# Patient Record
Sex: MALE | Race: ASIAN | NOT HISPANIC OR LATINO
[De-identification: names, ages, dates, MRNs, and addresses within clinical notes are randomized per-mention and may not be internally consistent; named-entity substitution may affect disease eponyms.]

---

## 2018-10-01 ENCOUNTER — APPOINTMENT (OUTPATIENT)
Dept: UROLOGY | Facility: CLINIC | Age: 63
End: 2018-10-01
Payer: MEDICARE

## 2018-10-01 VITALS
DIASTOLIC BLOOD PRESSURE: 70 MMHG | BODY MASS INDEX: 27.62 KG/M2 | RESPIRATION RATE: 16 BRPM | HEIGHT: 67 IN | HEART RATE: 76 BPM | TEMPERATURE: 98.5 F | OXYGEN SATURATION: 98 % | WEIGHT: 176 LBS | SYSTOLIC BLOOD PRESSURE: 150 MMHG

## 2018-10-01 DIAGNOSIS — I10 ESSENTIAL (PRIMARY) HYPERTENSION: ICD-10-CM

## 2018-10-01 DIAGNOSIS — Z86.39 PERSONAL HISTORY OF OTHER ENDOCRINE, NUTRITIONAL AND METABOLIC DISEASE: ICD-10-CM

## 2018-10-01 LAB
BILIRUB UR QL STRIP: NEGATIVE
CLARITY UR: CLEAR
COLLECTION METHOD: NORMAL
GLUCOSE UR-MCNC: NORMAL
HCG UR QL: 0.2 EU/DL
HGB UR QL STRIP.AUTO: NEGATIVE
KETONES UR-MCNC: NEGATIVE
LEUKOCYTE ESTERASE UR QL STRIP: NEGATIVE
NITRITE UR QL STRIP: NEGATIVE
PH UR STRIP: 5.5
PROT UR STRIP-MCNC: NEGATIVE
SP GR UR STRIP: <=1.005

## 2018-10-01 PROCEDURE — 51798 US URINE CAPACITY MEASURE: CPT

## 2018-10-01 PROCEDURE — 81003 URINALYSIS AUTO W/O SCOPE: CPT | Mod: QW

## 2018-10-01 PROCEDURE — 99204 OFFICE O/P NEW MOD 45 MIN: CPT | Mod: 25

## 2018-10-01 RX ORDER — FINASTERIDE 5 MG/1
5 TABLET, FILM COATED ORAL DAILY
Qty: 30 | Refills: 6 | Status: ACTIVE | COMMUNITY
Start: 2018-10-01 | End: 1900-01-01

## 2018-10-01 RX ORDER — MECLIZINE HYDROCHLORIDE 12.5 MG/1
12.5 TABLET ORAL
Refills: 0 | Status: ACTIVE | COMMUNITY

## 2018-10-01 RX ORDER — SITAGLIPTIN 100 MG/1
100 TABLET, FILM COATED ORAL
Refills: 0 | Status: ACTIVE | COMMUNITY

## 2018-10-01 RX ORDER — DONEPEZIL HYDROCHLORIDE 5 MG/1
5 TABLET ORAL
Refills: 0 | Status: ACTIVE | COMMUNITY

## 2018-10-01 RX ORDER — OXYBUTYNIN CHLORIDE 15 MG/1
15 TABLET, EXTENDED RELEASE ORAL
Refills: 0 | Status: ACTIVE | COMMUNITY

## 2018-10-01 RX ORDER — TOLTERODINE TARTRATE 4 MG/1
4 CAPSULE, EXTENDED RELEASE ORAL
Refills: 0 | Status: ACTIVE | COMMUNITY

## 2018-10-01 RX ORDER — ASCORBIC ACID 500 MG
500 TABLET ORAL
Refills: 0 | Status: ACTIVE | COMMUNITY

## 2018-10-01 RX ORDER — TAMSULOSIN HYDROCHLORIDE 0.4 MG/1
0.4 CAPSULE ORAL
Refills: 0 | Status: ACTIVE | COMMUNITY

## 2018-10-01 RX ORDER — ATORVASTATIN CALCIUM 40 MG/1
40 TABLET, FILM COATED ORAL
Refills: 0 | Status: ACTIVE | COMMUNITY

## 2018-10-01 RX ORDER — HYDROCHLOROTHIAZIDE 25 MG/1
25 TABLET ORAL
Refills: 0 | Status: ACTIVE | COMMUNITY

## 2018-10-01 RX ORDER — LOSARTAN POTASSIUM 50 MG/1
50 TABLET, FILM COATED ORAL
Refills: 0 | Status: ACTIVE | COMMUNITY

## 2018-10-01 RX ORDER — MELOXICAM 15 MG/1
15 TABLET ORAL
Refills: 0 | Status: ACTIVE | COMMUNITY

## 2018-10-01 RX ORDER — ASPIRIN 81 MG
81 TABLET, DELAYED RELEASE (ENTERIC COATED) ORAL
Refills: 0 | Status: ACTIVE | COMMUNITY

## 2018-10-01 RX ORDER — ACETAMINOPHEN 500 MG/1
500 TABLET ORAL
Refills: 0 | Status: ACTIVE | COMMUNITY

## 2018-10-01 RX ORDER — GLIPIZIDE AND METFORMIN HYDROCHLORIDE 2.5; 5 MG/1; MG/1
2.5-5 TABLET, FILM COATED ORAL
Refills: 0 | Status: ACTIVE | COMMUNITY

## 2018-10-01 RX ORDER — AMLODIPINE BESYLATE 10 MG/1
10 TABLET ORAL
Refills: 0 | Status: ACTIVE | COMMUNITY

## 2018-10-15 ENCOUNTER — APPOINTMENT (OUTPATIENT)
Dept: UROLOGY | Facility: CLINIC | Age: 63
End: 2018-10-15
Payer: MEDICARE

## 2018-10-15 VITALS
BODY MASS INDEX: 28.19 KG/M2 | SYSTOLIC BLOOD PRESSURE: 153 MMHG | RESPIRATION RATE: 18 BRPM | TEMPERATURE: 97.9 F | WEIGHT: 180 LBS | DIASTOLIC BLOOD PRESSURE: 69 MMHG | HEART RATE: 69 BPM | OXYGEN SATURATION: 97 %

## 2018-10-15 PROCEDURE — 51798 US URINE CAPACITY MEASURE: CPT

## 2018-10-15 PROCEDURE — 99213 OFFICE O/P EST LOW 20 MIN: CPT | Mod: 25

## 2018-10-15 RX ORDER — TAMSULOSIN HYDROCHLORIDE 0.4 MG/1
0.4 CAPSULE ORAL
Qty: 30 | Refills: 5 | Status: ACTIVE | COMMUNITY
Start: 2018-10-15 | End: 1900-01-01

## 2018-10-15 RX ORDER — TOLTERODINE TARTRATE 4 MG/1
4 CAPSULE, EXTENDED RELEASE ORAL
Qty: 30 | Refills: 5 | Status: ACTIVE | COMMUNITY
Start: 2018-10-15 | End: 1900-01-01

## 2018-10-15 RX ORDER — SOLIFENACIN SUCCINATE 10 MG/1
10 TABLET, FILM COATED ORAL
Qty: 30 | Refills: 5 | Status: ACTIVE | COMMUNITY
Start: 2018-10-15 | End: 1900-01-01

## 2018-12-04 ENCOUNTER — APPOINTMENT (OUTPATIENT)
Dept: UROLOGY | Facility: CLINIC | Age: 63
End: 2018-12-04
Payer: MEDICARE

## 2018-12-04 VITALS
BODY MASS INDEX: 28.19 KG/M2 | SYSTOLIC BLOOD PRESSURE: 158 MMHG | HEART RATE: 66 BPM | DIASTOLIC BLOOD PRESSURE: 72 MMHG | RESPIRATION RATE: 18 BRPM | TEMPERATURE: 97.6 F | WEIGHT: 180 LBS | OXYGEN SATURATION: 98 %

## 2018-12-04 DIAGNOSIS — N40.1 BENIGN PROSTATIC HYPERPLASIA WITH LOWER URINARY TRACT SYMPMS: ICD-10-CM

## 2018-12-04 DIAGNOSIS — N13.8 BENIGN PROSTATIC HYPERPLASIA WITH LOWER URINARY TRACT SYMPMS: ICD-10-CM

## 2018-12-04 DIAGNOSIS — N39.41 URGE INCONTINENCE: ICD-10-CM

## 2018-12-04 DIAGNOSIS — Z00.00 ENCOUNTER FOR GENERAL ADULT MEDICAL EXAMINATION W/OUT ABNORMAL FINDINGS: ICD-10-CM

## 2018-12-04 DIAGNOSIS — E11.9 TYPE 2 DIABETES MELLITUS W/OUT COMPLICATIONS: ICD-10-CM

## 2018-12-04 PROCEDURE — 99213 OFFICE O/P EST LOW 20 MIN: CPT

## 2018-12-05 PROBLEM — N40.1 BPH WITH OBSTRUCTION/LOWER URINARY TRACT SYMPTOMS: Status: ACTIVE | Noted: 2018-10-01

## 2018-12-05 PROBLEM — N39.41 URGE INCONTINENCE OF URINE: Status: ACTIVE | Noted: 2018-10-01

## 2018-12-05 PROBLEM — Z00.00 ENCOUNTER FOR PREVENTIVE HEALTH EXAMINATION: Status: ACTIVE | Noted: 2018-09-24

## 2021-01-01 ENCOUNTER — INPATIENT (INPATIENT)
Facility: HOSPITAL | Age: 66
LOS: 19 days | DRG: 100 | End: 2021-11-16
Attending: INTERNAL MEDICINE | Admitting: INTERNAL MEDICINE
Payer: COMMERCIAL

## 2021-01-01 VITALS — RESPIRATION RATE: 16 BRPM | HEART RATE: 76 BPM | HEIGHT: 70 IN | OXYGEN SATURATION: 100 %

## 2021-01-01 VITALS — OXYGEN SATURATION: 99 % | HEART RATE: 73 BPM

## 2021-01-01 DIAGNOSIS — Z71.89 OTHER SPECIFIED COUNSELING: ICD-10-CM

## 2021-01-01 DIAGNOSIS — Z51.5 ENCOUNTER FOR PALLIATIVE CARE: ICD-10-CM

## 2021-01-01 DIAGNOSIS — G93.40 ENCEPHALOPATHY, UNSPECIFIED: ICD-10-CM

## 2021-01-01 DIAGNOSIS — K13.79 OTHER LESIONS OF ORAL MUCOSA: ICD-10-CM

## 2021-01-01 DIAGNOSIS — R53.2 FUNCTIONAL QUADRIPLEGIA: ICD-10-CM

## 2021-01-01 DIAGNOSIS — E11.65 TYPE 2 DIABETES MELLITUS WITH HYPERGLYCEMIA: ICD-10-CM

## 2021-01-01 DIAGNOSIS — E78.5 HYPERLIPIDEMIA, UNSPECIFIED: ICD-10-CM

## 2021-01-01 DIAGNOSIS — I10 ESSENTIAL (PRIMARY) HYPERTENSION: ICD-10-CM

## 2021-01-01 DIAGNOSIS — R56.9 UNSPECIFIED CONVULSIONS: ICD-10-CM

## 2021-01-01 DIAGNOSIS — J96.00 ACUTE RESPIRATORY FAILURE, UNSPECIFIED WHETHER WITH HYPOXIA OR HYPERCAPNIA: ICD-10-CM

## 2021-01-01 LAB
-  AMIKACIN: SIGNIFICANT CHANGE UP
-  AMPICILLIN/SULBACTAM: SIGNIFICANT CHANGE UP
-  CEFEPIME: SIGNIFICANT CHANGE UP
-  CEFTAZIDIME: SIGNIFICANT CHANGE UP
-  CIPROFLOXACIN: SIGNIFICANT CHANGE UP
-  GENTAMICIN: SIGNIFICANT CHANGE UP
-  IMIPENEM: SIGNIFICANT CHANGE UP
-  LEVOFLOXACIN: SIGNIFICANT CHANGE UP
-  MEROPENEM: SIGNIFICANT CHANGE UP
-  PIPERACILLIN/TAZOBACTAM: SIGNIFICANT CHANGE UP
-  TOBRAMYCIN: SIGNIFICANT CHANGE UP
-  TRIMETHOPRIM/SULFAMETHOXAZOLE: SIGNIFICANT CHANGE UP
A1C WITH ESTIMATED AVERAGE GLUCOSE RESULT: 14.8 % — HIGH (ref 4–5.6)
ALBUMIN SERPL ELPH-MCNC: 1.9 G/DL — LOW (ref 3.3–5)
ALBUMIN SERPL ELPH-MCNC: 2 G/DL — LOW (ref 3.3–5)
ALBUMIN SERPL ELPH-MCNC: 2 G/DL — LOW (ref 3.3–5)
ALBUMIN SERPL ELPH-MCNC: 2.1 G/DL — LOW (ref 3.3–5)
ALBUMIN SERPL ELPH-MCNC: 2.2 G/DL — LOW (ref 3.3–5)
ALBUMIN SERPL ELPH-MCNC: 2.3 G/DL — LOW (ref 3.3–5)
ALBUMIN SERPL ELPH-MCNC: 2.3 G/DL — LOW (ref 3.3–5)
ALBUMIN SERPL ELPH-MCNC: 2.4 G/DL — LOW (ref 3.3–5)
ALBUMIN SERPL ELPH-MCNC: 2.5 G/DL — LOW (ref 3.3–5)
ALP SERPL-CCNC: 100 U/L — SIGNIFICANT CHANGE UP (ref 40–120)
ALP SERPL-CCNC: 105 U/L — SIGNIFICANT CHANGE UP (ref 40–120)
ALP SERPL-CCNC: 116 U/L — SIGNIFICANT CHANGE UP (ref 40–120)
ALP SERPL-CCNC: 128 U/L — HIGH (ref 40–120)
ALP SERPL-CCNC: 72 U/L — SIGNIFICANT CHANGE UP (ref 40–120)
ALP SERPL-CCNC: 73 U/L — SIGNIFICANT CHANGE UP (ref 40–120)
ALP SERPL-CCNC: 77 U/L — SIGNIFICANT CHANGE UP (ref 40–120)
ALP SERPL-CCNC: 78 U/L — SIGNIFICANT CHANGE UP (ref 40–120)
ALP SERPL-CCNC: 85 U/L — SIGNIFICANT CHANGE UP (ref 40–120)
ALP SERPL-CCNC: 85 U/L — SIGNIFICANT CHANGE UP (ref 40–120)
ALP SERPL-CCNC: 87 U/L — SIGNIFICANT CHANGE UP (ref 40–120)
ALP SERPL-CCNC: 87 U/L — SIGNIFICANT CHANGE UP (ref 40–120)
ALP SERPL-CCNC: 88 U/L — SIGNIFICANT CHANGE UP (ref 40–120)
ALP SERPL-CCNC: 90 U/L — SIGNIFICANT CHANGE UP (ref 40–120)
ALP SERPL-CCNC: 90 U/L — SIGNIFICANT CHANGE UP (ref 40–120)
ALP SERPL-CCNC: 98 U/L — SIGNIFICANT CHANGE UP (ref 40–120)
ALT FLD-CCNC: 10 U/L — SIGNIFICANT CHANGE UP (ref 10–45)
ALT FLD-CCNC: 10 U/L — SIGNIFICANT CHANGE UP (ref 10–45)
ALT FLD-CCNC: 11 U/L — SIGNIFICANT CHANGE UP (ref 10–45)
ALT FLD-CCNC: 12 U/L — SIGNIFICANT CHANGE UP (ref 10–45)
ALT FLD-CCNC: 13 U/L — SIGNIFICANT CHANGE UP (ref 10–45)
ALT FLD-CCNC: 14 U/L — SIGNIFICANT CHANGE UP (ref 10–45)
ALT FLD-CCNC: 16 U/L — SIGNIFICANT CHANGE UP (ref 10–45)
ALT FLD-CCNC: 19 U/L — SIGNIFICANT CHANGE UP (ref 10–45)
ALT FLD-CCNC: 19 U/L — SIGNIFICANT CHANGE UP (ref 10–45)
ALT FLD-CCNC: 20 U/L — SIGNIFICANT CHANGE UP (ref 10–45)
ALT FLD-CCNC: 21 U/L — SIGNIFICANT CHANGE UP (ref 10–45)
AMMONIA BLD-MCNC: 30 UMOL/L — SIGNIFICANT CHANGE UP (ref 11–55)
ANION GAP SERPL CALC-SCNC: 10 MMOL/L — SIGNIFICANT CHANGE UP (ref 5–17)
ANION GAP SERPL CALC-SCNC: 10 MMOL/L — SIGNIFICANT CHANGE UP (ref 5–17)
ANION GAP SERPL CALC-SCNC: 11 MMOL/L — SIGNIFICANT CHANGE UP (ref 5–17)
ANION GAP SERPL CALC-SCNC: 13 MMOL/L — SIGNIFICANT CHANGE UP (ref 5–17)
ANION GAP SERPL CALC-SCNC: 14 MMOL/L — SIGNIFICANT CHANGE UP (ref 5–17)
ANION GAP SERPL CALC-SCNC: 14 MMOL/L — SIGNIFICANT CHANGE UP (ref 5–17)
ANION GAP SERPL CALC-SCNC: 15 MMOL/L — SIGNIFICANT CHANGE UP (ref 5–17)
ANION GAP SERPL CALC-SCNC: 7 MMOL/L — SIGNIFICANT CHANGE UP (ref 5–17)
ANION GAP SERPL CALC-SCNC: 8 MMOL/L — SIGNIFICANT CHANGE UP (ref 5–17)
ANION GAP SERPL CALC-SCNC: 9 MMOL/L — SIGNIFICANT CHANGE UP (ref 5–17)
APPEARANCE CSF: CLEAR — SIGNIFICANT CHANGE UP
APPEARANCE SPUN FLD: COLORLESS — SIGNIFICANT CHANGE UP
APPEARANCE UR: ABNORMAL
APPEARANCE UR: CLEAR — SIGNIFICANT CHANGE UP
APPEARANCE UR: CLEAR — SIGNIFICANT CHANGE UP
APTT BLD: 31.6 SEC — SIGNIFICANT CHANGE UP (ref 27.5–35.5)
APTT BLD: 34.6 SEC — SIGNIFICANT CHANGE UP (ref 27.5–35.5)
APTT BLD: 35.2 SEC — SIGNIFICANT CHANGE UP (ref 27.5–35.5)
APTT BLD: 36 SEC — HIGH (ref 27.5–35.5)
APTT BLD: 37.5 SEC — HIGH (ref 27.5–35.5)
APTT BLD: 38.4 SEC — HIGH (ref 27.5–35.5)
APTT BLD: 39.5 SEC — HIGH (ref 27.5–35.5)
APTT BLD: 39.5 SEC — HIGH (ref 27.5–35.5)
APTT BLD: 45.1 SEC — HIGH (ref 27.5–35.5)
APTT BLD: 47 SEC — HIGH (ref 27.5–35.5)
APTT BLD: 50.5 SEC — HIGH (ref 27.5–35.5)
APTT BLD: 51 SEC — HIGH (ref 27.5–35.5)
ARBOVIRUS IGG PNL SER IF: SIGNIFICANT CHANGE UP
AST SERPL-CCNC: 27 U/L — SIGNIFICANT CHANGE UP (ref 10–40)
AST SERPL-CCNC: 28 U/L — SIGNIFICANT CHANGE UP (ref 10–40)
AST SERPL-CCNC: 30 U/L — SIGNIFICANT CHANGE UP (ref 10–40)
AST SERPL-CCNC: 30 U/L — SIGNIFICANT CHANGE UP (ref 10–40)
AST SERPL-CCNC: 34 U/L — SIGNIFICANT CHANGE UP (ref 10–40)
AST SERPL-CCNC: 36 U/L — SIGNIFICANT CHANGE UP (ref 10–40)
AST SERPL-CCNC: 39 U/L — SIGNIFICANT CHANGE UP (ref 10–40)
AST SERPL-CCNC: 41 U/L — HIGH (ref 10–40)
AST SERPL-CCNC: 42 U/L — HIGH (ref 10–40)
AST SERPL-CCNC: 44 U/L — HIGH (ref 10–40)
AST SERPL-CCNC: 47 U/L — HIGH (ref 10–40)
AST SERPL-CCNC: 49 U/L — HIGH (ref 10–40)
AST SERPL-CCNC: 55 U/L — HIGH (ref 10–40)
AST SERPL-CCNC: 56 U/L — HIGH (ref 10–40)
AST SERPL-CCNC: 59 U/L — HIGH (ref 10–40)
AST SERPL-CCNC: 71 U/L — HIGH (ref 10–40)
AUTO DIFF PNL BLD: NEGATIVE — SIGNIFICANT CHANGE UP
B BURGDOR DNA SPEC QL NAA+PROBE: NEGATIVE — SIGNIFICANT CHANGE UP
BACTERIA # UR AUTO: NEGATIVE — SIGNIFICANT CHANGE UP
BASE EXCESS BLDV CALC-SCNC: 5 MMOL/L — HIGH (ref -2–2)
BASE EXCESS BLDV CALC-SCNC: 6.6 MMOL/L — HIGH (ref -2–2)
BASE EXCESS BLDV CALC-SCNC: 6.7 MMOL/L — HIGH (ref -2–2)
BASOPHILS # BLD AUTO: 0.01 K/UL — SIGNIFICANT CHANGE UP (ref 0–0.2)
BASOPHILS # BLD AUTO: 0.02 K/UL — SIGNIFICANT CHANGE UP (ref 0–0.2)
BASOPHILS # BLD AUTO: 0.03 K/UL — SIGNIFICANT CHANGE UP (ref 0–0.2)
BASOPHILS # BLD AUTO: 0.03 K/UL — SIGNIFICANT CHANGE UP (ref 0–0.2)
BASOPHILS # BLD AUTO: 0.04 K/UL — SIGNIFICANT CHANGE UP (ref 0–0.2)
BASOPHILS NFR BLD AUTO: 0.1 % — SIGNIFICANT CHANGE UP (ref 0–2)
BASOPHILS NFR BLD AUTO: 0.2 % — SIGNIFICANT CHANGE UP (ref 0–2)
BASOPHILS NFR BLD AUTO: 0.3 % — SIGNIFICANT CHANGE UP (ref 0–2)
BILIRUB DIRECT SERPL-MCNC: <0.1 MG/DL — SIGNIFICANT CHANGE UP (ref 0–0.2)
BILIRUB INDIRECT FLD-MCNC: >0 MG/DL — LOW (ref 0.2–1)
BILIRUB SERPL-MCNC: 0.1 MG/DL — LOW (ref 0.2–1.2)
BILIRUB SERPL-MCNC: 0.2 MG/DL — SIGNIFICANT CHANGE UP (ref 0.2–1.2)
BILIRUB SERPL-MCNC: <0.1 MG/DL — LOW (ref 0.2–1.2)
BILIRUB UR-MCNC: NEGATIVE — SIGNIFICANT CHANGE UP
BLD GP AB SCN SERPL QL: NEGATIVE — SIGNIFICANT CHANGE UP
BUN SERPL-MCNC: 21 MG/DL — SIGNIFICANT CHANGE UP (ref 7–23)
BUN SERPL-MCNC: 23 MG/DL — SIGNIFICANT CHANGE UP (ref 7–23)
BUN SERPL-MCNC: 24 MG/DL — HIGH (ref 7–23)
BUN SERPL-MCNC: 24 MG/DL — HIGH (ref 7–23)
BUN SERPL-MCNC: 27 MG/DL — HIGH (ref 7–23)
BUN SERPL-MCNC: 28 MG/DL — HIGH (ref 7–23)
BUN SERPL-MCNC: 29 MG/DL — HIGH (ref 7–23)
BUN SERPL-MCNC: 31 MG/DL — HIGH (ref 7–23)
BUN SERPL-MCNC: 32 MG/DL — HIGH (ref 7–23)
BUN SERPL-MCNC: 33 MG/DL — HIGH (ref 7–23)
BUN SERPL-MCNC: 33 MG/DL — HIGH (ref 7–23)
BUN SERPL-MCNC: 34 MG/DL — HIGH (ref 7–23)
BUN SERPL-MCNC: 36 MG/DL — HIGH (ref 7–23)
BUN SERPL-MCNC: 39 MG/DL — HIGH (ref 7–23)
C-ANCA SER-ACNC: NEGATIVE — SIGNIFICANT CHANGE UP
C3 SERPL-MCNC: 153 MG/DL — SIGNIFICANT CHANGE UP (ref 81–157)
C4 SERPL-MCNC: 52 MG/DL — HIGH (ref 13–39)
CA-I SERPL-SCNC: 1.1 MMOL/L — LOW (ref 1.15–1.33)
CA-I SERPL-SCNC: 1.11 MMOL/L — LOW (ref 1.15–1.33)
CA-I SERPL-SCNC: 1.16 MMOL/L — SIGNIFICANT CHANGE UP (ref 1.15–1.33)
CALCIUM SERPL-MCNC: 7.6 MG/DL — LOW (ref 8.4–10.5)
CALCIUM SERPL-MCNC: 7.6 MG/DL — LOW (ref 8.4–10.5)
CALCIUM SERPL-MCNC: 7.7 MG/DL — LOW (ref 8.4–10.5)
CALCIUM SERPL-MCNC: 7.7 MG/DL — LOW (ref 8.4–10.5)
CALCIUM SERPL-MCNC: 7.8 MG/DL — LOW (ref 8.4–10.5)
CALCIUM SERPL-MCNC: 7.9 MG/DL — LOW (ref 8.4–10.5)
CALCIUM SERPL-MCNC: 8 MG/DL — LOW (ref 8.4–10.5)
CALCIUM SERPL-MCNC: 8 MG/DL — LOW (ref 8.4–10.5)
CALCIUM SERPL-MCNC: 8.2 MG/DL — LOW (ref 8.4–10.5)
CALCIUM SERPL-MCNC: 8.2 MG/DL — LOW (ref 8.4–10.5)
CALCIUM SERPL-MCNC: 8.3 MG/DL — LOW (ref 8.4–10.5)
CHLORIDE BLDV-SCNC: 105 MMOL/L — SIGNIFICANT CHANGE UP (ref 96–108)
CHLORIDE BLDV-SCNC: 109 MMOL/L — HIGH (ref 96–108)
CHLORIDE BLDV-SCNC: 109 MMOL/L — HIGH (ref 96–108)
CHLORIDE SERPL-SCNC: 102 MMOL/L — SIGNIFICANT CHANGE UP (ref 96–108)
CHLORIDE SERPL-SCNC: 103 MMOL/L — SIGNIFICANT CHANGE UP (ref 96–108)
CHLORIDE SERPL-SCNC: 104 MMOL/L — SIGNIFICANT CHANGE UP (ref 96–108)
CHLORIDE SERPL-SCNC: 104 MMOL/L — SIGNIFICANT CHANGE UP (ref 96–108)
CHLORIDE SERPL-SCNC: 106 MMOL/L — SIGNIFICANT CHANGE UP (ref 96–108)
CHLORIDE SERPL-SCNC: 107 MMOL/L — SIGNIFICANT CHANGE UP (ref 96–108)
CHLORIDE SERPL-SCNC: 107 MMOL/L — SIGNIFICANT CHANGE UP (ref 96–108)
CHLORIDE SERPL-SCNC: 108 MMOL/L — SIGNIFICANT CHANGE UP (ref 96–108)
CHLORIDE SERPL-SCNC: 109 MMOL/L — HIGH (ref 96–108)
CHLORIDE SERPL-SCNC: 110 MMOL/L — HIGH (ref 96–108)
CHLORIDE SERPL-SCNC: 113 MMOL/L — HIGH (ref 96–108)
CHLORIDE SERPL-SCNC: 114 MMOL/L — HIGH (ref 96–108)
CK MB BLD-MCNC: 1.6 % — SIGNIFICANT CHANGE UP (ref 0–3.5)
CK MB CFR SERPL CALC: 1.1 NG/ML — SIGNIFICANT CHANGE UP (ref 0–6.7)
CK SERPL-CCNC: 59 U/L — SIGNIFICANT CHANGE UP (ref 30–200)
CK SERPL-CCNC: 63 U/L — SIGNIFICANT CHANGE UP (ref 30–200)
CK SERPL-CCNC: 68 U/L — SIGNIFICANT CHANGE UP (ref 30–200)
CO2 BLDV-SCNC: 31 MMOL/L — HIGH (ref 22–26)
CO2 BLDV-SCNC: 33 MMOL/L — HIGH (ref 22–26)
CO2 BLDV-SCNC: 34 MMOL/L — HIGH (ref 22–26)
CO2 SERPL-SCNC: 21 MMOL/L — LOW (ref 22–31)
CO2 SERPL-SCNC: 22 MMOL/L — SIGNIFICANT CHANGE UP (ref 22–31)
CO2 SERPL-SCNC: 23 MMOL/L — SIGNIFICANT CHANGE UP (ref 22–31)
CO2 SERPL-SCNC: 24 MMOL/L — SIGNIFICANT CHANGE UP (ref 22–31)
CO2 SERPL-SCNC: 24 MMOL/L — SIGNIFICANT CHANGE UP (ref 22–31)
CO2 SERPL-SCNC: 25 MMOL/L — SIGNIFICANT CHANGE UP (ref 22–31)
CO2 SERPL-SCNC: 26 MMOL/L — SIGNIFICANT CHANGE UP (ref 22–31)
CO2 SERPL-SCNC: 26 MMOL/L — SIGNIFICANT CHANGE UP (ref 22–31)
CO2 SERPL-SCNC: 27 MMOL/L — SIGNIFICANT CHANGE UP (ref 22–31)
CO2 SERPL-SCNC: 27 MMOL/L — SIGNIFICANT CHANGE UP (ref 22–31)
CO2 SERPL-SCNC: 28 MMOL/L — SIGNIFICANT CHANGE UP (ref 22–31)
COLOR CSF: SIGNIFICANT CHANGE UP
COLOR SPEC: SIGNIFICANT CHANGE UP
COLOR SPEC: YELLOW — SIGNIFICANT CHANGE UP
COLOR SPEC: YELLOW — SIGNIFICANT CHANGE UP
COVID-19 NUCLEOCAPSID GAM AB INTERP: POSITIVE
COVID-19 NUCLEOCAPSID TOTAL GAM ANTIBODY RESULT: 32.6 INDEX — HIGH
COVID-19 SPIKE DOMAIN AB INTERP: POSITIVE
COVID-19 SPIKE DOMAIN ANTIBODY RESULT: >250 U/ML — HIGH
CREAT SERPL-MCNC: 0.89 MG/DL — SIGNIFICANT CHANGE UP (ref 0.5–1.3)
CREAT SERPL-MCNC: 0.96 MG/DL — SIGNIFICANT CHANGE UP (ref 0.5–1.3)
CREAT SERPL-MCNC: 0.97 MG/DL — SIGNIFICANT CHANGE UP (ref 0.5–1.3)
CREAT SERPL-MCNC: 0.98 MG/DL — SIGNIFICANT CHANGE UP (ref 0.5–1.3)
CREAT SERPL-MCNC: 0.99 MG/DL — SIGNIFICANT CHANGE UP (ref 0.5–1.3)
CREAT SERPL-MCNC: 1.01 MG/DL — SIGNIFICANT CHANGE UP (ref 0.5–1.3)
CREAT SERPL-MCNC: 1.03 MG/DL — SIGNIFICANT CHANGE UP (ref 0.5–1.3)
CREAT SERPL-MCNC: 1.04 MG/DL — SIGNIFICANT CHANGE UP (ref 0.5–1.3)
CREAT SERPL-MCNC: 1.05 MG/DL — SIGNIFICANT CHANGE UP (ref 0.5–1.3)
CREAT SERPL-MCNC: 1.07 MG/DL — SIGNIFICANT CHANGE UP (ref 0.5–1.3)
CREAT SERPL-MCNC: 1.08 MG/DL — SIGNIFICANT CHANGE UP (ref 0.5–1.3)
CREAT SERPL-MCNC: 1.08 MG/DL — SIGNIFICANT CHANGE UP (ref 0.5–1.3)
CREAT SERPL-MCNC: 1.11 MG/DL — SIGNIFICANT CHANGE UP (ref 0.5–1.3)
CREAT SERPL-MCNC: 1.16 MG/DL — SIGNIFICANT CHANGE UP (ref 0.5–1.3)
CREAT SERPL-MCNC: 1.26 MG/DL — SIGNIFICANT CHANGE UP (ref 0.5–1.3)
CREAT SERPL-MCNC: 1.26 MG/DL — SIGNIFICANT CHANGE UP (ref 0.5–1.3)
CREAT SERPL-MCNC: 1.34 MG/DL — HIGH (ref 0.5–1.3)
CREAT SERPL-MCNC: 1.35 MG/DL — HIGH (ref 0.5–1.3)
CRP SERPL-MCNC: 35 MG/L — HIGH (ref 0–4)
CRYOGLOB SERPL-MCNC: NEGATIVE — SIGNIFICANT CHANGE UP
CRYPTOC AG CSF-ACNC: NEGATIVE — SIGNIFICANT CHANGE UP
CSF PCR RESULT: SIGNIFICANT CHANGE UP
CULTURE RESULTS: NO GROWTH — SIGNIFICANT CHANGE UP
CULTURE RESULTS: SIGNIFICANT CHANGE UP
D DIMER BLD IA.RAPID-MCNC: 1046 NG/ML DDU — HIGH
D DIMER BLD IA.RAPID-MCNC: 845 NG/ML DDU — HIGH
DESMETHYL LACOSAMIDE: <0.5 UG/ML — SIGNIFICANT CHANGE UP
DIFF PNL FLD: ABNORMAL
DIFF PNL FLD: ABNORMAL
DIFF PNL FLD: NEGATIVE — SIGNIFICANT CHANGE UP
EBV EA AB SER IA-ACNC: <5 U/ML — SIGNIFICANT CHANGE UP
EBV EA AB TITR SER IF: POSITIVE
EBV EA IGG SER-ACNC: NEGATIVE — SIGNIFICANT CHANGE UP
EBV NA IGG SER IA-ACNC: 369 U/ML — HIGH
EBV PATRN SPEC IB-IMP: SIGNIFICANT CHANGE UP
EBV VCA IGG AVIDITY SER QL IA: NEGATIVE — SIGNIFICANT CHANGE UP
EBV VCA IGM SER IA-ACNC: <10 U/ML — SIGNIFICANT CHANGE UP
EBV VCA IGM SER IA-ACNC: <10 U/ML — SIGNIFICANT CHANGE UP
EBV VCA IGM TITR FLD: NEGATIVE — SIGNIFICANT CHANGE UP
EOSINOPHIL # BLD AUTO: 0 K/UL — SIGNIFICANT CHANGE UP (ref 0–0.5)
EOSINOPHIL # BLD AUTO: 0.02 K/UL — SIGNIFICANT CHANGE UP (ref 0–0.5)
EOSINOPHIL # BLD AUTO: 0.12 K/UL — SIGNIFICANT CHANGE UP (ref 0–0.5)
EOSINOPHIL # BLD AUTO: 0.13 K/UL — SIGNIFICANT CHANGE UP (ref 0–0.5)
EOSINOPHIL # BLD AUTO: 0.16 K/UL — SIGNIFICANT CHANGE UP (ref 0–0.5)
EOSINOPHIL # BLD AUTO: 0.18 K/UL — SIGNIFICANT CHANGE UP (ref 0–0.5)
EOSINOPHIL # BLD AUTO: 0.21 K/UL — SIGNIFICANT CHANGE UP (ref 0–0.5)
EOSINOPHIL # BLD AUTO: 0.21 K/UL — SIGNIFICANT CHANGE UP (ref 0–0.5)
EOSINOPHIL # BLD AUTO: 0.26 K/UL — SIGNIFICANT CHANGE UP (ref 0–0.5)
EOSINOPHIL # BLD AUTO: 0.27 K/UL — SIGNIFICANT CHANGE UP (ref 0–0.5)
EOSINOPHIL # BLD AUTO: 0.31 K/UL — SIGNIFICANT CHANGE UP (ref 0–0.5)
EOSINOPHIL NFR BLD AUTO: 0 % — SIGNIFICANT CHANGE UP (ref 0–6)
EOSINOPHIL NFR BLD AUTO: 0.2 % — SIGNIFICANT CHANGE UP (ref 0–6)
EOSINOPHIL NFR BLD AUTO: 1 % — SIGNIFICANT CHANGE UP (ref 0–6)
EOSINOPHIL NFR BLD AUTO: 1.3 % — SIGNIFICANT CHANGE UP (ref 0–6)
EOSINOPHIL NFR BLD AUTO: 1.6 % — SIGNIFICANT CHANGE UP (ref 0–6)
EOSINOPHIL NFR BLD AUTO: 1.6 % — SIGNIFICANT CHANGE UP (ref 0–6)
EOSINOPHIL NFR BLD AUTO: 1.7 % — SIGNIFICANT CHANGE UP (ref 0–6)
EOSINOPHIL NFR BLD AUTO: 2.1 % — SIGNIFICANT CHANGE UP (ref 0–6)
EOSINOPHIL NFR BLD AUTO: 2.7 % — SIGNIFICANT CHANGE UP (ref 0–6)
EOSINOPHIL NFR BLD AUTO: 2.8 % — SIGNIFICANT CHANGE UP (ref 0–6)
EOSINOPHIL NFR BLD AUTO: 3.3 % — SIGNIFICANT CHANGE UP (ref 0–6)
EPI CELLS # UR: 1 /HPF — SIGNIFICANT CHANGE UP
EPI CELLS # UR: 10 — SIGNIFICANT CHANGE UP
EPI CELLS # UR: 21 /HPF — HIGH
ERYTHROCYTE [SEDIMENTATION RATE] IN BLOOD: 107 MM/HR — HIGH (ref 0–20)
ESTIMATED AVERAGE GLUCOSE: 378 MG/DL — HIGH (ref 68–114)
FERRITIN SERPL-MCNC: 476 NG/ML — HIGH (ref 30–400)
FIBRINOGEN PPP-MCNC: 1004 MG/DL — HIGH (ref 290–520)
FIBRINOGEN PPP-MCNC: 1166 MG/DL — HIGH (ref 290–520)
GAS PNL BLDA: SIGNIFICANT CHANGE UP
GAS PNL BLDV: 137 MMOL/L — SIGNIFICANT CHANGE UP (ref 136–145)
GAS PNL BLDV: 140 MMOL/L — SIGNIFICANT CHANGE UP (ref 136–145)
GAS PNL BLDV: 141 MMOL/L — SIGNIFICANT CHANGE UP (ref 136–145)
GAS PNL BLDV: SIGNIFICANT CHANGE UP
GBM IGG SER-ACNC: 3 — SIGNIFICANT CHANGE UP (ref 0–20)
GLUCOSE BLDC GLUCOMTR-MCNC: 109 MG/DL — HIGH (ref 70–99)
GLUCOSE BLDC GLUCOMTR-MCNC: 109 MG/DL — HIGH (ref 70–99)
GLUCOSE BLDC GLUCOMTR-MCNC: 110 MG/DL — HIGH (ref 70–99)
GLUCOSE BLDC GLUCOMTR-MCNC: 111 MG/DL — HIGH (ref 70–99)
GLUCOSE BLDC GLUCOMTR-MCNC: 111 MG/DL — HIGH (ref 70–99)
GLUCOSE BLDC GLUCOMTR-MCNC: 119 MG/DL — HIGH (ref 70–99)
GLUCOSE BLDC GLUCOMTR-MCNC: 120 MG/DL — HIGH (ref 70–99)
GLUCOSE BLDC GLUCOMTR-MCNC: 122 MG/DL — HIGH (ref 70–99)
GLUCOSE BLDC GLUCOMTR-MCNC: 129 MG/DL — HIGH (ref 70–99)
GLUCOSE BLDC GLUCOMTR-MCNC: 130 MG/DL — HIGH (ref 70–99)
GLUCOSE BLDC GLUCOMTR-MCNC: 131 MG/DL — HIGH (ref 70–99)
GLUCOSE BLDC GLUCOMTR-MCNC: 133 MG/DL — HIGH (ref 70–99)
GLUCOSE BLDC GLUCOMTR-MCNC: 136 MG/DL — HIGH (ref 70–99)
GLUCOSE BLDC GLUCOMTR-MCNC: 137 MG/DL — HIGH (ref 70–99)
GLUCOSE BLDC GLUCOMTR-MCNC: 138 MG/DL — HIGH (ref 70–99)
GLUCOSE BLDC GLUCOMTR-MCNC: 140 MG/DL — HIGH (ref 70–99)
GLUCOSE BLDC GLUCOMTR-MCNC: 144 MG/DL — HIGH (ref 70–99)
GLUCOSE BLDC GLUCOMTR-MCNC: 144 MG/DL — HIGH (ref 70–99)
GLUCOSE BLDC GLUCOMTR-MCNC: 153 MG/DL — HIGH (ref 70–99)
GLUCOSE BLDC GLUCOMTR-MCNC: 159 MG/DL — HIGH (ref 70–99)
GLUCOSE BLDC GLUCOMTR-MCNC: 166 MG/DL — HIGH (ref 70–99)
GLUCOSE BLDC GLUCOMTR-MCNC: 168 MG/DL — HIGH (ref 70–99)
GLUCOSE BLDC GLUCOMTR-MCNC: 170 MG/DL — HIGH (ref 70–99)
GLUCOSE BLDC GLUCOMTR-MCNC: 171 MG/DL — HIGH (ref 70–99)
GLUCOSE BLDC GLUCOMTR-MCNC: 171 MG/DL — HIGH (ref 70–99)
GLUCOSE BLDC GLUCOMTR-MCNC: 178 MG/DL — HIGH (ref 70–99)
GLUCOSE BLDC GLUCOMTR-MCNC: 179 MG/DL — HIGH (ref 70–99)
GLUCOSE BLDC GLUCOMTR-MCNC: 182 MG/DL — HIGH (ref 70–99)
GLUCOSE BLDC GLUCOMTR-MCNC: 183 MG/DL — HIGH (ref 70–99)
GLUCOSE BLDC GLUCOMTR-MCNC: 185 MG/DL — HIGH (ref 70–99)
GLUCOSE BLDC GLUCOMTR-MCNC: 187 MG/DL — HIGH (ref 70–99)
GLUCOSE BLDC GLUCOMTR-MCNC: 190 MG/DL — HIGH (ref 70–99)
GLUCOSE BLDC GLUCOMTR-MCNC: 193 MG/DL — HIGH (ref 70–99)
GLUCOSE BLDC GLUCOMTR-MCNC: 197 MG/DL — HIGH (ref 70–99)
GLUCOSE BLDC GLUCOMTR-MCNC: 199 MG/DL — HIGH (ref 70–99)
GLUCOSE BLDC GLUCOMTR-MCNC: 199 MG/DL — HIGH (ref 70–99)
GLUCOSE BLDC GLUCOMTR-MCNC: 200 MG/DL — HIGH (ref 70–99)
GLUCOSE BLDC GLUCOMTR-MCNC: 202 MG/DL — HIGH (ref 70–99)
GLUCOSE BLDC GLUCOMTR-MCNC: 205 MG/DL — HIGH (ref 70–99)
GLUCOSE BLDC GLUCOMTR-MCNC: 210 MG/DL — HIGH (ref 70–99)
GLUCOSE BLDC GLUCOMTR-MCNC: 225 MG/DL — HIGH (ref 70–99)
GLUCOSE BLDC GLUCOMTR-MCNC: 238 MG/DL — HIGH (ref 70–99)
GLUCOSE BLDC GLUCOMTR-MCNC: 239 MG/DL — HIGH (ref 70–99)
GLUCOSE BLDC GLUCOMTR-MCNC: 239 MG/DL — HIGH (ref 70–99)
GLUCOSE BLDC GLUCOMTR-MCNC: 245 MG/DL — HIGH (ref 70–99)
GLUCOSE BLDC GLUCOMTR-MCNC: 256 MG/DL — HIGH (ref 70–99)
GLUCOSE BLDC GLUCOMTR-MCNC: 257 MG/DL — HIGH (ref 70–99)
GLUCOSE BLDC GLUCOMTR-MCNC: 275 MG/DL — HIGH (ref 70–99)
GLUCOSE BLDC GLUCOMTR-MCNC: 282 MG/DL — HIGH (ref 70–99)
GLUCOSE BLDC GLUCOMTR-MCNC: 296 MG/DL — HIGH (ref 70–99)
GLUCOSE BLDC GLUCOMTR-MCNC: 303 MG/DL — HIGH (ref 70–99)
GLUCOSE BLDC GLUCOMTR-MCNC: 312 MG/DL — HIGH (ref 70–99)
GLUCOSE BLDC GLUCOMTR-MCNC: 319 MG/DL — HIGH (ref 70–99)
GLUCOSE BLDC GLUCOMTR-MCNC: 322 MG/DL — HIGH (ref 70–99)
GLUCOSE BLDC GLUCOMTR-MCNC: 329 MG/DL — HIGH (ref 70–99)
GLUCOSE BLDC GLUCOMTR-MCNC: 65 MG/DL — LOW (ref 70–99)
GLUCOSE BLDC GLUCOMTR-MCNC: 70 MG/DL — SIGNIFICANT CHANGE UP (ref 70–99)
GLUCOSE BLDC GLUCOMTR-MCNC: 76 MG/DL — SIGNIFICANT CHANGE UP (ref 70–99)
GLUCOSE BLDC GLUCOMTR-MCNC: 79 MG/DL — SIGNIFICANT CHANGE UP (ref 70–99)
GLUCOSE BLDC GLUCOMTR-MCNC: 83 MG/DL — SIGNIFICANT CHANGE UP (ref 70–99)
GLUCOSE BLDC GLUCOMTR-MCNC: 99 MG/DL — SIGNIFICANT CHANGE UP (ref 70–99)
GLUCOSE BLDV-MCNC: 114 MG/DL — HIGH (ref 70–99)
GLUCOSE BLDV-MCNC: 142 MG/DL — HIGH (ref 70–99)
GLUCOSE BLDV-MCNC: 180 MG/DL — HIGH (ref 70–99)
GLUCOSE CSF-MCNC: 189 MG/DL — HIGH (ref 40–70)
GLUCOSE SERPL-MCNC: 121 MG/DL — HIGH (ref 70–99)
GLUCOSE SERPL-MCNC: 131 MG/DL — HIGH (ref 70–99)
GLUCOSE SERPL-MCNC: 136 MG/DL — HIGH (ref 70–99)
GLUCOSE SERPL-MCNC: 139 MG/DL — HIGH (ref 70–99)
GLUCOSE SERPL-MCNC: 141 MG/DL — HIGH (ref 70–99)
GLUCOSE SERPL-MCNC: 152 MG/DL — HIGH (ref 70–99)
GLUCOSE SERPL-MCNC: 155 MG/DL — HIGH (ref 70–99)
GLUCOSE SERPL-MCNC: 173 MG/DL — HIGH (ref 70–99)
GLUCOSE SERPL-MCNC: 180 MG/DL — HIGH (ref 70–99)
GLUCOSE SERPL-MCNC: 194 MG/DL — HIGH (ref 70–99)
GLUCOSE SERPL-MCNC: 196 MG/DL — HIGH (ref 70–99)
GLUCOSE SERPL-MCNC: 211 MG/DL — HIGH (ref 70–99)
GLUCOSE SERPL-MCNC: 228 MG/DL — HIGH (ref 70–99)
GLUCOSE SERPL-MCNC: 274 MG/DL — HIGH (ref 70–99)
GLUCOSE SERPL-MCNC: 306 MG/DL — HIGH (ref 70–99)
GLUCOSE SERPL-MCNC: 362 MG/DL — HIGH (ref 70–99)
GLUCOSE SERPL-MCNC: 391 MG/DL — HIGH (ref 70–99)
GLUCOSE SERPL-MCNC: 83 MG/DL — SIGNIFICANT CHANGE UP (ref 70–99)
GLUCOSE UR QL: ABNORMAL
GLUCOSE UR QL: ABNORMAL
GLUCOSE UR QL: NEGATIVE — SIGNIFICANT CHANGE UP
GRAM STN FLD: SIGNIFICANT CHANGE UP
GRAN CASTS # UR COMP ASSIST: ABNORMAL /LPF
HCO3 BLDV-SCNC: 30 MMOL/L — HIGH (ref 22–29)
HCO3 BLDV-SCNC: 32 MMOL/L — HIGH (ref 22–29)
HCO3 BLDV-SCNC: 33 MMOL/L — HIGH (ref 22–29)
HCT VFR BLD CALC: 26 % — LOW (ref 39–50)
HCT VFR BLD CALC: 29.5 % — LOW (ref 39–50)
HCT VFR BLD CALC: 30.5 % — LOW (ref 39–50)
HCT VFR BLD CALC: 31.5 % — LOW (ref 39–50)
HCT VFR BLD CALC: 31.7 % — LOW (ref 39–50)
HCT VFR BLD CALC: 32.1 % — LOW (ref 39–50)
HCT VFR BLD CALC: 32.6 % — LOW (ref 39–50)
HCT VFR BLD CALC: 34.1 % — LOW (ref 39–50)
HCT VFR BLD CALC: 34.5 % — LOW (ref 39–50)
HCT VFR BLD CALC: 36.3 % — LOW (ref 39–50)
HCT VFR BLD CALC: 37.5 % — LOW (ref 39–50)
HCT VFR BLD CALC: 38 % — LOW (ref 39–50)
HCT VFR BLD CALC: 38.2 % — LOW (ref 39–50)
HCT VFR BLD CALC: 42.2 % — SIGNIFICANT CHANGE UP (ref 39–50)
HCT VFR BLDA CALC: 27 % — LOW (ref 39–51)
HCT VFR BLDA CALC: 33 % — LOW (ref 39–51)
HCT VFR BLDA CALC: 34 % — LOW (ref 39–51)
HCV AB S/CO SERPL IA: 0.09 S/CO — SIGNIFICANT CHANGE UP (ref 0–0.99)
HCV AB SERPL-IMP: SIGNIFICANT CHANGE UP
HGB BLD CALC-MCNC: 10.9 G/DL — LOW (ref 12.6–17.4)
HGB BLD CALC-MCNC: 11.3 G/DL — LOW (ref 12.6–17.4)
HGB BLD CALC-MCNC: 8.9 G/DL — LOW (ref 12.6–17.4)
HGB BLD-MCNC: 10 G/DL — LOW (ref 13–17)
HGB BLD-MCNC: 10.3 G/DL — LOW (ref 13–17)
HGB BLD-MCNC: 10.3 G/DL — LOW (ref 13–17)
HGB BLD-MCNC: 11.3 G/DL — LOW (ref 13–17)
HGB BLD-MCNC: 11.4 G/DL — LOW (ref 13–17)
HGB BLD-MCNC: 11.9 G/DL — LOW (ref 13–17)
HGB BLD-MCNC: 12.1 G/DL — LOW (ref 13–17)
HGB BLD-MCNC: 12.4 G/DL — LOW (ref 13–17)
HGB BLD-MCNC: 13.7 G/DL — SIGNIFICANT CHANGE UP (ref 13–17)
HGB BLD-MCNC: 8.3 G/DL — LOW (ref 13–17)
HGB BLD-MCNC: 9 G/DL — LOW (ref 13–17)
HGB BLD-MCNC: 9.2 G/DL — LOW (ref 13–17)
HGB BLD-MCNC: 9.8 G/DL — LOW (ref 13–17)
HGB BLD-MCNC: 9.9 G/DL — LOW (ref 13–17)
HOROWITZ INDEX BLDV+IHG-RTO: 21 — SIGNIFICANT CHANGE UP
HOROWITZ INDEX BLDV+IHG-RTO: 21 — SIGNIFICANT CHANGE UP
HOROWITZ INDEX BLDV+IHG-RTO: 30 — SIGNIFICANT CHANGE UP
HYALINE CASTS # UR AUTO: 0 /LPF — SIGNIFICANT CHANGE UP (ref 0–2)
HYALINE CASTS # UR AUTO: 41 /LPF — HIGH (ref 0–2)
HYALINE CASTS # UR AUTO: ABNORMAL /LPF
IMM GRANULOCYTES NFR BLD AUTO: 0.4 % — SIGNIFICANT CHANGE UP (ref 0–1.5)
IMM GRANULOCYTES NFR BLD AUTO: 0.8 % — SIGNIFICANT CHANGE UP (ref 0–1.5)
IMM GRANULOCYTES NFR BLD AUTO: 0.9 % — SIGNIFICANT CHANGE UP (ref 0–1.5)
IMM GRANULOCYTES NFR BLD AUTO: 1 % — SIGNIFICANT CHANGE UP (ref 0–1.5)
IMM GRANULOCYTES NFR BLD AUTO: 1.1 % — SIGNIFICANT CHANGE UP (ref 0–1.5)
IMM GRANULOCYTES NFR BLD AUTO: 1.1 % — SIGNIFICANT CHANGE UP (ref 0–1.5)
IMM GRANULOCYTES NFR BLD AUTO: 1.3 % — SIGNIFICANT CHANGE UP (ref 0–1.5)
IMM GRANULOCYTES NFR BLD AUTO: 1.6 % — HIGH (ref 0–1.5)
IMM GRANULOCYTES NFR BLD AUTO: 1.7 % — HIGH (ref 0–1.5)
INR BLD: 1.01 RATIO — SIGNIFICANT CHANGE UP (ref 0.88–1.16)
INR BLD: 1.02 RATIO — SIGNIFICANT CHANGE UP (ref 0.88–1.16)
INR BLD: 1.03 RATIO — SIGNIFICANT CHANGE UP (ref 0.88–1.16)
INR BLD: 1.04 RATIO — SIGNIFICANT CHANGE UP (ref 0.88–1.16)
INR BLD: 1.05 RATIO — SIGNIFICANT CHANGE UP (ref 0.88–1.16)
INR BLD: 1.05 RATIO — SIGNIFICANT CHANGE UP (ref 0.88–1.16)
INR BLD: 1.06 RATIO — SIGNIFICANT CHANGE UP (ref 0.88–1.16)
INR BLD: 1.09 RATIO — SIGNIFICANT CHANGE UP (ref 0.88–1.16)
INR BLD: 1.12 RATIO — SIGNIFICANT CHANGE UP (ref 0.88–1.16)
INR BLD: 1.16 RATIO — SIGNIFICANT CHANGE UP (ref 0.88–1.16)
KETONES UR-MCNC: NEGATIVE — SIGNIFICANT CHANGE UP
LABORATORY COMMENT REPORT: SIGNIFICANT CHANGE UP
LACOSAMIDE (VIMPAT) RESULT: 3.7 UG/ML — SIGNIFICANT CHANGE UP (ref 1–10)
LACOSAMIDE (VIMPAT) RESULT: 5.6 UG/ML — SIGNIFICANT CHANGE UP (ref 1–10)
LACOSAMIDE (VIMPAT) RESULT: 6.6 UG/ML — SIGNIFICANT CHANGE UP (ref 1–10)
LACOSAMIDE (VIMPAT) RESULT: 6.9 UG/ML — SIGNIFICANT CHANGE UP (ref 1–10)
LACTATE BLDV-MCNC: 0.6 MMOL/L — LOW (ref 0.7–2)
LACTATE BLDV-MCNC: 1 MMOL/L — SIGNIFICANT CHANGE UP (ref 0.7–2)
LACTATE BLDV-MCNC: 1.1 MMOL/L — SIGNIFICANT CHANGE UP (ref 0.7–2)
LACTATE SERPL-SCNC: 1.5 MMOL/L — SIGNIFICANT CHANGE UP (ref 0.7–2)
LDH CSF L TO P-CCNC: 200 U/L — SIGNIFICANT CHANGE UP
LDH FLD-CCNC: 200 U/L — SIGNIFICANT CHANGE UP
LDH SERPL L TO P-CCNC: 273 U/L — HIGH (ref 50–242)
LEUKOCYTE ESTERASE UR-ACNC: ABNORMAL
LEUKOCYTE ESTERASE UR-ACNC: ABNORMAL
LEUKOCYTE ESTERASE UR-ACNC: NEGATIVE — SIGNIFICANT CHANGE UP
LEVETIRACETAM SERPL-MCNC: 40.9 UG/ML — HIGH (ref 10–40)
LEVETIRACETAM SERPL-MCNC: 41.2 UG/ML — HIGH (ref 10–40)
LEVETIRACETAM SERPL-MCNC: 42.1 UG/ML — HIGH (ref 10–40)
LEVETIRACETAM SERPL-MCNC: 49.7 UG/ML — HIGH (ref 10–40)
LYMPHOCYTES # BLD AUTO: 0.44 K/UL — LOW (ref 1–3.3)
LYMPHOCYTES # BLD AUTO: 0.69 K/UL — LOW (ref 1–3.3)
LYMPHOCYTES # BLD AUTO: 0.76 K/UL — LOW (ref 1–3.3)
LYMPHOCYTES # BLD AUTO: 0.79 K/UL — LOW (ref 1–3.3)
LYMPHOCYTES # BLD AUTO: 0.81 K/UL — LOW (ref 1–3.3)
LYMPHOCYTES # BLD AUTO: 0.84 K/UL — LOW (ref 1–3.3)
LYMPHOCYTES # BLD AUTO: 0.88 K/UL — LOW (ref 1–3.3)
LYMPHOCYTES # BLD AUTO: 0.92 K/UL — LOW (ref 1–3.3)
LYMPHOCYTES # BLD AUTO: 0.92 K/UL — LOW (ref 1–3.3)
LYMPHOCYTES # BLD AUTO: 0.94 K/UL — LOW (ref 1–3.3)
LYMPHOCYTES # BLD AUTO: 0.98 K/UL — LOW (ref 1–3.3)
LYMPHOCYTES # BLD AUTO: 1.15 K/UL — SIGNIFICANT CHANGE UP (ref 1–3.3)
LYMPHOCYTES # BLD AUTO: 1.34 K/UL — SIGNIFICANT CHANGE UP (ref 1–3.3)
LYMPHOCYTES # BLD AUTO: 10.1 % — LOW (ref 13–44)
LYMPHOCYTES # BLD AUTO: 10.2 % — LOW (ref 13–44)
LYMPHOCYTES # BLD AUTO: 14.3 % — SIGNIFICANT CHANGE UP (ref 13–44)
LYMPHOCYTES # BLD AUTO: 3.9 % — LOW (ref 13–44)
LYMPHOCYTES # BLD AUTO: 4.4 % — LOW (ref 13–44)
LYMPHOCYTES # BLD AUTO: 6.1 % — LOW (ref 13–44)
LYMPHOCYTES # BLD AUTO: 6.4 % — LOW (ref 13–44)
LYMPHOCYTES # BLD AUTO: 7 % — LOW (ref 13–44)
LYMPHOCYTES # BLD AUTO: 7.4 % — LOW (ref 13–44)
LYMPHOCYTES # BLD AUTO: 8 % — LOW (ref 13–44)
LYMPHOCYTES # BLD AUTO: 8.1 % — LOW (ref 13–44)
LYMPHOCYTES # BLD AUTO: 8.2 % — LOW (ref 13–44)
LYMPHOCYTES # BLD AUTO: 9.3 % — LOW (ref 13–44)
LYMPHOCYTES # CSF: 41 % — SIGNIFICANT CHANGE UP (ref 40–80)
MAGNESIUM SERPL-MCNC: 2.1 MG/DL — SIGNIFICANT CHANGE UP (ref 1.6–2.6)
MAGNESIUM SERPL-MCNC: 2.1 MG/DL — SIGNIFICANT CHANGE UP (ref 1.6–2.6)
MAGNESIUM SERPL-MCNC: 2.2 MG/DL — SIGNIFICANT CHANGE UP (ref 1.6–2.6)
MAGNESIUM SERPL-MCNC: 2.3 MG/DL — SIGNIFICANT CHANGE UP (ref 1.6–2.6)
MAGNESIUM SERPL-MCNC: 2.4 MG/DL — SIGNIFICANT CHANGE UP (ref 1.6–2.6)
MAGNESIUM SERPL-MCNC: 2.5 MG/DL — SIGNIFICANT CHANGE UP (ref 1.6–2.6)
MAGNESIUM SERPL-MCNC: 2.6 MG/DL — SIGNIFICANT CHANGE UP (ref 1.6–2.6)
MAGNESIUM SERPL-MCNC: 2.7 MG/DL — HIGH (ref 1.6–2.6)
MCHC RBC-ENTMCNC: 29.2 PG — SIGNIFICANT CHANGE UP (ref 27–34)
MCHC RBC-ENTMCNC: 29.4 PG — SIGNIFICANT CHANGE UP (ref 27–34)
MCHC RBC-ENTMCNC: 29.5 PG — SIGNIFICANT CHANGE UP (ref 27–34)
MCHC RBC-ENTMCNC: 29.6 PG — SIGNIFICANT CHANGE UP (ref 27–34)
MCHC RBC-ENTMCNC: 29.6 PG — SIGNIFICANT CHANGE UP (ref 27–34)
MCHC RBC-ENTMCNC: 29.7 PG — SIGNIFICANT CHANGE UP (ref 27–34)
MCHC RBC-ENTMCNC: 29.9 PG — SIGNIFICANT CHANGE UP (ref 27–34)
MCHC RBC-ENTMCNC: 30.1 PG — SIGNIFICANT CHANGE UP (ref 27–34)
MCHC RBC-ENTMCNC: 30.1 PG — SIGNIFICANT CHANGE UP (ref 27–34)
MCHC RBC-ENTMCNC: 30.2 GM/DL — LOW (ref 32–36)
MCHC RBC-ENTMCNC: 30.2 GM/DL — LOW (ref 32–36)
MCHC RBC-ENTMCNC: 30.2 PG — SIGNIFICANT CHANGE UP (ref 27–34)
MCHC RBC-ENTMCNC: 30.4 PG — SIGNIFICANT CHANGE UP (ref 27–34)
MCHC RBC-ENTMCNC: 30.5 GM/DL — LOW (ref 32–36)
MCHC RBC-ENTMCNC: 30.8 GM/DL — LOW (ref 32–36)
MCHC RBC-ENTMCNC: 31.1 GM/DL — LOW (ref 32–36)
MCHC RBC-ENTMCNC: 31.2 GM/DL — LOW (ref 32–36)
MCHC RBC-ENTMCNC: 31.4 GM/DL — LOW (ref 32–36)
MCHC RBC-ENTMCNC: 31.5 GM/DL — LOW (ref 32–36)
MCHC RBC-ENTMCNC: 31.6 GM/DL — LOW (ref 32–36)
MCHC RBC-ENTMCNC: 31.8 GM/DL — LOW (ref 32–36)
MCHC RBC-ENTMCNC: 31.9 GM/DL — LOW (ref 32–36)
MCHC RBC-ENTMCNC: 32.5 GM/DL — SIGNIFICANT CHANGE UP (ref 32–36)
MCHC RBC-ENTMCNC: 32.8 GM/DL — SIGNIFICANT CHANGE UP (ref 32–36)
MCHC RBC-ENTMCNC: 33.1 GM/DL — SIGNIFICANT CHANGE UP (ref 32–36)
MCV RBC AUTO: 100.6 FL — HIGH (ref 80–100)
MCV RBC AUTO: 91.2 FL — SIGNIFICANT CHANGE UP (ref 80–100)
MCV RBC AUTO: 91.8 FL — SIGNIFICANT CHANGE UP (ref 80–100)
MCV RBC AUTO: 92.1 FL — SIGNIFICANT CHANGE UP (ref 80–100)
MCV RBC AUTO: 93.5 FL — SIGNIFICANT CHANGE UP (ref 80–100)
MCV RBC AUTO: 93.5 FL — SIGNIFICANT CHANGE UP (ref 80–100)
MCV RBC AUTO: 93.7 FL — SIGNIFICANT CHANGE UP (ref 80–100)
MCV RBC AUTO: 93.8 FL — SIGNIFICANT CHANGE UP (ref 80–100)
MCV RBC AUTO: 93.9 FL — SIGNIFICANT CHANGE UP (ref 80–100)
MCV RBC AUTO: 94.5 FL — SIGNIFICANT CHANGE UP (ref 80–100)
MCV RBC AUTO: 95.8 FL — SIGNIFICANT CHANGE UP (ref 80–100)
MCV RBC AUTO: 96.4 FL — SIGNIFICANT CHANGE UP (ref 80–100)
MCV RBC AUTO: 96.6 FL — SIGNIFICANT CHANGE UP (ref 80–100)
MCV RBC AUTO: 99 FL — SIGNIFICANT CHANGE UP (ref 80–100)
METHOD TYPE: SIGNIFICANT CHANGE UP
METHOD TYPE: SIGNIFICANT CHANGE UP
MEV IGM SER-ACNC: 63.3 AU/ML — HIGH (ref 0–19.9)
MONOCYTES # BLD AUTO: 0.56 K/UL — SIGNIFICANT CHANGE UP (ref 0–0.9)
MONOCYTES # BLD AUTO: 0.62 K/UL — SIGNIFICANT CHANGE UP (ref 0–0.9)
MONOCYTES # BLD AUTO: 0.64 K/UL — SIGNIFICANT CHANGE UP (ref 0–0.9)
MONOCYTES # BLD AUTO: 0.71 K/UL — SIGNIFICANT CHANGE UP (ref 0–0.9)
MONOCYTES # BLD AUTO: 0.72 K/UL — SIGNIFICANT CHANGE UP (ref 0–0.9)
MONOCYTES # BLD AUTO: 0.73 K/UL — SIGNIFICANT CHANGE UP (ref 0–0.9)
MONOCYTES # BLD AUTO: 0.73 K/UL — SIGNIFICANT CHANGE UP (ref 0–0.9)
MONOCYTES # BLD AUTO: 0.75 K/UL — SIGNIFICANT CHANGE UP (ref 0–0.9)
MONOCYTES # BLD AUTO: 0.76 K/UL — SIGNIFICANT CHANGE UP (ref 0–0.9)
MONOCYTES # BLD AUTO: 0.8 K/UL — SIGNIFICANT CHANGE UP (ref 0–0.9)
MONOCYTES # BLD AUTO: 0.87 K/UL — SIGNIFICANT CHANGE UP (ref 0–0.9)
MONOCYTES # BLD AUTO: 1.12 K/UL — HIGH (ref 0–0.9)
MONOCYTES # BLD AUTO: 1.13 K/UL — HIGH (ref 0–0.9)
MONOCYTES NFR BLD AUTO: 5.4 % — SIGNIFICANT CHANGE UP (ref 2–14)
MONOCYTES NFR BLD AUTO: 5.6 % — SIGNIFICANT CHANGE UP (ref 2–14)
MONOCYTES NFR BLD AUTO: 5.8 % — SIGNIFICANT CHANGE UP (ref 2–14)
MONOCYTES NFR BLD AUTO: 5.8 % — SIGNIFICANT CHANGE UP (ref 2–14)
MONOCYTES NFR BLD AUTO: 6.2 % — SIGNIFICANT CHANGE UP (ref 2–14)
MONOCYTES NFR BLD AUTO: 6.4 % — SIGNIFICANT CHANGE UP (ref 2–14)
MONOCYTES NFR BLD AUTO: 6.5 % — SIGNIFICANT CHANGE UP (ref 2–14)
MONOCYTES NFR BLD AUTO: 7.3 % — SIGNIFICANT CHANGE UP (ref 2–14)
MONOCYTES NFR BLD AUTO: 7.4 % — SIGNIFICANT CHANGE UP (ref 2–14)
MONOCYTES NFR BLD AUTO: 7.6 % — SIGNIFICANT CHANGE UP (ref 2–14)
MONOCYTES NFR BLD AUTO: 7.8 % — SIGNIFICANT CHANGE UP (ref 2–14)
MONOCYTES NFR BLD AUTO: 8.1 % — SIGNIFICANT CHANGE UP (ref 2–14)
MONOCYTES NFR BLD AUTO: 8.5 % — SIGNIFICANT CHANGE UP (ref 2–14)
MONOS+MACROS NFR CSF: 45 % — SIGNIFICANT CHANGE UP (ref 15–45)
NEUTROPHILS # BLD AUTO: 10.3 K/UL — HIGH (ref 1.8–7.4)
NEUTROPHILS # BLD AUTO: 10.58 K/UL — HIGH (ref 1.8–7.4)
NEUTROPHILS # BLD AUTO: 15.6 K/UL — HIGH (ref 1.8–7.4)
NEUTROPHILS # BLD AUTO: 17.94 K/UL — HIGH (ref 1.8–7.4)
NEUTROPHILS # BLD AUTO: 6.9 K/UL — SIGNIFICANT CHANGE UP (ref 1.8–7.4)
NEUTROPHILS # BLD AUTO: 7.22 K/UL — SIGNIFICANT CHANGE UP (ref 1.8–7.4)
NEUTROPHILS # BLD AUTO: 7.81 K/UL — HIGH (ref 1.8–7.4)
NEUTROPHILS # BLD AUTO: 7.89 K/UL — HIGH (ref 1.8–7.4)
NEUTROPHILS # BLD AUTO: 8.1 K/UL — HIGH (ref 1.8–7.4)
NEUTROPHILS # BLD AUTO: 8.16 K/UL — HIGH (ref 1.8–7.4)
NEUTROPHILS # BLD AUTO: 8.17 K/UL — HIGH (ref 1.8–7.4)
NEUTROPHILS # BLD AUTO: 9.03 K/UL — HIGH (ref 1.8–7.4)
NEUTROPHILS # BLD AUTO: 9.47 K/UL — HIGH (ref 1.8–7.4)
NEUTROPHILS # CSF: 14 % — HIGH (ref 0–6)
NEUTROPHILS NFR BLD AUTO: 73.3 % — SIGNIFICANT CHANGE UP (ref 43–77)
NEUTROPHILS NFR BLD AUTO: 77.5 % — HIGH (ref 43–77)
NEUTROPHILS NFR BLD AUTO: 80.1 % — HIGH (ref 43–77)
NEUTROPHILS NFR BLD AUTO: 80.3 % — HIGH (ref 43–77)
NEUTROPHILS NFR BLD AUTO: 81 % — HIGH (ref 43–77)
NEUTROPHILS NFR BLD AUTO: 82.4 % — HIGH (ref 43–77)
NEUTROPHILS NFR BLD AUTO: 82.5 % — HIGH (ref 43–77)
NEUTROPHILS NFR BLD AUTO: 82.7 % — HIGH (ref 43–77)
NEUTROPHILS NFR BLD AUTO: 82.9 % — HIGH (ref 43–77)
NEUTROPHILS NFR BLD AUTO: 85 % — HIGH (ref 43–77)
NEUTROPHILS NFR BLD AUTO: 86.5 % — HIGH (ref 43–77)
NEUTROPHILS NFR BLD AUTO: 89.1 % — HIGH (ref 43–77)
NEUTROPHILS NFR BLD AUTO: 90.2 % — HIGH (ref 43–77)
NIGHT BLUE STAIN TISS: SIGNIFICANT CHANGE UP
NITRITE UR-MCNC: NEGATIVE — SIGNIFICANT CHANGE UP
NRBC # BLD: 0 /100 WBCS — SIGNIFICANT CHANGE UP (ref 0–0)
NRBC NFR CSF: 1 /UL — SIGNIFICANT CHANGE UP (ref 0–5)
NT-PROBNP SERPL-SCNC: 882 PG/ML — HIGH (ref 0–300)
ORGANISM # SPEC MICROSCOPIC CNT: SIGNIFICANT CHANGE UP
P-ANCA SER-ACNC: NEGATIVE — SIGNIFICANT CHANGE UP
PCO2 BLDV: 45 MMHG — SIGNIFICANT CHANGE UP (ref 42–55)
PCO2 BLDV: 48 MMHG — SIGNIFICANT CHANGE UP (ref 42–55)
PCO2 BLDV: 53 MMHG — SIGNIFICANT CHANGE UP (ref 42–55)
PH BLDV: 7.4 — SIGNIFICANT CHANGE UP (ref 7.32–7.43)
PH BLDV: 7.43 — SIGNIFICANT CHANGE UP (ref 7.32–7.43)
PH BLDV: 7.43 — SIGNIFICANT CHANGE UP (ref 7.32–7.43)
PH UR: 6 — SIGNIFICANT CHANGE UP (ref 5–8)
PH UR: 6 — SIGNIFICANT CHANGE UP (ref 5–8)
PH UR: 8.5 — HIGH (ref 5–8)
PHOSPHATE SERPL-MCNC: 2.1 MG/DL — LOW (ref 2.5–4.5)
PHOSPHATE SERPL-MCNC: 2.4 MG/DL — LOW (ref 2.5–4.5)
PHOSPHATE SERPL-MCNC: 2.5 MG/DL — SIGNIFICANT CHANGE UP (ref 2.5–4.5)
PHOSPHATE SERPL-MCNC: 2.6 MG/DL — SIGNIFICANT CHANGE UP (ref 2.5–4.5)
PHOSPHATE SERPL-MCNC: 2.8 MG/DL — SIGNIFICANT CHANGE UP (ref 2.5–4.5)
PHOSPHATE SERPL-MCNC: 2.9 MG/DL — SIGNIFICANT CHANGE UP (ref 2.5–4.5)
PHOSPHATE SERPL-MCNC: 2.9 MG/DL — SIGNIFICANT CHANGE UP (ref 2.5–4.5)
PHOSPHATE SERPL-MCNC: 3 MG/DL — SIGNIFICANT CHANGE UP (ref 2.5–4.5)
PHOSPHATE SERPL-MCNC: 3.1 MG/DL — SIGNIFICANT CHANGE UP (ref 2.5–4.5)
PHOSPHATE SERPL-MCNC: 3.1 MG/DL — SIGNIFICANT CHANGE UP (ref 2.5–4.5)
PHOSPHATE SERPL-MCNC: 3.2 MG/DL — SIGNIFICANT CHANGE UP (ref 2.5–4.5)
PHOSPHATE SERPL-MCNC: 3.5 MG/DL — SIGNIFICANT CHANGE UP (ref 2.5–4.5)
PHOSPHATE SERPL-MCNC: 3.5 MG/DL — SIGNIFICANT CHANGE UP (ref 2.5–4.5)
PHOSPHATE SERPL-MCNC: 3.8 MG/DL — SIGNIFICANT CHANGE UP (ref 2.5–4.5)
PHOSPHATE SERPL-MCNC: 3.8 MG/DL — SIGNIFICANT CHANGE UP (ref 2.5–4.5)
PHOSPHATE SERPL-MCNC: 4.1 MG/DL — SIGNIFICANT CHANGE UP (ref 2.5–4.5)
PLATELET # BLD AUTO: 146 K/UL — LOW (ref 150–400)
PLATELET # BLD AUTO: 168 K/UL — SIGNIFICANT CHANGE UP (ref 150–400)
PLATELET # BLD AUTO: 177 K/UL — SIGNIFICANT CHANGE UP (ref 150–400)
PLATELET # BLD AUTO: 190 K/UL — SIGNIFICANT CHANGE UP (ref 150–400)
PLATELET # BLD AUTO: 193 K/UL — SIGNIFICANT CHANGE UP (ref 150–400)
PLATELET # BLD AUTO: 195 K/UL — SIGNIFICANT CHANGE UP (ref 150–400)
PLATELET # BLD AUTO: 199 K/UL — SIGNIFICANT CHANGE UP (ref 150–400)
PLATELET # BLD AUTO: 201 K/UL — SIGNIFICANT CHANGE UP (ref 150–400)
PLATELET # BLD AUTO: 207 K/UL — SIGNIFICANT CHANGE UP (ref 150–400)
PLATELET # BLD AUTO: 216 K/UL — SIGNIFICANT CHANGE UP (ref 150–400)
PLATELET # BLD AUTO: 227 K/UL — SIGNIFICANT CHANGE UP (ref 150–400)
PLATELET # BLD AUTO: 249 K/UL — SIGNIFICANT CHANGE UP (ref 150–400)
PLATELET # BLD AUTO: 311 K/UL — SIGNIFICANT CHANGE UP (ref 150–400)
PLATELET # BLD AUTO: 320 K/UL — SIGNIFICANT CHANGE UP (ref 150–400)
PO2 BLDV: 42 MMHG — SIGNIFICANT CHANGE UP (ref 25–45)
PO2 BLDV: 57 MMHG — HIGH (ref 25–45)
PO2 BLDV: 64 MMHG — HIGH (ref 25–45)
POTASSIUM BLDV-SCNC: 4 MMOL/L — SIGNIFICANT CHANGE UP (ref 3.5–5.1)
POTASSIUM BLDV-SCNC: 4.2 MMOL/L — SIGNIFICANT CHANGE UP (ref 3.5–5.1)
POTASSIUM BLDV-SCNC: 4.6 MMOL/L — SIGNIFICANT CHANGE UP (ref 3.5–5.1)
POTASSIUM SERPL-MCNC: 3.3 MMOL/L — LOW (ref 3.5–5.3)
POTASSIUM SERPL-MCNC: 3.3 MMOL/L — LOW (ref 3.5–5.3)
POTASSIUM SERPL-MCNC: 3.6 MMOL/L — SIGNIFICANT CHANGE UP (ref 3.5–5.3)
POTASSIUM SERPL-MCNC: 3.7 MMOL/L — SIGNIFICANT CHANGE UP (ref 3.5–5.3)
POTASSIUM SERPL-MCNC: 3.7 MMOL/L — SIGNIFICANT CHANGE UP (ref 3.5–5.3)
POTASSIUM SERPL-MCNC: 3.8 MMOL/L — SIGNIFICANT CHANGE UP (ref 3.5–5.3)
POTASSIUM SERPL-MCNC: 3.9 MMOL/L — SIGNIFICANT CHANGE UP (ref 3.5–5.3)
POTASSIUM SERPL-MCNC: 4.1 MMOL/L — SIGNIFICANT CHANGE UP (ref 3.5–5.3)
POTASSIUM SERPL-MCNC: 4.5 MMOL/L — SIGNIFICANT CHANGE UP (ref 3.5–5.3)
POTASSIUM SERPL-MCNC: 4.8 MMOL/L — SIGNIFICANT CHANGE UP (ref 3.5–5.3)
POTASSIUM SERPL-MCNC: 5 MMOL/L — SIGNIFICANT CHANGE UP (ref 3.5–5.3)
POTASSIUM SERPL-SCNC: 3.3 MMOL/L — LOW (ref 3.5–5.3)
POTASSIUM SERPL-SCNC: 3.3 MMOL/L — LOW (ref 3.5–5.3)
POTASSIUM SERPL-SCNC: 3.6 MMOL/L — SIGNIFICANT CHANGE UP (ref 3.5–5.3)
POTASSIUM SERPL-SCNC: 3.7 MMOL/L — SIGNIFICANT CHANGE UP (ref 3.5–5.3)
POTASSIUM SERPL-SCNC: 3.7 MMOL/L — SIGNIFICANT CHANGE UP (ref 3.5–5.3)
POTASSIUM SERPL-SCNC: 3.8 MMOL/L — SIGNIFICANT CHANGE UP (ref 3.5–5.3)
POTASSIUM SERPL-SCNC: 3.9 MMOL/L — SIGNIFICANT CHANGE UP (ref 3.5–5.3)
POTASSIUM SERPL-SCNC: 4.1 MMOL/L — SIGNIFICANT CHANGE UP (ref 3.5–5.3)
POTASSIUM SERPL-SCNC: 4.5 MMOL/L — SIGNIFICANT CHANGE UP (ref 3.5–5.3)
POTASSIUM SERPL-SCNC: 4.8 MMOL/L — SIGNIFICANT CHANGE UP (ref 3.5–5.3)
POTASSIUM SERPL-SCNC: 5 MMOL/L — SIGNIFICANT CHANGE UP (ref 3.5–5.3)
PROCALCITONIN SERPL-MCNC: 0.2 NG/ML — HIGH (ref 0.02–0.1)
PROCALCITONIN SERPL-MCNC: 0.22 NG/ML — HIGH (ref 0.02–0.1)
PROLACTIN SERPL-MCNC: 40.8 NG/ML — HIGH (ref 4.1–18.4)
PROT CSF-MCNC: 30 MG/DL — SIGNIFICANT CHANGE UP (ref 15–45)
PROT SERPL-MCNC: 4.8 G/DL — LOW (ref 6–8.3)
PROT SERPL-MCNC: 4.9 G/DL — LOW (ref 6–8.3)
PROT SERPL-MCNC: 5 G/DL — LOW (ref 6–8.3)
PROT SERPL-MCNC: 5 G/DL — LOW (ref 6–8.3)
PROT SERPL-MCNC: 5.1 G/DL — LOW (ref 6–8.3)
PROT SERPL-MCNC: 5.2 G/DL — LOW (ref 6–8.3)
PROT SERPL-MCNC: 5.4 G/DL — LOW (ref 6–8.3)
PROT SERPL-MCNC: 5.5 G/DL — LOW (ref 6–8.3)
PROT SERPL-MCNC: 5.6 G/DL — LOW (ref 6–8.3)
PROT UR-MCNC: ABNORMAL
PROTHROM AB SERPL-ACNC: 12.1 SEC — SIGNIFICANT CHANGE UP (ref 10.6–13.6)
PROTHROM AB SERPL-ACNC: 12.2 SEC — SIGNIFICANT CHANGE UP (ref 10.6–13.6)
PROTHROM AB SERPL-ACNC: 12.3 SEC — SIGNIFICANT CHANGE UP (ref 10.6–13.6)
PROTHROM AB SERPL-ACNC: 12.4 SEC — SIGNIFICANT CHANGE UP (ref 10.6–13.6)
PROTHROM AB SERPL-ACNC: 12.5 SEC — SIGNIFICANT CHANGE UP (ref 10.6–13.6)
PROTHROM AB SERPL-ACNC: 12.5 SEC — SIGNIFICANT CHANGE UP (ref 10.6–13.6)
PROTHROM AB SERPL-ACNC: 12.6 SEC — SIGNIFICANT CHANGE UP (ref 10.6–13.6)
PROTHROM AB SERPL-ACNC: 12.6 SEC — SIGNIFICANT CHANGE UP (ref 10.6–13.6)
PROTHROM AB SERPL-ACNC: 12.7 SEC — SIGNIFICANT CHANGE UP (ref 10.6–13.6)
PROTHROM AB SERPL-ACNC: 13 SEC — SIGNIFICANT CHANGE UP (ref 10.6–13.6)
PROTHROM AB SERPL-ACNC: 13.4 SEC — SIGNIFICANT CHANGE UP (ref 10.6–13.6)
PROTHROM AB SERPL-ACNC: 13.8 SEC — HIGH (ref 10.6–13.6)
RAPID RVP RESULT: SIGNIFICANT CHANGE UP
RBC # BLD: 2.78 M/UL — LOW (ref 4.2–5.8)
RBC # BLD: 3.06 M/UL — LOW (ref 4.2–5.8)
RBC # BLD: 3.08 M/UL — LOW (ref 4.2–5.8)
RBC # BLD: 3.26 M/UL — LOW (ref 4.2–5.8)
RBC # BLD: 3.35 M/UL — LOW (ref 4.2–5.8)
RBC # BLD: 3.39 M/UL — LOW (ref 4.2–5.8)
RBC # BLD: 3.39 M/UL — LOW (ref 4.2–5.8)
RBC # BLD: 3.48 M/UL — LOW (ref 4.2–5.8)
RBC # BLD: 3.76 M/UL — LOW (ref 4.2–5.8)
RBC # BLD: 3.84 M/UL — LOW (ref 4.2–5.8)
RBC # BLD: 4.05 M/UL — LOW (ref 4.2–5.8)
RBC # BLD: 4.07 M/UL — LOW (ref 4.2–5.8)
RBC # BLD: 4.11 M/UL — LOW (ref 4.2–5.8)
RBC # BLD: 4.58 M/UL — SIGNIFICANT CHANGE UP (ref 4.2–5.8)
RBC # CSF: 4 /UL — HIGH (ref 0–0)
RBC # FLD: 12.9 % — SIGNIFICANT CHANGE UP (ref 10.3–14.5)
RBC # FLD: 13 % — SIGNIFICANT CHANGE UP (ref 10.3–14.5)
RBC # FLD: 13 % — SIGNIFICANT CHANGE UP (ref 10.3–14.5)
RBC # FLD: 13.1 % — SIGNIFICANT CHANGE UP (ref 10.3–14.5)
RBC # FLD: 13.1 % — SIGNIFICANT CHANGE UP (ref 10.3–14.5)
RBC # FLD: 13.2 % — SIGNIFICANT CHANGE UP (ref 10.3–14.5)
RBC # FLD: 13.3 % — SIGNIFICANT CHANGE UP (ref 10.3–14.5)
RBC # FLD: 13.3 % — SIGNIFICANT CHANGE UP (ref 10.3–14.5)
RBC # FLD: 13.4 % — SIGNIFICANT CHANGE UP (ref 10.3–14.5)
RBC CASTS # UR COMP ASSIST: 27 /HPF — HIGH (ref 0–4)
RBC CASTS # UR COMP ASSIST: 4 /HPF — SIGNIFICANT CHANGE UP (ref 0–4)
RBC CASTS # UR COMP ASSIST: 45 /HPF — HIGH (ref 0–4)
RH IG SCN BLD-IMP: POSITIVE — SIGNIFICANT CHANGE UP
RHEUMATOID FACT SERPL-ACNC: <10 IU/ML — SIGNIFICANT CHANGE UP (ref 0–13)
RUBV IGG SER-ACNC: >33 INDEX — SIGNIFICANT CHANGE UP
RUBV IGG SER-IMP: POSITIVE — SIGNIFICANT CHANGE UP
SAO2 % BLDV: 69.4 % — SIGNIFICANT CHANGE UP (ref 67–88)
SAO2 % BLDV: 90.6 % — HIGH (ref 67–88)
SAO2 % BLDV: 94.1 % — HIGH (ref 67–88)
SARS-COV-2 IGG+IGM SERPL QL IA: 32.6 INDEX — HIGH
SARS-COV-2 IGG+IGM SERPL QL IA: >250 U/ML — HIGH
SARS-COV-2 IGG+IGM SERPL QL IA: POSITIVE
SARS-COV-2 IGG+IGM SERPL QL IA: POSITIVE
SARS-COV-2 RNA SPEC QL NAA+PROBE: SIGNIFICANT CHANGE UP
SODIUM SERPL-SCNC: 137 MMOL/L — SIGNIFICANT CHANGE UP (ref 135–145)
SODIUM SERPL-SCNC: 138 MMOL/L — SIGNIFICANT CHANGE UP (ref 135–145)
SODIUM SERPL-SCNC: 138 MMOL/L — SIGNIFICANT CHANGE UP (ref 135–145)
SODIUM SERPL-SCNC: 140 MMOL/L — SIGNIFICANT CHANGE UP (ref 135–145)
SODIUM SERPL-SCNC: 140 MMOL/L — SIGNIFICANT CHANGE UP (ref 135–145)
SODIUM SERPL-SCNC: 141 MMOL/L — SIGNIFICANT CHANGE UP (ref 135–145)
SODIUM SERPL-SCNC: 141 MMOL/L — SIGNIFICANT CHANGE UP (ref 135–145)
SODIUM SERPL-SCNC: 142 MMOL/L — SIGNIFICANT CHANGE UP (ref 135–145)
SODIUM SERPL-SCNC: 144 MMOL/L — SIGNIFICANT CHANGE UP (ref 135–145)
SODIUM SERPL-SCNC: 145 MMOL/L — SIGNIFICANT CHANGE UP (ref 135–145)
SODIUM SERPL-SCNC: 145 MMOL/L — SIGNIFICANT CHANGE UP (ref 135–145)
SODIUM SERPL-SCNC: 147 MMOL/L — HIGH (ref 135–145)
SODIUM SERPL-SCNC: 148 MMOL/L — HIGH (ref 135–145)
SOURCE HSV 1/2: SIGNIFICANT CHANGE UP
SP GR SPEC: 1.01 — SIGNIFICANT CHANGE UP (ref 1.01–1.02)
SP GR SPEC: 1.03 — HIGH (ref 1.01–1.02)
SP GR SPEC: 1.03 — HIGH (ref 1.01–1.02)
SPECIMEN SOURCE: SIGNIFICANT CHANGE UP
T PALLIDUM AB TITR SER: NEGATIVE — SIGNIFICANT CHANGE UP
TM INTERPRETATION: SIGNIFICANT CHANGE UP
TOTAL HEM COMP BLD-ACNC: <14 U/ML — LOW (ref 42–95)
TROPONIN T, HIGH SENSITIVITY RESULT: 127 NG/L — HIGH (ref 0–51)
TROPONIN T, HIGH SENSITIVITY RESULT: 130 NG/L — HIGH (ref 0–51)
TROPONIN T, HIGH SENSITIVITY RESULT: 164 NG/L — HIGH (ref 0–51)
TROPONIN T, HIGH SENSITIVITY RESULT: 173 NG/L — HIGH (ref 0–51)
TUBE TYPE: SIGNIFICANT CHANGE UP
UROBILINOGEN FLD QL: NEGATIVE — SIGNIFICANT CHANGE UP
VALPROATE SERPL-MCNC: 34 UG/ML — LOW (ref 50–100)
VALPROATE SERPL-MCNC: 44 UG/ML — LOW (ref 50–100)
VALPROATE SERPL-MCNC: 55 UG/ML — SIGNIFICANT CHANGE UP (ref 50–100)
VALPROATE SERPL-MCNC: 56 UG/ML — SIGNIFICANT CHANGE UP (ref 50–100)
VDRL CSF-TITR: SIGNIFICANT CHANGE UP
WBC # BLD: 10.2 K/UL — SIGNIFICANT CHANGE UP (ref 3.8–10.5)
WBC # BLD: 10.96 K/UL — HIGH (ref 3.8–10.5)
WBC # BLD: 11.42 K/UL — HIGH (ref 3.8–10.5)
WBC # BLD: 11.45 K/UL — HIGH (ref 3.8–10.5)
WBC # BLD: 12.45 K/UL — HIGH (ref 3.8–10.5)
WBC # BLD: 12.71 K/UL — HIGH (ref 3.8–10.5)
WBC # BLD: 18.03 K/UL — HIGH (ref 3.8–10.5)
WBC # BLD: 20.13 K/UL — HIGH (ref 3.8–10.5)
WBC # BLD: 9.31 K/UL — SIGNIFICANT CHANGE UP (ref 3.8–10.5)
WBC # BLD: 9.4 K/UL — SIGNIFICANT CHANGE UP (ref 3.8–10.5)
WBC # BLD: 9.64 K/UL — SIGNIFICANT CHANGE UP (ref 3.8–10.5)
WBC # BLD: 9.77 K/UL — SIGNIFICANT CHANGE UP (ref 3.8–10.5)
WBC # BLD: 9.84 K/UL — SIGNIFICANT CHANGE UP (ref 3.8–10.5)
WBC # BLD: 9.89 K/UL — SIGNIFICANT CHANGE UP (ref 3.8–10.5)
WBC # FLD AUTO: 10.2 K/UL — SIGNIFICANT CHANGE UP (ref 3.8–10.5)
WBC # FLD AUTO: 10.96 K/UL — HIGH (ref 3.8–10.5)
WBC # FLD AUTO: 11.42 K/UL — HIGH (ref 3.8–10.5)
WBC # FLD AUTO: 11.45 K/UL — HIGH (ref 3.8–10.5)
WBC # FLD AUTO: 12.45 K/UL — HIGH (ref 3.8–10.5)
WBC # FLD AUTO: 12.71 K/UL — HIGH (ref 3.8–10.5)
WBC # FLD AUTO: 18.03 K/UL — HIGH (ref 3.8–10.5)
WBC # FLD AUTO: 20.13 K/UL — HIGH (ref 3.8–10.5)
WBC # FLD AUTO: 9.31 K/UL — SIGNIFICANT CHANGE UP (ref 3.8–10.5)
WBC # FLD AUTO: 9.4 K/UL — SIGNIFICANT CHANGE UP (ref 3.8–10.5)
WBC # FLD AUTO: 9.64 K/UL — SIGNIFICANT CHANGE UP (ref 3.8–10.5)
WBC # FLD AUTO: 9.77 K/UL — SIGNIFICANT CHANGE UP (ref 3.8–10.5)
WBC # FLD AUTO: 9.84 K/UL — SIGNIFICANT CHANGE UP (ref 3.8–10.5)
WBC # FLD AUTO: 9.89 K/UL — SIGNIFICANT CHANGE UP (ref 3.8–10.5)
WBC UR QL: 58 /HPF — HIGH (ref 0–5)
WBC UR QL: 6 /HPF — HIGH (ref 0–5)
WBC UR QL: 63 /HPF — HIGH (ref 0–5)
WNV IGG TITR FLD: NEGATIVE — SIGNIFICANT CHANGE UP
WNV IGM SPEC QL: NEGATIVE — SIGNIFICANT CHANGE UP

## 2021-01-01 PROCEDURE — 82728 ASSAY OF FERRITIN: CPT

## 2021-01-01 PROCEDURE — 83615 LACTATE (LD) (LDH) ENZYME: CPT

## 2021-01-01 PROCEDURE — 83605 ASSAY OF LACTIC ACID: CPT

## 2021-01-01 PROCEDURE — U0005: CPT

## 2021-01-01 PROCEDURE — 99291 CRITICAL CARE FIRST HOUR: CPT

## 2021-01-01 PROCEDURE — 0225U NFCT DS DNA&RNA 21 SARSCOV2: CPT

## 2021-01-01 PROCEDURE — 93880 EXTRACRANIAL BILAT STUDY: CPT

## 2021-01-01 PROCEDURE — 99223 1ST HOSP IP/OBS HIGH 75: CPT

## 2021-01-01 PROCEDURE — 86140 C-REACTIVE PROTEIN: CPT

## 2021-01-01 PROCEDURE — 95720 EEG PHY/QHP EA INCR W/VEEG: CPT

## 2021-01-01 PROCEDURE — 70553 MRI BRAIN STEM W/O & W/DYE: CPT | Mod: 26

## 2021-01-01 PROCEDURE — 93970 EXTREMITY STUDY: CPT

## 2021-01-01 PROCEDURE — 85730 THROMBOPLASTIN TIME PARTIAL: CPT

## 2021-01-01 PROCEDURE — 83735 ASSAY OF MAGNESIUM: CPT

## 2021-01-01 PROCEDURE — 99497 ADVNCD CARE PLAN 30 MIN: CPT

## 2021-01-01 PROCEDURE — 87483 CNS DNA AMP PROBE TYPE 12-25: CPT

## 2021-01-01 PROCEDURE — 84100 ASSAY OF PHOSPHORUS: CPT

## 2021-01-01 PROCEDURE — 80177 DRUG SCRN QUAN LEVETIRACETAM: CPT

## 2021-01-01 PROCEDURE — 82550 ASSAY OF CK (CPK): CPT

## 2021-01-01 PROCEDURE — 86592 SYPHILIS TEST NON-TREP QUAL: CPT

## 2021-01-01 PROCEDURE — 76604 US EXAM CHEST: CPT | Mod: 26,GC

## 2021-01-01 PROCEDURE — 84484 ASSAY OF TROPONIN QUANT: CPT

## 2021-01-01 PROCEDURE — 83516 IMMUNOASSAY NONANTIBODY: CPT

## 2021-01-01 PROCEDURE — 95711 VEEG 2-12 HR UNMONITORED: CPT

## 2021-01-01 PROCEDURE — 82803 BLOOD GASES ANY COMBINATION: CPT

## 2021-01-01 PROCEDURE — 87070 CULTURE OTHR SPECIMN AEROBIC: CPT

## 2021-01-01 PROCEDURE — 99233 SBSQ HOSP IP/OBS HIGH 50: CPT

## 2021-01-01 PROCEDURE — 99232 SBSQ HOSP IP/OBS MODERATE 35: CPT

## 2021-01-01 PROCEDURE — 86664 EPSTEIN-BARR NUCLEAR ANTIGEN: CPT

## 2021-01-01 PROCEDURE — 85014 HEMATOCRIT: CPT

## 2021-01-01 PROCEDURE — 86653 ENCEPHALTIS ST LOUIS ANTBODY: CPT

## 2021-01-01 PROCEDURE — C9254: CPT

## 2021-01-01 PROCEDURE — 93010 ELECTROCARDIOGRAM REPORT: CPT

## 2021-01-01 PROCEDURE — 62270 DX LMBR SPI PNXR: CPT | Mod: GC

## 2021-01-01 PROCEDURE — 86651 ENCEPHALITIS CALIFORN ANTBDY: CPT

## 2021-01-01 PROCEDURE — 71045 X-RAY EXAM CHEST 1 VIEW: CPT

## 2021-01-01 PROCEDURE — 71045 X-RAY EXAM CHEST 1 VIEW: CPT | Mod: 26

## 2021-01-01 PROCEDURE — P9045: CPT

## 2021-01-01 PROCEDURE — 99223 1ST HOSP IP/OBS HIGH 75: CPT | Mod: GC

## 2021-01-01 PROCEDURE — 76376 3D RENDER W/INTRP POSTPROCES: CPT

## 2021-01-01 PROCEDURE — 87102 FUNGUS ISOLATION CULTURE: CPT

## 2021-01-01 PROCEDURE — U0003: CPT

## 2021-01-01 PROCEDURE — 82330 ASSAY OF CALCIUM: CPT

## 2021-01-01 PROCEDURE — 82140 ASSAY OF AMMONIA: CPT

## 2021-01-01 PROCEDURE — 80164 ASSAY DIPROPYLACETIC ACD TOT: CPT

## 2021-01-01 PROCEDURE — 89051 BODY FLUID CELL COUNT: CPT

## 2021-01-01 PROCEDURE — 84146 ASSAY OF PROLACTIN: CPT

## 2021-01-01 PROCEDURE — 83036 HEMOGLOBIN GLYCOSYLATED A1C: CPT

## 2021-01-01 PROCEDURE — 94002 VENT MGMT INPAT INIT DAY: CPT

## 2021-01-01 PROCEDURE — 95714 VEEG EA 12-26 HR UNMNTR: CPT

## 2021-01-01 PROCEDURE — 93356 MYOCRD STRAIN IMG SPCKL TRCK: CPT

## 2021-01-01 PROCEDURE — 70450 CT HEAD/BRAIN W/O DYE: CPT

## 2021-01-01 PROCEDURE — 87040 BLOOD CULTURE FOR BACTERIA: CPT

## 2021-01-01 PROCEDURE — 82553 CREATINE MB FRACTION: CPT

## 2021-01-01 PROCEDURE — 80048 BASIC METABOLIC PNL TOTAL CA: CPT

## 2021-01-01 PROCEDURE — 80235 DRUG ASSAY LACOSAMIDE: CPT

## 2021-01-01 PROCEDURE — 70450 CT HEAD/BRAIN W/O DYE: CPT | Mod: 26

## 2021-01-01 PROCEDURE — 83880 ASSAY OF NATRIURETIC PEPTIDE: CPT

## 2021-01-01 PROCEDURE — 87077 CULTURE AEROBIC IDENTIFY: CPT

## 2021-01-01 PROCEDURE — 86850 RBC ANTIBODY SCREEN: CPT

## 2021-01-01 PROCEDURE — 70553 MRI BRAIN STEM W/O & W/DYE: CPT

## 2021-01-01 PROCEDURE — A9585: CPT

## 2021-01-01 PROCEDURE — 87184 SC STD DISK METHOD PER PLATE: CPT

## 2021-01-01 PROCEDURE — 86160 COMPLEMENT ANTIGEN: CPT

## 2021-01-01 PROCEDURE — 84157 ASSAY OF PROTEIN OTHER: CPT

## 2021-01-01 PROCEDURE — 85379 FIBRIN DEGRADATION QUANT: CPT

## 2021-01-01 PROCEDURE — 82435 ASSAY OF BLOOD CHLORIDE: CPT

## 2021-01-01 PROCEDURE — 86780 TREPONEMA PALLIDUM: CPT

## 2021-01-01 PROCEDURE — 86652 ENCEPHALTIS EAST EQNE ANBDY: CPT

## 2021-01-01 PROCEDURE — 86431 RHEUMATOID FACTOR QUANT: CPT

## 2021-01-01 PROCEDURE — 93306 TTE W/DOPPLER COMPLETE: CPT

## 2021-01-01 PROCEDURE — 87086 URINE CULTURE/COLONY COUNT: CPT

## 2021-01-01 PROCEDURE — 82595 ASSAY OF CRYOGLOBULIN: CPT

## 2021-01-01 PROCEDURE — 82565 ASSAY OF CREATININE: CPT

## 2021-01-01 PROCEDURE — 87116 MYCOBACTERIA CULTURE: CPT

## 2021-01-01 PROCEDURE — 87205 SMEAR GRAM STAIN: CPT

## 2021-01-01 PROCEDURE — 93970 EXTREMITY STUDY: CPT | Mod: 26

## 2021-01-01 PROCEDURE — 93880 EXTRACRANIAL BILAT STUDY: CPT | Mod: 26

## 2021-01-01 PROCEDURE — 85610 PROTHROMBIN TIME: CPT

## 2021-01-01 PROCEDURE — 85384 FIBRINOGEN ACTIVITY: CPT

## 2021-01-01 PROCEDURE — 82962 GLUCOSE BLOOD TEST: CPT

## 2021-01-01 PROCEDURE — 86654 ENCEPHALTIS WEST EQNE ANTBDY: CPT

## 2021-01-01 PROCEDURE — 93306 TTE W/DOPPLER COMPLETE: CPT | Mod: 26

## 2021-01-01 PROCEDURE — 80053 COMPREHEN METABOLIC PANEL: CPT

## 2021-01-01 PROCEDURE — 86803 HEPATITIS C AB TEST: CPT

## 2021-01-01 PROCEDURE — 99222 1ST HOSP IP/OBS MODERATE 55: CPT

## 2021-01-01 PROCEDURE — 95718 EEG PHYS/QHP 2-12 HR W/VEEG: CPT

## 2021-01-01 PROCEDURE — 85025 COMPLETE CBC W/AUTO DIFF WBC: CPT

## 2021-01-01 PROCEDURE — 84145 PROCALCITONIN (PCT): CPT

## 2021-01-01 PROCEDURE — 86162 COMPLEMENT TOTAL (CH50): CPT

## 2021-01-01 PROCEDURE — 86769 SARS-COV-2 COVID-19 ANTIBODY: CPT

## 2021-01-01 PROCEDURE — 86762 RUBELLA ANTIBODY: CPT

## 2021-01-01 PROCEDURE — 86036 ANCA SCREEN EACH ANTIBODY: CPT

## 2021-01-01 PROCEDURE — 84295 ASSAY OF SERUM SODIUM: CPT

## 2021-01-01 PROCEDURE — 86788 WEST NILE VIRUS AB IGM: CPT

## 2021-01-01 PROCEDURE — 86900 BLOOD TYPING SEROLOGIC ABO: CPT

## 2021-01-01 PROCEDURE — 80076 HEPATIC FUNCTION PANEL: CPT

## 2021-01-01 PROCEDURE — 99497 ADVNCD CARE PLAN 30 MIN: CPT | Mod: 25

## 2021-01-01 PROCEDURE — 86403 PARTICLE AGGLUT ANTBDY SCRN: CPT

## 2021-01-01 PROCEDURE — 86789 WEST NILE VIRUS ANTIBODY: CPT

## 2021-01-01 PROCEDURE — 76376 3D RENDER W/INTRP POSTPROCES: CPT | Mod: 26

## 2021-01-01 PROCEDURE — 93308 TTE F-UP OR LMTD: CPT | Mod: 26,GC

## 2021-01-01 PROCEDURE — 99291 CRITICAL CARE FIRST HOUR: CPT | Mod: 25

## 2021-01-01 PROCEDURE — 95700 EEG CONT REC W/VID EEG TECH: CPT

## 2021-01-01 PROCEDURE — 86665 EPSTEIN-BARR CAPSID VCA: CPT

## 2021-01-01 PROCEDURE — 81001 URINALYSIS AUTO W/SCOPE: CPT

## 2021-01-01 PROCEDURE — 87476 LYME DIS DNA AMP PROBE: CPT

## 2021-01-01 PROCEDURE — 82945 GLUCOSE OTHER FLUID: CPT

## 2021-01-01 PROCEDURE — 93005 ELECTROCARDIOGRAM TRACING: CPT

## 2021-01-01 PROCEDURE — 86663 EPSTEIN-BARR ANTIBODY: CPT

## 2021-01-01 PROCEDURE — 86901 BLOOD TYPING SEROLOGIC RH(D): CPT

## 2021-01-01 PROCEDURE — 99231 SBSQ HOSP IP/OBS SF/LOW 25: CPT

## 2021-01-01 PROCEDURE — 85652 RBC SED RATE AUTOMATED: CPT

## 2021-01-01 PROCEDURE — 36415 COLL VENOUS BLD VENIPUNCTURE: CPT

## 2021-01-01 PROCEDURE — 82947 ASSAY GLUCOSE BLOOD QUANT: CPT

## 2021-01-01 PROCEDURE — 85018 HEMOGLOBIN: CPT

## 2021-01-01 PROCEDURE — 87186 SC STD MICRODIL/AGAR DIL: CPT

## 2021-01-01 PROCEDURE — 94003 VENT MGMT INPAT SUBQ DAY: CPT

## 2021-01-01 PROCEDURE — 87529 HSV DNA AMP PROBE: CPT

## 2021-01-01 PROCEDURE — 84132 ASSAY OF SERUM POTASSIUM: CPT

## 2021-01-01 RX ORDER — HUMAN INSULIN 100 [IU]/ML
6 INJECTION, SUSPENSION SUBCUTANEOUS EVERY 6 HOURS
Refills: 0 | Status: DISCONTINUED | OUTPATIENT
Start: 2021-01-01 | End: 2021-01-01

## 2021-01-01 RX ORDER — POLYETHYLENE GLYCOL 3350 17 G/17G
17 POWDER, FOR SOLUTION ORAL EVERY 12 HOURS
Refills: 0 | Status: DISCONTINUED | OUTPATIENT
Start: 2021-01-01 | End: 2021-01-01

## 2021-01-01 RX ORDER — CARVEDILOL PHOSPHATE 80 MG/1
1 CAPSULE, EXTENDED RELEASE ORAL
Qty: 0 | Refills: 0 | DISCHARGE

## 2021-01-01 RX ORDER — HUMAN INSULIN 100 [IU]/ML
14 INJECTION, SUSPENSION SUBCUTANEOUS EVERY 6 HOURS
Refills: 0 | Status: DISCONTINUED | OUTPATIENT
Start: 2021-01-01 | End: 2021-01-01

## 2021-01-01 RX ORDER — ATORVASTATIN CALCIUM 80 MG/1
1 TABLET, FILM COATED ORAL
Qty: 0 | Refills: 0 | DISCHARGE

## 2021-01-01 RX ORDER — INSULIN HUMAN 100 [IU]/ML
4 INJECTION, SOLUTION SUBCUTANEOUS ONCE
Refills: 0 | Status: DISCONTINUED | OUTPATIENT
Start: 2021-01-01 | End: 2021-01-01

## 2021-01-01 RX ORDER — VASOPRESSIN 20 [USP'U]/ML
0.04 INJECTION INTRAVENOUS
Qty: 50 | Refills: 0 | Status: DISCONTINUED | OUTPATIENT
Start: 2021-01-01 | End: 2021-01-01

## 2021-01-01 RX ORDER — INSULIN HUMAN 100 [IU]/ML
4 INJECTION, SOLUTION SUBCUTANEOUS ONCE
Refills: 0 | Status: COMPLETED | OUTPATIENT
Start: 2021-01-01 | End: 2021-01-01

## 2021-01-01 RX ORDER — POLYETHYLENE GLYCOL 3350 17 G/17G
17 POWDER, FOR SOLUTION ORAL DAILY
Refills: 0 | Status: DISCONTINUED | OUTPATIENT
Start: 2021-01-01 | End: 2021-01-01

## 2021-01-01 RX ORDER — SODIUM CHLORIDE 9 MG/ML
10 INJECTION INTRAMUSCULAR; INTRAVENOUS; SUBCUTANEOUS
Refills: 0 | Status: DISCONTINUED | OUTPATIENT
Start: 2021-01-01 | End: 2021-01-01

## 2021-01-01 RX ORDER — HYDROMORPHONE HYDROCHLORIDE 2 MG/ML
0.5 INJECTION INTRAMUSCULAR; INTRAVENOUS; SUBCUTANEOUS
Refills: 0 | Status: DISCONTINUED | OUTPATIENT
Start: 2021-01-01 | End: 2021-01-01

## 2021-01-01 RX ORDER — PROPOFOL 10 MG/ML
50 INJECTION, EMULSION INTRAVENOUS
Qty: 1000 | Refills: 0 | Status: DISCONTINUED | OUTPATIENT
Start: 2021-01-01 | End: 2021-01-01

## 2021-01-01 RX ORDER — SODIUM CHLORIDE 9 MG/ML
500 INJECTION INTRAMUSCULAR; INTRAVENOUS; SUBCUTANEOUS ONCE
Refills: 0 | Status: COMPLETED | OUTPATIENT
Start: 2021-01-01 | End: 2021-01-01

## 2021-01-01 RX ORDER — AMLODIPINE BESYLATE 2.5 MG/1
5 TABLET ORAL DAILY
Refills: 0 | Status: DISCONTINUED | OUTPATIENT
Start: 2021-01-01 | End: 2021-01-01

## 2021-01-01 RX ORDER — ACETAZOLAMIDE 250 MG/1
250 TABLET ORAL ONCE
Refills: 0 | Status: COMPLETED | OUTPATIENT
Start: 2021-01-01 | End: 2021-01-01

## 2021-01-01 RX ORDER — POTASSIUM CHLORIDE 20 MEQ
20 PACKET (EA) ORAL ONCE
Refills: 0 | Status: COMPLETED | OUTPATIENT
Start: 2021-01-01 | End: 2021-01-01

## 2021-01-01 RX ORDER — ATORVASTATIN CALCIUM 80 MG/1
40 TABLET, FILM COATED ORAL AT BEDTIME
Refills: 0 | Status: DISCONTINUED | OUTPATIENT
Start: 2021-01-01 | End: 2021-01-01

## 2021-01-01 RX ORDER — DEXMEDETOMIDINE HYDROCHLORIDE IN 0.9% SODIUM CHLORIDE 4 UG/ML
0.2 INJECTION INTRAVENOUS
Qty: 200 | Refills: 0 | Status: DISCONTINUED | OUTPATIENT
Start: 2021-01-01 | End: 2021-01-01

## 2021-01-01 RX ORDER — AMLODIPINE BESYLATE 2.5 MG/1
5 TABLET ORAL ONCE
Refills: 0 | Status: COMPLETED | OUTPATIENT
Start: 2021-01-01 | End: 2021-01-01

## 2021-01-01 RX ORDER — LEVETIRACETAM 250 MG/1
1000 TABLET, FILM COATED ORAL
Refills: 0 | Status: DISCONTINUED | OUTPATIENT
Start: 2021-01-01 | End: 2021-01-01

## 2021-01-01 RX ORDER — HUMAN INSULIN 100 [IU]/ML
10 INJECTION, SUSPENSION SUBCUTANEOUS EVERY 6 HOURS
Refills: 0 | Status: DISCONTINUED | OUTPATIENT
Start: 2021-01-01 | End: 2021-01-01

## 2021-01-01 RX ORDER — MORPHINE SULFATE 50 MG/1
1 CAPSULE, EXTENDED RELEASE ORAL
Refills: 0 | Status: DISCONTINUED | OUTPATIENT
Start: 2021-01-01 | End: 2021-01-01

## 2021-01-01 RX ORDER — CHLORHEXIDINE GLUCONATE 213 G/1000ML
1 SOLUTION TOPICAL
Refills: 0 | Status: DISCONTINUED | OUTPATIENT
Start: 2021-01-01 | End: 2021-01-01

## 2021-01-01 RX ORDER — PROPOFOL 10 MG/ML
10 INJECTION, EMULSION INTRAVENOUS
Qty: 1000 | Refills: 0 | Status: DISCONTINUED | OUTPATIENT
Start: 2021-01-01 | End: 2021-01-01

## 2021-01-01 RX ORDER — POTASSIUM CHLORIDE 20 MEQ
40 PACKET (EA) ORAL ONCE
Refills: 0 | Status: COMPLETED | OUTPATIENT
Start: 2021-01-01 | End: 2021-01-01

## 2021-01-01 RX ORDER — VALPROIC ACID (AS SODIUM SALT) 250 MG/5ML
500 SOLUTION, ORAL ORAL EVERY 8 HOURS
Refills: 0 | Status: DISCONTINUED | OUTPATIENT
Start: 2021-01-01 | End: 2021-01-01

## 2021-01-01 RX ORDER — CEFTRIAXONE 500 MG/1
2000 INJECTION, POWDER, FOR SOLUTION INTRAMUSCULAR; INTRAVENOUS EVERY 24 HOURS
Refills: 0 | Status: DISCONTINUED | OUTPATIENT
Start: 2021-01-01 | End: 2021-01-01

## 2021-01-01 RX ORDER — DOXAZOSIN MESYLATE 4 MG
4 TABLET ORAL AT BEDTIME
Refills: 0 | Status: DISCONTINUED | OUTPATIENT
Start: 2021-01-01 | End: 2021-01-01

## 2021-01-01 RX ORDER — FENTANYL CITRATE 50 UG/ML
0.5 INJECTION INTRAVENOUS
Qty: 5000 | Refills: 0 | Status: DISCONTINUED | OUTPATIENT
Start: 2021-01-01 | End: 2021-01-01

## 2021-01-01 RX ORDER — HUMAN INSULIN 100 [IU]/ML
4 INJECTION, SUSPENSION SUBCUTANEOUS EVERY 6 HOURS
Refills: 0 | Status: DISCONTINUED | OUTPATIENT
Start: 2021-01-01 | End: 2021-01-01

## 2021-01-01 RX ORDER — HUMAN INSULIN 100 [IU]/ML
30 INJECTION, SUSPENSION SUBCUTANEOUS EVERY 6 HOURS
Refills: 0 | Status: DISCONTINUED | OUTPATIENT
Start: 2021-01-01 | End: 2021-01-01

## 2021-01-01 RX ORDER — PROPOFOL 10 MG/ML
50.09 INJECTION, EMULSION INTRAVENOUS
Qty: 1000 | Refills: 0 | Status: DISCONTINUED | OUTPATIENT
Start: 2021-01-01 | End: 2021-01-01

## 2021-01-01 RX ORDER — HYDRALAZINE HCL 50 MG
5 TABLET ORAL ONCE
Refills: 0 | Status: COMPLETED | OUTPATIENT
Start: 2021-01-01 | End: 2021-01-01

## 2021-01-01 RX ORDER — HUMAN INSULIN 100 [IU]/ML
10 INJECTION, SUSPENSION SUBCUTANEOUS ONCE
Refills: 0 | Status: COMPLETED | OUTPATIENT
Start: 2021-01-01 | End: 2021-01-01

## 2021-01-01 RX ORDER — NOREPINEPHRINE BITARTRATE/D5W 8 MG/250ML
0.05 PLASTIC BAG, INJECTION (ML) INTRAVENOUS
Qty: 8 | Refills: 0 | Status: DISCONTINUED | OUTPATIENT
Start: 2021-01-01 | End: 2021-01-01

## 2021-01-01 RX ORDER — CARVEDILOL PHOSPHATE 80 MG/1
12.5 CAPSULE, EXTENDED RELEASE ORAL EVERY 12 HOURS
Refills: 0 | Status: DISCONTINUED | OUTPATIENT
Start: 2021-01-01 | End: 2021-01-01

## 2021-01-01 RX ORDER — ALBUMIN HUMAN 25 %
250 VIAL (ML) INTRAVENOUS ONCE
Refills: 0 | Status: COMPLETED | OUTPATIENT
Start: 2021-01-01 | End: 2021-01-01

## 2021-01-01 RX ORDER — MIDAZOLAM HYDROCHLORIDE 1 MG/ML
2 INJECTION, SOLUTION INTRAMUSCULAR; INTRAVENOUS ONCE
Refills: 0 | Status: DISCONTINUED | OUTPATIENT
Start: 2021-01-01 | End: 2021-01-01

## 2021-01-01 RX ORDER — HYDRALAZINE HCL 50 MG
10 TABLET ORAL EVERY 6 HOURS
Refills: 0 | Status: DISCONTINUED | OUTPATIENT
Start: 2021-01-01 | End: 2021-01-01

## 2021-01-01 RX ORDER — VALPROIC ACID (AS SODIUM SALT) 250 MG/5ML
1000 SOLUTION, ORAL ORAL ONCE
Refills: 0 | Status: COMPLETED | OUTPATIENT
Start: 2021-01-01 | End: 2021-01-01

## 2021-01-01 RX ORDER — INSULIN HUMAN 100 [IU]/ML
4 INJECTION, SOLUTION SUBCUTANEOUS
Qty: 100 | Refills: 0 | Status: DISCONTINUED | OUTPATIENT
Start: 2021-01-01 | End: 2021-01-01

## 2021-01-01 RX ORDER — DEXAMETHASONE 0.5 MG/5ML
6 ELIXIR ORAL DAILY
Refills: 0 | Status: DISCONTINUED | OUTPATIENT
Start: 2021-01-01 | End: 2021-01-01

## 2021-01-01 RX ORDER — AMLODIPINE BESYLATE 2.5 MG/1
10 TABLET ORAL DAILY
Refills: 0 | Status: DISCONTINUED | OUTPATIENT
Start: 2021-01-01 | End: 2021-01-01

## 2021-01-01 RX ORDER — KETAMINE HYDROCHLORIDE 100 MG/ML
0.25 INJECTION INTRAMUSCULAR; INTRAVENOUS
Qty: 1000 | Refills: 0 | Status: DISCONTINUED | OUTPATIENT
Start: 2021-01-01 | End: 2021-01-01

## 2021-01-01 RX ORDER — INSULIN LISPRO 100/ML
VIAL (ML) SUBCUTANEOUS EVERY 6 HOURS
Refills: 0 | Status: DISCONTINUED | OUTPATIENT
Start: 2021-01-01 | End: 2021-01-01

## 2021-01-01 RX ORDER — POTASSIUM PHOSPHATE, MONOBASIC POTASSIUM PHOSPHATE, DIBASIC 236; 224 MG/ML; MG/ML
15 INJECTION, SOLUTION INTRAVENOUS ONCE
Refills: 0 | Status: COMPLETED | OUTPATIENT
Start: 2021-01-01 | End: 2021-01-01

## 2021-01-01 RX ORDER — HUMAN INSULIN 100 [IU]/ML
2 INJECTION, SUSPENSION SUBCUTANEOUS ONCE
Refills: 0 | Status: COMPLETED | OUTPATIENT
Start: 2021-01-01 | End: 2021-01-01

## 2021-01-01 RX ORDER — FUROSEMIDE 40 MG
20 TABLET ORAL ONCE
Refills: 0 | Status: COMPLETED | OUTPATIENT
Start: 2021-01-01 | End: 2021-01-01

## 2021-01-01 RX ORDER — CHLORHEXIDINE GLUCONATE 213 G/1000ML
15 SOLUTION TOPICAL EVERY 12 HOURS
Refills: 0 | Status: DISCONTINUED | OUTPATIENT
Start: 2021-01-01 | End: 2021-01-01

## 2021-01-01 RX ORDER — SODIUM CHLORIDE 9 MG/ML
1000 INJECTION INTRAMUSCULAR; INTRAVENOUS; SUBCUTANEOUS
Refills: 0 | Status: DISCONTINUED | OUTPATIENT
Start: 2021-01-01 | End: 2021-01-01

## 2021-01-01 RX ORDER — MORPHINE SULFATE 50 MG/1
0.5 CAPSULE, EXTENDED RELEASE ORAL
Refills: 0 | Status: DISCONTINUED | OUTPATIENT
Start: 2021-01-01 | End: 2021-01-01

## 2021-01-01 RX ORDER — DEXTROSE 50 % IN WATER 50 %
50 SYRINGE (ML) INTRAVENOUS
Refills: 0 | Status: DISCONTINUED | OUTPATIENT
Start: 2021-01-01 | End: 2021-01-01

## 2021-01-01 RX ORDER — HUMAN INSULIN 100 [IU]/ML
4 INJECTION, SUSPENSION SUBCUTANEOUS ONCE
Refills: 0 | Status: COMPLETED | OUTPATIENT
Start: 2021-01-01 | End: 2021-01-01

## 2021-01-01 RX ORDER — HYDROMORPHONE HYDROCHLORIDE 2 MG/ML
0.2 INJECTION INTRAMUSCULAR; INTRAVENOUS; SUBCUTANEOUS
Qty: 100 | Refills: 0 | Status: DISCONTINUED | OUTPATIENT
Start: 2021-01-01 | End: 2021-01-01

## 2021-01-01 RX ORDER — LEVETIRACETAM 250 MG/1
1000 TABLET, FILM COATED ORAL EVERY 12 HOURS
Refills: 0 | Status: DISCONTINUED | OUTPATIENT
Start: 2021-01-01 | End: 2021-01-01

## 2021-01-01 RX ORDER — ENOXAPARIN SODIUM 100 MG/ML
80 INJECTION SUBCUTANEOUS
Refills: 0 | Status: DISCONTINUED | OUTPATIENT
Start: 2021-01-01 | End: 2021-01-01

## 2021-01-01 RX ORDER — KETAMINE HYDROCHLORIDE 100 MG/ML
10 INJECTION INTRAMUSCULAR; INTRAVENOUS ONCE
Refills: 0 | Status: DISCONTINUED | OUTPATIENT
Start: 2021-01-01 | End: 2021-01-01

## 2021-01-01 RX ORDER — HUMAN INSULIN 100 [IU]/ML
25 INJECTION, SUSPENSION SUBCUTANEOUS EVERY 6 HOURS
Refills: 0 | Status: DISCONTINUED | OUTPATIENT
Start: 2021-01-01 | End: 2021-01-01

## 2021-01-01 RX ORDER — SENNA PLUS 8.6 MG/1
10 TABLET ORAL AT BEDTIME
Refills: 0 | Status: DISCONTINUED | OUTPATIENT
Start: 2021-01-01 | End: 2021-01-01

## 2021-01-01 RX ORDER — CEPHALEXIN 500 MG
500 CAPSULE ORAL EVERY 12 HOURS
Refills: 0 | Status: DISCONTINUED | OUTPATIENT
Start: 2021-01-01 | End: 2021-01-01

## 2021-01-01 RX ORDER — LACOSAMIDE 50 MG/1
100 TABLET ORAL EVERY 12 HOURS
Refills: 0 | Status: DISCONTINUED | OUTPATIENT
Start: 2021-01-01 | End: 2021-01-01

## 2021-01-01 RX ORDER — PANTOPRAZOLE SODIUM 20 MG/1
40 TABLET, DELAYED RELEASE ORAL DAILY
Refills: 0 | Status: DISCONTINUED | OUTPATIENT
Start: 2021-01-01 | End: 2021-01-01

## 2021-01-01 RX ORDER — PHENYLEPHRINE HYDROCHLORIDE 10 MG/ML
0.1 INJECTION INTRAVENOUS
Qty: 40 | Refills: 0 | Status: DISCONTINUED | OUTPATIENT
Start: 2021-01-01 | End: 2021-01-01

## 2021-01-01 RX ORDER — MULTIVIT-MIN/FERROUS GLUCONATE 9 MG/15 ML
15 LIQUID (ML) ORAL DAILY
Refills: 0 | Status: DISCONTINUED | OUTPATIENT
Start: 2021-01-01 | End: 2021-01-01

## 2021-01-01 RX ORDER — DEXTROSE 50 % IN WATER 50 %
25 SYRINGE (ML) INTRAVENOUS ONCE
Refills: 0 | Status: COMPLETED | OUTPATIENT
Start: 2021-01-01 | End: 2021-01-01

## 2021-01-01 RX ORDER — FENTANYL CITRATE 50 UG/ML
100 INJECTION INTRAVENOUS ONCE
Refills: 0 | Status: DISCONTINUED | OUTPATIENT
Start: 2021-01-01 | End: 2021-01-01

## 2021-01-01 RX ORDER — DEXTROSE 50 % IN WATER 50 %
25 SYRINGE (ML) INTRAVENOUS
Refills: 0 | Status: DISCONTINUED | OUTPATIENT
Start: 2021-01-01 | End: 2021-01-01

## 2021-01-01 RX ORDER — DEXTROSE 50 % IN WATER 50 %
50 SYRINGE (ML) INTRAVENOUS ONCE
Refills: 0 | Status: COMPLETED | OUTPATIENT
Start: 2021-01-01 | End: 2021-01-01

## 2021-01-01 RX ORDER — MIDAZOLAM HYDROCHLORIDE 1 MG/ML
0.02 INJECTION, SOLUTION INTRAMUSCULAR; INTRAVENOUS
Qty: 100 | Refills: 0 | Status: DISCONTINUED | OUTPATIENT
Start: 2021-01-01 | End: 2021-01-01

## 2021-01-01 RX ORDER — FUROSEMIDE 40 MG
40 TABLET ORAL ONCE
Refills: 0 | Status: COMPLETED | OUTPATIENT
Start: 2021-01-01 | End: 2021-01-01

## 2021-01-01 RX ORDER — ACETAMINOPHEN 500 MG
1000 TABLET ORAL ONCE
Refills: 0 | Status: COMPLETED | OUTPATIENT
Start: 2021-01-01 | End: 2021-01-01

## 2021-01-01 RX ORDER — REMDESIVIR 5 MG/ML
100 INJECTION INTRAVENOUS EVERY 24 HOURS
Refills: 0 | Status: DISCONTINUED | OUTPATIENT
Start: 2021-01-01 | End: 2021-01-01

## 2021-01-01 RX ORDER — ACETAMINOPHEN 500 MG
650 TABLET ORAL EVERY 6 HOURS
Refills: 0 | Status: DISCONTINUED | OUTPATIENT
Start: 2021-01-01 | End: 2021-01-01

## 2021-01-01 RX ORDER — AMLODIPINE BESYLATE 2.5 MG/1
1 TABLET ORAL
Qty: 0 | Refills: 0 | DISCHARGE

## 2021-01-01 RX ORDER — HYDRALAZINE HCL 50 MG
10 TABLET ORAL EVERY 4 HOURS
Refills: 0 | Status: DISCONTINUED | OUTPATIENT
Start: 2021-01-01 | End: 2021-01-01

## 2021-01-01 RX ORDER — HUMAN INSULIN 100 [IU]/ML
20 INJECTION, SUSPENSION SUBCUTANEOUS EVERY 6 HOURS
Refills: 0 | Status: DISCONTINUED | OUTPATIENT
Start: 2021-01-01 | End: 2021-01-01

## 2021-01-01 RX ORDER — SODIUM CHLORIDE 9 MG/ML
1000 INJECTION, SOLUTION INTRAVENOUS ONCE
Refills: 0 | Status: COMPLETED | OUTPATIENT
Start: 2021-01-01 | End: 2021-01-01

## 2021-01-01 RX ORDER — ROBINUL 0.2 MG/ML
0.4 INJECTION INTRAMUSCULAR; INTRAVENOUS EVERY 6 HOURS
Refills: 0 | Status: DISCONTINUED | OUTPATIENT
Start: 2021-01-01 | End: 2021-01-01

## 2021-01-01 RX ORDER — SODIUM CHLORIDE 9 MG/ML
1000 INJECTION, SOLUTION INTRAVENOUS
Refills: 0 | Status: DISCONTINUED | OUTPATIENT
Start: 2021-01-01 | End: 2021-01-01

## 2021-01-01 RX ORDER — HUMAN INSULIN 100 [IU]/ML
15 INJECTION, SUSPENSION SUBCUTANEOUS EVERY 6 HOURS
Refills: 0 | Status: DISCONTINUED | OUTPATIENT
Start: 2021-01-01 | End: 2021-01-01

## 2021-01-01 RX ORDER — ASPIRIN/CALCIUM CARB/MAGNESIUM 324 MG
1 TABLET ORAL
Qty: 0 | Refills: 0 | DISCHARGE

## 2021-01-01 RX ORDER — LACOSAMIDE 50 MG/1
100 TABLET ORAL
Refills: 0 | Status: DISCONTINUED | OUTPATIENT
Start: 2021-01-01 | End: 2021-01-01

## 2021-01-01 RX ORDER — CARVEDILOL PHOSPHATE 80 MG/1
6.25 CAPSULE, EXTENDED RELEASE ORAL EVERY 12 HOURS
Refills: 0 | Status: DISCONTINUED | OUTPATIENT
Start: 2021-01-01 | End: 2021-01-01

## 2021-01-01 RX ORDER — ASPIRIN/CALCIUM CARB/MAGNESIUM 324 MG
81 TABLET ORAL DAILY
Refills: 0 | Status: DISCONTINUED | OUTPATIENT
Start: 2021-01-01 | End: 2021-01-01

## 2021-01-01 RX ORDER — ACETAMINOPHEN 500 MG
650 TABLET ORAL EVERY 4 HOURS
Refills: 0 | Status: DISCONTINUED | OUTPATIENT
Start: 2021-01-01 | End: 2021-01-01

## 2021-01-01 RX ORDER — AMLODIPINE BESYLATE 2.5 MG/1
10 TABLET ORAL ONCE
Refills: 0 | Status: COMPLETED | OUTPATIENT
Start: 2021-01-01 | End: 2021-01-01

## 2021-01-01 RX ORDER — DEXMEDETOMIDINE HYDROCHLORIDE IN 0.9% SODIUM CHLORIDE 4 UG/ML
0.4 INJECTION INTRAVENOUS
Qty: 200 | Refills: 0 | Status: DISCONTINUED | OUTPATIENT
Start: 2021-01-01 | End: 2021-01-01

## 2021-01-01 RX ORDER — HYDRALAZINE HCL 50 MG
5 TABLET ORAL
Refills: 0 | Status: DISCONTINUED | OUTPATIENT
Start: 2021-01-01 | End: 2021-01-01

## 2021-01-01 RX ORDER — POTASSIUM CHLORIDE 20 MEQ
20 PACKET (EA) ORAL
Refills: 0 | Status: COMPLETED | OUTPATIENT
Start: 2021-01-01 | End: 2021-01-01

## 2021-01-01 RX ORDER — SENNA PLUS 8.6 MG/1
2 TABLET ORAL AT BEDTIME
Refills: 0 | Status: DISCONTINUED | OUTPATIENT
Start: 2021-01-01 | End: 2021-01-01

## 2021-01-01 RX ORDER — FENTANYL CITRATE 50 UG/ML
25 INJECTION INTRAVENOUS ONCE
Refills: 0 | Status: DISCONTINUED | OUTPATIENT
Start: 2021-01-01 | End: 2021-01-01

## 2021-01-01 RX ORDER — ENOXAPARIN SODIUM 100 MG/ML
40 INJECTION SUBCUTANEOUS DAILY
Refills: 0 | Status: DISCONTINUED | OUTPATIENT
Start: 2021-01-01 | End: 2021-01-01

## 2021-01-01 RX ADMIN — SODIUM CHLORIDE 1000 MILLILITER(S): 9 INJECTION INTRAMUSCULAR; INTRAVENOUS; SUBCUTANEOUS at 04:34

## 2021-01-01 RX ADMIN — VASOPRESSIN 2.4 UNIT(S)/MIN: 20 INJECTION INTRAVENOUS at 19:52

## 2021-01-01 RX ADMIN — LACOSAMIDE 120 MILLIGRAM(S): 50 TABLET ORAL at 17:25

## 2021-01-01 RX ADMIN — CHLORHEXIDINE GLUCONATE 1 APPLICATION(S): 213 SOLUTION TOPICAL at 05:56

## 2021-01-01 RX ADMIN — Medication 15 MILLILITER(S): at 12:53

## 2021-01-01 RX ADMIN — KETAMINE HYDROCHLORIDE 1.96 MG/KG/HR: 100 INJECTION INTRAMUSCULAR; INTRAVENOUS at 05:10

## 2021-01-01 RX ADMIN — Medication 20 MILLIGRAM(S): at 11:48

## 2021-01-01 RX ADMIN — PANTOPRAZOLE SODIUM 40 MILLIGRAM(S): 20 TABLET, DELAYED RELEASE ORAL at 12:04

## 2021-01-01 RX ADMIN — Medication 2: at 00:24

## 2021-01-01 RX ADMIN — Medication 2 MILLIGRAM(S): at 08:15

## 2021-01-01 RX ADMIN — AMLODIPINE BESYLATE 10 MILLIGRAM(S): 2.5 TABLET ORAL at 12:04

## 2021-01-01 RX ADMIN — LACOSAMIDE 120 MILLIGRAM(S): 50 TABLET ORAL at 17:59

## 2021-01-01 RX ADMIN — Medication 2 MILLIGRAM(S): at 01:06

## 2021-01-01 RX ADMIN — Medication 27.5 MILLIGRAM(S): at 13:03

## 2021-01-01 RX ADMIN — DEXMEDETOMIDINE HYDROCHLORIDE IN 0.9% SODIUM CHLORIDE 7.82 MICROGRAM(S)/KG/HR: 4 INJECTION INTRAVENOUS at 20:12

## 2021-01-01 RX ADMIN — CHLORHEXIDINE GLUCONATE 1 APPLICATION(S): 213 SOLUTION TOPICAL at 05:06

## 2021-01-01 RX ADMIN — CHLORHEXIDINE GLUCONATE 1 APPLICATION(S): 213 SOLUTION TOPICAL at 06:00

## 2021-01-01 RX ADMIN — CHLORHEXIDINE GLUCONATE 15 MILLILITER(S): 213 SOLUTION TOPICAL at 17:54

## 2021-01-01 RX ADMIN — Medication 0.5 MILLIGRAM(S): at 12:55

## 2021-01-01 RX ADMIN — PANTOPRAZOLE SODIUM 40 MILLIGRAM(S): 20 TABLET, DELAYED RELEASE ORAL at 11:06

## 2021-01-01 RX ADMIN — DEXMEDETOMIDINE HYDROCHLORIDE IN 0.9% SODIUM CHLORIDE 3.91 MICROGRAM(S)/KG/HR: 4 INJECTION INTRAVENOUS at 01:00

## 2021-01-01 RX ADMIN — CHLORHEXIDINE GLUCONATE 1 APPLICATION(S): 213 SOLUTION TOPICAL at 05:18

## 2021-01-01 RX ADMIN — MIDAZOLAM HYDROCHLORIDE 1.56 MG/KG/HR: 1 INJECTION, SOLUTION INTRAMUSCULAR; INTRAVENOUS at 18:45

## 2021-01-01 RX ADMIN — LEVETIRACETAM 400 MILLIGRAM(S): 250 TABLET, FILM COATED ORAL at 06:00

## 2021-01-01 RX ADMIN — MIDAZOLAM HYDROCHLORIDE 2 MILLIGRAM(S): 1 INJECTION, SOLUTION INTRAMUSCULAR; INTRAVENOUS at 15:47

## 2021-01-01 RX ADMIN — Medication 10 MILLIGRAM(S): at 05:45

## 2021-01-01 RX ADMIN — ATORVASTATIN CALCIUM 40 MILLIGRAM(S): 80 TABLET, FILM COATED ORAL at 21:13

## 2021-01-01 RX ADMIN — ENOXAPARIN SODIUM 40 MILLIGRAM(S): 100 INJECTION SUBCUTANEOUS at 11:35

## 2021-01-01 RX ADMIN — Medication 500 MILLIGRAM(S): at 06:20

## 2021-01-01 RX ADMIN — PANTOPRAZOLE SODIUM 40 MILLIGRAM(S): 20 TABLET, DELAYED RELEASE ORAL at 12:05

## 2021-01-01 RX ADMIN — ATORVASTATIN CALCIUM 40 MILLIGRAM(S): 80 TABLET, FILM COATED ORAL at 21:24

## 2021-01-01 RX ADMIN — LEVETIRACETAM 400 MILLIGRAM(S): 250 TABLET, FILM COATED ORAL at 13:21

## 2021-01-01 RX ADMIN — PANTOPRAZOLE SODIUM 40 MILLIGRAM(S): 20 TABLET, DELAYED RELEASE ORAL at 11:42

## 2021-01-01 RX ADMIN — CHLORHEXIDINE GLUCONATE 15 MILLILITER(S): 213 SOLUTION TOPICAL at 05:08

## 2021-01-01 RX ADMIN — PANTOPRAZOLE SODIUM 40 MILLIGRAM(S): 20 TABLET, DELAYED RELEASE ORAL at 11:13

## 2021-01-01 RX ADMIN — Medication 15 MILLILITER(S): at 12:05

## 2021-01-01 RX ADMIN — PROPOFOL 4.69 MICROGRAM(S)/KG/MIN: 10 INJECTION, EMULSION INTRAVENOUS at 05:09

## 2021-01-01 RX ADMIN — Medication 3: at 05:17

## 2021-01-01 RX ADMIN — ACETAZOLAMIDE 250 MILLIGRAM(S): 250 TABLET ORAL at 06:02

## 2021-01-01 RX ADMIN — LACOSAMIDE 120 MILLIGRAM(S): 50 TABLET ORAL at 05:26

## 2021-01-01 RX ADMIN — Medication 4: at 18:21

## 2021-01-01 RX ADMIN — PROPOFOL 23.5 MICROGRAM(S)/KG/MIN: 10 INJECTION, EMULSION INTRAVENOUS at 10:49

## 2021-01-01 RX ADMIN — Medication 15 MILLILITER(S): at 11:13

## 2021-01-01 RX ADMIN — HUMAN INSULIN 15 UNIT(S): 100 INJECTION, SUSPENSION SUBCUTANEOUS at 05:49

## 2021-01-01 RX ADMIN — CHLORHEXIDINE GLUCONATE 1 APPLICATION(S): 213 SOLUTION TOPICAL at 06:23

## 2021-01-01 RX ADMIN — POTASSIUM PHOSPHATE, MONOBASIC POTASSIUM PHOSPHATE, DIBASIC 62.5 MILLIMOLE(S): 236; 224 INJECTION, SOLUTION INTRAVENOUS at 14:12

## 2021-01-01 RX ADMIN — LACOSAMIDE 120 MILLIGRAM(S): 50 TABLET ORAL at 18:34

## 2021-01-01 RX ADMIN — PROPOFOL 23.5 MICROGRAM(S)/KG/MIN: 10 INJECTION, EMULSION INTRAVENOUS at 20:46

## 2021-01-01 RX ADMIN — Medication 81 MILLIGRAM(S): at 11:12

## 2021-01-01 RX ADMIN — PROPOFOL 23.5 MICROGRAM(S)/KG/MIN: 10 INJECTION, EMULSION INTRAVENOUS at 11:35

## 2021-01-01 RX ADMIN — Medication 40 MILLIGRAM(S): at 09:14

## 2021-01-01 RX ADMIN — Medication 27.5 MILLIGRAM(S): at 13:04

## 2021-01-01 RX ADMIN — Medication 10 MILLIGRAM(S): at 22:24

## 2021-01-01 RX ADMIN — Medication 5 MILLIGRAM(S): at 19:15

## 2021-01-01 RX ADMIN — PROPOFOL 23.5 MICROGRAM(S)/KG/MIN: 10 INJECTION, EMULSION INTRAVENOUS at 10:15

## 2021-01-01 RX ADMIN — Medication 27.5 MILLIGRAM(S): at 22:41

## 2021-01-01 RX ADMIN — Medication 25 MILLILITER(S): at 11:46

## 2021-01-01 RX ADMIN — Medication 27.5 MILLIGRAM(S): at 21:24

## 2021-01-01 RX ADMIN — LEVETIRACETAM 400 MILLIGRAM(S): 250 TABLET, FILM COATED ORAL at 05:29

## 2021-01-01 RX ADMIN — Medication 2 MILLIGRAM(S): at 09:52

## 2021-01-01 RX ADMIN — LEVETIRACETAM 400 MILLIGRAM(S): 250 TABLET, FILM COATED ORAL at 14:05

## 2021-01-01 RX ADMIN — PROPOFOL 23.5 MICROGRAM(S)/KG/MIN: 10 INJECTION, EMULSION INTRAVENOUS at 11:30

## 2021-01-01 RX ADMIN — SENNA PLUS 10 MILLILITER(S): 8.6 TABLET ORAL at 21:00

## 2021-01-01 RX ADMIN — HUMAN INSULIN 2 UNIT(S): 100 INJECTION, SUSPENSION SUBCUTANEOUS at 05:18

## 2021-01-01 RX ADMIN — SODIUM CHLORIDE 500 MILLILITER(S): 9 INJECTION INTRAMUSCULAR; INTRAVENOUS; SUBCUTANEOUS at 12:25

## 2021-01-01 RX ADMIN — HUMAN INSULIN 30 UNIT(S): 100 INJECTION, SUSPENSION SUBCUTANEOUS at 17:47

## 2021-01-01 RX ADMIN — Medication 27.5 MILLIGRAM(S): at 14:08

## 2021-01-01 RX ADMIN — LACOSAMIDE 120 MILLIGRAM(S): 50 TABLET ORAL at 05:09

## 2021-01-01 RX ADMIN — LEVETIRACETAM 400 MILLIGRAM(S): 250 TABLET, FILM COATED ORAL at 15:07

## 2021-01-01 RX ADMIN — HUMAN INSULIN 20 UNIT(S): 100 INJECTION, SUSPENSION SUBCUTANEOUS at 05:55

## 2021-01-01 RX ADMIN — PROPOFOL 23.5 MICROGRAM(S)/KG/MIN: 10 INJECTION, EMULSION INTRAVENOUS at 13:39

## 2021-01-01 RX ADMIN — LACOSAMIDE 120 MILLIGRAM(S): 50 TABLET ORAL at 17:41

## 2021-01-01 RX ADMIN — ATORVASTATIN CALCIUM 40 MILLIGRAM(S): 80 TABLET, FILM COATED ORAL at 23:08

## 2021-01-01 RX ADMIN — ENOXAPARIN SODIUM 80 MILLIGRAM(S): 100 INJECTION SUBCUTANEOUS at 05:19

## 2021-01-01 RX ADMIN — PROPOFOL 23.5 MICROGRAM(S)/KG/MIN: 10 INJECTION, EMULSION INTRAVENOUS at 04:47

## 2021-01-01 RX ADMIN — DEXMEDETOMIDINE HYDROCHLORIDE IN 0.9% SODIUM CHLORIDE 7.82 MICROGRAM(S)/KG/HR: 4 INJECTION INTRAVENOUS at 11:25

## 2021-01-01 RX ADMIN — CHLORHEXIDINE GLUCONATE 1 APPLICATION(S): 213 SOLUTION TOPICAL at 05:04

## 2021-01-01 RX ADMIN — AMLODIPINE BESYLATE 5 MILLIGRAM(S): 2.5 TABLET ORAL at 12:13

## 2021-01-01 RX ADMIN — Medication 4 MILLIGRAM(S): at 13:07

## 2021-01-01 RX ADMIN — KETAMINE HYDROCHLORIDE 10 MILLIGRAM(S): 100 INJECTION INTRAMUSCULAR; INTRAVENOUS at 23:00

## 2021-01-01 RX ADMIN — LEVETIRACETAM 400 MILLIGRAM(S): 250 TABLET, FILM COATED ORAL at 13:02

## 2021-01-01 RX ADMIN — LACOSAMIDE 120 MILLIGRAM(S): 50 TABLET ORAL at 17:04

## 2021-01-01 RX ADMIN — CHLORHEXIDINE GLUCONATE 15 MILLILITER(S): 213 SOLUTION TOPICAL at 06:03

## 2021-01-01 RX ADMIN — Medication 27.5 MILLIGRAM(S): at 23:08

## 2021-01-01 RX ADMIN — Medication 2 MILLIGRAM(S): at 17:59

## 2021-01-01 RX ADMIN — CHLORHEXIDINE GLUCONATE 15 MILLILITER(S): 213 SOLUTION TOPICAL at 17:57

## 2021-01-01 RX ADMIN — LEVETIRACETAM 400 MILLIGRAM(S): 250 TABLET, FILM COATED ORAL at 22:47

## 2021-01-01 RX ADMIN — HUMAN INSULIN 15 UNIT(S): 100 INJECTION, SUSPENSION SUBCUTANEOUS at 00:56

## 2021-01-01 RX ADMIN — LACOSAMIDE 120 MILLIGRAM(S): 50 TABLET ORAL at 17:14

## 2021-01-01 RX ADMIN — Medication 27.5 MILLIGRAM(S): at 06:12

## 2021-01-01 RX ADMIN — LEVETIRACETAM 400 MILLIGRAM(S): 250 TABLET, FILM COATED ORAL at 13:29

## 2021-01-01 RX ADMIN — ATORVASTATIN CALCIUM 40 MILLIGRAM(S): 80 TABLET, FILM COATED ORAL at 21:00

## 2021-01-01 RX ADMIN — Medication 15 MILLILITER(S): at 11:11

## 2021-01-01 RX ADMIN — ENOXAPARIN SODIUM 80 MILLIGRAM(S): 100 INJECTION SUBCUTANEOUS at 05:13

## 2021-01-01 RX ADMIN — PROPOFOL 23.5 MICROGRAM(S)/KG/MIN: 10 INJECTION, EMULSION INTRAVENOUS at 17:03

## 2021-01-01 RX ADMIN — CHLORHEXIDINE GLUCONATE 15 MILLILITER(S): 213 SOLUTION TOPICAL at 18:37

## 2021-01-01 RX ADMIN — Medication 27.5 MILLIGRAM(S): at 18:35

## 2021-01-01 RX ADMIN — Medication 650 MILLIGRAM(S): at 17:30

## 2021-01-01 RX ADMIN — Medication 7.33 MICROGRAM(S)/KG/MIN: at 12:08

## 2021-01-01 RX ADMIN — HUMAN INSULIN 20 UNIT(S): 100 INJECTION, SUSPENSION SUBCUTANEOUS at 17:42

## 2021-01-01 RX ADMIN — LEVETIRACETAM 400 MILLIGRAM(S): 250 TABLET, FILM COATED ORAL at 14:32

## 2021-01-01 RX ADMIN — PROPOFOL 23.5 MICROGRAM(S)/KG/MIN: 10 INJECTION, EMULSION INTRAVENOUS at 12:18

## 2021-01-01 RX ADMIN — ATORVASTATIN CALCIUM 40 MILLIGRAM(S): 80 TABLET, FILM COATED ORAL at 22:00

## 2021-01-01 RX ADMIN — Medication 15 MILLILITER(S): at 11:02

## 2021-01-01 RX ADMIN — Medication 27.5 MILLIGRAM(S): at 06:00

## 2021-01-01 RX ADMIN — MIDAZOLAM HYDROCHLORIDE 1.56 MG/KG/HR: 1 INJECTION, SOLUTION INTRAMUSCULAR; INTRAVENOUS at 01:43

## 2021-01-01 RX ADMIN — PROPOFOL 23.5 MICROGRAM(S)/KG/MIN: 10 INJECTION, EMULSION INTRAVENOUS at 13:08

## 2021-01-01 RX ADMIN — Medication 650 MILLIGRAM(S): at 18:45

## 2021-01-01 RX ADMIN — HUMAN INSULIN 20 UNIT(S): 100 INJECTION, SUSPENSION SUBCUTANEOUS at 17:36

## 2021-01-01 RX ADMIN — LACOSAMIDE 100 MILLIGRAM(S): 50 TABLET ORAL at 19:51

## 2021-01-01 RX ADMIN — Medication 81 MILLIGRAM(S): at 11:02

## 2021-01-01 RX ADMIN — Medication 15 MILLILITER(S): at 11:49

## 2021-01-01 RX ADMIN — HUMAN INSULIN 10 UNIT(S): 100 INJECTION, SUSPENSION SUBCUTANEOUS at 11:47

## 2021-01-01 RX ADMIN — LACOSAMIDE 120 MILLIGRAM(S): 50 TABLET ORAL at 17:30

## 2021-01-01 RX ADMIN — ATORVASTATIN CALCIUM 40 MILLIGRAM(S): 80 TABLET, FILM COATED ORAL at 22:42

## 2021-01-01 RX ADMIN — CHLORHEXIDINE GLUCONATE 15 MILLILITER(S): 213 SOLUTION TOPICAL at 05:28

## 2021-01-01 RX ADMIN — Medication 650 MILLIGRAM(S): at 22:00

## 2021-01-01 RX ADMIN — CHLORHEXIDINE GLUCONATE 15 MILLILITER(S): 213 SOLUTION TOPICAL at 05:06

## 2021-01-01 RX ADMIN — PROPOFOL 23.5 MICROGRAM(S)/KG/MIN: 10 INJECTION, EMULSION INTRAVENOUS at 08:59

## 2021-01-01 RX ADMIN — LEVETIRACETAM 400 MILLIGRAM(S): 250 TABLET, FILM COATED ORAL at 06:19

## 2021-01-01 RX ADMIN — Medication 27.5 MILLIGRAM(S): at 05:18

## 2021-01-01 RX ADMIN — Medication 81 MILLIGRAM(S): at 11:51

## 2021-01-01 RX ADMIN — LEVETIRACETAM 400 MILLIGRAM(S): 250 TABLET, FILM COATED ORAL at 05:09

## 2021-01-01 RX ADMIN — HUMAN INSULIN 15 UNIT(S): 100 INJECTION, SUSPENSION SUBCUTANEOUS at 17:04

## 2021-01-01 RX ADMIN — LEVETIRACETAM 400 MILLIGRAM(S): 250 TABLET, FILM COATED ORAL at 05:16

## 2021-01-01 RX ADMIN — PROPOFOL 23.5 MICROGRAM(S)/KG/MIN: 10 INJECTION, EMULSION INTRAVENOUS at 19:28

## 2021-01-01 RX ADMIN — LACOSAMIDE 120 MILLIGRAM(S): 50 TABLET ORAL at 05:05

## 2021-01-01 RX ADMIN — Medication 650 MILLIGRAM(S): at 20:11

## 2021-01-01 RX ADMIN — PANTOPRAZOLE SODIUM 40 MILLIGRAM(S): 20 TABLET, DELAYED RELEASE ORAL at 11:51

## 2021-01-01 RX ADMIN — Medication 2: at 05:11

## 2021-01-01 RX ADMIN — Medication 27.5 MILLIGRAM(S): at 14:49

## 2021-01-01 RX ADMIN — CHLORHEXIDINE GLUCONATE 15 MILLILITER(S): 213 SOLUTION TOPICAL at 17:59

## 2021-01-01 RX ADMIN — Medication 81 MILLIGRAM(S): at 11:49

## 2021-01-01 RX ADMIN — PROPOFOL 23.5 MICROGRAM(S)/KG/MIN: 10 INJECTION, EMULSION INTRAVENOUS at 12:34

## 2021-01-01 RX ADMIN — Medication 27.5 MILLIGRAM(S): at 05:17

## 2021-01-01 RX ADMIN — PROPOFOL 23.5 MICROGRAM(S)/KG/MIN: 10 INJECTION, EMULSION INTRAVENOUS at 11:52

## 2021-01-01 RX ADMIN — PANTOPRAZOLE SODIUM 40 MILLIGRAM(S): 20 TABLET, DELAYED RELEASE ORAL at 11:36

## 2021-01-01 RX ADMIN — Medication 650 MILLIGRAM(S): at 12:47

## 2021-01-01 RX ADMIN — HUMAN INSULIN 15 UNIT(S): 100 INJECTION, SUSPENSION SUBCUTANEOUS at 11:35

## 2021-01-01 RX ADMIN — LACOSAMIDE 120 MILLIGRAM(S): 50 TABLET ORAL at 05:29

## 2021-01-01 RX ADMIN — AMLODIPINE BESYLATE 10 MILLIGRAM(S): 2.5 TABLET ORAL at 11:50

## 2021-01-01 RX ADMIN — CHLORHEXIDINE GLUCONATE 15 MILLILITER(S): 213 SOLUTION TOPICAL at 05:04

## 2021-01-01 RX ADMIN — Medication 5 MILLIGRAM(S): at 09:37

## 2021-01-01 RX ADMIN — LEVETIRACETAM 400 MILLIGRAM(S): 250 TABLET, FILM COATED ORAL at 22:41

## 2021-01-01 RX ADMIN — Medication 6 MILLIGRAM(S): at 09:00

## 2021-01-01 RX ADMIN — Medication 50 MILLIEQUIVALENT(S): at 06:16

## 2021-01-01 RX ADMIN — LEVETIRACETAM 400 MILLIGRAM(S): 250 TABLET, FILM COATED ORAL at 21:27

## 2021-01-01 RX ADMIN — PROPOFOL 23.5 MICROGRAM(S)/KG/MIN: 10 INJECTION, EMULSION INTRAVENOUS at 09:31

## 2021-01-01 RX ADMIN — CHLORHEXIDINE GLUCONATE 1 APPLICATION(S): 213 SOLUTION TOPICAL at 05:54

## 2021-01-01 RX ADMIN — POTASSIUM PHOSPHATE, MONOBASIC POTASSIUM PHOSPHATE, DIBASIC 62.5 MILLIMOLE(S): 236; 224 INJECTION, SOLUTION INTRAVENOUS at 14:56

## 2021-01-01 RX ADMIN — AMLODIPINE BESYLATE 5 MILLIGRAM(S): 2.5 TABLET ORAL at 12:04

## 2021-01-01 RX ADMIN — Medication 500 MILLIGRAM(S): at 17:53

## 2021-01-01 RX ADMIN — Medication 7.33 MICROGRAM(S)/KG/MIN: at 05:19

## 2021-01-01 RX ADMIN — LACOSAMIDE 120 MILLIGRAM(S): 50 TABLET ORAL at 06:00

## 2021-01-01 RX ADMIN — LEVETIRACETAM 400 MILLIGRAM(S): 250 TABLET, FILM COATED ORAL at 05:12

## 2021-01-01 RX ADMIN — CHLORHEXIDINE GLUCONATE 1 APPLICATION(S): 213 SOLUTION TOPICAL at 06:12

## 2021-01-01 RX ADMIN — CHLORHEXIDINE GLUCONATE 15 MILLILITER(S): 213 SOLUTION TOPICAL at 17:03

## 2021-01-01 RX ADMIN — CHLORHEXIDINE GLUCONATE 15 MILLILITER(S): 213 SOLUTION TOPICAL at 05:19

## 2021-01-01 RX ADMIN — HUMAN INSULIN 10 UNIT(S): 100 INJECTION, SUSPENSION SUBCUTANEOUS at 01:00

## 2021-01-01 RX ADMIN — LEVETIRACETAM 400 MILLIGRAM(S): 250 TABLET, FILM COATED ORAL at 05:21

## 2021-01-01 RX ADMIN — Medication 27.5 MILLIGRAM(S): at 21:00

## 2021-01-01 RX ADMIN — HUMAN INSULIN 20 UNIT(S): 100 INJECTION, SUSPENSION SUBCUTANEOUS at 18:22

## 2021-01-01 RX ADMIN — Medication 650 MILLIGRAM(S): at 20:35

## 2021-01-01 RX ADMIN — CHLORHEXIDINE GLUCONATE 15 MILLILITER(S): 213 SOLUTION TOPICAL at 05:39

## 2021-01-01 RX ADMIN — HUMAN INSULIN 20 UNIT(S): 100 INJECTION, SUSPENSION SUBCUTANEOUS at 23:50

## 2021-01-01 RX ADMIN — CHLORHEXIDINE GLUCONATE 1 APPLICATION(S): 213 SOLUTION TOPICAL at 05:48

## 2021-01-01 RX ADMIN — CHLORHEXIDINE GLUCONATE 15 MILLILITER(S): 213 SOLUTION TOPICAL at 05:48

## 2021-01-01 RX ADMIN — Medication 7.33 MICROGRAM(S)/KG/MIN: at 21:01

## 2021-01-01 RX ADMIN — LEVETIRACETAM 400 MILLIGRAM(S): 250 TABLET, FILM COATED ORAL at 13:11

## 2021-01-01 RX ADMIN — PROPOFOL 23.5 MICROGRAM(S)/KG/MIN: 10 INJECTION, EMULSION INTRAVENOUS at 23:03

## 2021-01-01 RX ADMIN — PROPOFOL 23.5 MICROGRAM(S)/KG/MIN: 10 INJECTION, EMULSION INTRAVENOUS at 14:35

## 2021-01-01 RX ADMIN — HUMAN INSULIN 30 UNIT(S): 100 INJECTION, SUSPENSION SUBCUTANEOUS at 17:25

## 2021-01-01 RX ADMIN — PANTOPRAZOLE SODIUM 40 MILLIGRAM(S): 20 TABLET, DELAYED RELEASE ORAL at 11:11

## 2021-01-01 RX ADMIN — Medication 27.5 MILLIGRAM(S): at 05:56

## 2021-01-01 RX ADMIN — CARVEDILOL PHOSPHATE 12.5 MILLIGRAM(S): 80 CAPSULE, EXTENDED RELEASE ORAL at 17:15

## 2021-01-01 RX ADMIN — Medication 650 MILLIGRAM(S): at 16:27

## 2021-01-01 RX ADMIN — LACOSAMIDE 120 MILLIGRAM(S): 50 TABLET ORAL at 17:22

## 2021-01-01 RX ADMIN — DEXMEDETOMIDINE HYDROCHLORIDE IN 0.9% SODIUM CHLORIDE 7.82 MICROGRAM(S)/KG/HR: 4 INJECTION INTRAVENOUS at 00:37

## 2021-01-01 RX ADMIN — Medication 8: at 11:46

## 2021-01-01 RX ADMIN — LEVETIRACETAM 400 MILLIGRAM(S): 250 TABLET, FILM COATED ORAL at 21:01

## 2021-01-01 RX ADMIN — Medication 125 MILLILITER(S): at 16:41

## 2021-01-01 RX ADMIN — LEVETIRACETAM 400 MILLIGRAM(S): 250 TABLET, FILM COATED ORAL at 22:17

## 2021-01-01 RX ADMIN — CARVEDILOL PHOSPHATE 12.5 MILLIGRAM(S): 80 CAPSULE, EXTENDED RELEASE ORAL at 18:24

## 2021-01-01 RX ADMIN — AMLODIPINE BESYLATE 10 MILLIGRAM(S): 2.5 TABLET ORAL at 11:01

## 2021-01-01 RX ADMIN — PROPOFOL 23.5 MICROGRAM(S)/KG/MIN: 10 INJECTION, EMULSION INTRAVENOUS at 23:30

## 2021-01-01 RX ADMIN — LACOSAMIDE 120 MILLIGRAM(S): 50 TABLET ORAL at 05:17

## 2021-01-01 RX ADMIN — LEVETIRACETAM 400 MILLIGRAM(S): 250 TABLET, FILM COATED ORAL at 06:13

## 2021-01-01 RX ADMIN — MORPHINE SULFATE 1 MILLIGRAM(S): 50 CAPSULE, EXTENDED RELEASE ORAL at 12:34

## 2021-01-01 RX ADMIN — PROPOFOL 23.5 MICROGRAM(S)/KG/MIN: 10 INJECTION, EMULSION INTRAVENOUS at 06:44

## 2021-01-01 RX ADMIN — Medication 50 MILLILITER(S): at 11:36

## 2021-01-01 RX ADMIN — PROPOFOL 23.5 MICROGRAM(S)/KG/MIN: 10 INJECTION, EMULSION INTRAVENOUS at 03:25

## 2021-01-01 RX ADMIN — CHLORHEXIDINE GLUCONATE 15 MILLILITER(S): 213 SOLUTION TOPICAL at 17:22

## 2021-01-01 RX ADMIN — Medication 27.5 MILLIGRAM(S): at 21:43

## 2021-01-01 RX ADMIN — HUMAN INSULIN 15 UNIT(S): 100 INJECTION, SUSPENSION SUBCUTANEOUS at 00:25

## 2021-01-01 RX ADMIN — PANTOPRAZOLE SODIUM 40 MILLIGRAM(S): 20 TABLET, DELAYED RELEASE ORAL at 11:30

## 2021-01-01 RX ADMIN — LACOSAMIDE 120 MILLIGRAM(S): 50 TABLET ORAL at 06:12

## 2021-01-01 RX ADMIN — ENOXAPARIN SODIUM 40 MILLIGRAM(S): 100 INJECTION SUBCUTANEOUS at 11:43

## 2021-01-01 RX ADMIN — HUMAN INSULIN 15 UNIT(S): 100 INJECTION, SUSPENSION SUBCUTANEOUS at 06:30

## 2021-01-01 RX ADMIN — CARVEDILOL PHOSPHATE 12.5 MILLIGRAM(S): 80 CAPSULE, EXTENDED RELEASE ORAL at 05:11

## 2021-01-01 RX ADMIN — CARVEDILOL PHOSPHATE 6.25 MILLIGRAM(S): 80 CAPSULE, EXTENDED RELEASE ORAL at 05:39

## 2021-01-01 RX ADMIN — LACOSAMIDE 120 MILLIGRAM(S): 50 TABLET ORAL at 05:16

## 2021-01-01 RX ADMIN — Medication 10 MILLIGRAM(S): at 09:50

## 2021-01-01 RX ADMIN — Medication 2 MILLIGRAM(S): at 15:54

## 2021-01-01 RX ADMIN — Medication 81 MILLIGRAM(S): at 11:36

## 2021-01-01 RX ADMIN — Medication 500 MILLIGRAM(S): at 05:22

## 2021-01-01 RX ADMIN — ENOXAPARIN SODIUM 40 MILLIGRAM(S): 100 INJECTION SUBCUTANEOUS at 11:49

## 2021-01-01 RX ADMIN — CHLORHEXIDINE GLUCONATE 15 MILLILITER(S): 213 SOLUTION TOPICAL at 17:24

## 2021-01-01 RX ADMIN — LEVETIRACETAM 400 MILLIGRAM(S): 250 TABLET, FILM COATED ORAL at 13:54

## 2021-01-01 RX ADMIN — LACOSAMIDE 120 MILLIGRAM(S): 50 TABLET ORAL at 17:54

## 2021-01-01 RX ADMIN — ENOXAPARIN SODIUM 80 MILLIGRAM(S): 100 INJECTION SUBCUTANEOUS at 17:30

## 2021-01-01 RX ADMIN — LEVETIRACETAM 400 MILLIGRAM(S): 250 TABLET, FILM COATED ORAL at 08:44

## 2021-01-01 RX ADMIN — Medication 2: at 05:23

## 2021-01-01 RX ADMIN — KETAMINE HYDROCHLORIDE 1.96 MG/KG/HR: 100 INJECTION INTRAMUSCULAR; INTRAVENOUS at 08:26

## 2021-01-01 RX ADMIN — ENOXAPARIN SODIUM 80 MILLIGRAM(S): 100 INJECTION SUBCUTANEOUS at 17:32

## 2021-01-01 RX ADMIN — POLYETHYLENE GLYCOL 3350 17 GRAM(S): 17 POWDER, FOR SOLUTION ORAL at 12:04

## 2021-01-01 RX ADMIN — CHLORHEXIDINE GLUCONATE 15 MILLILITER(S): 213 SOLUTION TOPICAL at 17:25

## 2021-01-01 RX ADMIN — Medication 4 MILLIGRAM(S): at 10:16

## 2021-01-01 RX ADMIN — LEVETIRACETAM 400 MILLIGRAM(S): 250 TABLET, FILM COATED ORAL at 05:05

## 2021-01-01 RX ADMIN — Medication 650 MILLIGRAM(S): at 21:35

## 2021-01-01 RX ADMIN — Medication 27.5 MILLIGRAM(S): at 13:21

## 2021-01-01 RX ADMIN — Medication 27.5 MILLIGRAM(S): at 06:01

## 2021-01-01 RX ADMIN — LACOSAMIDE 120 MILLIGRAM(S): 50 TABLET ORAL at 06:01

## 2021-01-01 RX ADMIN — LEVETIRACETAM 400 MILLIGRAM(S): 250 TABLET, FILM COATED ORAL at 05:47

## 2021-01-01 RX ADMIN — FENTANYL CITRATE 25 MICROGRAM(S): 50 INJECTION INTRAVENOUS at 19:56

## 2021-01-01 RX ADMIN — Medication 27.5 MILLIGRAM(S): at 13:47

## 2021-01-01 RX ADMIN — HUMAN INSULIN 14 UNIT(S): 100 INJECTION, SUSPENSION SUBCUTANEOUS at 18:44

## 2021-01-01 RX ADMIN — LEVETIRACETAM 400 MILLIGRAM(S): 250 TABLET, FILM COATED ORAL at 21:00

## 2021-01-01 RX ADMIN — Medication 27.5 MILLIGRAM(S): at 13:38

## 2021-01-01 RX ADMIN — PROPOFOL 23.5 MICROGRAM(S)/KG/MIN: 10 INJECTION, EMULSION INTRAVENOUS at 21:43

## 2021-01-01 RX ADMIN — CEFTRIAXONE 100 MILLIGRAM(S): 500 INJECTION, POWDER, FOR SOLUTION INTRAMUSCULAR; INTRAVENOUS at 20:18

## 2021-01-01 RX ADMIN — CHLORHEXIDINE GLUCONATE 1 APPLICATION(S): 213 SOLUTION TOPICAL at 05:26

## 2021-01-01 RX ADMIN — Medication 2: at 05:55

## 2021-01-01 RX ADMIN — CHLORHEXIDINE GLUCONATE 15 MILLILITER(S): 213 SOLUTION TOPICAL at 17:45

## 2021-01-01 RX ADMIN — Medication 4 MILLIGRAM(S): at 22:00

## 2021-01-01 RX ADMIN — CHLORHEXIDINE GLUCONATE 15 MILLILITER(S): 213 SOLUTION TOPICAL at 17:30

## 2021-01-01 RX ADMIN — CHLORHEXIDINE GLUCONATE 15 MILLILITER(S): 213 SOLUTION TOPICAL at 06:00

## 2021-01-01 RX ADMIN — Medication 2: at 06:30

## 2021-01-01 RX ADMIN — HUMAN INSULIN 15 UNIT(S): 100 INJECTION, SUSPENSION SUBCUTANEOUS at 05:30

## 2021-01-01 RX ADMIN — CHLORHEXIDINE GLUCONATE 1 APPLICATION(S): 213 SOLUTION TOPICAL at 05:28

## 2021-01-01 RX ADMIN — LACOSAMIDE 120 MILLIGRAM(S): 50 TABLET ORAL at 05:15

## 2021-01-01 RX ADMIN — HUMAN INSULIN 20 UNIT(S): 100 INJECTION, SUSPENSION SUBCUTANEOUS at 00:06

## 2021-01-01 RX ADMIN — ATORVASTATIN CALCIUM 40 MILLIGRAM(S): 80 TABLET, FILM COATED ORAL at 21:02

## 2021-01-01 RX ADMIN — LEVETIRACETAM 400 MILLIGRAM(S): 250 TABLET, FILM COATED ORAL at 04:48

## 2021-01-01 RX ADMIN — Medication 2: at 11:35

## 2021-01-01 RX ADMIN — Medication 15 MILLILITER(S): at 12:47

## 2021-01-01 RX ADMIN — LEVETIRACETAM 400 MILLIGRAM(S): 250 TABLET, FILM COATED ORAL at 22:18

## 2021-01-01 RX ADMIN — CHLORHEXIDINE GLUCONATE 15 MILLILITER(S): 213 SOLUTION TOPICAL at 19:19

## 2021-01-01 RX ADMIN — CHLORHEXIDINE GLUCONATE 15 MILLILITER(S): 213 SOLUTION TOPICAL at 17:32

## 2021-01-01 RX ADMIN — Medication 4: at 05:30

## 2021-01-01 RX ADMIN — Medication 20 MILLIEQUIVALENT(S): at 02:40

## 2021-01-01 RX ADMIN — LEVETIRACETAM 400 MILLIGRAM(S): 250 TABLET, FILM COATED ORAL at 06:20

## 2021-01-01 RX ADMIN — DEXMEDETOMIDINE HYDROCHLORIDE IN 0.9% SODIUM CHLORIDE 7.82 MICROGRAM(S)/KG/HR: 4 INJECTION INTRAVENOUS at 04:49

## 2021-01-01 RX ADMIN — LEVETIRACETAM 400 MILLIGRAM(S): 250 TABLET, FILM COATED ORAL at 13:05

## 2021-01-01 RX ADMIN — HUMAN INSULIN 4 UNIT(S): 100 INJECTION, SUSPENSION SUBCUTANEOUS at 00:30

## 2021-01-01 RX ADMIN — Medication 2 MILLIGRAM(S): at 04:58

## 2021-01-01 RX ADMIN — PANTOPRAZOLE SODIUM 40 MILLIGRAM(S): 20 TABLET, DELAYED RELEASE ORAL at 12:47

## 2021-01-01 RX ADMIN — PROPOFOL 23.5 MICROGRAM(S)/KG/MIN: 10 INJECTION, EMULSION INTRAVENOUS at 06:21

## 2021-01-01 RX ADMIN — LEVETIRACETAM 400 MILLIGRAM(S): 250 TABLET, FILM COATED ORAL at 06:01

## 2021-01-01 RX ADMIN — Medication 27.5 MILLIGRAM(S): at 22:43

## 2021-01-01 RX ADMIN — Medication 27.5 MILLIGRAM(S): at 06:30

## 2021-01-01 RX ADMIN — CEFTRIAXONE 100 MILLIGRAM(S): 500 INJECTION, POWDER, FOR SOLUTION INTRAMUSCULAR; INTRAVENOUS at 20:11

## 2021-01-01 RX ADMIN — INSULIN HUMAN 4 UNIT(S)/HR: 100 INJECTION, SOLUTION SUBCUTANEOUS at 14:56

## 2021-01-01 RX ADMIN — Medication 15 MILLILITER(S): at 11:36

## 2021-01-01 RX ADMIN — Medication 100 MILLIEQUIVALENT(S): at 06:41

## 2021-01-01 RX ADMIN — Medication 400 MILLIGRAM(S): at 00:00

## 2021-01-01 RX ADMIN — PROPOFOL 23.5 MICROGRAM(S)/KG/MIN: 10 INJECTION, EMULSION INTRAVENOUS at 18:20

## 2021-01-01 RX ADMIN — LACOSAMIDE 120 MILLIGRAM(S): 50 TABLET ORAL at 05:57

## 2021-01-01 RX ADMIN — Medication 2: at 17:11

## 2021-01-01 RX ADMIN — LEVETIRACETAM 400 MILLIGRAM(S): 250 TABLET, FILM COATED ORAL at 21:24

## 2021-01-01 RX ADMIN — CHLORHEXIDINE GLUCONATE 15 MILLILITER(S): 213 SOLUTION TOPICAL at 17:04

## 2021-01-01 RX ADMIN — HUMAN INSULIN 30 UNIT(S): 100 INJECTION, SUSPENSION SUBCUTANEOUS at 05:12

## 2021-01-01 RX ADMIN — Medication 27.5 MILLIGRAM(S): at 14:55

## 2021-01-01 RX ADMIN — Medication 650 MILLIGRAM(S): at 19:27

## 2021-01-01 RX ADMIN — PROPOFOL 23.5 MICROGRAM(S)/KG/MIN: 10 INJECTION, EMULSION INTRAVENOUS at 18:45

## 2021-01-01 RX ADMIN — LEVETIRACETAM 400 MILLIGRAM(S): 250 TABLET, FILM COATED ORAL at 05:56

## 2021-01-01 RX ADMIN — HUMAN INSULIN 10 UNIT(S): 100 INJECTION, SUSPENSION SUBCUTANEOUS at 11:36

## 2021-01-01 RX ADMIN — Medication 5 MILLIGRAM(S): at 06:21

## 2021-01-01 RX ADMIN — LEVETIRACETAM 400 MILLIGRAM(S): 250 TABLET, FILM COATED ORAL at 21:43

## 2021-01-01 RX ADMIN — SENNA PLUS 10 MILLILITER(S): 8.6 TABLET ORAL at 22:00

## 2021-01-01 RX ADMIN — PROPOFOL 23.5 MICROGRAM(S)/KG/MIN: 10 INJECTION, EMULSION INTRAVENOUS at 00:36

## 2021-01-01 RX ADMIN — HUMAN INSULIN 30 UNIT(S): 100 INJECTION, SUSPENSION SUBCUTANEOUS at 05:35

## 2021-01-01 RX ADMIN — POLYETHYLENE GLYCOL 3350 17 GRAM(S): 17 POWDER, FOR SOLUTION ORAL at 11:49

## 2021-01-01 RX ADMIN — CHLORHEXIDINE GLUCONATE 1 APPLICATION(S): 213 SOLUTION TOPICAL at 06:02

## 2021-01-01 RX ADMIN — Medication 27.5 MILLIGRAM(S): at 05:09

## 2021-01-01 RX ADMIN — DEXMEDETOMIDINE HYDROCHLORIDE IN 0.9% SODIUM CHLORIDE 7.82 MICROGRAM(S)/KG/HR: 4 INJECTION INTRAVENOUS at 20:57

## 2021-01-01 RX ADMIN — Medication 10 MILLIGRAM(S): at 19:08

## 2021-01-01 RX ADMIN — PANTOPRAZOLE SODIUM 40 MILLIGRAM(S): 20 TABLET, DELAYED RELEASE ORAL at 11:02

## 2021-01-01 RX ADMIN — PROPOFOL 23.5 MICROGRAM(S)/KG/MIN: 10 INJECTION, EMULSION INTRAVENOUS at 22:41

## 2021-01-01 RX ADMIN — LACOSAMIDE 120 MILLIGRAM(S): 50 TABLET ORAL at 06:02

## 2021-01-01 RX ADMIN — Medication 2 MILLIGRAM(S): at 03:20

## 2021-01-01 RX ADMIN — Medication 650 MILLIGRAM(S): at 13:24

## 2021-01-01 RX ADMIN — POTASSIUM PHOSPHATE, MONOBASIC POTASSIUM PHOSPHATE, DIBASIC 62.5 MILLIMOLE(S): 236; 224 INJECTION, SOLUTION INTRAVENOUS at 13:53

## 2021-01-01 RX ADMIN — Medication 27.5 MILLIGRAM(S): at 06:02

## 2021-01-01 RX ADMIN — POLYETHYLENE GLYCOL 3350 17 GRAM(S): 17 POWDER, FOR SOLUTION ORAL at 17:15

## 2021-01-01 RX ADMIN — Medication 15 MILLILITER(S): at 11:51

## 2021-01-01 RX ADMIN — LEVETIRACETAM 400 MILLIGRAM(S): 250 TABLET, FILM COATED ORAL at 14:04

## 2021-01-01 RX ADMIN — PANTOPRAZOLE SODIUM 40 MILLIGRAM(S): 20 TABLET, DELAYED RELEASE ORAL at 11:49

## 2021-01-01 RX ADMIN — FENTANYL CITRATE 100 MICROGRAM(S): 50 INJECTION INTRAVENOUS at 12:45

## 2021-01-01 RX ADMIN — Medication 27.5 MILLIGRAM(S): at 22:27

## 2021-01-01 RX ADMIN — HUMAN INSULIN 10 UNIT(S): 100 INJECTION, SUSPENSION SUBCUTANEOUS at 17:56

## 2021-01-01 RX ADMIN — LACOSAMIDE 120 MILLIGRAM(S): 50 TABLET ORAL at 05:04

## 2021-01-01 RX ADMIN — CHLORHEXIDINE GLUCONATE 15 MILLILITER(S): 213 SOLUTION TOPICAL at 05:25

## 2021-01-01 RX ADMIN — HUMAN INSULIN 15 UNIT(S): 100 INJECTION, SUSPENSION SUBCUTANEOUS at 17:33

## 2021-01-01 RX ADMIN — HUMAN INSULIN 20 UNIT(S): 100 INJECTION, SUSPENSION SUBCUTANEOUS at 05:14

## 2021-01-01 RX ADMIN — Medication 27.5 MILLIGRAM(S): at 21:25

## 2021-01-01 RX ADMIN — SODIUM CHLORIDE 1000 MILLILITER(S): 9 INJECTION, SOLUTION INTRAVENOUS at 21:58

## 2021-01-01 RX ADMIN — HUMAN INSULIN 15 UNIT(S): 100 INJECTION, SUSPENSION SUBCUTANEOUS at 06:21

## 2021-01-01 RX ADMIN — LACOSAMIDE 120 MILLIGRAM(S): 50 TABLET ORAL at 05:28

## 2021-01-01 RX ADMIN — PROPOFOL 23.5 MICROGRAM(S)/KG/MIN: 10 INJECTION, EMULSION INTRAVENOUS at 03:01

## 2021-01-01 RX ADMIN — Medication 27.5 MILLIGRAM(S): at 21:01

## 2021-01-01 RX ADMIN — LEVETIRACETAM 400 MILLIGRAM(S): 250 TABLET, FILM COATED ORAL at 13:04

## 2021-01-01 RX ADMIN — Medication 27.5 MILLIGRAM(S): at 13:12

## 2021-01-01 RX ADMIN — CHLORHEXIDINE GLUCONATE 15 MILLILITER(S): 213 SOLUTION TOPICAL at 05:11

## 2021-01-01 RX ADMIN — Medication 81 MILLIGRAM(S): at 12:48

## 2021-01-01 RX ADMIN — POTASSIUM PHOSPHATE, MONOBASIC POTASSIUM PHOSPHATE, DIBASIC 62.5 MILLIMOLE(S): 236; 224 INJECTION, SOLUTION INTRAVENOUS at 06:45

## 2021-01-01 RX ADMIN — PROPOFOL 23.5 MICROGRAM(S)/KG/MIN: 10 INJECTION, EMULSION INTRAVENOUS at 17:37

## 2021-01-01 RX ADMIN — Medication 81 MILLIGRAM(S): at 12:53

## 2021-01-01 RX ADMIN — Medication 40 MILLIEQUIVALENT(S): at 13:53

## 2021-01-01 RX ADMIN — CEFTRIAXONE 100 MILLIGRAM(S): 500 INJECTION, POWDER, FOR SOLUTION INTRAMUSCULAR; INTRAVENOUS at 20:30

## 2021-01-01 RX ADMIN — AMLODIPINE BESYLATE 10 MILLIGRAM(S): 2.5 TABLET ORAL at 22:08

## 2021-01-01 RX ADMIN — AMLODIPINE BESYLATE 10 MILLIGRAM(S): 2.5 TABLET ORAL at 22:26

## 2021-01-01 RX ADMIN — Medication 27.5 MILLIGRAM(S): at 22:08

## 2021-01-01 RX ADMIN — PROPOFOL 23.5 MICROGRAM(S)/KG/MIN: 10 INJECTION, EMULSION INTRAVENOUS at 06:01

## 2021-01-01 RX ADMIN — Medication 2: at 17:26

## 2021-01-01 RX ADMIN — Medication 5 MILLIGRAM(S): at 15:57

## 2021-01-01 RX ADMIN — HUMAN INSULIN 30 UNIT(S): 100 INJECTION, SUSPENSION SUBCUTANEOUS at 11:50

## 2021-01-01 RX ADMIN — LEVETIRACETAM 400 MILLIGRAM(S): 250 TABLET, FILM COATED ORAL at 21:30

## 2021-01-01 RX ADMIN — Medication 27.5 MILLIGRAM(S): at 05:12

## 2021-01-01 RX ADMIN — INSULIN HUMAN 4 UNIT(S): 100 INJECTION, SOLUTION SUBCUTANEOUS at 01:00

## 2021-01-01 RX ADMIN — CHLORHEXIDINE GLUCONATE 1 APPLICATION(S): 213 SOLUTION TOPICAL at 05:46

## 2021-01-01 RX ADMIN — LEVETIRACETAM 400 MILLIGRAM(S): 250 TABLET, FILM COATED ORAL at 18:21

## 2021-01-01 RX ADMIN — DEXMEDETOMIDINE HYDROCHLORIDE IN 0.9% SODIUM CHLORIDE 3.91 MICROGRAM(S)/KG/HR: 4 INJECTION INTRAVENOUS at 06:35

## 2021-01-01 RX ADMIN — Medication 27.5 MILLIGRAM(S): at 13:02

## 2021-01-01 RX ADMIN — Medication 27.5 MILLIGRAM(S): at 22:00

## 2021-01-01 RX ADMIN — PROPOFOL 23.5 MICROGRAM(S)/KG/MIN: 10 INJECTION, EMULSION INTRAVENOUS at 03:03

## 2021-01-01 RX ADMIN — Medication 650 MILLIGRAM(S): at 04:09

## 2021-01-01 RX ADMIN — Medication 20 MILLIEQUIVALENT(S): at 05:07

## 2021-01-01 RX ADMIN — Medication 2: at 17:52

## 2021-01-01 RX ADMIN — Medication 8: at 18:44

## 2021-01-01 RX ADMIN — HUMAN INSULIN 15 UNIT(S): 100 INJECTION, SUSPENSION SUBCUTANEOUS at 13:04

## 2021-01-01 RX ADMIN — Medication 27.5 MILLIGRAM(S): at 13:18

## 2021-01-01 RX ADMIN — CHLORHEXIDINE GLUCONATE 1 APPLICATION(S): 213 SOLUTION TOPICAL at 05:20

## 2021-01-01 RX ADMIN — Medication 50 MILLIEQUIVALENT(S): at 01:43

## 2021-01-01 RX ADMIN — CARVEDILOL PHOSPHATE 12.5 MILLIGRAM(S): 80 CAPSULE, EXTENDED RELEASE ORAL at 17:24

## 2021-01-01 RX ADMIN — Medication 27.5 MILLIGRAM(S): at 22:47

## 2021-01-01 RX ADMIN — Medication 81 MILLIGRAM(S): at 12:04

## 2021-01-01 RX ADMIN — CHLORHEXIDINE GLUCONATE 15 MILLILITER(S): 213 SOLUTION TOPICAL at 18:21

## 2021-01-01 RX ADMIN — LEVETIRACETAM 400 MILLIGRAM(S): 250 TABLET, FILM COATED ORAL at 22:44

## 2021-01-01 RX ADMIN — CHLORHEXIDINE GLUCONATE 15 MILLILITER(S): 213 SOLUTION TOPICAL at 05:18

## 2021-01-01 RX ADMIN — Medication 4: at 11:36

## 2021-01-01 RX ADMIN — LEVETIRACETAM 400 MILLIGRAM(S): 250 TABLET, FILM COATED ORAL at 18:37

## 2021-01-01 RX ADMIN — CHLORHEXIDINE GLUCONATE 15 MILLILITER(S): 213 SOLUTION TOPICAL at 05:55

## 2021-01-01 RX ADMIN — CHLORHEXIDINE GLUCONATE 15 MILLILITER(S): 213 SOLUTION TOPICAL at 05:17

## 2021-01-01 RX ADMIN — PROPOFOL 23.5 MICROGRAM(S)/KG/MIN: 10 INJECTION, EMULSION INTRAVENOUS at 19:41

## 2021-01-01 RX ADMIN — CHLORHEXIDINE GLUCONATE 15 MILLILITER(S): 213 SOLUTION TOPICAL at 17:02

## 2021-01-01 RX ADMIN — CHLORHEXIDINE GLUCONATE 1 APPLICATION(S): 213 SOLUTION TOPICAL at 05:50

## 2021-01-01 RX ADMIN — POTASSIUM PHOSPHATE, MONOBASIC POTASSIUM PHOSPHATE, DIBASIC 62.5 MILLIMOLE(S): 236; 224 INJECTION, SOLUTION INTRAVENOUS at 06:51

## 2021-01-01 RX ADMIN — Medication 5: at 00:30

## 2021-01-01 RX ADMIN — LACOSAMIDE 120 MILLIGRAM(S): 50 TABLET ORAL at 06:43

## 2021-01-01 RX ADMIN — HUMAN INSULIN 30 UNIT(S): 100 INJECTION, SUSPENSION SUBCUTANEOUS at 12:25

## 2021-01-01 RX ADMIN — KETAMINE HYDROCHLORIDE 1.96 MG/KG/HR: 100 INJECTION INTRAMUSCULAR; INTRAVENOUS at 01:44

## 2021-01-01 RX ADMIN — CHLORHEXIDINE GLUCONATE 15 MILLILITER(S): 213 SOLUTION TOPICAL at 17:06

## 2021-01-01 RX ADMIN — HUMAN INSULIN 30 UNIT(S): 100 INJECTION, SUSPENSION SUBCUTANEOUS at 01:00

## 2021-01-01 RX ADMIN — HYDROMORPHONE HYDROCHLORIDE 0.5 MILLIGRAM(S): 2 INJECTION INTRAMUSCULAR; INTRAVENOUS; SUBCUTANEOUS at 12:58

## 2021-01-01 RX ADMIN — PROPOFOL 23.5 MICROGRAM(S)/KG/MIN: 10 INJECTION, EMULSION INTRAVENOUS at 19:21

## 2021-01-01 RX ADMIN — HUMAN INSULIN 4 UNIT(S): 100 INJECTION, SUSPENSION SUBCUTANEOUS at 05:16

## 2021-01-01 RX ADMIN — LACOSAMIDE 120 MILLIGRAM(S): 50 TABLET ORAL at 17:03

## 2021-01-01 RX ADMIN — HUMAN INSULIN 15 UNIT(S): 100 INJECTION, SUSPENSION SUBCUTANEOUS at 12:53

## 2021-01-01 RX ADMIN — Medication 40 MILLIEQUIVALENT(S): at 01:34

## 2021-01-01 RX ADMIN — LACOSAMIDE 120 MILLIGRAM(S): 50 TABLET ORAL at 17:01

## 2021-01-01 RX ADMIN — PHENYLEPHRINE HYDROCHLORIDE 2.93 MICROGRAM(S)/KG/MIN: 10 INJECTION INTRAVENOUS at 19:52

## 2021-01-01 RX ADMIN — PROPOFOL 23.5 MICROGRAM(S)/KG/MIN: 10 INJECTION, EMULSION INTRAVENOUS at 14:23

## 2021-01-01 RX ADMIN — PHENYLEPHRINE HYDROCHLORIDE 2.93 MICROGRAM(S)/KG/MIN: 10 INJECTION INTRAVENOUS at 19:00

## 2021-01-01 RX ADMIN — PROPOFOL 23.5 MICROGRAM(S)/KG/MIN: 10 INJECTION, EMULSION INTRAVENOUS at 14:32

## 2021-01-01 RX ADMIN — Medication 650 MILLIGRAM(S): at 17:41

## 2021-01-01 RX ADMIN — PROPOFOL 4.69 MICROGRAM(S)/KG/MIN: 10 INJECTION, EMULSION INTRAVENOUS at 17:10

## 2021-01-01 RX ADMIN — Medication 7.33 MICROGRAM(S)/KG/MIN: at 19:52

## 2021-01-01 RX ADMIN — Medication 27.5 MILLIGRAM(S): at 05:05

## 2021-01-01 RX ADMIN — PANTOPRAZOLE SODIUM 40 MILLIGRAM(S): 20 TABLET, DELAYED RELEASE ORAL at 13:01

## 2021-01-01 RX ADMIN — Medication 7.33 MICROGRAM(S)/KG/MIN: at 18:15

## 2021-01-01 RX ADMIN — HUMAN INSULIN 20 UNIT(S): 100 INJECTION, SUSPENSION SUBCUTANEOUS at 11:51

## 2021-01-01 RX ADMIN — FENTANYL CITRATE 100 MICROGRAM(S): 50 INJECTION INTRAVENOUS at 13:00

## 2021-01-01 RX ADMIN — Medication 27.5 MILLIGRAM(S): at 13:06

## 2021-01-01 RX ADMIN — LACOSAMIDE 120 MILLIGRAM(S): 50 TABLET ORAL at 17:27

## 2021-01-01 RX ADMIN — MIDAZOLAM HYDROCHLORIDE 2 MILLIGRAM(S): 1 INJECTION, SOLUTION INTRAMUSCULAR; INTRAVENOUS at 12:24

## 2021-01-01 RX ADMIN — Medication 7.33 MICROGRAM(S)/KG/MIN: at 11:52

## 2021-01-01 RX ADMIN — Medication 81 MILLIGRAM(S): at 11:35

## 2021-01-01 RX ADMIN — Medication 2: at 13:05

## 2021-01-01 RX ADMIN — PROPOFOL 23.5 MICROGRAM(S)/KG/MIN: 10 INJECTION, EMULSION INTRAVENOUS at 22:47

## 2021-01-01 RX ADMIN — Medication 81 MILLIGRAM(S): at 11:50

## 2021-01-01 RX ADMIN — LACOSAMIDE 120 MILLIGRAM(S): 50 TABLET ORAL at 17:02

## 2021-01-01 RX ADMIN — HUMAN INSULIN 10 UNIT(S): 100 INJECTION, SUSPENSION SUBCUTANEOUS at 05:26

## 2021-01-01 RX ADMIN — ENOXAPARIN SODIUM 40 MILLIGRAM(S): 100 INJECTION SUBCUTANEOUS at 11:02

## 2021-01-01 RX ADMIN — Medication 4 MILLIGRAM(S): at 21:18

## 2021-01-01 RX ADMIN — Medication 2 MILLIGRAM(S): at 02:18

## 2021-01-01 RX ADMIN — ENOXAPARIN SODIUM 80 MILLIGRAM(S): 100 INJECTION SUBCUTANEOUS at 17:11

## 2021-01-01 RX ADMIN — AMLODIPINE BESYLATE 10 MILLIGRAM(S): 2.5 TABLET ORAL at 21:24

## 2021-01-01 RX ADMIN — LEVETIRACETAM 400 MILLIGRAM(S): 250 TABLET, FILM COATED ORAL at 13:06

## 2021-01-01 RX ADMIN — HUMAN INSULIN 15 UNIT(S): 100 INJECTION, SUSPENSION SUBCUTANEOUS at 17:51

## 2021-01-01 RX ADMIN — Medication 81 MILLIGRAM(S): at 11:11

## 2021-01-01 RX ADMIN — Medication 27.5 MILLIGRAM(S): at 22:18

## 2021-01-01 RX ADMIN — ROBINUL 0.4 MILLIGRAM(S): 0.2 INJECTION INTRAMUSCULAR; INTRAVENOUS at 13:05

## 2021-01-01 RX ADMIN — MIDAZOLAM HYDROCHLORIDE 2 MILLIGRAM(S): 1 INJECTION, SOLUTION INTRAMUSCULAR; INTRAVENOUS at 09:29

## 2021-01-01 RX ADMIN — ENOXAPARIN SODIUM 80 MILLIGRAM(S): 100 INJECTION SUBCUTANEOUS at 17:41

## 2021-01-01 RX ADMIN — CHLORHEXIDINE GLUCONATE 15 MILLILITER(S): 213 SOLUTION TOPICAL at 17:05

## 2021-01-01 RX ADMIN — Medication 27.5 MILLIGRAM(S): at 13:09

## 2021-01-01 RX ADMIN — LEVETIRACETAM 400 MILLIGRAM(S): 250 TABLET, FILM COATED ORAL at 22:00

## 2021-01-01 RX ADMIN — PANTOPRAZOLE SODIUM 40 MILLIGRAM(S): 20 TABLET, DELAYED RELEASE ORAL at 12:52

## 2021-01-01 RX ADMIN — AMLODIPINE BESYLATE 10 MILLIGRAM(S): 2.5 TABLET ORAL at 22:17

## 2021-01-01 RX ADMIN — Medication 15 MILLILITER(S): at 11:35

## 2021-01-01 RX ADMIN — CHLORHEXIDINE GLUCONATE 15 MILLILITER(S): 213 SOLUTION TOPICAL at 05:20

## 2021-01-01 RX ADMIN — LEVETIRACETAM 400 MILLIGRAM(S): 250 TABLET, FILM COATED ORAL at 13:12

## 2021-01-01 RX ADMIN — REMDESIVIR 500 MILLIGRAM(S): 5 INJECTION INTRAVENOUS at 23:00

## 2021-01-01 RX ADMIN — PROPOFOL 23.5 MICROGRAM(S)/KG/MIN: 10 INJECTION, EMULSION INTRAVENOUS at 22:31

## 2021-01-01 RX ADMIN — Medication 4 MILLIGRAM(S): at 21:02

## 2021-01-01 RX ADMIN — LEVETIRACETAM 400 MILLIGRAM(S): 250 TABLET, FILM COATED ORAL at 21:02

## 2021-01-01 RX ADMIN — Medication 650 MILLIGRAM(S): at 07:00

## 2021-01-01 RX ADMIN — Medication 4: at 00:03

## 2021-01-01 RX ADMIN — DEXMEDETOMIDINE HYDROCHLORIDE IN 0.9% SODIUM CHLORIDE 7.82 MICROGRAM(S)/KG/HR: 4 INJECTION INTRAVENOUS at 19:52

## 2021-01-01 RX ADMIN — Medication 2: at 05:49

## 2021-01-01 RX ADMIN — ENOXAPARIN SODIUM 40 MILLIGRAM(S): 100 INJECTION SUBCUTANEOUS at 12:05

## 2021-01-01 RX ADMIN — PROPOFOL 23.5 MICROGRAM(S)/KG/MIN: 10 INJECTION, EMULSION INTRAVENOUS at 04:14

## 2021-01-01 RX ADMIN — CHLORHEXIDINE GLUCONATE 15 MILLILITER(S): 213 SOLUTION TOPICAL at 17:42

## 2021-01-01 RX ADMIN — Medication 125 MILLILITER(S): at 01:43

## 2021-01-01 RX ADMIN — HUMAN INSULIN 10 UNIT(S): 100 INJECTION, SUSPENSION SUBCUTANEOUS at 06:21

## 2021-01-01 RX ADMIN — PROPOFOL 23.5 MICROGRAM(S)/KG/MIN: 10 INJECTION, EMULSION INTRAVENOUS at 07:50

## 2021-01-01 RX ADMIN — CHLORHEXIDINE GLUCONATE 15 MILLILITER(S): 213 SOLUTION TOPICAL at 06:02

## 2021-01-01 RX ADMIN — PANTOPRAZOLE SODIUM 40 MILLIGRAM(S): 20 TABLET, DELAYED RELEASE ORAL at 12:17

## 2021-01-01 RX ADMIN — LACOSAMIDE 120 MILLIGRAM(S): 50 TABLET ORAL at 06:13

## 2021-01-01 RX ADMIN — ATORVASTATIN CALCIUM 40 MILLIGRAM(S): 80 TABLET, FILM COATED ORAL at 21:25

## 2021-01-01 RX ADMIN — LEVETIRACETAM 400 MILLIGRAM(S): 250 TABLET, FILM COATED ORAL at 05:59

## 2021-01-01 RX ADMIN — Medication 4: at 11:52

## 2021-01-01 RX ADMIN — Medication 27.5 MILLIGRAM(S): at 21:02

## 2021-01-01 RX ADMIN — Medication 4 MILLIGRAM(S): at 12:20

## 2021-01-01 RX ADMIN — LEVETIRACETAM 400 MILLIGRAM(S): 250 TABLET, FILM COATED ORAL at 23:08

## 2021-01-01 RX ADMIN — LEVETIRACETAM 400 MILLIGRAM(S): 250 TABLET, FILM COATED ORAL at 13:08

## 2021-01-01 RX ADMIN — PANTOPRAZOLE SODIUM 40 MILLIGRAM(S): 20 TABLET, DELAYED RELEASE ORAL at 13:04

## 2021-01-01 RX ADMIN — SENNA PLUS 10 MILLILITER(S): 8.6 TABLET ORAL at 21:14

## 2021-01-01 RX ADMIN — LACOSAMIDE 120 MILLIGRAM(S): 50 TABLET ORAL at 05:50

## 2021-01-01 RX ADMIN — LEVETIRACETAM 400 MILLIGRAM(S): 250 TABLET, FILM COATED ORAL at 17:32

## 2021-01-01 RX ADMIN — Medication 27.5 MILLIGRAM(S): at 15:07

## 2021-01-01 RX ADMIN — Medication 50 MILLIEQUIVALENT(S): at 04:08

## 2021-01-01 RX ADMIN — PROPOFOL 23.5 MICROGRAM(S)/KG/MIN: 10 INJECTION, EMULSION INTRAVENOUS at 02:57

## 2021-01-01 RX ADMIN — HYDROMORPHONE HYDROCHLORIDE 0.2 MG/HR: 2 INJECTION INTRAMUSCULAR; INTRAVENOUS; SUBCUTANEOUS at 11:07

## 2021-01-01 RX ADMIN — MIDAZOLAM HYDROCHLORIDE 1.56 MG/KG/HR: 1 INJECTION, SOLUTION INTRAMUSCULAR; INTRAVENOUS at 05:16

## 2021-01-01 RX ADMIN — ENOXAPARIN SODIUM 40 MILLIGRAM(S): 100 INJECTION SUBCUTANEOUS at 12:00

## 2021-01-01 RX ADMIN — LEVETIRACETAM 400 MILLIGRAM(S): 250 TABLET, FILM COATED ORAL at 22:08

## 2021-01-01 RX ADMIN — HUMAN INSULIN 20 UNIT(S): 100 INJECTION, SUSPENSION SUBCUTANEOUS at 11:13

## 2021-01-01 RX ADMIN — CARVEDILOL PHOSPHATE 12.5 MILLIGRAM(S): 80 CAPSULE, EXTENDED RELEASE ORAL at 06:00

## 2021-01-01 RX ADMIN — LEVETIRACETAM 400 MILLIGRAM(S): 250 TABLET, FILM COATED ORAL at 05:20

## 2021-01-01 RX ADMIN — Medication 27.5 MILLIGRAM(S): at 22:17

## 2021-01-01 RX ADMIN — LEVETIRACETAM 400 MILLIGRAM(S): 250 TABLET, FILM COATED ORAL at 21:13

## 2021-01-01 RX ADMIN — LEVETIRACETAM 400 MILLIGRAM(S): 250 TABLET, FILM COATED ORAL at 05:17

## 2021-01-01 RX ADMIN — AMLODIPINE BESYLATE 10 MILLIGRAM(S): 2.5 TABLET ORAL at 22:44

## 2021-01-01 RX ADMIN — PROPOFOL 23.5 MICROGRAM(S)/KG/MIN: 10 INJECTION, EMULSION INTRAVENOUS at 18:52

## 2021-01-01 RX ADMIN — Medication 650 MILLIGRAM(S): at 08:58

## 2021-01-01 RX ADMIN — CHLORHEXIDINE GLUCONATE 15 MILLILITER(S): 213 SOLUTION TOPICAL at 06:13

## 2021-01-01 RX ADMIN — LACOSAMIDE 120 MILLIGRAM(S): 50 TABLET ORAL at 19:54

## 2021-01-01 RX ADMIN — FENTANYL CITRATE 1.96 MICROGRAM(S)/KG/HR: 50 INJECTION INTRAVENOUS at 19:56

## 2021-01-01 RX ADMIN — Medication 5 MILLIGRAM(S): at 19:58

## 2021-01-01 RX ADMIN — LEVETIRACETAM 400 MILLIGRAM(S): 250 TABLET, FILM COATED ORAL at 22:12

## 2021-01-01 RX ADMIN — PANTOPRAZOLE SODIUM 40 MILLIGRAM(S): 20 TABLET, DELAYED RELEASE ORAL at 11:33

## 2021-01-01 RX ADMIN — Medication 2: at 05:10

## 2021-01-01 RX ADMIN — LACOSAMIDE 120 MILLIGRAM(S): 50 TABLET ORAL at 05:12

## 2021-01-01 RX ADMIN — CHLORHEXIDINE GLUCONATE 15 MILLILITER(S): 213 SOLUTION TOPICAL at 05:22

## 2021-01-01 RX ADMIN — DEXMEDETOMIDINE HYDROCHLORIDE IN 0.9% SODIUM CHLORIDE 7.82 MICROGRAM(S)/KG/HR: 4 INJECTION INTRAVENOUS at 06:21

## 2021-01-01 RX ADMIN — HUMAN INSULIN 20 UNIT(S): 100 INJECTION, SUSPENSION SUBCUTANEOUS at 05:24

## 2021-01-01 RX ADMIN — LACOSAMIDE 120 MILLIGRAM(S): 50 TABLET ORAL at 18:54

## 2021-01-01 RX ADMIN — LACOSAMIDE 120 MILLIGRAM(S): 50 TABLET ORAL at 17:53

## 2021-01-01 RX ADMIN — LEVETIRACETAM 400 MILLIGRAM(S): 250 TABLET, FILM COATED ORAL at 13:18

## 2021-01-01 RX ADMIN — Medication 2: at 18:03

## 2021-01-01 RX ADMIN — CHLORHEXIDINE GLUCONATE 1 APPLICATION(S): 213 SOLUTION TOPICAL at 06:03

## 2021-01-01 RX ADMIN — AMLODIPINE BESYLATE 10 MILLIGRAM(S): 2.5 TABLET ORAL at 22:42

## 2021-01-01 RX ADMIN — Medication 100 MILLIEQUIVALENT(S): at 14:05

## 2021-01-01 RX ADMIN — CHLORHEXIDINE GLUCONATE 1 APPLICATION(S): 213 SOLUTION TOPICAL at 05:30

## 2021-01-01 RX ADMIN — CHLORHEXIDINE GLUCONATE 1 APPLICATION(S): 213 SOLUTION TOPICAL at 05:13

## 2021-01-01 RX ADMIN — CHLORHEXIDINE GLUCONATE 1 APPLICATION(S): 213 SOLUTION TOPICAL at 06:27

## 2021-01-01 RX ADMIN — Medication 2: at 23:50

## 2021-01-01 RX ADMIN — PANTOPRAZOLE SODIUM 40 MILLIGRAM(S): 20 TABLET, DELAYED RELEASE ORAL at 13:05

## 2021-01-01 RX ADMIN — Medication 27.5 MILLIGRAM(S): at 05:29

## 2021-01-01 RX ADMIN — AMLODIPINE BESYLATE 5 MILLIGRAM(S): 2.5 TABLET ORAL at 05:39

## 2021-01-01 RX ADMIN — CHLORHEXIDINE GLUCONATE 15 MILLILITER(S): 213 SOLUTION TOPICAL at 05:13

## 2021-01-01 RX ADMIN — PANTOPRAZOLE SODIUM 40 MILLIGRAM(S): 20 TABLET, DELAYED RELEASE ORAL at 11:35

## 2021-01-01 RX ADMIN — AMLODIPINE BESYLATE 10 MILLIGRAM(S): 2.5 TABLET ORAL at 22:30

## 2021-01-01 RX ADMIN — Medication 15 MILLILITER(S): at 11:50

## 2021-01-01 RX ADMIN — CHLORHEXIDINE GLUCONATE 1 APPLICATION(S): 213 SOLUTION TOPICAL at 04:52

## 2021-01-01 RX ADMIN — Medication 4 MILLIGRAM(S): at 22:01

## 2021-01-01 RX ADMIN — Medication 27.5 MILLIGRAM(S): at 06:20

## 2021-01-01 RX ADMIN — PROPOFOL 23.5 MICROGRAM(S)/KG/MIN: 10 INJECTION, EMULSION INTRAVENOUS at 00:06

## 2021-01-01 RX ADMIN — ATORVASTATIN CALCIUM 40 MILLIGRAM(S): 80 TABLET, FILM COATED ORAL at 22:17

## 2021-01-01 RX ADMIN — Medication 4 MILLIGRAM(S): at 22:56

## 2021-01-01 RX ADMIN — ENOXAPARIN SODIUM 80 MILLIGRAM(S): 100 INJECTION SUBCUTANEOUS at 05:07

## 2021-01-01 RX ADMIN — SODIUM CHLORIDE 1000 MILLILITER(S): 9 INJECTION INTRAMUSCULAR; INTRAVENOUS; SUBCUTANEOUS at 20:00

## 2021-01-01 RX ADMIN — PHENYLEPHRINE HYDROCHLORIDE 2.93 MICROGRAM(S)/KG/MIN: 10 INJECTION INTRAVENOUS at 01:44

## 2021-01-01 RX ADMIN — LEVETIRACETAM 400 MILLIGRAM(S): 250 TABLET, FILM COATED ORAL at 15:49

## 2021-01-01 RX ADMIN — MIDAZOLAM HYDROCHLORIDE 1.56 MG/KG/HR: 1 INJECTION, SOLUTION INTRAMUSCULAR; INTRAVENOUS at 19:52

## 2021-01-01 RX ADMIN — Medication 7.33 MICROGRAM(S)/KG/MIN: at 05:10

## 2021-01-01 RX ADMIN — Medication 4: at 17:42

## 2021-01-01 RX ADMIN — Medication 650 MILLIGRAM(S): at 09:58

## 2021-01-01 RX ADMIN — Medication 27.5 MILLIGRAM(S): at 05:48

## 2021-01-01 RX ADMIN — POLYETHYLENE GLYCOL 3350 17 GRAM(S): 17 POWDER, FOR SOLUTION ORAL at 05:17

## 2021-01-01 RX ADMIN — Medication 25 MILLIGRAM(S): at 12:32

## 2021-01-01 RX ADMIN — Medication 27.5 MILLIGRAM(S): at 05:55

## 2021-01-01 RX ADMIN — HUMAN INSULIN 20 UNIT(S): 100 INJECTION, SUSPENSION SUBCUTANEOUS at 00:04

## 2021-01-01 RX ADMIN — HUMAN INSULIN 30 UNIT(S): 100 INJECTION, SUSPENSION SUBCUTANEOUS at 00:38

## 2021-01-01 RX ADMIN — Medication 27.5 MILLIGRAM(S): at 14:23

## 2021-01-01 RX ADMIN — MIDAZOLAM HYDROCHLORIDE 2 MILLIGRAM(S): 1 INJECTION, SOLUTION INTRAMUSCULAR; INTRAVENOUS at 18:45

## 2021-01-01 RX ADMIN — CARVEDILOL PHOSPHATE 6.25 MILLIGRAM(S): 80 CAPSULE, EXTENDED RELEASE ORAL at 17:25

## 2021-01-01 RX ADMIN — Medication 500 MILLIGRAM(S): at 17:04

## 2021-01-01 RX ADMIN — Medication 2: at 17:56

## 2021-01-01 RX ADMIN — CEFTRIAXONE 100 MILLIGRAM(S): 500 INJECTION, POWDER, FOR SOLUTION INTRAMUSCULAR; INTRAVENOUS at 18:32

## 2021-01-01 RX ADMIN — LEVETIRACETAM 400 MILLIGRAM(S): 250 TABLET, FILM COATED ORAL at 06:02

## 2021-10-27 NOTE — CHART NOTE - NSCHARTNOTEFT_GEN_A_CORE
[facility] COMPREHENSIVE EPILEPSY CENTER    ** PRELIMINARY EEG reviewed until  21:20    - Burst suppression pattern noted with abundant generalized periodic discharges (GPDs) at 1 Hz.  - This is indicative of diffuse cortical hyperexcitability.      d/w house neurology consult resident.    Final report to be completed at the completion of the study tomorrow morning.    -----------------------------  Sridhar Malave MD, ELIU  Epilepsy Fellow  [facility]  --------------------------------  EEG Reading Room: [phone]  (weekdays)  On Call Service After Hours: [phone]

## 2021-10-27 NOTE — H&P ADULT - HISTORY OF PRESENT ILLNESS
65 y/o male with PMHX DM, HTn and BPH, found lethargic and was brought to [facility] by ambulance on 10/25 after family witnesed tonic clonic seizure. Patient was found hypoglycemic upon arrival to [facility] with a blood sugar of 34. IN the ER pt had another episode of seizure activity  and did not return to baseline and was subsequently intubated for airway protection. EEG was done on 10/26 after sedation was turned off and showed epileptic activity while patient was on Keppra 500bid. After EEG findings Keppra was increased to 100bid, however repeat eeg on 10/27 showed epileptic activity originating from left mid temporal area. On admission the patient was tested positive for covid 19 but no signs of respiratory disease and no findings of PNA on cxr. Pt was seen by the neurology team in [place] who added vimpat in addition to the keppra. Due to refractory seizures, pt was started on propofol for sedation. In addition blood cultures collected on 10/26 showed GPC bacteremia and group B strep isolated from urine culture. Pt has not required pressors during his stay at [facility] and was subsequently [address] monitoring and seizure management.

## 2021-10-27 NOTE — H&P ADULT - NSHPPHYSICALEXAM_GEN_ALL_CORE
GENERAL: NAD,  HEENT:  Atraumatic, Normocephalic  EYES: PERRLA, conjunctiva and sclera clear  NECK: Supple, No JVD  CHEST/LUNG: diminished bilaterally; No wheezes, rales, or rhonchi  HEART: Regular rate and rhythm; No murmurs, rubs, or gallops  ABDOMEN: Soft, Nontender, Nondistended; Bowel sounds present  EXTREMITIES:  2+ Peripheral Pulses, No clubbing, cyanosis, or edema  PSYCH: unable as pt is sedated  NEUROLOGY: sedated  SKIN: No rashes or lesions

## 2021-10-27 NOTE — H&P ADULT - ATTENDING COMMENTS
66M Hx Dementia, HTN, CAD found unresponsive at home and witnessed GTC Seizures admitted to OSH with Hypoxic Respiratory Failure, Covid PNA, Hypoglycemia,  Recurrent Seizures on Vent Support transfer to [facility] for vEEG monitoring   - Neuro watch for clinical Seizure pending Neuro/ Epilepsy and CTH result   - IV Sedation, AEDs and vEEG titrated Seizure Management plans   - Continue Vent Support, CXR and serial ABGs for Vent Support management and weaning plan   - IV Hydration and Aspiration Precaution   - Continue Covid PNA treatment Protocol with Remdesivir and Dexamethasone   - Enteral Feed and I & O Monitor   - DVT/GI Prophylaxis per ICU Protocol   - GOC - Full Code     Patient seen and examined with ICU Resident/Fellow at bedside after lab data, medical records and radiology reports reviewed. I have read and agreeable in general with resident's Documented Note, Assessment and Management Plan which reflected my opinions from bedside round and discussion.   Total Critical Care Time = 45 Min excluding teaching and procedure activity. 66M Hx Dementia, HTN, CAD found unresponsive at home and witnessed GTC Seizures admitted to OSH with Hypoxic Respiratory Failure, Covid PNA, Hypoglycemia,  Recurrent Seizures on Vent Support transfer to [facility] for vEEG monitoring   - Neuro watch for clinical Seizure pending Neuro/ Epilepsy and CTH result   - IV Sedation, AEDs and vEEG titrated Seizure Management plans   - Continue Vent Support, CXR and serial ABGs for Vent Support management and weaning plan   - IV Hydration and Aspiration Precaution   - Empiric ABx cor GCP Bacteremia pending C&S   - Continue Covid PNA treatment Protocol with Remdesivir and Dexamethasone   - Enteral Feed and I & O Monitor   - DVT/GI Prophylaxis per ICU Protocol   - GOC - Full Code     Patient seen and examined with ICU Resident/Fellow at bedside after lab data, medical records and radiology reports reviewed. I have read and agreeable in general with resident's Documented Note, Assessment and Management Plan which reflected my opinions from bedside round and discussion.   Total Critical Care Time = 45 Min excluding teaching and procedure activity. 66M Hx Dementia, HTN, CAD found unresponsive at home and witnessed GTC Seizures admitted to OSH with Hypoxic Respiratory Failure, Covid PNA, Hypoglycemia,  Recurrent Seizures on Vent Support transfer to [facility] for vEEG monitoring   - Neuro watch for clinical Seizure pending Neuro/ Epilepsy and CTH result   - IV Sedation, AEDs and vEEG titrated Seizure Management plans   - Continue Vent Support, CXR and serial ABGs for Vent Support management and weaning plan   - IV Hydration and Aspiration Precaution   - Empiric ABx cor GCP Bacteremia pending C&S   - Clarify Covid PNA treatment Protocol for Remdesivir and Dexamethasone   - Enteral Feed and I & O Monitor   - DVT/GI Prophylaxis per ICU Protocol   - GOC - Full Code     Patient seen and examined with ICU Resident/Fellow at bedside after lab data, medical records and radiology reports reviewed. I have read and agreeable in general with resident's Documented Note, Assessment and Management Plan which reflected my opinions from bedside round and discussion.   Total Critical Care Time = 45 Min excluding teaching and procedure activity.

## 2021-10-27 NOTE — CONSULT NOTE ADULT - ASSESSMENT
Impression:    Recommendations: INCOMPLETE  [ ] CBC, CMP, Mg, P, CpK, UA, Utox, ammonia, troponin, VBG/ABG, basic infectious workup  [ ] head CT for baseline,   [ ] MRI brain w/ and w/o contrast    [ ] vEEG  [ ] DVT ppx as per primary team   [ ] Given concern for seizure, advise patient to not drive, operate heavy machinery, avoid heights, pools, bathtubs, locked doors until cleared by further follow up outpatient by neurology.     Please note: if patient has a convulsion, please document length of episode, specifically what patient was doing paying attention to eye opening vs closure, gaze deviation, shaking of extremities, tongue bite, urinary incontinence, any derangement of vital signs    To be discussed with neurology attending and will be formally staffed during morning rounds. Recommendations will be finalized once signed by attending. Patient is a 67 y/o M with PMHx DM, HTN, BPH, transferred from [facility] after witnessed tonic clonic seizure fo unknown etiology and chronicity found also with hypoglycemia, hypotension in setting of bacteremia. Currently intubated for airway protection and on sedation versed/ fentanyl. Concern of COVID+. VS: 90/41 (map 57), RR22, 99% vent. CBC: 20.13wbc 89%N, Cr 1.34, alb2.4, ast 49, proBNP 882, Ua w/ glucosuria and proteinuria.     Impression: Patient found w/ GTC medically refractory to keppra/ vimpat at [facility], intubated for airway protection and sedated for concerns of status epilepticus of unknown etiology and chronicity.     Recommendations:   [ ] CBC, CMP, Mg, P, CpK, UA, Utox, ammonia, troponin, VBG/ABG, basic infectious workup, prolactin  [ ] continue with keppra 1000mg BID IV, vimpat 100mg BID IV  [ ] Stat head CT to assess for structural lesions, signs of stroke, infection, etc.   [ ] MRI brain w/ and w/o contrast when medically able to   [ ] vEEG 24 hr  [ ] If CT head is not showing signs of structural lesions, mass effect, would strongly consider obtaining LP especially if planning for steroids for COVID to assess for CNS infectious etiologies that can cause seizures.   [ ] DVT ppx as per primary team   [ ] Please obtain collaterals from family  [ ] Given concern for seizure, advise patient to not drive, operate heavy machinery, avoid heights, pools, bathtubs, locked doors until cleared by further follow up outpatient by neurology.     Please note: if patient has a convulsion, please document length of episode, specifically what patient was doing paying attention to eye opening vs closure, gaze deviation, shaking of extremities, tongue bite, urinary incontinence, any derangement of vital signs    Reviewed initial EEG with overnight epilepsy fellow. To be discussed with neurology attending and will be formally staffed during morning rounds. Recommendations will be finalized once signed by attending.

## 2021-10-27 NOTE — H&P ADULT - ASSESSMENT
This is a 66 year old male with PMHx DM, BPH, dementia HTN ?CAD found unresponsive by EMS at home. Pt received 3 amps of dextrose with recovered blood sugar of 79. On arrival to ED pt with refractory  seizures. Pt being transferred to [facility] intubated on propofol and vimpat for EEG monitoring and seizure management.  Neuro:  Sedated on propofol  A+O at baseline  Seizuires  Continue vimpat  Continue Neurology/epilepsy management-neurology consulted  Video EEG monitoring ordered and called    Pul  Intubated ETT at_26. Vent settings at: /16/5/30  CXR, ABG   No cpap at this time until pt is no longer seizing and sedation reduced     CV  No know cardiac issues  However takes Lipitor and ASA and Norvasc  Likely HTN at baseline  Continue to monitor at this time as pt remains normotensive  Echo for baseline    GI  NPO with TF  Bowel regimen  Protonix      Known hx of BPH  Add Cardura to regimen  Morales  Monitor lytes    Infectious Disease  Covid 19—incidental finding in ER  Remdesivir  Dexamethasone  Blood cultures   Remains Off abx for now    Endocrine  Hx of DM2   Hypoglycemic in field   Frequent monitoring of BS and adjust coverage as needed    Heme  Lovenox DVT PPX  Monitor CBC    Dispo: Full code admitted to 5ICU MICU attending aware of arrival

## 2021-10-28 NOTE — DIETITIAN INITIAL EVALUATION ADULT. - REASON INDICATOR FOR ASSESSMENT
Pt seen for tube feeding  Source: Medical record and RN (attempted to call emergency contact - phone not in service)

## 2021-10-28 NOTE — CONSULT NOTE ADULT - ASSESSMENT
67 y/o M w/ uncontrolled Type 2 DM w/ hyperglycemia exacerbated by steroids for COVID on 24 hour tube feeds admitted for status epilepticus (high risk patient with severely uncontrolled diabetes with A1c of 14.8% at high risk of CAD and CVA with high level decision-making).

## 2021-10-28 NOTE — DIETITIAN INITIAL EVALUATION ADULT. - PERTINENT MEDS FT
MEDICATIONS  (STANDING):  cefTRIAXone   IVPB 2000 milliGRAM(s) IV Intermittent every 24 hours  chlorhexidine 0.12% Liquid 15 milliLiter(s) Oral Mucosa every 12 hours  chlorhexidine 4% Liquid 1 Application(s) Topical <User Schedule>  dexAMETHasone  Injectable 6 milliGRAM(s) IV Push daily  enoxaparin Injectable 40 milliGRAM(s) SubCutaneous daily  insulin lispro (ADMELOG) corrective regimen sliding scale   SubCutaneous every 6 hours  insulin NPH human recombinant 10 Unit(s) SubCutaneous every 6 hours  ketamine Infusion. 0.25 mG/kG/Hr (1.96 mL/Hr) IV Continuous <Continuous>  lacosamide IVPB 100 milliGRAM(s) IV Intermittent every 12 hours  levETIRAcetam  IVPB 1000 milliGRAM(s) IV Intermittent every 12 hours  midazolam Infusion 0.02 mG/kG/Hr (1.56 mL/Hr) IV Continuous <Continuous>  pantoprazole  Injectable 40 milliGRAM(s) IV Push daily  phenylephrine    Infusion 0.1 MICROgram(s)/kG/Min (2.93 mL/Hr) IV Continuous <Continuous>  polyethylene glycol 3350 17 Gram(s) Oral daily  remdesivir  IVPB 100 milliGRAM(s) IV Intermittent every 24 hours  senna Syrup 10 milliLiter(s) Oral at bedtime  vasopressin Infusion 0.04 Unit(s)/Min (2.4 mL/Hr) IV Continuous <Continuous>

## 2021-10-28 NOTE — DIETITIAN INITIAL EVALUATION ADULT. - ORAL INTAKE PTA/DIET HISTORY
Unknown how pt was eating PTA. Unknown if pt was following therapeutic diet. No known food allergies. No known Hx of chewing or swallowing issues; however, noted with missing teeth. No known micronutrient or oral nutrient supplement use.

## 2021-10-28 NOTE — DIETITIAN INITIAL EVALUATION ADULT. - ADD RECOMMEND
2) Obtain subjective information as able. 3) Continue to trend labs, weight, skin integrity, and tolerance to EN.

## 2021-10-28 NOTE — DIETITIAN INITIAL EVALUATION ADULT. - OTHER INFO
Pt with Hx of Type 2 diabetes; medications used for glycemic control unknown. Unknown if pt checks blood sugars. Recent A1C 14.8% (10/28), indicating poor glycemic control.    Dosing wt: 172 lbs. UBW unknown. Wt Hx per chart - lbs: 172 (5/28/18), 180 (12/5/18), 172 (10/27/21). No other wts to address.     Pt currently receiving Glucerna 1.5 at 30 cc/hr and advancing to goal 50 cc/hr x24 hours. No known intolerances. Last BM 10/27.    Nutrition Related Concerns:  -Noted on phenylephrine, rate presently trending down and no longer receiving Vasopressin per flowsheet.

## 2021-10-28 NOTE — PROGRESS NOTE ADULT - ASSESSMENT
65 y/o male with PMHX HTN, T2DM, HTN, and BPH brought to [facility] by ambulance on 10/25 after family witnesed tonic clonic seizure. IN the ER pt was found hypoglycemic and had another episode of seizure activity. Pt was intubated for airway protection as his mental status did not return to baseline. EEG 10/26 showed epileptic activity while patient was on Keppra 500 BID. After EEG findings Keppra was increased to 1000 BID , however repeat eeg on 10/27 showed epileptic activity originating from left mid temporal area. On admission at [place] the patient was tested positive for covid 19 but no signs of respiratory disease and no findings of PNA on cxr. Pt was seen by the neurology team in [place] who added vimpat in addition to the keppra. Due to refractory seizures, pt was started on propofol for sedation. In addition blood cultures collected on 10/26 showed GPC bacteremia and group B strep isolated from urine culture. [address] and [address] management.     Neuro:  Seizures  - A,O at baseline  - Sedated on Versed and Ketamine   - Continue Viimpat 100mg IV q12  and Keppra 1gIV q12  - Video EEG monitoring started 10/27 overnight   - Noncon CT Head (10/27) f/u official results  - Possible LP on 10/28 to r/o Infectious Etiology per Neuro  - Ammonia level 30,   Prolactin level 0.20  - Neuro following, Reccs appreciated     Respiratory  - Intubated on 10/25  - Volume A/C 450/22/5/30; ABG 7.45/35/159/24  - No CPAP at this time until pt is no longer seizing and sedation reduced     CV  Hypotension   - Continue Alberto for MAP >/= 65  - Hold Home Lipitor, ASA, Norvasc  - TTE ordered; f/u results     GI  -NPO with TF for goal @ 50cc  - Bowel regimen with Miralax and Senna   - Protonix daily   - Nutrition consulted, f/u reccs       BPH  - Cardura on hold until Hypotension resolves   - Morales changed on Admission ( 10/27)   - Monitor Lytes replete as necessary     Infectious Disease  Covid 19—incidental finding in ER  - Continue Remdesivir ( 10/25 - 10/29)  - Continue Dexamethasone ( 10/26 - 11/04)   - f/u repeat COVID PCR   - BCx + for GPC with UCx + for Group B Strep at [facility]   - F/u repear Blood cultures x2, UA, UCx ( 10/27)  - Continue Ceftriaxone ( 10/27 - )     Endocrine  T2DM  - FGS q6hr  - Low Dose ISS with NPH 6U q6hr   - HbA1c 14.8    Heme  - Lovenox DVT PPX  - Monitor CBC   - LE Duplex ordered f/u results     Lines  - LIJ TLC 10/27  - Right Radial A line 10/27   - Morales Catheter 10/27    Dispo  - Full Code    67 y/o male with PMHX HTN, T2DM, HTN, and BPH brought to [facility] by ambulance on 10/25 after family witnesed tonic clonic seizure. IN the ER pt was found hypoglycemic and had another episode of seizure activity. Pt was intubated for airway protection as his mental status did not return to baseline. EEG 10/26 showed epileptic activity while patient was on Keppra 500 BID. After EEG findings Keppra was increased to 1000 BID , however repeat eeg on 10/27 showed epileptic activity originating from left mid temporal area. On admission at [place] the patient was tested positive for covid 19 but no signs of respiratory disease and no findings of PNA on cxr. Pt was seen by the neurology team in [place] who added vimpat in addition to the keppra. Due to refractory seizures, pt was started on propofol for sedation. In addition blood cultures collected on 10/26 showed GPC bacteremia and group B strep isolated from urine culture. [address] and [address] management.     Neuro:  Seizures  - A,+ O at baseline  - Sedated on Versed and Ketamine   - Continue Viimpat 100mg IV q12  and Keppra 1gIV q12  - Video EEG monitoring started 10/27 overnight   - Noncon CT Head (10/27) no anoxic ischemic encephalopathy  - Possible LP on 10/28 to r/o Infectious Etiology per Neuro  - Ammonia level 30,   Prolactin level 40.0  - Neuro following, Reccs appreciated   -Lumbar puncture done and specimens sent to lab    Respiratory  - Intubated on 10/25  - Volume A/C 450/20/5/30; ABG 7.45/35/159/24  - No CPAP at this time until pt is no longer seizing and sedation reduced     CV  Hypotension   - Continue Alberto for MAP >/= 65  - Hold Home Lipitor, ASA, Norvasc  - TTE ordered; f/u results   -10/28 bedside pocus with hypertrophy LV moderate squeeze. no pericardial effusion    GI  -NPO with TF for goal @ 50cc  - Bowel regimen with Miralax and Senna   - Protonix daily   - Nutrition consulted, f/u reccs       BPH  - Cardura on hold until Hypotension resolves   - Morales changed on Admission ( 10/27)   - Monitor Lytes replete as necessary     Infectious Disease  Covid 19—incidental finding in ER  - OFF Remdesivir x 4doses stopped 2/2 negative covid diagnosis  - OFF Dexamethasone x1 dose ( 10/26 - 11/04)   - f/u repeat COVID PCR   - BCx + for GPC with UCx + for Group B Strep at [facility]   - F/u repear Blood cultures x2, UA, UCx ( 10/27)  - Continue Ceftriaxone ( 10/27 - )   -ID consult called no changes to regimen    Endocrine  T2DM--House Endocrine on board  - FGS q6hr  - Low Dose ISS with NPH 10U q6hr   - HbA1c 14.8    Heme  - Lovenox DVT PPX  - Monitor CBC   - LE Duplex ordered f/u results     Lines  - LIJ TLC 10/27  - Right Radial A line 10/27   - Morales Catheter 10/27    Dispo  - Full Code

## 2021-10-28 NOTE — PROGRESS NOTE ADULT - SUBJECTIVE AND OBJECTIVE BOX
CHIEF COMPLAINT:  Patient is a 66y old  Male who presents with a chief complaint of Transfer from [place] for EEG Monitoring (27 Oct 2021 20:35)    HPI:  67 y/o male with PMHX DM, HTn and BPH, found lethargic and was brought to [facility] by ambulance on 10/25 after family witnesed tonic clonic seizure. Patient was found hypoglycemic upon arrival to [facility] with a blood sugar of 34. IN the ER pt had another episode of seizure activity  and did not return to baseline and was subsequently intubated for airway protection. EEG was done on 10/26 after sedation was turned off and showed epileptic activity while patient was on Keppra 500bid. After EEG findings Keppra was increased to 100bid, however repeat eeg on 10/27 showed epileptic activity originating from left mid temporal area. On admission the patient was tested positive for covid 19 but no signs of respiratory disease and no findings of PNA on cxr. Pt was seen by the neurology team in [place] who added vimpat in addition to the keppra. Due to refractory seizures, pt was started on propofol for sedation. In addition blood cultures collected on 10/26 showed GPC bacteremia and group B strep isolated from urine culture. Pt has not required pressors during his stay at [facility] and was subsequently transferred [address] for EEG monitoring and seizure management.  (27 Oct 2021 17:09)    Interval Events: sedated with Versed Ketamine CTH performed EEG started Started Alberto and Vaso ( minimal Repsonse to Levo) --> now just Alberto @1500cc NS and 250cc 5% Albumin --> UO picked up 20cc --> 50cc/hr [facility] TLC UA/Ucx sent  incr RR to 22 --> abg 7.45/35/159/24 RVP COVID neg --> sent repeat COVID PCR Kcl 20meq x3        REVIEW OF SYSTEMS: unable as pt is sedated          OBJECTIVE:  ICU Vital Signs Last 24 Hrs  T(C): 36.9 (28 Oct 2021 03:00), Max: 36.9 (28 Oct 2021 03:00)  T(F): 98.4 (28 Oct 2021 03:00), Max: 98.4 (28 Oct 2021 03:00)  HR: 60 (28 Oct 2021 06:45) (57 - 98)  BP: --  BP(mean): --  ABP: 127/48 (28 Oct 2021 06:45) (74/36 - 189/81)  ABP(mean): 73 (28 Oct 2021 06:45) (48 - 120)  RR: 22 (28 Oct 2021 06:45) (16 - 123)  SpO2: 100% (28 Oct 2021 06:45) (98% - 100%)    Mode: AC/ CMV (Assist Control/ Continuous Mandatory Ventilation), RR (machine): 22, TV (machine): 450, FiO2: 30, PEEP: 5, ITime: 1, MAP: 10, PIP: 20    10-27 @ 07:01  -  10-28 @ 07:00  --------------------------------------------------------  IN: 2326.5 mL / OUT: 535 mL / NET: 1791.5 mL      CAPILLARY BLOOD GLUCOSE              PHYSICAL EXAM:  GENERAL: NAD,  HEENT:  Atraumatic, Normocephalic  EYES: PERRLA, conjunctiva and sclera clear  NECK: Supple, No JVD  CHEST/LUNG: diminished bilaterally; No wheezes, rales, or rhonchi  HEART: Regular rate and rhythm; No murmurs, rubs, or gallops  ABDOMEN: Soft, Nontender, Nondistended; Bowel sounds present  EXTREMITIES:  2+ Peripheral Pulses, No clubbing, cyanosis, or edema  PSYCH: unable as pt is sedated  NEUROLOGY: sedated  SKIN: No rashes or lesions        HOSPITAL MEDICATIONS:  MEDICATIONS  (STANDING):  cefTRIAXone   IVPB 2000 milliGRAM(s) IV Intermittent every 24 hours  chlorhexidine 0.12% Liquid 15 milliLiter(s) Oral Mucosa every 12 hours  chlorhexidine 4% Liquid 1 Application(s) Topical <User Schedule>  dexAMETHasone  Injectable 6 milliGRAM(s) IV Push daily  enoxaparin Injectable 40 milliGRAM(s) SubCutaneous daily  insulin lispro (ADMELOG) corrective regimen sliding scale   SubCutaneous every 6 hours  insulin NPH human recombinant 6 Unit(s) SubCutaneous every 6 hours  ketamine Infusion. 0.25 mG/kG/Hr (1.96 mL/Hr) IV Continuous <Continuous>  lacosamide IVPB 100 milliGRAM(s) IV Intermittent every 12 hours  levETIRAcetam  IVPB 1000 milliGRAM(s) IV Intermittent every 12 hours  midazolam Infusion 0.02 mG/kG/Hr (1.56 mL/Hr) IV Continuous <Continuous>  pantoprazole  Injectable 40 milliGRAM(s) IV Push daily  phenylephrine    Infusion 0.1 MICROgram(s)/kG/Min (2.93 mL/Hr) IV Continuous <Continuous>  polyethylene glycol 3350 17 Gram(s) Oral daily  remdesivir  IVPB 100 milliGRAM(s) IV Intermittent every 24 hours  senna Syrup 10 milliLiter(s) Oral at bedtime  vasopressin Infusion 0.04 Unit(s)/Min (2.4 mL/Hr) IV Continuous <Continuous>    MEDICATIONS  (PRN):  sodium chloride 0.9% lock flush 10 milliLiter(s) IV Push every 1 hour PRN Pre/post blood products, medications, blood draw, and to maintain line patency      LABS:                        12.4   18.03 )-----------( 320      ( 28 Oct 2021 00:49 )             37.5     Hgb Trend: 12.4<--, 13.7<--  10-28    138  |  103  |  33<H>  ----------------------------<  362<H>  3.3<L>   |  21<L>  |  1.35<H>    Ca    7.9<L>      28 Oct 2021 00:49  Phos  2.5     10-28  Mg     2.2     10-28    TPro  5.0<L>  /  Alb  2.0<L>  /  TBili  0.1<L>  /  DBili  x   /  AST  34  /  ALT  10  /  AlkPhos  87  10-28    LIVER FUNCTIONS - ( 28 Oct 2021 00:49 )  Alb: 2.0 g/dL / Pro: 5.0 g/dL / ALK PHOS: 87 U/L / ALT: 10 U/L / AST: 34 U/L / GGT: x           Creatinine Trend: 1.35<--, 1.34<--  PT/INR - ( 28 Oct 2021 00:49 )   PT: 13.8 sec;   INR: 1.16 ratio         PTT - ( 28 Oct 2021 00:49 )  PTT:31.6 sec  Urinalysis Basic - ( 27 Oct 2021 20:18 )    Color: Yellow / Appearance: Slightly Turbid / S.029 / pH: x  Gluc: x / Ketone: Negative  / Bili: Negative / Urobili: Negative   Blood: x / Protein: 300 mg/dL / Nitrite: Negative   Leuk Esterase: Small / RBC: 45 /hpf / WBC 63 /HPF   Sq Epi: x / Non Sq Epi: 10 / Bacteria: Negative      Arterial Blood Gas:  10-28 @ 00:45  7.42/38/157/25/99.9/0.3  ABG lactate: --  Arterial Blood Gas:  10-27 @ 21:00  7.45/35/159/24/100.0/0.7  ABG lactate: --  Arterial Blood Gas:  10-27 @ 17:40  7.32/56/127/29/99.5/1.5  ABG lactate: --        MICROBIOLOGY:     RADIOLOGY:  [ ] Reviewed and interpreted by me    EKG:

## 2021-10-28 NOTE — EEG REPORT - NS EEG TEXT BOX
[facility]  Comprehensive Epilepsy Center  Report of Continuous Video EEG    [facility]: [address], [place] [address], Phone [phone]  [facility]: [address] [place], [place] [address] [address], Phone [phone]  [place] [facility]: [address], [address], [place] [address] Phone [phone]    [facility]: [address] [place] [address], Phone [phone]  [place] Office: [address] [place] [address], Phone [phone]    Patient Name: Cyndie Hughes    Age: 66 year, : 1955  Patient ID: -, MRN #: [id number], [place]: 5  5   Referring Physician: -  EEG #: [id number]    Study Time/Date: 7:08:15 PM on 10/27/2021  	  End Time/Date: 0800 on 10/28/2021          			   Duration: 13H    Study Information:    EEG Recording Technique:  The patient underwent continuous Video-EEG monitoring, using Telemetry System hardware on the XLTek Digital System. EEG and video data were stored on a computer hard drive with important events saved in digital archive files. The material was reviewed by a physician (electroencephalographer / epileptologist) on a daily basis. Osmar and seizure detection algorithms were utilized and reviewed. An EEG Technician attended to the patient, and was available throughout daytime work hours.  The epilepsy center neurologist was available in person or on call 24-hours per day.    EEG Placement and Labeling of Electrodes:  The EEG was performed utilizing 20 channel referential EEG connections (coronal over temporal over parasagittal montage) using all standard 10-20 electrode placements, with additional electrodes placed in the inferior temporal region using the modified 10-10 montage electrode placements for elective admissions, or if deemed necessary. Recording was at a sampling rate of 256 samples per second per channel. Time synchronized digital video recording was done simultaneously with EEG recording. A low light infrared camera was used for low light recording.     History:  EMU performed at the bedside  COR: Sedated, Vented  No HV due to covid protocol  No Photic due to Unknown PMHx  65 Y/O Male  P/W: Generalized Twitching  Unknown PMHx      Pertinent Medication  Vimpat (Lacosamide)  Keppra (Levetiracetam)  Diprivan (Propofol)    Interpretation:    Daily EEG Visual Analysis  Findings:  The background was nonreactive and in burst suppression, predominantly consisted of 1-3 s bursts of theta and delta activities, alternating with 2-4 s of diffuse suppression. No posterior dominant rhythm seen.    Background Slowing:  Background predominantly consisted of theta, delta and faster activities.    Focal Slowing:   None were present.    Sleep Background:  Drowsiness and stage II sleep transients were not recorded.    Other Non-Epileptiform Findings:  None were present.    Interictal Epileptiform Activity:   Generalized high amplitude periodic discharges with a frequency of 0.5-1.5Hz (GPDs).    Events:  Clinical events: None recorded.  Seizures: None recorded.    Activation Procedures:   Hyperventilation was not performed.    Photic stimulation was not performed.     Artifacts:  Intermittent myogenic and movement artifacts were noted.    EEG Summary / Classification:  Abnormal   - GPDs  - Moderate generalized slowing.    EEG Impression / Clinical Correlate:  Abnormal EEG study.  Diffuse cortical hyperexcitability with GPD.   Severe nonspecific diffuse or multifocal cerebral dysfunction.   No seizure seen.    Antonio Chandra MD  Attending Physician, [facility]

## 2021-10-28 NOTE — CHART NOTE - NSCHARTNOTEFT_GEN_A_CORE
PRELIMINARY EEG REVIEW    EEG reviewed to 15:41  Date: 10-28-21    - No seizure seen.    This Preliminary report is based on fellow review. Final report pending Completion of study tomorrow morning and following attending review.    Reading Room: [phone]  On Call Service After Hours: [phone]    [facility] MD PGY-5  Epilepsy Fellow

## 2021-10-28 NOTE — PROCEDURE NOTE - NSPOSTCAREGUIDE_GEN_A_CORE
Keep the cast/splint/dressing clean and dry
Care for catheter as per unit/ICU protocols
Care for catheter as per unit/ICU protocols

## 2021-10-28 NOTE — DIETITIAN INITIAL EVALUATION ADULT. - PERTINENT LABORATORY DATA
10-28 Na 140 mmol/L Glu 306 mg/dL<H> K+ 3.7 mmol/L Cr  1.26 mg/dL BUN 33 mg/dL<H> Phos 2.1 mg/dL<L> Alb 2.2 g/dL<L>   A1C with Estimated Average Glucose Result: 14.8 % (10-28-21 @ 02:51)  CAPILLARY BLOOD GLUCOSE  POCT Blood Glucose.: 296 mg/dL (28 Oct 2021 05:12)  POCT Blood Glucose.: 245 mg/dL (27 Oct 2021 16:37)  -Elevated blood glucose noted, pt on Glucerna, sliding scale of insulin, NPH insulin, and steroid

## 2021-10-28 NOTE — CONSULT NOTE ADULT - ASSESSMENT
66 years old male with PMHx of uncontrolled DM (A1C ~14), HTN and BPH, transferred from [facility] for Epilepsy monitoring    CTH 10/27 negative for intracranial pathologies  CXR 10/27 no focal consolidation; at minimal vent setting  COVID PCR 10/27, 10/28 negative; positive COVID PCR in [place] likely false positive    UA shows pyuria; UCx pending  UCx 10/25 grew group B strep    BCx 10/25 viridans strep 1/4 bottles; repeat pending  Received Rocephin 2gm q24hrs 10/27 - current        Patient's seizure could be set off by hypoglycemia   66 years old male with PMHx of uncontrolled DM (A1C ~14), HTN and BPH, transferred from [facility] for Epilepsy monitoring    CTH 10/27 negative for intracranial pathologies  CXR 10/27 no focal consolidation; at minimal vent setting  COVID PCR 10/27, 10/28 negative; positive COVID PCR in [place] likely false positive    UA shows pyuria; UCx pending  UCx 10/25 grew group B strep    BCx 10/25 viridans strep 1/4 bottles; repeat pending  Received Rocephin 2gm q24hrs 10/27 - current    Patient's seizure could be set off by hypoglycemia or septic shock secondary to viridans strep bacteremia  Unable to do comprehensive oral exam since patient is intubated  Will treat as a pathogen regardless and need to rule out endocarditis      IMPRESSION:  Viridans strep bacteremia  Leukocytosis  Septic shock  Status epilepticus      RECOMMENDATIONS:  - Continue IV Rocephin 2gm q24hrs for now  - Follow up repeat blood culture and urine culture  - ECHO  - Dental eval once patient is extubated  - Will continue to follow        * THIS IS AN INCOMPLETE NOTE. FINAL RECOMMENDATION WILL BE UPDATED AFTER DISCUSSING WITH ATTENDING *       66 years old male with PMHx of uncontrolled DM (A1C ~14), HTN and BPH, transferred from [facility] for Epilepsy monitoring    CTH 10/27 negative for intracranial pathologies  CXR 10/27 no focal consolidation; at minimal vent setting  COVID PCR 10/27, 10/28 negative; positive COVID PCR in [place] likely false positive    UA shows pyuria; UCx pending  UCx 10/25 grew group B strep    BCx 10/25 viridans strep 1/4 bottles; repeat pending  Received Rocephin 2gm q24hrs 10/27 - current    New onset seizure could be set off by hypoglycemia or septic shock secondary to viridans strep bacteremia or meningoencephalitis  Unable to do comprehensive oral exam since patient is intubated  Low grade viridans strep could be contamination but in light of leukocytosis and cardiac murmur, will treat as a pathogen and  rule out endocarditis      IMPRESSION:  Viridans strep bacteremia  Leukocytosis  Septic shock  Status epilepticus      RECOMMENDATIONS:  - Continue IV Rocephin 2gm q24hrs for now  - Follow up repeat blood culture and urine culture; if negative TTE and negative blood culture can d/c Rocephin  - Follow up LP studies  - TTE  - Dental eval once patient is extubated  - Will continue to follow      Patient is seen and examined with attending and case is discussed with primary team      Leanne Murcia DO  PGY4 ID fellow  Pager: [phone]  [facility] pager ID: [phone]  Please contact me via page or text through Microsoft Teams  If after 5PM or on weekends, please call [phone]       66 years old male with PMHx of uncontrolled DM (A1C ~14), HTN and BPH, transferred from [facility] for Epilepsy monitoring    CTH 10/27 negative for intracranial pathologies  CXR 10/27 no focal consolidation; on minimal vent setting  COVID PCR 10/27, 10/28 negative; positive COVID PCR in [place] unclear if true positive or not    UA shows pyuria; UCx pending  UCx 10/25 grew group B strep    BCx 10/25 viridans strep 1/4 bottles; repeat pending  Received Rocephin 2gm q24hrs 10/27 - current    New onset seizure could be set off by hypoglycemia vs septic shock secondary to viridans strep bacteremia vs meningoencephalitis vs COVID-19 infecion  Unable to do comprehensive oral exam since patient is intubated  Low grade viridans strep could be contamination but in light of leukocytosis and cardiac murmur, will treat as a pathogen and rule out endocarditis  Hold off on CNS dosing of abx for now until LP comes back; no meningeal signs on exam      IMPRESSION:  Viridans strep bacteremia  Possible meningoencephalitis, complicated by status epilepticus  Leukocytosis  Septic shock      RECOMMENDATIONS:  - Continue IV Rocephin 2gm q24hrs for now  - Follow up repeat blood culture and urine culture; if negative TTE and negative blood culture can d/c Rocephin  - Follow up LP studies  - TTE  - Dental eval once patient is extubated  - Will continue to follow      Patient is seen and examined with attending and case is discussed with primary team      Leanne Murcia DO  PGY4 ID fellow  Pager: [phone]  [facility] pager ID: [phone]  Please contact me via page or text through Microsoft Teams  If after 5PM or on weekends, please call [phone]       66 years old male with PMHx of uncontrolled DM (A1C ~14), HTN and BPH, transferred from [facility] for Epilepsy monitoring    CTH 10/27 negative for intracranial pathologies  CXR 10/27 no focal consolidation; on minimal vent setting  COVID PCR 10/27, 10/28 negative; positive COVID PCR in [place] unclear if true positive or not    UA shows pyuria; UCx pending  UCx 10/25 grew group B strep    BCx 10/25 viridans strep 1/4 bottles; repeat pending  Received Rocephin 2gm q24hrs 10/27 - current    New onset seizure could be set off by hypoglycemia vs septic shock secondary to viridans strep bacteremia vs meningoencephalitis vs COVID-19 infecion  Unable to do comprehensive oral exam since patient is intubated  Low grade viridans strep could be contamination but in light of leukocytosis and cardiac murmur, will treat as a pathogen and rule out endocarditis  LP not suggestive of meningitis    IMPRESSION:  Viridans strep bacteremia  Possible meningoencephalitis, complicated by status epilepticus  Leukocytosis  Septic shock      RECOMMENDATIONS:  - Continue IV Rocephin 2gm q24hrs for now  - Follow up repeat blood culture and urine culture; if negative TTE and negative blood culture can d/c Rocephin  - Follow up LP studies  - TTE  - Dental eval once patient is extubated  - Will continue to follow      Patient is seen and examined with attending and case is discussed with primary team      Leanne Murcia DO  PGY4 ID fellow  Pager: [phone]  [facility] pager ID: [phone]  Please contact me via page or text through Microsoft Teams  If after 5PM or on weekends, please call [phone]

## 2021-10-28 NOTE — DIETITIAN INITIAL EVALUATION ADULT. - CHIEF COMPLAINT
Per Chart: Pt is a 65 y/o male with PMHX DM, HTn and BPH, found lethargic and was brought to [facility] by ambulance on 10/25 after family witnesed tonic clonic seizure. Patient was found hypoglycemic upon arrival to [facility] with a blood sugar of 34. IN the ER pt had another episode of seizure activity  and did not return to baseline and was subsequently intubated for airway protection. Pt has not required pressors during his stay at [facility] and was subsequently transferred to [address] for EEG monitoring and seizure management.

## 2021-10-28 NOTE — CONSULT NOTE ADULT - SUBJECTIVE AND OBJECTIVE BOX
INFECTIOUS DISEASE CONSULT NOTE    Patient is a 66y old  Male who presents with a chief complaint of Transfer from [place] for EEG Monitoring (28 Oct 2021 10:49)    HPI:  65 y/o male with PMHX DM, HTn and BPH, found lethargic and was brought to [facility] by ambulance on 10/25 after family witnesed tonic clonic seizure. Patient was found hypoglycemic upon arrival to [facility] with a blood sugar of 34. IN the ER pt had another episode of seizure activity  and did not return to baseline and was subsequently intubated for airway protection. EEG was done on 10/26 after sedation was turned off and showed epileptic activity while patient was on Keppra 500bid. After EEG findings Keppra was increased to 100bid, however repeat eeg on 10/27 showed epileptic activity originating from left mid temporal area. On admission the patient was tested positive for covid 19 but no signs of respiratory disease and no findings of PNA on cxr. Pt was seen by the neurology team in [place] who added vimpat in addition to the keppra. Due to refractory seizures, pt was started on propofol for sedation. In addition blood cultures collected on 10/26 showed GPC bacteremia and group B strep isolated from urine culture. Pt has not required pressors during his stay at [facility] and was subsequently transferred to [address] for EEG monitoring and seizure management.  (27 Oct 2021 17:09)       REVIEW OF SYSTEMS:  CONSTITUTIONAL: No unintentional weight loss; no weakness; no fevers or chills  HEENT: No decrease hearing, tinnitus, ear pain, rhinorrhea, congestion, or sore throat  RESPIRATORY: No cough, wheezing, hemoptysis; no shortness of breath/shortness of breath on exertion; no orthopnea  CARDIOVASCULAR: No chest pain or palpitations  GASTROINTESTINAL: No abdominal or epigastric pain; no change in appetite; no early satiety; no nausea, vomiting, or hematemesis; no diarrhea or constipation; no melena or hematochezia; no change of stool caliber  GENITOURINARY: No dysuria, increased in urinary frequency or hematuria; no urethral discharge  NEUROLOGICAL: No headache/dizziness; no focal numbness or motor weakness  MSK: No joint pain, erythema, or swelling; no back pain  SKIN: No itching, rashes; no recent insect bites  All other ROS negative except noted above    Prior hospital charts reviewed [Yes]  Primary team notes reviewed [Yes]  Other consultant notes reviewed [Yes]    PAST MEDICAL & SURGICAL HISTORY:  Hypertension    Diabetes    History of BPH    No significant past surgical history        SOCIAL HISTORY:  - Born in _____, migrated [place] in 19XX  - Currently working as / Retired  - Lives with _____; no pets  - No recent travel  - Denies tobacco use  - Denies alcohol use  - Denies illicit drug use  - Currently sexually active, has one male/female sexual partner    FAMILY HISTORY:  No pertinent family history in first degree relatives        Allergies:  No Known Allergies      ANTIMICROBIALS:  cefTRIAXone   IVPB 2000 every 24 hours      ANTIMICROBIALS (past 90 days):  MEDICATIONS  (STANDING):  cefTRIAXone   IVPB   100 mL/Hr IV Intermittent (10-27-21 @ 20:30)    remdesivir  IVPB   500 mL/Hr IV Intermittent (10-27-21 @ 23:00)        OTHER MEDS:   MEDICATIONS  (STANDING):  enoxaparin Injectable 40 daily  insulin lispro (ADMELOG) corrective regimen sliding scale  every 6 hours  insulin NPH human recombinant 10 every 6 hours  ketamine Infusion. 0.25 <Continuous>  lacosamide IVPB 100 every 12 hours  levETIRAcetam  IVPB 1000 every 12 hours  pantoprazole  Injectable 40 daily  phenylephrine    Infusion 0.1 <Continuous>  polyethylene glycol 3350 17 daily  senna Syrup 10 at bedtime      VITALS:  Vital Signs Last 24 Hrs  T(F): 98.4 (10-28-21 @ 03:00), Max: 98.4 (10-28-21 @ 03:00)    Vital Signs Last 24 Hrs  HR: 80 (10-28-21 @ 14:30) (57 - 98)  BP: --  RR: 20 (10-28-21 @ 14:30)  SpO2: 98% (10-28-21 @ 14:30) (98% - 100%)  Wt(kg): --    EXAM:  GENERAL: NAD, lying in bed comfortably  HEAD: Atraumatic, Normocephalic  EYES: EOMI, PERRLA, conjunctiva pink and cornea white  ENT: Normal external ears and nose, no discharges; moist mucous membranes, no erythema on posterior oropharynx  NECK: Supple, nontender to palpation; no JVD  CHEST/LUNG: Clear to auscultation bilaterally; No rales, rhonchi, wheezing, or rubs. Unlabored respirations  HEART: Regular rate and rhythm; No murmurs, rubs, or gallops  ABDOMEN: Soft, nontender, nondistended; no rebound tenderness, no guarding; no hepatomegaly; normoactive/hyperactive/hypoactive bowel sounds  EXTREMITIES:  2+ Peripheral Pulses, brisk capillary refill. No clubbing, cyanosis, or petal edema  NERVOUS SYSTEM: Alert and oriented to person, time, place and situation, speech clear. No focal deficits   MSK: FROM all 4 extremities, full and equal strength; no joint erythema, swelling or pain  SKIN: No rashes or lesions, no superficial thrombophlebitis    Labs:                        12.4   18.03 )-----------( 320      ( 28 Oct 2021 00:49 )             37.5     10-28    140  |  106  |  33<H>  ----------------------------<  306<H>  3.7   |  21<L>  |  1.26    Ca    7.7<L>      28 Oct 2021 08:15  Phos  2.1     10-28  Mg     2.2     10-28    TPro  4.8<L>  /  Alb  2.2<L>  /  TBili  0.1<L>  /  DBili  x   /  AST  30  /  ALT  10  /  AlkPhos  77  10-28      WBC Trend:  WBC Count: 18.03 (10-28-21 @ 00:49)  WBC Count: 20.13 (10-27-21 @ 17:50)      Auto Neutrophil #: 15.60 K/uL (10-28-21 @ 00:49)  Auto Neutrophil #: 17.94 K/uL (10-27-21 @ 17:50)      Creatine Trend:  Creatinine, Serum: 1.26 (10-28)  Creatinine, Serum: 1.35 (10-28)  Creatinine, Serum: 1.34 (10-27)      Liver Biochemical Testing Trend:  Alanine Aminotransferase (ALT/SGPT): 10 (10-28)  Alanine Aminotransferase (ALT/SGPT): 10 (10-28)  Alanine Aminotransferase (ALT/SGPT): 14 (10-27)  Aspartate Aminotransferase (AST/SGOT): 30 (10-28-21 @ 08:15)  Aspartate Aminotransferase (AST/SGOT): 34 (10-28-21 @ 00:49)  Aspartate Aminotransferase (AST/SGOT): 49 (10-27-21 @ 17:50)  Bilirubin Total, Serum: 0.1 (10-28)  Bilirubin Total, Serum: 0.1 (10-28)  Bilirubin Total, Serum: 0.2 (10-27)      Trend LDH  10-28-21 @ 00:49  273<H>      Auto Eosinophil %: 0.0 % (10-28-21 @ 00:49)  Auto Eosinophil %: 0.0 % (10-27-21 @ 17:50)      Urinalysis Basic - ( 27 Oct 2021 20:18 )    Color: Yellow / Appearance: Slightly Turbid / S.029 / pH: x  Gluc: x / Ketone: Negative  / Bili: Negative / Urobili: Negative   Blood: x / Protein: 300 mg/dL / Nitrite: Negative   Leuk Esterase: Small / RBC: 45 /hpf / WBC 63 /HPF   Sq Epi: x / Non Sq Epi: 10 / Bacteria: Negative        MICROBIOLOGY:        Culture - Sputum (collected 27 Oct 2021 22:21)  Source: .Sputum ett                        Rapid RVP Result: NotDetec (10-27 @ 18:26)    COVID-19 PCR: NotDetec (10-28-21 @ 06:36)        Procalcitonin, Serum: 0.20 (10-28)  Procalcitonin, Serum: 0.22 (10-27)      Ferritin, Serum: 476 (10-28)    D-Dimer Assay, Quantitative: 1046 (10-28)  D-Dimer Assay, Quantitative: 845 (10-27)    Lactate Dehydrogenase, Serum: 273 (10-28)    Serum Pro-Brain Natriuretic Peptide: 882 (10-27)    Troponin T, High Sensitivity Result: 127 (10-28)    Blood Gas Arterial, Lactate: 1.9 (10-28 @ 00:45)  Blood Gas Arterial, Lactate: 2.0 (10-27 @ 21:00)  Lactate, Blood: 1.5 (10-27 @ 17:50)  Blood Gas Arterial, Lactate: 1.3 (10-27 @ 17:40)    A1C with Estimated Average Glucose Result: 14.8 % (10-28-21 @ 02:51)      RADIOLOGY:  imaging below personally reviewed    < from: Xray Chest 1 View- PORTABLE-Urgent (Xray Chest 1 View- PORTABLE-Urgent .) (10.28.21 @ 00:27) >  IMPRESSION:  Interval placement of left internal jugular central venous catheter with tip at the left brachiocephalic/SVC junction.    Two small caliber radiopaque lines are approaching from the left upper extremity with a single lying returning towards the midline. These lines are likely external to patient. Clinical correlation is recommended..    Clear lungs.  No pneumothorax.    < end of copied text >   INFECTIOUS DISEASE CONSULT NOTE    Patient is a 66y old  Male who presents with a chief complaint of Transfer from [place] for EEG Monitoring (28 Oct 2021 10:49)    HPI:  65 y/o male with PMHX DM, HTn and BPH, found lethargic and was brought to [facility] by ambulance on 10/25 after family witnesed tonic clonic seizure. Patient was found hypoglycemic upon arrival to [facility] with a blood sugar of 34. IN the ER pt had another episode of seizure activity  and did not return to baseline and was subsequently intubated for airway protection. EEG was done on 10/26 after sedation was turned off and showed epileptic activity while patient was on Keppra 500bid. After EEG findings Keppra was increased to 1000bid, however repeat eeg on 10/27 showed epileptic activity originating from left mid temporal area. On 10/25 the patient was tested positive for COVID-19 on admission but no signs of respiratory disease and no findings of PNA on cxr. Pt was seen by the neurology team in [place] who added Vimpat in addition to the Keppra. Due to refractory seizures, patient was started on propofol for sedation.     Blood cultures collected on 10/26 showed 1/4 bottle GPC and group B strep isolated from urine culture. Pt did not required pressors during his stay at [facility] and was subsequently [facility] and seizure management.    A new central line and A-line was placed in ICU; COVID PCR was done twice and were negative. He was started on Ceftriaxone 2gm q24hrs for bacteremia. Patient has been on continuous EEG. EEG did not shows any epileptic form activity on 10/27 and 10/28.     Patient at one point required 3 pressors but then was gradually weaned off, now stable without pressor support.     Called [facility] MICU and spoke to resident; blood culture on 10/26 was positive for viridans strep.        REVIEW OF SYSTEMS:  Unable to obtain due to patient being sedated and intubated    All other ROS negative except noted above    Prior hospital charts reviewed [Yes]  Primary team notes reviewed [Yes]  Other consultant notes reviewed [Yes]    PAST MEDICAL & SURGICAL HISTORY:  Hypertension    Diabetes    History of BPH    No significant past surgical history        SOCIAL HISTORY:  Unable to obtain as patient is intubated and sedated    FAMILY HISTORY:  Unable to obtain as patient is intubated and sedated        Allergies:  No Known Allergies      ANTIMICROBIALS:  cefTRIAXone   IVPB 2000 every 24 hours      ANTIMICROBIALS (past 90 days):  MEDICATIONS  (STANDING):  cefTRIAXone   IVPB   100 mL/Hr IV Intermittent (10-27-21 @ 20:30)    remdesivir  IVPB   500 mL/Hr IV Intermittent (10-27-21 @ 23:00)        OTHER MEDS:   MEDICATIONS  (STANDING):  enoxaparin Injectable 40 daily  insulin lispro (ADMELOG) corrective regimen sliding scale  every 6 hours  insulin NPH human recombinant 10 every 6 hours  ketamine Infusion. 0.25 <Continuous>  lacosamide IVPB 100 every 12 hours  levETIRAcetam  IVPB 1000 every 12 hours  pantoprazole  Injectable 40 daily  phenylephrine    Infusion 0.1 <Continuous>  polyethylene glycol 3350 17 daily  senna Syrup 10 at bedtime      VITALS:  Vital Signs Last 24 Hrs  T(F): 98.4 (10-28-21 @ 03:00), Max: 98.4 (10-28-21 @ 03:00)    Vital Signs Last 24 Hrs  HR: 80 (10-28-21 @ 14:30) (57 - 98)  BP: --  RR: 20 (10-28-21 @ 14:30)  SpO2: 98% (10-28-21 @ 14:30) (98% - 100%)    EXAM:  GENERAL: Sedated and intubated  HEAD: Atraumatic  EYES: Conjunctiva pink and cornea white  ENT: Dry mucous membranes, ET tube in place; unable to examine oral cavity fully  NECK: Supple, nontender to palpation  CHEST/LUNG: Clear to auscultation bilaterally  HEART: S1 S2  ABDOMEN: Soft, nontender, nondistended; normoactive bowel sounds  EXTREMITIES: + bilateral UE edema; No clubbing, cyanosis, or petal edema; no foot lesions or wounds  NERVOUS SYSTEM: Sedated and intubated  MSK: No joint erythema, swelling or pain  SKIN: No rashes or lesions, no superficial thrombophlebitis    Labs:                        12.4   18.03 )-----------( 320      ( 28 Oct 2021 00:49 )             37.5     10-28    140  |  106  |  33<H>  ----------------------------<  306<H>  3.7   |  21<L>  |  1.26    Ca    7.7<L>      28 Oct 2021 08:15  Phos  2.1     10-28  Mg     2.2     10-28    TPro  4.8<L>  /  Alb  2.2<L>  /  TBili  0.1<L>  /  DBili  x   /  AST  30  /  ALT  10  /  AlkPhos  77  10-28      WBC Trend:  WBC Count: 18.03 (10-28-21 @ 00:49)  WBC Count: 20.13 (10-27-21 @ 17:50)      Auto Neutrophil #: 15.60 K/uL (10-28-21 @ 00:49)  Auto Neutrophil #: 17.94 K/uL (10-27-21 @ 17:50)      Creatine Trend:  Creatinine, Serum: 1.26 (10-28)  Creatinine, Serum: 1.35 (10-28)  Creatinine, Serum: 1.34 (10-27)      Liver Biochemical Testing Trend:  Alanine Aminotransferase (ALT/SGPT): 10 (10-28)  Alanine Aminotransferase (ALT/SGPT): 10 (10-28)  Alanine Aminotransferase (ALT/SGPT): 14 (10-27)  Aspartate Aminotransferase (AST/SGOT): 30 (10-28-21 @ 08:15)  Aspartate Aminotransferase (AST/SGOT): 34 (10-28-21 @ 00:49)  Aspartate Aminotransferase (AST/SGOT): 49 (10-27-21 @ 17:50)  Bilirubin Total, Serum: 0.1 (10-28)  Bilirubin Total, Serum: 0.1 (10-28)  Bilirubin Total, Serum: 0.2 (10-27)      Trend LDH  10-28-21 @ 00:49  273<H>      Auto Eosinophil %: 0.0 % (10-28-21 @ 00:49)  Auto Eosinophil %: 0.0 % (10-27-21 @ 17:50)      Urinalysis Basic - ( 27 Oct 2021 20:18 )    Color: Yellow / Appearance: Slightly Turbid / S.029 / pH: x  Gluc: x / Ketone: Negative  / Bili: Negative / Urobili: Negative   Blood: x / Protein: 300 mg/dL / Nitrite: Negative   Leuk Esterase: Small / RBC: 45 /hpf / WBC 63 /HPF   Sq Epi: x / Non Sq Epi: 10 / Bacteria: Negative        MICROBIOLOGY:    Culture - Sputum (collected 27 Oct 2021 22:21)  Source: .Sputum ett    Rapid RVP Result: NotDetec (10-27 @ 18:26)    COVID-19 PCR: NotDetec (10-28-21 @ 06:36)        Procalcitonin, Serum: 0.20 (10-28)  Procalcitonin, Serum: 0.22 (10-27)      Ferritin, Serum: 476 (10-28)    D-Dimer Assay, Quantitative: 1046 (10-28)  D-Dimer Assay, Quantitative: 845 (10-27)    Lactate Dehydrogenase, Serum: 273 (10-28)    Serum Pro-Brain Natriuretic Peptide: 882 (10-27)    Troponin T, High Sensitivity Result: 127 (10-28)    Blood Gas Arterial, Lactate: 1.9 (10-28 @ 00:45)  Blood Gas Arterial, Lactate: 2.0 (10-27 @ 21:00)  Lactate, Blood: 1.5 (10-27 @ 17:50)  Blood Gas Arterial, Lactate: 1.3 (10-27 @ 17:40)    A1C with Estimated Average Glucose Result: 14.8 % (10-28-21 @ 02:51)      RADIOLOGY:  imaging below personally reviewed    < from: Xray Chest 1 View- PORTABLE-Urgent (Xray Chest 1 View- PORTABLE-Urgent .) (10.28.21 @ 00:27) >  IMPRESSION:  Interval placement of left internal jugular central venous catheter with tip at the left brachiocephalic/SVC junction.    Two small caliber radiopaque lines are approaching from the left upper extremity with a single lying returning towards the midline. These lines are likely external to patient. Clinical correlation is recommended..    Clear lungs.  No pneumothorax.    < end of copied text >   INFECTIOUS DISEASE CONSULT NOTE    Patient is a 66y old  Male who presents with a chief complaint of Transfer from [place] for EEG Monitoring (28 Oct 2021 10:49)    HPI:  67 y/o male with PMHX DM, HTn and BPH, found lethargic and was brought to [facility] by ambulance on 10/25 after family witnesed tonic clonic seizure. Patient was found hypoglycemic upon arrival to [facility] with a blood sugar of 34. IN the ER pt had another episode of seizure activity  and did not return to baseline and was subsequently intubated for airway protection. EEG was done on 10/26 after sedation was turned off and showed epileptic activity while patient was on Keppra 500bid. After EEG findings Keppra was increased to 1000bid, however repeat eeg on 10/27 showed epileptic activity originating from left mid temporal area. On 10/25 the patient was tested positive for COVID-19 on admission but no signs of respiratory disease and no findings of PNA on cxr. Pt was seen by the neurology team in [place] who added Vimpat in addition to the Keppra. Due to refractory seizures, patient was started on propofol for sedation.     Blood cultures collected on 10/26 showed 1/4 bottle GPC and group B strep isolated from urine culture. Pt did not required pressors during his stay at [facility] and was subsequently [facility] and seizure management.    A new central line and A-line was placed in ICU; COVID PCR was done twice and were negative. He was started on Ceftriaxone 2gm q24hrs for bacteremia. Patient has been on continuous EEG. EEG did not shows any epileptic form activity on 10/27 and 10/28.     Patient at one point required 3 pressors but then was gradually weaned off, now stable without pressor support.     Called [facility] MICU and spoke to MICU resident ([phone]); blood culture on 10/26 was positive for viridans strep.        REVIEW OF SYSTEMS:  Unable to obtain due to patient being sedated and intubated    All other ROS negative except noted above    Prior hospital charts reviewed [Yes]  Primary team notes reviewed [Yes]  Other consultant notes reviewed [Yes]    PAST MEDICAL & SURGICAL HISTORY:  Hypertension    Diabetes    History of BPH    No significant past surgical history        SOCIAL HISTORY:  Unable to obtain as patient is intubated and sedated    FAMILY HISTORY:  Unable to obtain as patient is intubated and sedated        Allergies:  No Known Allergies      ANTIMICROBIALS:  cefTRIAXone   IVPB 2000 every 24 hours      ANTIMICROBIALS (past 90 days):  MEDICATIONS  (STANDING):  cefTRIAXone   IVPB   100 mL/Hr IV Intermittent (10-27-21 @ 20:30)    remdesivir  IVPB   500 mL/Hr IV Intermittent (10-27-21 @ 23:00)        OTHER MEDS:   MEDICATIONS  (STANDING):  enoxaparin Injectable 40 daily  insulin lispro (ADMELOG) corrective regimen sliding scale  every 6 hours  insulin NPH human recombinant 10 every 6 hours  ketamine Infusion. 0.25 <Continuous>  lacosamide IVPB 100 every 12 hours  levETIRAcetam  IVPB 1000 every 12 hours  pantoprazole  Injectable 40 daily  phenylephrine    Infusion 0.1 <Continuous>  polyethylene glycol 3350 17 daily  senna Syrup 10 at bedtime      VITALS:  Vital Signs Last 24 Hrs  T(F): 98.4 (10-28-21 @ 03:00), Max: 98.4 (10-28-21 @ 03:00)    Vital Signs Last 24 Hrs  HR: 80 (10-28-21 @ 14:30) (57 - 98)  BP: --  RR: 20 (10-28-21 @ 14:30)  SpO2: 98% (10-28-21 @ 14:30) (98% - 100%)    EXAM:  GENERAL: Sedated and intubated  HEAD: Atraumatic  EYES: Conjunctiva pink and cornea white  ENT: Dry mucous membranes, ET tube in place; unable to examine oral cavity fully  NECK: Supple, nontender to palpation  CHEST/LUNG: Clear to auscultation bilaterally  HEART: S1 S2  ABDOMEN: Soft, nontender, nondistended; normoactive bowel sounds  EXTREMITIES: + bilateral UE edema; No clubbing, cyanosis, or petal edema; no foot lesions or wounds  NERVOUS SYSTEM: Sedated and intubated  MSK: No joint erythema, swelling or pain  SKIN: No rashes or lesions, no superficial thrombophlebitis    Labs:                        12.4   18.03 )-----------( 320      ( 28 Oct 2021 00:49 )             37.5     10-28    140  |  106  |  33<H>  ----------------------------<  306<H>  3.7   |  21<L>  |  1.26    Ca    7.7<L>      28 Oct 2021 08:15  Phos  2.1     10-28  Mg     2.2     10-28    TPro  4.8<L>  /  Alb  2.2<L>  /  TBili  0.1<L>  /  DBili  x   /  AST  30  /  ALT  10  /  AlkPhos  77  10-28      WBC Trend:  WBC Count: 18.03 (10-28-21 @ 00:49)  WBC Count: 20.13 (10-27-21 @ 17:50)      Auto Neutrophil #: 15.60 K/uL (10-28-21 @ 00:49)  Auto Neutrophil #: 17.94 K/uL (10-27-21 @ 17:50)      Creatine Trend:  Creatinine, Serum: 1.26 (10-28)  Creatinine, Serum: 1.35 (10-28)  Creatinine, Serum: 1.34 (10-27)      Liver Biochemical Testing Trend:  Alanine Aminotransferase (ALT/SGPT): 10 (10-28)  Alanine Aminotransferase (ALT/SGPT): 10 (10-28)  Alanine Aminotransferase (ALT/SGPT): 14 (10-27)  Aspartate Aminotransferase (AST/SGOT): 30 (10-28-21 @ 08:15)  Aspartate Aminotransferase (AST/SGOT): 34 (10-28-21 @ 00:49)  Aspartate Aminotransferase (AST/SGOT): 49 (10-27-21 @ 17:50)  Bilirubin Total, Serum: 0.1 (10-28)  Bilirubin Total, Serum: 0.1 (10-28)  Bilirubin Total, Serum: 0.2 (10-27)      Trend LDH  10-28-21 @ 00:49  273<H>      Auto Eosinophil %: 0.0 % (10-28-21 @ 00:49)  Auto Eosinophil %: 0.0 % (10-27-21 @ 17:50)      Urinalysis Basic - ( 27 Oct 2021 20:18 )    Color: Yellow / Appearance: Slightly Turbid / S.029 / pH: x  Gluc: x / Ketone: Negative  / Bili: Negative / Urobili: Negative   Blood: x / Protein: 300 mg/dL / Nitrite: Negative   Leuk Esterase: Small / RBC: 45 /hpf / WBC 63 /HPF   Sq Epi: x / Non Sq Epi: 10 / Bacteria: Negative        MICROBIOLOGY:    Culture - Sputum (collected 27 Oct 2021 22:21)  Source: .Sputum ett    Rapid RVP Result: NotDetec (10-27 @ 18:26)    COVID-19 PCR: NotDetec (10-28-21 @ 06:36)        Procalcitonin, Serum: 0.20 (10-28)  Procalcitonin, Serum: 0.22 (10-27)      Ferritin, Serum: 476 (10-28)    D-Dimer Assay, Quantitative: 1046 (10-28)  D-Dimer Assay, Quantitative: 845 (10-27)    Lactate Dehydrogenase, Serum: 273 (10-28)    Serum Pro-Brain Natriuretic Peptide: 882 (10-27)    Troponin T, High Sensitivity Result: 127 (10-28)    Blood Gas Arterial, Lactate: 1.9 (10-28 @ 00:45)  Blood Gas Arterial, Lactate: 2.0 (10-27 @ 21:00)  Lactate, Blood: 1.5 (10-27 @ 17:50)  Blood Gas Arterial, Lactate: 1.3 (10-27 @ 17:40)    A1C with Estimated Average Glucose Result: 14.8 % (10-28-21 @ 02:51)      RADIOLOGY:  imaging below personally reviewed    < from: Xray Chest 1 View- PORTABLE-Urgent (Xray Chest 1 View- PORTABLE-Urgent .) (10.28.21 @ 00:27) >  IMPRESSION:  Interval placement of left internal jugular central venous catheter with tip at the left brachiocephalic/SVC junction.    Two small caliber radiopaque lines are approaching from the left upper extremity with a single lying returning towards the midline. These lines are likely external to patient. Clinical correlation is recommended..    Clear lungs.  No pneumothorax.    < end of copied text >

## 2021-10-28 NOTE — CONSULT NOTE ADULT - SUBJECTIVE AND OBJECTIVE BOX
HPI:  67 y/o male with PMHX DM, HTN and BPH, found lethargic and was brought to [facility] by ambulance on 10/25 after family witnesed tonic clonic seizure. Patient was found hypoglycemic [facility] with a blood sugar of 34. In the ER pt had another episode of seizure activity  and did not return to baseline and was subsequently intubated for airway protection. EEG was done on 10/26 after sedation was turned off and showed epileptic activity while patient was on Keppra 500bid. After EEG findings Keppra was increased to 100bid, however repeat eeg on 10/27 showed epileptic activity originating from left mid temporal area. On admission the patient was tested positive for covid 19 but no signs of respiratory disease and no findings of PNA on cxr. Pt was seen by the neurology team in [place] who added vimpat in addition to the keppra. Due to refractory seizures, pt was started on propofol for sedation. In addition blood cultures collected on 10/26 showed GPC bacteremia and group B strep isolated from urine culture.   Endocrine consulted for uncontrolled hyperglycemia on decadron for COVID and on 24 hour tube feeds with A1c of 14.8% Attempted to reach family member Kalli for collateral diabetes information as patient intubated and unable to give history, but unable to reach her at this time.     PAST MEDICAL & SURGICAL HISTORY:  Hypertension    Diabetes    History of BPH    No significant past surgical history        FAMILY HISTORY:  No pertinent family history in first degree relatives        Social History: Unable to obtain at this time    Outpatient Medications:  · 	Norvasc 10 mg oral tablet: Last Dose Taken:  , 1 tab(s) orally once a day  · 	aspirin 81 mg oral tablet, chewable: Last Dose Taken:  , 1 tab(s) orally once a day  · 	Lipitor 40 mg oral tablet: Last Dose Taken:  , 1 tab(s) orally once a day  · 	Coreg 12.5 mg oral tablet: Last Dose Taken:  , 1 tab(s) orally 2 times a day    MEDICATIONS  (STANDING):  cefTRIAXone   IVPB 2000 milliGRAM(s) IV Intermittent every 24 hours  chlorhexidine 0.12% Liquid 15 milliLiter(s) Oral Mucosa every 12 hours  chlorhexidine 4% Liquid 1 Application(s) Topical <User Schedule>  enoxaparin Injectable 40 milliGRAM(s) SubCutaneous daily  insulin lispro (ADMELOG) corrective regimen sliding scale   SubCutaneous every 6 hours  insulin NPH human recombinant 10 Unit(s) SubCutaneous every 6 hours  ketamine Infusion. 0.25 mG/kG/Hr (1.96 mL/Hr) IV Continuous <Continuous>  lacosamide IVPB 100 milliGRAM(s) IV Intermittent every 12 hours  levETIRAcetam  IVPB 1000 milliGRAM(s) IV Intermittent every 12 hours  pantoprazole  Injectable 40 milliGRAM(s) IV Push daily  phenylephrine    Infusion 0.1 MICROgram(s)/kG/Min (2.93 mL/Hr) IV Continuous <Continuous>  polyethylene glycol 3350 17 Gram(s) Oral daily  senna Syrup 10 milliLiter(s) Oral at bedtime    MEDICATIONS  (PRN):  sodium chloride 0.9% lock flush 10 milliLiter(s) IV Push every 1 hour PRN Pre/post blood products, medications, blood draw, and to maintain line patency      Allergies    No Known Allergies    Intolerances      Review of Systems: Unable to obtain as patient is intubated    PHYSICAL EXAM:  VITALS: T(C): 36.9 (10-28-21 @ 03:00)  T(F): 98.4 (10-28-21 @ 03:00), Max: 98.4 (10-28-21 @ 03:00)  HR: 80 (10-28-21 @ 14:30) (57 - 98)  BP: --  RR:  (0 - 123)  SpO2:  (98% - 100%)  Wt(kg): --  GENERAL: NAD at this time, intubated  EYES: closed  HEENT:  Atraumatic, Normocephalic, +ET tube  RESPIRATORY: b/l mechanical BS  CARDIOVASCULAR: Regular rhythm; No murmurs; no peripheral edema  GI: Soft, nontender, non distended, normal bowel sounds  SKIN: Dry, intact, No rashes or lesions  MUSCULOSKELETAL: no sarcopenia  NEURO: sedated unable to follow commands at this time  PSYCH: sedated  CUSHING'S SIGNS: no striae      POCT Blood Glucose.: 303 mg/dL (10-28-21 @ 11:43)  POCT Blood Glucose.: 296 mg/dL (10-28-21 @ 05:12)  POCT Blood Glucose.: 245 mg/dL (10-27-21 @ 16:37)                              12.4   18.03 )-----------( 320      ( 28 Oct 2021 00:49 )             37.5       10-28    140  |  106  |  33<H>  ----------------------------<  306<H>  3.7   |  21<L>  |  1.26    EGFR if : 67  EGFR if non : 58<L>    Ca    7.7<L>      10-28  Mg     2.2     10-28  Phos  2.1     10-28    TPro  4.8<L>  /  Alb  2.2<L>  /  TBili  0.1<L>  /  DBili  x   /  AST  30  /  ALT  10  /  AlkPhos  77  10-28    Thyroid Function Tests:            Radiology:

## 2021-10-28 NOTE — DIETITIAN INITIAL EVALUATION ADULT. - OTHER CALCULATIONS
The Eren State Equation (PSU) 2003b was used to calculate resting energy expenditure: 1715 kcals. Based on dosing wt 78.2 kG

## 2021-10-28 NOTE — DIETITIAN INITIAL EVALUATION ADULT. - ENTERAL
1) Continue Glucerna 1.5 as tolerated until goal rate 50 cc/hr x24 hours to provide total volume 1200cc, 1800 kcals, 99 gm protein, and 911cc free water. Meets 23 kcals/kG and 1.3 gm protein/kG based on dosing wt 78.2 kG.

## 2021-10-28 NOTE — PROGRESS NOTE ADULT - ATTENDING COMMENTS
66 year old man transferred from [facility] for management of status epilepticus. The patient presented to [facility] 10/25 with altered mental status, witnessed seizure intubated for airway protection spot EEGs there showed seizures despite being on AED. In addition patient was swab positive for COVID there and had group B strep UTI and GPC bacteremia    - sedated for comfort and vent coordination  - wean off versed and follow up EEG report  - continue AED as per neurology  - planned for LP today  - tolerating tube fees  - adequate urine output with creatinine WNL  - repeat cultures sent, continue ceftriaxone for now  - as patient COVID PCR is negative x 2 will hold off on steroids  - remains on ventilator with lung protective settings, lung compliance is good with low settings on vent  - poorly controlled diabetic with A1c of 14.8 will continue insulin regimen    POCUS showed a-line pattern anteriorly, sliding lung present with no effusions. mildly decreased LV function and RV and LV size were within normal limits IVC indeterminate size    Prognosis guarded

## 2021-10-28 NOTE — DIETITIAN INITIAL EVALUATION ADULT. - PHYSCIAL ASSESSMENT
IBW%: 108%   Skin per nursing documentation: No pressure injuries noted.  Ht taken from [facility]

## 2021-10-28 NOTE — CONSULT NOTE ADULT - PROBLEM SELECTOR RECOMMENDATION 9
Diabetes Education and Nutrition Eval  Increase NPH to 9 units q6 with moderate correction q6  Goal glucose 100-180  Outpt. endo follow-up  Outpt. optho, podiatry, nephrology  Discharge plan to be based on insulin requirements, confirmation of outpatient regimen, and feed modality at the time of discharge.

## 2021-10-28 NOTE — CHART NOTE - NSCHARTNOTEFT_GEN_A_CORE
Pocus Note      :    INDICATION: Establish cardiac structure and gross function    PROCEDURE:  [x ] LIMITED ECHO  [ ] LIMITED CHEST  [ ] LIMITED RETROPERITONEAL  [ ] LIMITED ABDOMINAL  [ ] LIMITED DVT  [ ] NEEDLE GUIDANCE VASCULAR  [ ] NEEDLE GUIDANCE THORACENTESIS  [ ] NEEDLE GUIDANCE PARACENTESIS  [ ] NEEDLE GUIDANCE PERICARDIOCENTESIS  [ ] OTHER      FINDINGS: Scattered A lines seen some B-Line artifacts throughout posterior base R>L bilateral lung fields, good pleural sliding with regular appearance.  LV function preserved, RV size some hypertrophy with preserved function. IVC~ 1.2 cm without respiratory variation. Pocus Note      : M.P    INDICATION: Establish cardiac structure and gross function    PROCEDURE:  [x ] LIMITED ECHO  [ ] LIMITED CHEST  [ ] LIMITED RETROPERITONEAL  [ ] LIMITED ABDOMINAL  [ ] LIMITED DVT  [ ] NEEDLE GUIDANCE VASCULAR  [ ] NEEDLE GUIDANCE THORACENTESIS  [ ] NEEDLE GUIDANCE PARACENTESIS  [ ] NEEDLE GUIDANCE PERICARDIOCENTESIS  [ ] OTHER      FINDINGS: Scattered A lines seen some B-Line artifacts throughout posterior base R>L bilateral lung fields, good pleural sliding with regular appearance.  LV function preserved, RV size some hypertrophy with preserved function. IVC~ 1.2 cm without respiratory variation. Pocus Note      : Maryanne Gamboa and Hiro Frias    INDICATION: Respiratory failure and shock    PROCEDURE:  [x] LIMITED ECHO  [x] LIMITED CHEST  [ ] LIMITED RETROPERITONEAL  [ ] LIMITED ABDOMINAL  [ ] LIMITED DVT  [ ] NEEDLE GUIDANCE VASCULAR  [ ] NEEDLE GUIDANCE THORACENTESIS  [ ] NEEDLE GUIDANCE PARACENTESIS  [ ] NEEDLE GUIDANCE PERICARDIOCENTESIS  [ ] OTHER      FINDINGS: A- lines anteriorly few some B-Lines at the bases posteriorly R>L.  Lung sliding present bilaterally.   LV function preserved, RV size some hypertrophy with preserved function. IVC~ 1.2 cm without respiratory variation.    Attending Note: Agree with above. I was present through out the scan.

## 2021-10-29 NOTE — PROGRESS NOTE ADULT - ATTENDING COMMENTS
66 years old male with PMHx of uncontrolled DM (A1C ~14), HTN and BPH, transferred from [facility] for Epilepsy monitoring    Found to have AHS in 1/4 cultures at [facility]  TTE with no vegetations  LP was not suggestive of meningitis (1 nucleated cell)  Unclear if contaminant or true pathogen (but favoring contaminant)  Would continue Ceftriaxone for coverage pending repeat cultures (and finalized [facility] cultures)    I will continue to follow. Please feel free to contact me with any further questions.    Ad Villa M.D.  [facility] Division of Infectious Disease  8AM-5PM: Pager Number [phone]  After Hours (or if no response): Please contact the Infectious Diseases Office at [phone]

## 2021-10-29 NOTE — PROGRESS NOTE ADULT - PROBLEM SELECTOR PLAN 1
continue w/ insulin gtt for today to assess true insulin needs as steroid effect wears off   Plan to transition to NPH /Admelog tomorrow  Please verify       Endocrine called Number listed for Daughter Kalli on 10/29 to  verify home DM meds/regimen- the phone number is disconnected please verify pts home DM regimen and if he has an endocrinologist as outpatient .     Dispo:  Outpt. endo follow-up  Outpt. optho, podiatry, nephrology  Discharge plan to be based on insulin requirements, confirmation of outpatient regimen, and feed modality at the time of discharge.

## 2021-10-29 NOTE — PROGRESS NOTE ADULT - ASSESSMENT
66 years old male with PMHx of uncontrolled DM (A1C ~14), HTN and BPH, transferred from [facility] for Epilepsy monitoring    CTH 10/27 negative for intracranial pathologies; No septic emboli  CXR 10/28 no focal consolidation; on minimal vent setting  COVID PCR 10/27, 10/28 negative;   Had COVID infection in 2/2021 hospitalized at [facility]; received Pfizer 1st dose only on 8/8/21  Positive COVID PCR in [place] was likely false positive since patient likely has immunity from natural infection and vaccination    UA shows pyuria  UCx 10/25 grew group B strep in [facility]  UCx 10/27 NGTD    BCx 10/25 viridans strep 1/4 bottles in [facility]  BCx 10/27 NGTD  Received Rocephin 2gm q24hrs 10/27 - current  TTE no vegetation, mild MR and AR    [place] [facility]; CSF PCR negative  CSF culture NGTD    New onset seizure could be set off by hypoglycemia; less likely from viridans strep bacteremia or recurrent COVID-19 infection  Unable to do comprehensive oral exam since patient is intubated  Low grade viridans strep could be contamination but in light of leukocytosis and cardiac murmur, will continue Rocephin for another 24 hours until blood culture is finalized      IMPRESSION:  Viridans strep bacteremia  Leukocytosis  Septic shock  Status epilepticus      RECOMMENDATIONS:  - Continue IV Rocephin 2gm q24hrs for another 24 hours; can d/c if negative blood cultures are finalized  - Dental eval once patient is extubated  - Will continue to follow      Patient is seen and examined with attending and case is discussed with primary team      Leanne Murcia DO  PGY4 ID fellow  Pager: [phone]  [facility] pager ID: [phone]  Please contact me via page or text through Microsoft Teams  If after 5PM or on weekends, please call [phone]       66 years old male with PMHx of uncontrolled DM (A1C ~14), HTN and BPH, transferred from [facility] for Epilepsy monitoring    CTH 10/27 negative for intracranial pathologies; No septic emboli  CXR 10/28 no focal consolidation; on minimal vent setting  COVID PCR 10/27, 10/28 negative;   Had COVID infection in 2/2021 hospitalized at [facility]; received Pfizer 1st dose only on 8/8/21  Positive COVID PCR in [place] was likely false positive since patient likely has immunity from natural infection and vaccination    UA shows pyuria  UCx 10/25 grew group B strep in [facility]  UCx 10/27 NGTD    BCx 10/25 viridans strep 1/4 bottles in [facility]  BCx 10/27 NGTD  Received Rocephin 2gm q24hrs 10/27 - current  TTE no vegetation, mild MR and AR    [place] [facility]; CSF PCR negative  CSF culture NGTD    New onset seizure could be set off by hypoglycemia; less likely from viridans strep bacteremia or recurrent COVID-19 infection  Unable to do comprehensive oral exam since patient is intubated  Low grade viridans strep could be contamination but in light of leukocytosis and cardiac murmur, will continue Rocephin for another 24 hours until blood culture is finalized    Overall, Positive BCx, Positive UCx, Seizures, Leukocytosis    - Continue IV Rocephin 2gm q24hrs for another 24 hours; can d/c if negative blood cultures are finalized  - Dental eval once patient is extubated  - Will continue to follow    Patient is seen and examined with attending and case is discussed with primary team    Leanne Murcia DO  PGY4 ID fellow  Pager: [phone]  [facility] pager ID: [phone]  Please contact me via page or text through Microsoft Teams  If after 5PM or on weekends, please call [phone]

## 2021-10-29 NOTE — PROGRESS NOTE ADULT - ATTENDING COMMENTS
Agree with above. Intubated for AMS and refractory seizures. Supportive care on Kepra and Vimpat. Awaiting follow up EEG report. Supportive care.

## 2021-10-29 NOTE — PROGRESS NOTE ADULT - SUBJECTIVE AND OBJECTIVE BOX
CHIEF COMPLAINT:  Patient is a 66y old  Male who presents with a chief complaint of Transfer from [place] for EEG Monitoring (28 Oct 2021 15:18)    HPI:  67 y/o male with PMHX DM, HTn and BPH, found lethargic and was brought to [facility] by ambulance on 10/25 after family witnesed tonic clonic seizure. Patient was found hypoglycemic upon arrival to [facility] with a blood sugar of 34. IN the ER pt had another episode of seizure activity  and did not return to baseline and was subsequently intubated for airway protection. EEG was done on 10/26 after sedation was turned off and showed epileptic activity while patient was on Keppra 500bid. After EEG findings Keppra was increased to 100bid, however repeat eeg on 10/27 showed epileptic activity originating from left mid temporal area. On admission the patient was tested positive for covid 19 but no signs of respiratory disease and no findings of PNA on cxr. Pt was seen by the neurology team in [place] who added vimpat in addition to the keppra. Due to refractory seizures, pt was started on propofol for sedation. In addition blood cultures collected on 10/26 showed GPC bacteremia and group B strep isolated from urine culture. Pt has not required pressors during his stay at [facility] and was subsequently transferred [address] for [address] monitoring and seizure management.  (27 Oct 2021 17:09)      Interval Events:  -HTN sbp> 180 -> hydralazine 10mg IV x1  -Tylenol 1g IV for pain  -Blood Glucose  443 -> Insulin gtt @  3.5 now  -poss seizure @ 0200 (eyes and head rhythmic left and right movement) -> Ativan 2mg IVP, started on prop @ 10mcg/kg/min due to poss persistent seizure activity -> hypotensive -> 500ml NS (IVC 1.1 on pocus), Levo gtt on temporarily    OBJECTIVE:  ICU Vital Signs Last 24 Hrs  T(C): 36.9 (29 Oct 2021 05:00), Max: 37.6 (28 Oct 2021 23:00)  T(F): 98.4 (29 Oct 2021 05:00), Max: 99.7 (28 Oct 2021 23:00)  HR: 92 (29 Oct 2021 06:45) (59 - 105)  BP: --  BP(mean): --  ABP: 151/51 (29 Oct 2021 06:45) (102/40 - 206/60)  ABP(mean): 83 (29 Oct 2021 06:45) (58 - 112)  RR: 20 (29 Oct 2021 06:45) (0 - 29)  SpO2: 99% (29 Oct 2021 06:45) (97% - 100%)    Mode: AC/ CMV (Assist Control/ Continuous Mandatory Ventilation), RR (machine): 20, TV (machine): 450, FiO2: 30, PEEP: 5, ITime: 1, MAP: 9, PIP: 19    10-28-21 @ 07:01  -  10-29-21 @ 07:00  --------------------------------------------------------  IN: 2183.7 mL / OUT: 1845 mL / NET: 338.7 mL      CAPILLARY BLOOD GLUCOSE  POCT Blood Glucose.: 239 mg/dL (29 Oct 2021 08:01)    PHYSICAL EXAM:  GENERAL: NAD, sedated  HEENT:  Atraumatic, Normocephalic  EYES: PERRL very sluggish 4mm b/l, conjunctiva and sclera clear  NECK: Supple, No JVD  CHEST/LUNG: clear bilaterally; No wheezes, rales, or rhonchi  HEART: Regular rate and rhythm; No murmurs, rubs, or gallops  ABDOMEN: Soft, Nontender, Nondistended; Bowel sounds present  EXTREMITIES:  2+ Peripheral Pulses, No clubbing, cyanosis, or edema  PSYCH: unable as pt is sedated  NEUROLOGY: sedated on propofol, withdraws all extremities R>L, vEEG in progress  SKIN: No rashes or lesions      HOSPITAL MEDICATIONS:  MEDICATIONS  (STANDING):  cefTRIAXone   IVPB 2000 milliGRAM(s) IV Intermittent every 24 hours  chlorhexidine 0.12% Liquid 15 milliLiter(s) Oral Mucosa every 12 hours  chlorhexidine 4% Liquid 1 Application(s) Topical <User Schedule>  dextrose 50% Injectable 50 milliLiter(s) IV Push every 15 minutes  dextrose 50% Injectable 25 milliLiter(s) IV Push every 15 minutes  doxazosin 4 milliGRAM(s) Oral at bedtime  enoxaparin Injectable 40 milliGRAM(s) SubCutaneous daily  insulin regular Infusion 4 Unit(s)/Hr (4 mL/Hr) IV Continuous <Continuous>  ketamine Infusion. 0.25 mG/kG/Hr (1.96 mL/Hr) IV Continuous <Continuous>  lacosamide IVPB 100 milliGRAM(s) IV Intermittent every 12 hours  levETIRAcetam  IVPB 1000 milliGRAM(s) IV Intermittent every 12 hours  norepinephrine Infusion 0.05 MICROgram(s)/kG/Min (7.33 mL/Hr) IV Continuous <Continuous>  pantoprazole  Injectable 40 milliGRAM(s) IV Push daily  polyethylene glycol 3350 17 Gram(s) Oral daily  propofol Infusion 10 MICROgram(s)/kG/Min (4.69 mL/Hr) IV Continuous <Continuous>  senna Syrup 10 milliLiter(s) Oral at bedtime    MEDICATIONS  (PRN):  hydrALAZINE Injectable 10 milliGRAM(s) IV Push every 6 hours PRN for systolic blood pressures greater than 170  sodium chloride 0.9% lock flush 10 milliLiter(s) IV Push every 1 hour PRN Pre/post blood products, medications, blood draw, and to maintain line patency      LABS:                        11.9   11.42 )-----------( 249      ( 29 Oct 2021 00:14 )             38.2     Hgb Trend: 11.9<--, 12.4<--, 13.7<--  10-29    142  |  106  |  39<H>  ----------------------------<  391<H>  4.1   |  21<L>  |  1.26    Ca    8.0<L>      29 Oct 2021 00:14  Phos  3.2     10-29  Mg     2.3     10-29    TPro  5.0<L>  /  Alb  2.2<L>  /  TBili  <0.1<L>  /  DBili  x   /  AST  28  /  ALT  14  /  AlkPhos  98  10-29    LIVER FUNCTIONS - ( 29 Oct 2021 00:14 )  Alb: 2.2 g/dL / Pro: 5.0 g/dL / ALK PHOS: 98 U/L / ALT: 14 U/L / AST: 28 U/L / GGT: x           Creatinine Trend: 1.26<--, 1.26<--, 1.35<--, 1.34<--  PT/INR - ( 29 Oct 2021 00:14 )   PT: 13.0 sec;   INR: 1.09 ratio    PTT - ( 29 Oct 2021 00:14 )  PTT:35.2 sec  Urinalysis Basic - ( 27 Oct 2021 20:18 )    Color: Yellow / Appearance: Slightly Turbid / S.029 / pH: x  Gluc: x / Ketone: Negative  / Bili: Negative / Urobili: Negative   Blood: x / Protein: 300 mg/dL / Nitrite: Negative   Leuk Esterase: Small / RBC: 45 /hpf / WBC 63 /HPF   Sq Epi: x / Non Sq Epi: 10 / Bacteria: Negative      Arterial Blood Gas:  10-29 @ 00:09  7.40/43/314/27/100.0/1.5  ABG lactate: --  Arterial Blood Gas:  10-28 @ 00:45  7.42/38/157/25/99.9/0.3  ABG lactate: --  Arterial Blood Gas:  10-27 @ 21:00  7.45/35/159/24/100.0/0.7  ABG lactate: --  Arterial Blood Gas:  10-27 @ 17:40  7.32/56/127/29/99.5/1.5  ABG lactate: --      MICROBIOLOGY: Reviewed    Culture - Acid Fast - CSF (collected 10-28-21 @ 20:32)  Source: .CSF CSF    Culture - CSF with Gram Stain (collected 10-28-21 @ 20:32)  Source: .CSF CSF  Gram Stain (10-28-21 @ 21:14):    No polymorphonuclear cells seen    No organisms seen    by cytocentrifuge        RADIOLOGY: Reviewed and interpreted by me    EKG: NSR - Qtc 502

## 2021-10-29 NOTE — PROGRESS NOTE ADULT - ASSESSMENT
65 y/o male with PMHX HTN, T2DM, HTN, and BPH brought to [facility] by ambulance on 10/25 after family witnesed tonic clonic seizure. IN the ER pt was found hypoglycemic and had another episode of seizure activity. Pt was intubated for airway protection as his mental status did not return to baseline. EEG 10/26 showed epileptic activity while patient was on Keppra 500 BID. After EEG findings Keppra was increased to 1000 BID , however repeat eeg on 10/27 showed epileptic activity originating from left mid temporal area. On admission at [facility] the patient was tested positive for covid 19 but no signs of respiratory disease and no findings of PNA on cxr. Pt was seen by the neurology team in [place] who added vimpat in addition to the keppra. Due to refractory seizures, pt was started on propofol for sedation. In addition blood cultures collected on 10/26 showed GPC bacteremia and group B strep isolated from urine culture. [address] and seizure management.     Neuro:  Seizures  - A,+ O at baseline  - Sedated on Ketamine and Propofol   - Continue Viimpat 100mg IV q12  and Keppra 1gIV q12  - Video EEG monitoring started 10/27 overnight  - Noncon CT Head (10/27) no anoxic ischemic encephalopathy  - Lumbar puncture with CSF Negative for infection (10/28)   - Ammonia level 30,   Prolactin level 40.0  - Neuro following, Reccs appreciated       Respiratory  - Intubated on 10/25  - Volume A/C 450/20/5/30; ABG 7.45/35/159/24  - No CPAP at this time until pt is no longer seizing and sedation reduced     CV  Hypotension 2/2 Sedation   - off pressors maintain MAP >/= 65  - Hydralyzine 10mgIV prn for SBP >70   - Hold Home Lipitor, ASA, Norvasc  -10/28 bedside pocus with hypertrophy LV moderate squeeze. no pericardial effusion  - TTE ordered; f/u results     GI  - NPO with TF for goal @ 50cc  - Protonix daily   - Bowel regimen with Miralax and Senna   - Nutrition consulted, f/u reccs       BPH  - Stat Home Cardura   - Morales changed on Admission ( 10/27)   - Monitor Lytes replete as necessary     Infectious Disease  Covid 19—incidental finding in ER ( Negative PCR x2)   - OFF Remdesivir x 3doses stopped 2/2 negative Covid diagnosis  - OFF Dexmethasone x1 dose stopped 2/2 neg Covid diagnosis   - BCx + for GPC with UCx + for Group B Strep at [facility]   - F/u repeat Blood cultures x2 NGTD , UCx neg, Sputum Cx neg ( 10/27)  - Continue Ceftriaxone ( 10/27 - )   - ID consult called no changes to regimen    Endocrine  T2DM--[facility] on board  -Hyperglycemic with Blood Glucose >400  start Insulin gtt   - HbA1c 14.8    Heme  - Monitor CBC   - Lovenox DVT PPX  - LE Duplex ordered f/u results     Lines  - LIJ TLC 10/27  - Right Radial A line 10/27   - Morales Catheter 10/27    Dispo  - Full Code  65 y/o male with PMHx HTN, poorly controlled IDDM, HTN, Alzheimers, and BPH brought to [facility] by ambulance on 10/25 after family witnessed possible seizure activity (found on floor foaming from mouth per daughter).  EMS found patient to have BGL 34 and administered D50.  Per daughter, patient told her he thought he admin too much insulin that same day and has required previous hospitalizations for incorrect dosing Insulin.  In the ER pt had another episode of seizure activity, was intubated for airway protection, and started on Keppra 500 BID.  Covid-19 PCR positive and started on Redesevir and Dexamethasone as well.  Per patient's daughter, patient had Covid in Feb 2021 mild symptoms and received only the 1st dose of Pfizer vaccine on 8/8/21.  EEG 10/26 showed epileptic activity while patient was on Keppra prompting increase to 1g BID.  Repeat EEG on 10/27 showed epileptic activity originating from left mid temporal area. Pt was seen by the neurology team in [place] who added Vimpat to the Keppra. Due to refractory seizures, pt was started on propofol for sedation. In addition blood cultures collected on 10/26 showed GPC bacteremia and GBS in urine culture. [address] and seizure management.     Neuro:  Seizures, ?early Alzheimers  - A&O at baseline  - Sedated on Ketamine and Propofol -> transition Ketamine to Precedex, reduce propofol as tolerated  - Continue Vimpat 100mg IV q12  and Keppra 1g IV q12  - vEEG monitoring (10/27- ?)  - Noncon CTH (10/27) no anoxic ischemic encephalopathy  - Lumbar puncture with CSF Negative for infection (10/28)   - Ammonia level 30, Prolactin level 40.0  - Neuro following, Recs appreciated   - Send vasculitis panel    Respiratory  - Intubated on 10/25 for airway protection  - Mechanical Ventilation: Volume A/C 20/450/5/30; Ppk24, Pplat 14  - No CPAP at this time until pt is no longer seizing and sedation reduced     CV  Hypotension 2/2 Sedation   - off pressors maintain MAP >/= 65  - Restart home lipitor, ASA, Norvasc (start @ 5, home dose 10 QD), Coreg (start @ 3.125, home dose 12.5 BID)  - TTE (10/29) -> normal function  - Trend Mars x3 (Trop T 127 on 10/28) -> ?inflammatory    GI  - NPO with TF per Dietician recs via NGT  - Protonix daily   - Bowel regimen with Miralax and Senna       BPH  - Restart home Cardura   - Morales changed on Admission (10/27)   - Monitor Lytes replete as necessary to maintain Mg>2 Phos>3 K>4     Infectious Disease  Covid 19—incidental finding in OSH ER (Negative PCR x2 at [facility])   - OFF Remdesivir x 3doses stopped 2/2 negative Covid diagnosis  - OFF Dexmethasone x1 dose stopped 2/2 neg Covid diagnosis   - BCx + for GPC with UCx + for Group B Strep at [facility]   - F/u repeat Blood cultures x2 NGTD , UCx neg, Sputum Cx neg (10/27)  - Continue Ceftriaxone (10/27- ?)   - ID consulting, recs appreciated    Endocrine  IDDM- HbA1c 14.8  - Hyperglycemic with Blood Glucose >400  start Insulin gtt   - Endocrinology consulting, recs appreciated    Heme  - Monitor CBC   - Lovenox DVT PPX  - LE Duplex and Carotid Duplex ordered    Lines  - LIJ TLC 10/27  - Right Radial A line 10/27   - Morales Catheter 10/27    Dispo  - Full Code   - Primary contact: Kalli Hughes (daughter) [phone] 65 y/o male with PMHx HTN, poorly controlled IDDM, HTN, Alzheimers, and BPH brought to [facility] by ambulance on 10/25 after family witnessed possible seizure activity (found on floor foaming from mouth per daughter).  EMS found patient to have BGL 34 and administered D50.  Per daughter, patient told her he thought he admin too much insulin that same day and has required previous hospitalizations for incorrect dosing Insulin.  In the ER pt had another episode of seizure activity, was intubated for airway protection, and started on Keppra 500 BID.  Covid-19 PCR positive and started on Redesevir and Dexamethasone as well.  Per patient's daughter, patient had Covid in Feb 2021 mild symptoms and received only the 1st dose of Pfizer vaccine on 8/8/21.  EEG 10/26 showed epileptic activity while patient was on Keppra prompting increase to 1g BID.  Repeat EEG on 10/27 showed epileptic activity originating from left mid temporal area. Pt was seen by the neurology team in [place] who added Vimpat to the Keppra. Due to refractory seizures, pt was started on propofol for sedation. In addition blood cultures collected on 10/26 showed GPC bacteremia and GBS in urine culture. [address] and seizure management.     Neuro:  Seizures, ?early Alzheimers  - A&O at baseline  - Sedated on Ketamine and Propofol -> d/c and transition to Precedex  - Continue Vimpat 100mg IV q12  and Keppra 1g IV q12  - vEEG monitoring (10/27- ?)  - Noncon CTH (10/27) no anoxic ischemic encephalopathy  - Lumbar puncture with CSF Negative for infection (10/28)   - Ammonia level 30, Prolactin level 40.0  - Neuro following, Recs appreciated   - Send vasculitis panel    Respiratory  - Intubated on 10/25 for airway protection  - Mechanical Ventilation: Volume A/C 20/450/5/30; Ppk24, Pplat 14  - No CPAP at this time until pt is no longer seizing and sedation reduced     CV  Hypotension 2/2 Sedation   - off pressors maintain MAP >/= 65  - Restart home lipitor, ASA, Norvasc (start @ 5, home dose 10 QD), Coreg (start @ 6.25, home dose 12.5 BID)  - TTE (10/29) -> normal function  - Trend Mars x3 (Trop T 127 on 10/28) -> ?inflammatory  - d/c prn hydralazine    GI  - NPO with TF per Dietician recs via NGT  - Protonix daily   - Bowel regimen with Miralax and Senna       BPH  - Restart home Cardura   - Morales changed on Admission (10/27)   - Monitor Lytes replete as necessary to maintain Mg>2 Phos>3 K>4     Infectious Disease  Covid 19—incidental finding in OSH ER (Negative PCR x2 at [facility])   - OFF Remdesivir x 3doses stopped 2/2 negative Covid diagnosis  - OFF Dexmethasone x1 dose stopped 2/2 neg Covid diagnosis   - BCx + for GPC with UCx + for Group B Strep at [facility]   - F/u repeat Blood cultures x2 NGTD , UCx neg, Sputum Cx neg (10/27)  - Continue Ceftriaxone (10/27- ?)   - ID consulting, recs appreciated    Endocrine  IDDM- HbA1c 14.8  - Hyperglycemic with Blood Glucose >400  start Insulin gtt   - Endocrinology consulting, recs appreciated    Heme  - Monitor CBC   - Lovenox DVT PPX  - LE Duplex and Carotid Duplex ordered    Lines  - LIJ TLC 10/27  - Right Radial A line 10/27   - Morales Catheter 10/27    Dispo  - Full Code   - Primary contact: Kalli Hughes (daughter) [phone] 65 y/o male with PMHx HTN, poorly controlled IDDM, HTN, Alzheimers, and BPH brought to [facility] by ambulance on 10/25 after family witnessed possible seizure activity (found on floor foaming from mouth per daughter).  EMS found patient to have BGL 34 and administered D50.  Per daughter, patient told her he thought he admin too much insulin that same day and has required previous hospitalizations for incorrect dosing Insulin.  In the ER pt had another episode of seizure activity, was intubated for airway protection, and started on Keppra 500 BID.  Covid-19 PCR positive and started on Redesevir and Dexamethasone as well.  Per patient's daughter, patient had Covid in Feb 2021 mild symptoms and received only the 1st dose of Pfizer vaccine on 8/8/21.  EEG 10/26 showed epileptic activity while patient was on Keppra prompting increase to 1g BID.  Repeat EEG on 10/27 showed epileptic activity originating from left mid temporal area. Pt was seen by the neurology team in [place] who added Vimpat to the Keppra. Due to refractory seizures, pt was started on propofol for sedation. In addition blood cultures collected on 10/26 showed GPC bacteremia and GBS in urine culture. [address] and seizure management.     Neuro:  Seizures, ?early Alzheimers  - A&O at baseline  - Sedated on Ketamine and Propofol -> d/c and transition to Precedex  - Vimpat 100mg IV q12 and Keppra 1g IV q12 -> inc Keppra 1g TID per neuro for poss seizure activity on eeg  - vEEG monitoring (10/27- ?)  - Noncon CTH (10/27) no anoxic ischemic encephalopathy  - Lumbar puncture with CSF Negative for infection (10/28)   - Ammonia level 30, Prolactin level 40.0  - Neuro following, Recs appreciated   - Send vasculitis panel    Respiratory  - Intubated on 10/25 for airway protection  - Mechanical Ventilation: Volume A/C 20/450/5/30; Ppk24, Pplat 14  - No CPAP at this time until pt is no longer seizing and sedation reduced     CV  Hypotension 2/2 Sedation   - off pressors maintain MAP >/= 65  - Restart home lipitor, ASA, Norvasc (start @ 5, home dose 10 QD), Coreg (start @ 6.25, home dose 12.5 BID)  - TTE (10/29) -> normal function  - Trend Mars x3 (Trop T 127 on 10/28) -> ?inflammatory  - d/c prn hydralazine    GI  - NPO with TF per Dietician recs via NGT  - Protonix daily   - Bowel regimen with Miralax and Senna       BPH  - Restart home Cardura   - Morales changed on Admission (10/27)   - Monitor Lytes replete as necessary to maintain Mg>2 Phos>3 K>4     Infectious Disease  Covid 19—incidental finding in OSH ER (Negative PCR x2 at [facility])   - OFF Remdesivir x 3doses stopped 2/2 negative Covid diagnosis  - OFF Dexmethasone x1 dose stopped 2/2 neg Covid diagnosis   - BCx + for GPC with UCx + for Group B Strep at [facility]   - F/u repeat Blood cultures x2 NGTD , UCx neg, Sputum Cx neg (10/27)  - Continue Ceftriaxone (10/27- ?)   - ID consulting, recs appreciated    Endocrine  IDDM- HbA1c 14.8  - Hyperglycemic with Blood Glucose >400  start Insulin gtt   - Endocrinology consulting, recs appreciated    Heme  - Monitor CBC   - Lovenox DVT PPX  - LE Duplex and Carotid Duplex ordered    Lines  - LIJ TLC 10/27  - Right Radial A line 10/27   - Morales Catheter 10/27    Dispo  - Full Code   - Primary contact: Kalli Hughes (daughter) [phone] 65 y/o male with PMHx HTN, poorly controlled IDDM, HTN, Alzheimers, and BPH brought to [facility] by ambulance on 10/25 after family witnessed possible seizure activity (found on floor foaming from mouth per daughter).  EMS found patient to have BGL 34 and administered D50.  Per daughter, patient told her he thought he admin too much insulin that same day and has required previous hospitalizations for incorrect dosing Insulin.  In the ER pt had another episode of seizure activity, was intubated for airway protection, and started on Keppra 500 BID.  Covid-19 PCR positive and started on Redesevir and Dexamethasone as well.  Per patient's daughter, patient had Covid in Feb 2021 mild symptoms and received only the 1st dose of Pfizer vaccine on 8/8/21.  EEG 10/26 showed epileptic activity while patient was on Keppra prompting increase to 1g BID.  Repeat EEG on 10/27 showed epileptic activity originating from left mid temporal area. Pt was seen by the neurology team in [place] who added Vimpat to the Keppra. Due to refractory seizures, pt was started on propofol for sedation. In addition blood cultures collected on 10/26 showed GPC bacteremia and GBS in urine culture. [address] and seizure management.     Neuro:  Seizures, ?early Alzheimers  - A&O at baseline  - Sedated on Ketamine and Propofol -> d/c and transition to Precedex  - Vimpat 100mg IV q12 and Keppra 1g IV q12 -> inc Keppra 1g TID per neuro for poss seizure activity on eeg  - vEEG monitoring (10/27- ?)  - Noncon CTH (10/27) no anoxic ischemic encephalopathy  - Lumbar puncture with CSF Negative for infection (10/28)   - Ammonia level 30, Prolactin level 40.0  - Neuro following, Recs appreciated   - Send vasculitis panel    Respiratory  - Intubated on 10/25 for airway protection  - Mechanical Ventilation: Volume A/C 20/450/5/30; Ppk24, Pplat 14  - No CPAP at this time until pt is no longer seizing and sedation reduced     CV  Hypotension 2/2 Sedation   - off pressors maintain MAP >/= 65  - Restart home lipitor, ASA, Norvasc (start @ 5, home dose 10 QD), Coreg (start @ 6.25, home dose 12.5 BID)  - TTE (10/29) -> normal function  - Trend Mars x3 (Trop T 127 on 10/28) -> ?inflammatory  - d/c prn hydralazine    GI  - NPO with TF per Dietician recs via NGT  - Protonix daily   - Bowel regimen with Miralax and Senna       BPH  - Restart home Cardura   - Morales changed on Admission (10/27)   - Monitor Lytes replete as necessary to maintain Mg>2 Phos>3 K>4     Infectious Disease  Covid 19—incidental finding in OSH ER (Negative PCR x2 at [facility])   - OFF Remdesivir x 3doses stopped 2/2 negative Covid diagnosis  - OFF Dexmethasone x1 dose stopped 2/2 neg Covid diagnosis   - BCx + for GPC with UCx + for Group B Strep at [facility]   - F/u repeat Blood cultures x2 NGTD , UCx neg, Sputum Cx neg (10/27)  - Continue Ceftriaxone (10/27- ?)   - ID consulting, recs appreciated    Endocrine  IDDM- HbA1c 14.8  - Hyperglycemic with Blood Glucose >400  start Insulin gtt   - Endocrinology consulting, recs appreciated    Heme  - Monitor CBC   - Lovenox DVT PPX  - LE Duplex (10/29) neg for DVT   - Carotid Duplex ordered    Lines  - LIJ TLC 10/27  - Right Radial A line 10/27   - Morales Catheter 10/27    Dispo  - Full Code   - Primary contact: Kalli Hughes (daughter) [phone] 65 y/o male with PMHx HTN, poorly controlled IDDM, HTN, Alzheimers, and BPH brought to [facility] by ambulance on 10/25 after family witnessed possible seizure activity (found on floor foaming from mouth per daughter).  EMS found patient to have BGL 34 and administered D50.  Per daughter, patient told her he thought he admin too much insulin that same day and has required previous hospitalizations for incorrect dosing Insulin.  In the ER pt had another episode of seizure activity, was intubated for airway protection, and started on Keppra 500 BID.  Covid-19 PCR positive and started on Redesevir and Dexamethasone as well.  Per patient's daughter, patient had Covid in Feb 2021 mild symptoms and received only the 1st dose of Pfizer vaccine on 8/8/21.  EEG 10/26 showed epileptic activity while patient was on Keppra prompting increase to 1g BID.  Repeat EEG on 10/27 showed epileptic activity originating from left mid temporal area. Pt was seen by the neurology team in [place] who added Vimpat to the Keppra. Due to refractory seizures, pt was started on propofol for sedation. In addition blood cultures collected on 10/26 showed GPC bacteremia and GBS in urine culture. [address] and seizure management.     Neuro:  Seizures, ?early Alzheimers  - A&O at baseline  - Sedated on Ketamine and Propofol -> d/c and transition to Precedex  - Vimpat 100mg IV q12 and Keppra 1g IV q12 -> inc Keppra 1g TID per neuro for poss seizure activity on eeg  - vEEG monitoring (10/27- ?)  - Noncon CTH (10/27) no anoxic ischemic encephalopathy  - Lumbar puncture with CSF Negative for infection (10/28)   - Ammonia level 30, Prolactin level 40.0  - Neuro following, Recs appreciated   - Send vasculitis panel    Respiratory  - Intubated on 10/25 for airway protection  - Mechanical Ventilation: Volume A/C 20/450/5/30; Ppk24, Pplat 14  - No CPAP at this time until pt is no longer seizing and sedation reduced     CV  Hypotension 2/2 Sedation   - off pressors maintain MAP >/= 65  - Restart home lipitor, ASA, Norvasc (start @ 5, home dose 10 QD), Coreg (start @ 6.25, home dose 12.5 BID)  - TTE (10/29) -> normal function  - Trend Mars x3 (Trop T 127 on 10/28) -> ?inflammatory  - d/c prn hydralazine    GI  - NPO with TF per Dietician recs via NGT  - Protonix daily   - Bowel regimen with Miralax and Senna       BPH  - Restart home Cardura   - Morales changed on Admission (10/27)   - Monitor Lytes replete as necessary to maintain Mg>2 Phos>3 K>4     Infectious Disease  Covid 19—incidental finding in OSH ER (Negative PCR x2 at [facility])   - OFF Remdesivir x 3doses stopped 2/2 negative Covid diagnosis  - OFF Dexmethasone x1 dose stopped 2/2 neg Covid diagnosis   - BCx + for GPC with UCx + for Group B Strep at [facility]   - F/u repeat Blood cultures x2 NGTD , UCx neg, Sputum Cx neg (10/27)  - Continue Ceftriaxone (10/27- ?)   - ID consulting, recs appreciated    Endocrine  IDDM- HbA1c 14.8  - Hyperglycemic with Blood Glucose >400  start Insulin gtt   - transition off insulin gtt per endo to nph and iss q6h  - Endocrinology consulting, recs appreciated    Heme  - Monitor CBC   - Lovenox DVT PPX  - LE Duplex (10/29) neg for DVT   - Carotid Duplex ordered    Lines  - LIJ TLC 10/27  - Right Radial A line 10/27   - Morales Catheter 10/27    Dispo  - Full Code   - Primary contact: Kalli Hughes (daughter) [phone]

## 2021-10-29 NOTE — PROGRESS NOTE ADULT - SUBJECTIVE AND OBJECTIVE BOX
DIABETES FOLLOW UP : Seen earlier today    INTERVAL HX: pt sedated, placed on insulin gtt by team overnight for BG trending >300, receiving continuous glucerna TF; called number listed for daughter, Kalli, number is disconnected       Review of Systems:  unable to complete pt sedated    Allergies    No Known Allergies    Intolerances      MEDICATIONS  (STANDING):  amLODIPine   Tablet 5 milliGRAM(s) Oral daily  aspirin  chewable 81 milliGRAM(s) Enteral Tube daily  atorvastatin 40 milliGRAM(s) Oral at bedtime  carvedilol 6.25 milliGRAM(s) Oral every 12 hours  cefTRIAXone   IVPB 2000 milliGRAM(s) IV Intermittent every 24 hours  chlorhexidine 0.12% Liquid 15 milliLiter(s) Oral Mucosa every 12 hours  chlorhexidine 4% Liquid 1 Application(s) Topical <User Schedule>  dexMEDEtomidine Infusion 0.4 MICROgram(s)/kG/Hr (7.82 mL/Hr) IV Continuous <Continuous>  doxazosin 4 milliGRAM(s) Oral at bedtime  enoxaparin Injectable 40 milliGRAM(s) SubCutaneous daily  insulin regular Infusion 4 Unit(s)/Hr (4 mL/Hr) IV Continuous <Continuous>  lacosamide IVPB 100 milliGRAM(s) IV Intermittent every 12 hours  levETIRAcetam  IVPB 1000 milliGRAM(s) IV Intermittent every 12 hours  multivitamin/minerals/iron Oral Solution (CENTRUM) 15 milliLiter(s) Enteral Tube daily  pantoprazole  Injectable 40 milliGRAM(s) IV Push daily  polyethylene glycol 3350 17 Gram(s) Oral daily  propofol Infusion 10 MICROgram(s)/kG/Min (4.69 mL/Hr) IV Continuous <Continuous>  senna Syrup 10 milliLiter(s) Oral at bedtime      PHYSICAL EXAM:  VITALS: T(C): 36.7 (10-29-21 @ 12:00)  T(F): 98.1 (10-29-21 @ 12:00), Max: 99.7 (10-28-21 @ 23:00)  HR: 93 (10-29-21 @ 12:30) (72 - 108)  BP: --  RR:  (15 - 29)  SpO2:  (97% - 100%)  Wt(kg): --  GENERAL: male laying in bed in NAD, ett present  Abdomen: Soft, nontender, non distended, NGT w/ TF running  Extremities: Warm, lower extremity edema  NEURO: sedated    LABS:  POCT Blood Glucose.: 193 mg/dL (10-29-21 @ 12:08)  POCT Blood Glucose.: 200 mg/dL (10-29-21 @ 11:08)  POCT Blood Glucose.: 185 mg/dL (10-29-21 @ 10:02)  POCT Blood Glucose.: 282 mg/dL (10-29-21 @ 09:07)  POCT Blood Glucose.: 239 mg/dL (10-29-21 @ 08:01)  POCT Blood Glucose.: 256 mg/dL (10-29-21 @ 07:03)  POCT Blood Glucose.: 257 mg/dL (10-29-21 @ 05:55)  POCT Blood Glucose.: 275 mg/dL (10-29-21 @ 05:04)  POCT Blood Glucose.: 319 mg/dL (10-29-21 @ 04:02)  POCT Blood Glucose.: 329 mg/dL (10-29-21 @ 02:58)  POCT Blood Glucose.: 322 mg/dL (10-29-21 @ 02:04)  POCT Blood Glucose.: 312 mg/dL (10-28-21 @ 18:24)  POCT Blood Glucose.: 303 mg/dL (10-28-21 @ 11:43)  POCT Blood Glucose.: 296 mg/dL (10-28-21 @ 05:12)  POCT Blood Glucose.: 245 mg/dL (10-27-21 @ 16:37)                            11.9   11.42 )-----------( 249      ( 29 Oct 2021 00:14 )             38.2       10-29    145  |  110<H>  |  36<H>  ----------------------------<  228<H>  3.3<L>   |  24  |  1.16    EGFR if : 75  EGFR if non : 64    Ca    7.9<L>      10-29  Mg     2.3     10-29  Phos  2.4     10-29    TPro  5.0<L>  /  Alb  2.2<L>  /  TBili  <0.1<L>  /  DBili  x   /  AST  28  /  ALT  14  /  AlkPhos  98  10-29      A1C with Estimated Average Glucose Result: 14.8 % (10-28-21 @ 02:51)      Estimated Average Glucose: 378 mg/dL (10-28-21 @ 02:51)

## 2021-10-29 NOTE — PROGRESS NOTE ADULT - ASSESSMENT
67 y/o M w/ uncontrolled Type 2 DM w/ hyperglycemia exacerbated by steroids for COVID , now off isolation , admitted for status epilepticus (high risk patient with severely uncontrolled diabetes with A1c of 14.8% at high risk of CAD and CVA with high level decision-making). Endocrine consulted for DM2 and steroid induced hyperglycemia. Pt currently sedated and on continuous tube feedings, transitioned to insulin gtt for BG >300s requiring 6.5-9.5 units/hr on gtt with BG improving to 190s. Tolerating continous tube feedings. Would continue insulin gtt until tomorrow to assess true insulin requirements as steroid effect wears off (last steroid dose 10/28) while pt critically ill. BG Goal 100-180mg/dl

## 2021-10-29 NOTE — PROGRESS NOTE ADULT - SUBJECTIVE AND OBJECTIVE BOX
INFECTIOUS DISEASE FOLLOW UP NOTE:    Interval History/ROS: Patient is a 66y old  Male who presents with a chief complaint of Transfer from [place] for EEG Monitoring (29 Oct 2021 12:58)    Overnight events: He remains afebrile and hemodynamically stable overnight. Patient is sedated and intubated. He tried to open his eyes during physical exam but unable to follow commands.      REVIEW OF SYSTEMS:  Unable to obtain as patient is intubated and sedated      Prior hospital charts reviewed [Yes]  Primary team notes reviewed [Yes]  Other consultant notes reviewed [Yes]    Allergies:  No Known Allergies      ANTIMICROBIALS:   cefTRIAXone   IVPB 2000 every 24 hours      OTHER MEDS: MEDICATIONS  (STANDING):  amLODIPine   Tablet 5 daily  aspirin  chewable 81 daily  atorvastatin 40 at bedtime  carvedilol 6.25 every 12 hours  dexMEDEtomidine Infusion 0.4 <Continuous>  doxazosin 4 at bedtime  enoxaparin Injectable 40 daily  insulin regular Infusion 4 <Continuous>  lacosamide IVPB 100 every 12 hours  levETIRAcetam  IVPB 1000 every 12 hours  pantoprazole  Injectable 40 daily  polyethylene glycol 3350 17 daily  senna Syrup 10 at bedtime      Vital Signs Last 24 Hrs  T(F): 98.1 (10-29-21 @ 12:00), Max: 99.7 (10-28-21 @ 23:00)    Vital Signs Last 24 Hrs  HR: 91 (10-29-21 @ 14:55) (72 - 108)  RR: 20 (10-29-21 @ 14:00)  SpO2: 100% (10-29-21 @ 14:55) (97% - 100%)      EXAM:  GENERAL: Sedated and intubated  HEAD: Atraumatic  EYES: Conjunctiva pink and cornea white  ENT: Dry mucous membranes, ET tube in place; unable to examine oral cavity fully  NECK: Supple, nontender to palpation  CHEST/LUNG: Clear to auscultation bilaterally  HEART: S1 S2, soft systolic murmur at left lower sternal border; soft diastolic murmur at right upper sternal border  ABDOMEN: Soft, nontender, nondistended; normoactive bowel sounds  EXTREMITIES: + bilateral UE edema; No clubbing, cyanosis, or petal edema; no foot lesions or wounds  NERVOUS SYSTEM: Sedated and intubated; attempted to open eyes during exam; unable to follow commands  MSK: No joint erythema, swelling or pain  SKIN: No rashes or lesions, no superficial thrombophlebitis    Labs:                        11.9   11.42 )-----------( 249      ( 29 Oct 2021 00:14 )             38.2     10-29    145  |  110<H>  |  36<H>  ----------------------------<  228<H>  3.3<L>   |  24  |  1.16    Ca    7.9<L>      29 Oct 2021 11:21  Phos  2.4     10-29  Mg     2.3     10-29    TPro  5.0<L>  /  Alb  2.2<L>  /  TBili  <0.1<L>  /  DBili  x   /  AST  28  /  ALT  14  /  AlkPhos  98  10-29      WBC Trend:  WBC Count: 11.42 (10-29-21 @ 00:14)  WBC Count: 18.03 (10-28-21 @ 00:49)  WBC Count: 20.13 (10-27-21 @ 17:50)      Creatine Trend:  Creatinine, Serum: 1.16 (10-29)  Creatinine, Serum: 1.26 (10-29)  Creatinine, Serum: 1.26 (10-28)  Creatinine, Serum: 1.35 (10-28)      Liver Biochemical Testing Trend:  Alanine Aminotransferase (ALT/SGPT): 14 (10-29)  Alanine Aminotransferase (ALT/SGPT): 10 (10-28)  Alanine Aminotransferase (ALT/SGPT): 10 (10-28)  Alanine Aminotransferase (ALT/SGPT): 14 (10-27)  Aspartate Aminotransferase (AST/SGOT): 28 (10-29-21 @ 00:14)  Aspartate Aminotransferase (AST/SGOT): 30 (10-28-21 @ 08:15)  Aspartate Aminotransferase (AST/SGOT): 34 (10-28-21 @ 00:49)  Aspartate Aminotransferase (AST/SGOT): 49 (10-27-21 @ 17:50)  Bilirubin Total, Serum: <0.1 (10-29)  Bilirubin Total, Serum: 0.1 (10-28)  Bilirubin Total, Serum: 0.1 (10-28)  Bilirubin Total, Serum: 0.2 (10-27)      Trend LDH  10-28-21 @ 00:49  273<H>      Urinalysis Basic - ( 27 Oct 2021 20:18 )    Color: Yellow / Appearance: Slightly Turbid / S.029 / pH: x  Gluc: x / Ketone: Negative  / Bili: Negative / Urobili: Negative   Blood: x / Protein: 300 mg/dL / Nitrite: Negative   Leuk Esterase: Small / RBC: 45 /hpf / WBC 63 /HPF   Sq Epi: x / Non Sq Epi: 10 / Bacteria: Negative        MICROBIOLOGY:        Culture - Fungal, CSF (collected 28 Oct 2021 20:32)  Source: .CSF CSF  Preliminary Report:    Testing in progress    Culture - Acid Fast - CSF (collected 28 Oct 2021 20:32)  Source: .CSF CSF    Culture - CSF with Gram Stain (collected 28 Oct 2021 20:32)  Source: .CSF CSF    Culture - Urine (collected 27 Oct 2021 22:33)  Source: Catheterized Catheterized  Final Report:    No growth    Culture - Sputum (collected 27 Oct 2021 22:21)  Source: .Sputum ett  Preliminary Report:    Normal Respiratory Jenifer present    Culture - Blood (collected 27 Oct 2021 22:15)  Source: .Blood Blood  Preliminary Report:    No growth to date.    Culture - Blood (collected 27 Oct 2021 22:15)  Source: .Blood Blood  Preliminary Report:    No growth to date.            Cryptococcal Antigen - CSF: Negative (10-29-21 @ 03:34)              Rapid RVP Result: NotDetec (10-27 @ 18:26)    COVID-19 PCR: NotDetec (10-28-21 @ 06:36)    COVID-19 Nucleocapsid ARUNA AB Interp: Positive (10-28-21 @ 23:38)  COVID-19 Osmar Domain AB Interp: Positive (10-28-21 @ 23:38)    Procalcitonin, Serum: 0.20 (10-28)  Procalcitonin, Serum: 0.22 (10-27)    C-Reactive Protein, Serum: 35 (10-)    Ferritin, Serum: 476 (10-28)    D-Dimer Assay, Quantitative: 1046 (10-28)  D-Dimer Assay, Quantitative: 845 (10-27)    Lactate Dehydrogenase, Serum: 273 (10-)    Serum Pro-Brain Natriuretic Peptide: 882 (10-)    Troponin T, High Sensitivity Result: 130 (10-)  Troponin T, High Sensitivity Result: 127 (10-)    Blood Gas Arterial, Lactate: 1.7 (10-29 @ 00:09)  Blood Gas Arterial, Lactate: 1.9 (10-28 @ 00:45)  Blood Gas Arterial, Lactate: 2.0 (10-27 @ 21:00)  Lactate, Blood: 1.5 (10-27 @ 17:50)      RADIOLOGY:  imaging below personally reviewed    < from: Xray Chest 1 View- PORTABLE-Urgent (Xray Chest 1 View- PORTABLE-Urgent .) (10.28.21 @ 00:27) >  IMPRESSION:  Interval placement of left internal jugular central venous catheter with tip at the left brachiocephalic/SVC junction.    Two small caliber radiopaque lines are approaching from the left upper extremity with a single lying returning towards the midline. These lines are likely external to patient. Clinical correlation is recommended..    Clear lungs.  No pneumothorax.   INFECTIOUS DISEASE FOLLOW UP NOTE:    Interval History/ROS: Patient is a 66y old  Male who presents with a chief complaint of Transfer from [place] for EEG Monitoring (29 Oct 2021 12:58)    Overnight events: He remains afebrile and hemodynamically stable overnight. Patient is sedated and intubated. He tried to open his eyes during physical exam but unable to follow commands.      Spoke to daughter Kalli and obtained more history:    Patient had COVID-19 infection in 2021 that required hospitalization until 3/2021. He received his 1st dose of Pfizer on 21 but did not get 2nd dose. Kalli denies patient complaining fever/chills, cough, shortness of breath, or chest pain. On 10/22, patient reported sleeping more than usual. On 10/23, he told Kalli that he might have injected too much insulin and he felt weak on his knees and dizzy; She cooked him some congee and he felt better. That was the last day she heard from him. On 10/24, she attempted to call him and he did not answer. On 10/25, Kalli found him lying in bed, foaming in his mouth and unresponsive. So she called EMS that patient was sent to [facility].      REVIEW OF SYSTEMS:  Unable to obtain as patient is intubated and sedated      Prior hospital charts reviewed [Yes]  Primary team notes reviewed [Yes]  Other consultant notes reviewed [Yes]    Allergies:  No Known Allergies      ANTIMICROBIALS:   cefTRIAXone   IVPB 2000 every 24 hours      OTHER MEDS: MEDICATIONS  (STANDING):  amLODIPine   Tablet 5 daily  aspirin  chewable 81 daily  atorvastatin 40 at bedtime  carvedilol 6.25 every 12 hours  dexMEDEtomidine Infusion 0.4 <Continuous>  doxazosin 4 at bedtime  enoxaparin Injectable 40 daily  insulin regular Infusion 4 <Continuous>  lacosamide IVPB 100 every 12 hours  levETIRAcetam  IVPB 1000 every 12 hours  pantoprazole  Injectable 40 daily  polyethylene glycol 3350 17 daily  senna Syrup 10 at bedtime      Vital Signs Last 24 Hrs  T(F): 98.1 (10-29-21 @ 12:00), Max: 99.7 (10-28-21 @ 23:00)    Vital Signs Last 24 Hrs  HR: 91 (10-29-21 @ 14:55) (72 - 108)  RR: 20 (10-29-21 @ 14:00)  SpO2: 100% (10-29-21 @ 14:55) (97% - 100%)      EXAM:  GENERAL: Sedated and intubated  HEAD: Atraumatic  EYES: Conjunctiva pink and cornea white  ENT: Dry mucous membranes, ET tube in place; unable to examine oral cavity fully  NECK: Supple, nontender to palpation  CHEST/LUNG: Clear to auscultation bilaterally  HEART: S1 S2, soft systolic murmur at left lower sternal border; soft diastolic murmur at right upper sternal border  ABDOMEN: Soft, nontender, nondistended; normoactive bowel sounds  EXTREMITIES: + bilateral UE edema; No clubbing, cyanosis, or petal edema; no foot lesions or wounds  NERVOUS SYSTEM: Sedated and intubated; attempted to open eyes during exam; unable to follow commands  MSK: No joint erythema, swelling or pain  SKIN: No rashes or lesions, no superficial thrombophlebitis    Labs:                        11.9   11.42 )-----------( 249      ( 29 Oct 2021 00:14 )             38.2     10-29    145  |  110<H>  |  36<H>  ----------------------------<  228<H>  3.3<L>   |  24  |  1.16    Ca    7.9<L>      29 Oct 2021 11:21  Phos  2.4     10-29  Mg     2.3     10-29    TPro  5.0<L>  /  Alb  2.2<L>  /  TBili  <0.1<L>  /  DBili  x   /  AST  28  /  ALT  14  /  AlkPhos  98  10-29      WBC Trend:  WBC Count: 11.42 (10-29-21 @ 00:14)  WBC Count: 18.03 (10-28-21 @ 00:49)  WBC Count: 20.13 (10-27-21 @ 17:50)      Creatine Trend:  Creatinine, Serum: 1.16 (10-29)  Creatinine, Serum: 1.26 (10-29)  Creatinine, Serum: 1.26 (10-28)  Creatinine, Serum: 1.35 (10-28)      Liver Biochemical Testing Trend:  Alanine Aminotransferase (ALT/SGPT): 14 (10-29)  Alanine Aminotransferase (ALT/SGPT): 10 (10-28)  Alanine Aminotransferase (ALT/SGPT): 10 (10-)  Alanine Aminotransferase (ALT/SGPT): 14 (10-)  Aspartate Aminotransferase (AST/SGOT): 28 (10-29-21 @ 00:14)  Aspartate Aminotransferase (AST/SGOT): 30 (10-28-21 @ 08:15)  Aspartate Aminotransferase (AST/SGOT): 34 (10-28-21 @ 00:49)  Aspartate Aminotransferase (AST/SGOT): 49 (10-27-21 @ 17:50)  Bilirubin Total, Serum: <0.1 (10-29)  Bilirubin Total, Serum: 0.1 (10-28)  Bilirubin Total, Serum: 0.1 (10-28)  Bilirubin Total, Serum: 0.2 (10-27)      Trend LDH  10-28-21 @ 00:49  273<H>      Urinalysis Basic - ( 27 Oct 2021 20:18 )    Color: Yellow / Appearance: Slightly Turbid / S.029 / pH: x  Gluc: x / Ketone: Negative  / Bili: Negative / Urobili: Negative   Blood: x / Protein: 300 mg/dL / Nitrite: Negative   Leuk Esterase: Small / RBC: 45 /hpf / WBC 63 /HPF   Sq Epi: x / Non Sq Epi: 10 / Bacteria: Negative        MICROBIOLOGY:        Culture - Fungal, CSF (collected 28 Oct 2021 20:32)  Source: .CSF CSF  Preliminary Report:    Testing in progress    Culture - Acid Fast - CSF (collected 28 Oct 2021 20:32)  Source: .CSF CSF    Culture - CSF with Gram Stain (collected 28 Oct 2021 20:32)  Source: .CSF CSF    Culture - Urine (collected 27 Oct 2021 22:33)  Source: Catheterized Catheterized  Final Report:    No growth    Culture - Sputum (collected 27 Oct 2021 22:21)  Source: .Sputum ett  Preliminary Report:    Normal Respiratory Jenifer present    Culture - Blood (collected 27 Oct 2021 22:15)  Source: .Blood Blood  Preliminary Report:    No growth to date.    Culture - Blood (collected 27 Oct 2021 22:15)  Source: .Blood Blood  Preliminary Report:    No growth to date.            Cryptococcal Antigen - CSF: Negative (10-29-21 @ 03:34)              Rapid RVP Result: NotDetec (10-27 @ 18:26)    COVID-19 PCR: NotDetec (10-28-21 @ 06:36)    COVID-19 Nucleocapsid ARUNA AB Interp: Positive (10-28-21 @ 23:38)  COVID-19 Osmar Domain AB Interp: Positive (10-28-21 @ 23:38)    Procalcitonin, Serum: 0.20 (10-28)  Procalcitonin, Serum: 0.22 (10-27)    C-Reactive Protein, Serum: 35 (10-29)    Ferritin, Serum: 476 (10-28)    D-Dimer Assay, Quantitative: 1046 (10-28)  D-Dimer Assay, Quantitative: 845 (10-27)    Lactate Dehydrogenase, Serum: 273 (10-28)    Serum Pro-Brain Natriuretic Peptide: 882 (10-27)    Troponin T, High Sensitivity Result: 130 (10-29)  Troponin T, High Sensitivity Result: 127 (10-28)    Blood Gas Arterial, Lactate: 1.7 (10-29 @ 00:09)  Blood Gas Arterial, Lactate: 1.9 (10-28 @ 00:45)  Blood Gas Arterial, Lactate: 2.0 (10-27 @ 21:00)  Lactate, Blood: 1.5 (10-27 @ 17:50)      RADIOLOGY:  imaging below personally reviewed    < from: Xray Chest 1 View- PORTABLE-Urgent (Xray Chest 1 View- PORTABLE-Urgent .) (10.28.21 @ 00:27) >  IMPRESSION:  Interval placement of left internal jugular central venous catheter with tip at the left brachiocephalic/SVC junction.    Two small caliber radiopaque lines are approaching from the left upper extremity with a single lying returning towards the midline. These lines are likely external to patient. Clinical correlation is recommended..    Clear lungs.  No pneumothorax.   INFECTIOUS DISEASE FOLLOW UP NOTE:     Interval History/ROS: Patient is a 66y old  Male who presents with a chief complaint of Transfer from [place] for EEG Monitoring (29 Oct 2021 12:58)    Overnight events: He remains afebrile and hemodynamically stable overnight. Patient is sedated and intubated. He tried to open his eyes during physical exam but unable to follow commands.      Spoke to daughter Kalli and obtained more history:    Patient had COVID-19 infection in 2021 that required hospitalization until 3/2021. He received his 1st dose of Pfizer on 21 but did not get 2nd dose. Kalli denies patient complaining fever/chills, cough, shortness of breath, or chest pain. On 10/22, patient reported sleeping more than usual. On 10/23, he told Kalli that he might have injected too much insulin and he felt weak on his knees and dizzy; She cooked him some congee and he felt better. That was the last day she heard from him. On 10/24, she attempted to call him and he did not answer. On 10/25, Kalli found him lying in bed, foaming in his mouth and unresponsive. So she called EMS that patient was sent to [facility].      REVIEW OF SYSTEMS:  Unable to obtain as patient is intubated and sedated      Prior hospital charts reviewed [Yes]  Primary team notes reviewed [Yes]  Other consultant notes reviewed [Yes]    Allergies:  No Known Allergies      ANTIMICROBIALS:   cefTRIAXone   IVPB 2000 every 24 hours      OTHER MEDS: MEDICATIONS  (STANDING):  amLODIPine   Tablet 5 daily  aspirin  chewable 81 daily  atorvastatin 40 at bedtime  carvedilol 6.25 every 12 hours  dexMEDEtomidine Infusion 0.4 <Continuous>  doxazosin 4 at bedtime  enoxaparin Injectable 40 daily  insulin regular Infusion 4 <Continuous>  lacosamide IVPB 100 every 12 hours  levETIRAcetam  IVPB 1000 every 12 hours  pantoprazole  Injectable 40 daily  polyethylene glycol 3350 17 daily  senna Syrup 10 at bedtime      Vital Signs Last 24 Hrs  T(F): 98.1 (10-29-21 @ 12:00), Max: 99.7 (10-28-21 @ 23:00)    Vital Signs Last 24 Hrs  HR: 91 (10-29-21 @ 14:55) (72 - 108)  RR: 20 (10-29-21 @ 14:00)  SpO2: 100% (10-29-21 @ 14:55) (97% - 100%)      EXAM:  GENERAL: Sedated and intubated  HEAD: Atraumatic  EYES: Conjunctiva pink and cornea white  ENT: Dry mucous membranes, ET tube in place; unable to examine oral cavity fully  NECK: Supple, nontender to palpation  CHEST/LUNG: Clear to auscultation bilaterally  HEART: S1 S2, soft systolic murmur at left lower sternal border; soft diastolic murmur at right upper sternal border  ABDOMEN: Soft, nontender, nondistended; normoactive bowel sounds  EXTREMITIES: + bilateral UE edema; No clubbing, cyanosis, or petal edema; no foot lesions or wounds  NERVOUS SYSTEM: Sedated and intubated; attempted to open eyes during exam; unable to follow commands  MSK: No joint erythema, swelling or pain  SKIN: No rashes or lesions, no superficial thrombophlebitis    Labs:                        11.9   11.42 )-----------( 249      ( 29 Oct 2021 00:14 )             38.2     10-29    145  |  110<H>  |  36<H>  ----------------------------<  228<H>  3.3<L>   |  24  |  1.16    Ca    7.9<L>      29 Oct 2021 11:21  Phos  2.4     10-29  Mg     2.3     10-29    TPro  5.0<L>  /  Alb  2.2<L>  /  TBili  <0.1<L>  /  DBili  x   /  AST  28  /  ALT  14  /  AlkPhos  98  10-29      WBC Trend:  WBC Count: 11.42 (10-29-21 @ 00:14)  WBC Count: 18.03 (10-28-21 @ 00:49)  WBC Count: 20.13 (10-27-21 @ 17:50)      Creatine Trend:  Creatinine, Serum: 1.16 (10-29)  Creatinine, Serum: 1.26 (10-29)  Creatinine, Serum: 1.26 (10-28)  Creatinine, Serum: 1.35 (10-28)      Liver Biochemical Testing Trend:  Alanine Aminotransferase (ALT/SGPT): 14 (10-29)  Alanine Aminotransferase (ALT/SGPT): 10 (10-28)  Alanine Aminotransferase (ALT/SGPT): 10 (10-)  Alanine Aminotransferase (ALT/SGPT): 14 (10-)  Aspartate Aminotransferase (AST/SGOT): 28 (10-29-21 @ 00:14)  Aspartate Aminotransferase (AST/SGOT): 30 (10-28-21 @ 08:15)  Aspartate Aminotransferase (AST/SGOT): 34 (10-28-21 @ 00:49)  Aspartate Aminotransferase (AST/SGOT): 49 (10-27-21 @ 17:50)  Bilirubin Total, Serum: <0.1 (10-29)  Bilirubin Total, Serum: 0.1 (10-28)  Bilirubin Total, Serum: 0.1 (10-28)  Bilirubin Total, Serum: 0.2 (10-27)      Trend LDH  10-28-21 @ 00:49  273<H>      Urinalysis Basic - ( 27 Oct 2021 20:18 )    Color: Yellow / Appearance: Slightly Turbid / S.029 / pH: x  Gluc: x / Ketone: Negative  / Bili: Negative / Urobili: Negative   Blood: x / Protein: 300 mg/dL / Nitrite: Negative   Leuk Esterase: Small / RBC: 45 /hpf / WBC 63 /HPF   Sq Epi: x / Non Sq Epi: 10 / Bacteria: Negative        MICROBIOLOGY:        Culture - Fungal, CSF (collected 28 Oct 2021 20:32)  Source: .CSF CSF  Preliminary Report:    Testing in progress    Culture - Acid Fast - CSF (collected 28 Oct 2021 20:32)  Source: .CSF CSF    Culture - CSF with Gram Stain (collected 28 Oct 2021 20:32)  Source: .CSF CSF    Culture - Urine (collected 27 Oct 2021 22:33)  Source: Catheterized Catheterized  Final Report:    No growth    Culture - Sputum (collected 27 Oct 2021 22:21)  Source: .Sputum ett  Preliminary Report:    Normal Respiratory Jenifer present    Culture - Blood (collected 27 Oct 2021 22:15)  Source: .Blood Blood  Preliminary Report:    No growth to date.    Culture - Blood (collected 27 Oct 2021 22:15)  Source: .Blood Blood  Preliminary Report:    No growth to date.            Cryptococcal Antigen - CSF: Negative (10-29-21 @ 03:34)              Rapid RVP Result: NotDetec (10-27 @ 18:26)    COVID-19 PCR: NotDetec (10-28-21 @ 06:36)    COVID-19 Nucleocapsid ARUNA AB Interp: Positive (10-28-21 @ 23:38)  COVID-19 Osmar Domain AB Interp: Positive (10-28-21 @ 23:38)    Procalcitonin, Serum: 0.20 (10-28)  Procalcitonin, Serum: 0.22 (10-27)    C-Reactive Protein, Serum: 35 (10-29)    Ferritin, Serum: 476 (10-28)    D-Dimer Assay, Quantitative: 1046 (10-28)  D-Dimer Assay, Quantitative: 845 (10-27)    Lactate Dehydrogenase, Serum: 273 (10-28)    Serum Pro-Brain Natriuretic Peptide: 882 (10-27)    Troponin T, High Sensitivity Result: 130 (10-29)  Troponin T, High Sensitivity Result: 127 (10-28)    Blood Gas Arterial, Lactate: 1.7 (10-29 @ 00:09)  Blood Gas Arterial, Lactate: 1.9 (10-28 @ 00:45)  Blood Gas Arterial, Lactate: 2.0 (10-27 @ 21:00)  Lactate, Blood: 1.5 (10-27 @ 17:50)      RADIOLOGY:  imaging below personally reviewed    < from: Xray Chest 1 View- PORTABLE-Urgent (Xray Chest 1 View- PORTABLE-Urgent .) (10.28.21 @ 00:27) >  IMPRESSION:  Interval placement of left internal jugular central venous catheter with tip at the left brachiocephalic/SVC junction.    Two small caliber radiopaque lines are approaching from the left upper extremity with a single lying returning towards the midline. These lines are likely external to patient. Clinical correlation is recommended..    Clear lungs.  No pneumothorax.

## 2021-10-29 NOTE — PROGRESS NOTE ADULT - PROBLEM SELECTOR PLAN 3
on statin as outpatient    Discussed with patient and primary  team Rosette NP  Contact via [employer] Teams Tues/Thurs/Friday  On evenings and weekends, please call [phone] or page endocrine fellow on call.   Please note that this patient may be followed by different provider tomorrow. If no answer, contact endocrine fellow on call [phone] M-F 9a-5pm  [employer] password: [email or web address]

## 2021-10-29 NOTE — EEG REPORT - NS EEG TEXT BOX
[facility]  Comprehensive Epilepsy Center  Report of Continuous Video EEG    [facility]: [address], [place] [address], Phone [phone]  [facility]: [address] [place], [place] [address], [place] [address], Phone [phone]  [place] Office: [address] [facility], [address], [place] [address] Phone [phone]    [facility]: [address] [place] [address], Phone [phone]  [place] Office: [address] [place] [address], Phone [phone]    Patient Name: Cyndie Hughes    Age: 66 year, : 1955  Patient ID: -, MRN #: MR: [id number], Holguin: 5  5   Referring Physician: -  EEG #: [id number]    Study Time/Date: 08:00 PM on 10/28/2021  	  End Time/Date: 0800 on 10/29/2021          			   Duration: 24H    Study Information:    EEG Recording Technique:  The patient underwent continuous Video-EEG monitoring, using Telemetry System hardware on the XLTek Digital System. EEG and video data were stored on a computer hard drive with important events saved in digital archive files. The material was reviewed by a physician (electroencephalographer / epileptologist) on a daily basis. Osmar and seizure detection algorithms were utilized and reviewed. An EEG Technician attended to the patient, and was available throughout daytime work hours.  The epilepsy center neurologist was available in person or on call 24-hours per day.    EEG Placement and Labeling of Electrodes:  The EEG was performed utilizing 20 channel referential EEG connections (coronal over temporal over parasagittal montage) using all standard 10-20 electrode placements, with additional electrodes placed in the inferior temporal region using the modified 10-10 montage electrode placements for elective admissions, or if deemed necessary. Recording was at a sampling rate of 256 samples per second per channel. Time synchronized digital video recording was done simultaneously with EEG recording. A low light infrared camera was used for low light recording.     History:  EMU performed at the bedside  COR: Sedated, Vented  No HV due to covid protocol  No Photic due to Unknown PMHx  67 Y/O Male  P/W: Generalized Twitching  Unknown PMHx      Pertinent Medication  Vimpat (Lacosamide)  Keppra (Levetiracetam)  Diprivan (Propofol)    Interpretation:    Daily EEG Visual Analysis  Findings:  The background was nonreactive, discontinuous, and invariable. At time background was characteried by diffuse attenuated polymorphic delta. At times background is seen to be in burst suppression, less than 1s bursts sharp wave discharges alternating with 2-4 s of diffuse suppression. No posterior dominant rhythm seen.    Background Slowing:  Background predominantly consisted of diffuse polymorphic delta    Focal Slowing:   None were present.    Sleep Background:  Drowsiness and stage II sleep transients were not recorded.    Other Non-Epileptiform Findings:  None were present.    Interictal Epileptiform Activity:   Generalized high amplitude periodic discharges with a frequency of 0.5-1.5Hz (GPDs).    Events:  Clinical events: None recorded.  Seizures: None recorded.    Activation Procedures:   Hyperventilation was not performed.    Photic stimulation was not performed.     Artifacts:  Intermittent myogenic and movement artifacts were noted.    EEG Summary / Classification:  Abnormal   - GPDs  - severe generalized slowing.    EEG Impression / Clinical Correlate:  Abnormal EEG study.  Diffuse cortical hyperexcitability with GPD.   Severe nonspecific diffuse or multifocal cerebral dysfunction.   No seizure seen.    Price Heart MD PGY-5  Epilepsy Fellow    This Preliminary report is based on fellow review. Final report pending attending review.    Reading Room: [phone]  On Call Service After Hours: [phone] [facility]  Comprehensive Epilepsy Center  Report of Continuous Video EEG    [facility]: [address], [place] [address], Phone [phone]  [facility]: [address] [place], [place] [address], [place] [address], Phone [phone]  [place] Office: [address] [facility], [address], [place] [address] Phone [phone]    [facility]: [address] [place] [address], Phone [phone]  [place] Office: [address] [place] [address], Phone [phone]    Patient Name: Cyndie Hughes    Age: 66 year, : 1955  Patient ID: -, MRN #: MR: [id number], Holguin: 5  5   Referring Physician: -  EEG #: [id number]    Study Time/Date: 08:00 PM on 10/28/2021  	  End Time/Date: 0800 on 10/29/2021          			   Duration: 24H    Study Information:    EEG Recording Technique:  The patient underwent continuous Video-EEG monitoring, using Telemetry System hardware on the XLTek Digital System. EEG and video data were stored on a computer hard drive with important events saved in digital archive files. The material was reviewed by a physician (electroencephalographer / epileptologist) on a daily basis. Osmar and seizure detection algorithms were utilized and reviewed. An EEG Technician attended to the patient, and was available throughout daytime work hours.  The epilepsy center neurologist was available in person or on call 24-hours per day.    EEG Placement and Labeling of Electrodes:  The EEG was performed utilizing 20 channel referential EEG connections (coronal over temporal over parasagittal montage) using all standard 10-20 electrode placements, with additional electrodes placed in the inferior temporal region using the modified 10-10 montage electrode placements for elective admissions, or if deemed necessary. Recording was at a sampling rate of 256 samples per second per channel. Time synchronized digital video recording was done simultaneously with EEG recording. A low light infrared camera was used for low light recording.     History:  EMU performed at the bedside  COR: Sedated, Vented  No HV due to covid protocol  No Photic due to Unknown PMHx  67 Y/O Male  P/W: Generalized Twitching  Unknown PMHx      Pertinent Medication  Vimpat (Lacosamide)  Keppra (Levetiracetam)  Diprivan (Propofol)    Interpretation:    Daily EEG Visual Analysis  Findings:  The background was nonreactive, discontinuous, and invariable. At time background was characteried by diffuse attenuated polymorphic delta. At times background is seen to be in burst suppression, less than 1s bursts sharp wave discharges alternating with 2-4 s of diffuse suppression. No posterior dominant rhythm seen.    Background Slowing:  Background predominantly consisted of diffuse polymorphic delta    Focal Slowing:   None were present.    Sleep Background:  Drowsiness and stage II sleep transients were not recorded.    Other Non-Epileptiform Findings:  None were present.    Interictal Epileptiform Activity:   Generalized high amplitude periodic discharges with a frequency of 0.5-1.5Hz (GPDs).    Events:  Clinical events: None recorded.  Seizures: None recorded.    Activation Procedures:   Hyperventilation was not performed.    Photic stimulation was not performed.     Artifacts:  Intermittent myogenic and movement artifacts were noted.    EEG Summary / Classification:  Abnormal   - GPDs  - severe generalized slowing.    EEG Impression / Clinical Correlate:  Abnormal EEG study.  Diffuse cortical hyperexcitability with GPD.   Severe nonspecific diffuse or multifocal cerebral dysfunction.   No seizure seen.    Price Heart MD PGY-5  Epilepsy Fellow    Antonio Chandra MD  EEG/Epilepsy Attending    Reading Room: [phone]  On Call Service After Hours: [phone] [facility]  Comprehensive Epilepsy Center  Report of Continuous Video EEG    [facility]: [address], [place] [address], Phone [phone]  [facility]: [address] [place], [place] [address], [place] [address], Phone [phone]  [place] Office: [address] [facility], [address], [place] [address] Phone [phone]    [facility]: [address] [place] [address], Phone [phone]  [place] Office: [address] [place] [address], Phone [phone]    Patient Name: Cyndie Hughes    Age: 66 year, : 1955  Patient ID: -, MRN #: MR: [id number], Holguin: 5  5   Referring Physician: -  EEG #: [id number]    Study Time/Date: 08:00 PM on 10/28/2021  	  End Time/Date: 0800 on 10/29/2021          			   Duration: 24H    Study Information:    EEG Recording Technique:  The patient underwent continuous Video-EEG monitoring, using Telemetry System hardware on the XLTek Digital System. EEG and video data were stored on a computer hard drive with important events saved in digital archive files. The material was reviewed by a physician (electroencephalographer / epileptologist) on a daily basis. Osmar and seizure detection algorithms were utilized and reviewed. An EEG Technician attended to the patient, and was available throughout daytime work hours.  The epilepsy center neurologist was available in person or on call 24-hours per day.    EEG Placement and Labeling of Electrodes:  The EEG was performed utilizing 20 channel referential EEG connections (coronal over temporal over parasagittal montage) using all standard 10-20 electrode placements, with additional electrodes placed in the inferior temporal region using the modified 10-10 montage electrode placements for elective admissions, or if deemed necessary. Recording was at a sampling rate of 256 samples per second per channel. Time synchronized digital video recording was done simultaneously with EEG recording. A low light infrared camera was used for low light recording.     History:  EMU performed at the bedside  COR: Sedated, Vented  No HV due to covid protocol  No Photic due to Unknown PMHx  67 Y/O Male  P/W: Generalized Twitching  Unknown PMHx      Pertinent Medication  Vimpat (Lacosamide)  Keppra (Levetiracetam)  Diprivan (Propofol)    Interpretation:    Daily EEG Visual Analysis  Findings:  The background was nonreactive, discontinuous, and invariable. At time background was characteried by diffuse attenuated polymorphic delta. At times background is seen to be in burst suppression, less than 1s bursts sharp wave discharges alternating with 2-4 s of diffuse suppression. No posterior dominant rhythm seen.    Background Slowing:  Background predominantly consisted of diffuse polymorphic delta    Focal Slowing:   None were present.    Sleep Background:  Drowsiness and stage II sleep transients were not recorded.    Other Non-Epileptiform Findings:  None were present.    Interictal Epileptiform Activity:   Generalized high amplitude periodic discharges with a frequency of 0.5-1.5Hz (GPDs) with a shifting left right predominance    Events:  Clinical events: None recorded.  Seizures: None recorded.    Activation Procedures:   Hyperventilation was not performed.    Photic stimulation was not performed.     Artifacts:  Intermittent myogenic and movement artifacts were noted.    EEG Summary / Classification:  Abnormal   - GPDs  - severe generalized slowing.    EEG Impression / Clinical Correlate:  Abnormal EEG study.  Diffuse cortical hyperexcitability with GPD.   Severe nonspecific diffuse or multifocal cerebral dysfunction.   No seizure seen.    Price Heart MD PGY-5  Epilepsy Fellow    Antonio Chandra MD  EEG/Epilepsy Attending    Reading Room: [phone]  On Call Service After Hours: [phone] [facility]  Comprehensive Epilepsy Center  Report of Continuous Video EEG    [facility]: [address], [place] [address], Phone [phone]  [facility]: [address] [place], [place] [address] [address], Phone [phone]  [place] Office: [address] [facility], [address], [place] [address] Phone [phone]    [facility]: [address] [place] [address], Phone [phone]  [place] Office: [address] [place] [address], Phone [phone]    Patient Name: Cyndie Hughes    Age: 66 year, : 1955  Patient ID: -, MRN #: MR: [id number], Holguin: 5  5   Referring Physician: -  EEG #: [id number]    Study Time/Date: 08:00 PM on 10/28/2021  	  End Time/Date: 0800 on 10/29/2021          			   Duration: 24H    Study Information:    EEG Recording Technique:  The patient underwent continuous Video-EEG monitoring, using Telemetry System hardware on the XLTek Digital System. EEG and video data were stored on a computer hard drive with important events saved in digital archive files. The material was reviewed by a physician (electroencephalographer / epileptologist) on a daily basis. Osmar and seizure detection algorithms were utilized and reviewed. An EEG Technician attended to the patient, and was available throughout daytime work hours.  The epilepsy center neurologist was available in person or on call 24-hours per day.    EEG Placement and Labeling of Electrodes:  The EEG was performed utilizing 20 channel referential EEG connections (coronal over temporal over parasagittal montage) using all standard 10-20 electrode placements, with additional electrodes placed in the inferior temporal region using the modified 10-10 montage electrode placements for elective admissions, or if deemed necessary. Recording was at a sampling rate of 256 samples per second per channel. Time synchronized digital video recording was done simultaneously with EEG recording. A low light infrared camera was used for low light recording.     History:  EMU performed at the bedside  COR: Sedated, Vented  No HV due to covid protocol  No Photic due to Unknown PMHx  67 Y/O Male  P/W: Generalized Twitching  Unknown PMHx      Pertinent Medication  Vimpat (Lacosamide)  Keppra (Levetiracetam)  Diprivan (Propofol)    Interpretation:    Daily EEG Visual Analysis  Findings:  The background was nonreactive, discontinuous, and invariable. At time background was characteried by diffuse attenuated polymorphic delta. At times background is seen to be in burst suppression, less than 1s bursts sharp wave discharges alternating with 2-4 s of diffuse suppression. No posterior dominant rhythm seen.    Background Slowing:  Background predominantly consisted of diffuse polymorphic delta    Focal Slowing:   None were present.    Sleep Background:  Drowsiness and stage II sleep transients were not recorded.    Other Non-Epileptiform Findings:  None were present.    Interictal Epileptiform Activity:   Generalized high amplitude periodic discharges with a frequency of 0.5-1.5Hz (GPDs) with a shifting left right predominance    Events:  Clinical events: None recorded.  Seizures: None recorded.    Activation Procedures:   Hyperventilation was not performed.    Photic stimulation was not performed.     Artifacts:  Intermittent myogenic and movement artifacts were noted.    EEG Summary / Classification:  Abnormal   - GPDs  - severe generalized slowing.    EEG Impression / Clinical Correlate:  Abnormal EEG study.  Diffuse cortical hyperexcitability with GPD.   Severe nonspecific diffuse or multifocal cerebral dysfunction.   No seizure seen.    Price Heart MD PGY-5  Epilepsy Fellow    Antonio Chandra MD  EEG/Epilepsy Attending    Reading Room: [phone]  On Call Service After Hours: [phone]

## 2021-10-30 NOTE — EEG REPORT - NS EEG TEXT BOX
[facility]  Comprehensive Epilepsy Center  Report of Continuous Video EEG    [facility]: [address], [place] [address], Phone [phone]  [facility]: [address] [place], [place] [address] [address], Phone [phone]  [place] Office: [address] [facility], [address], [place] [address] Phone [phone]    [facility]: [address] [place] [address], Phone [phone]  [place] Office: [address] [place] [address], Phone [phone]    Patient Name: Cyndie Hughes    Age: 66 year, : 1955  Patient ID: -, MRN #: MR: [id number], Holguin: 5  5   Referring Physician: -  EEG #: [id number]-    Study Time/Date: 08:00 on 10/30/2021  	  End Time/Date: 14:30 on 10/30/2021          			   Duration: 06 H 30 mins    Study Information:    EEG Recording Technique:  The patient underwent continuous Video-EEG monitoring, using Telemetry System hardware on the XLTek Digital System. EEG and video data were stored on a computer hard drive with important events saved in digital archive files. The material was reviewed by a physician (electroencephalographer / epileptologist) on a daily basis. Osmar and seizure detection algorithms were utilized and reviewed. An EEG Technician attended to the patient, and was available throughout daytime work hours.  The epilepsy center neurologist was available in person or on call 24-hours per day.    EEG Placement and Labeling of Electrodes:  The EEG was performed utilizing 20 channel referential EEG connections (coronal over temporal over parasagittal montage) using all standard 10-20 electrode placements, with additional electrodes placed in the inferior temporal region using the modified 10-10 montage electrode placements for elective admissions, or if deemed necessary. Recording was at a sampling rate of 256 samples per second per channel. Time synchronized digital video recording was done simultaneously with EEG recording. A low light infrared camera was used for low light recording.     History:  EMU performed at the bedside  COR: Sedated, Vented  No HV due to covid protocol  No Photic due to Unknown PMHx  67 Y/O Male  P/W: Generalized Twitching  Unknown PMHx      Pertinent Medication  Vimpat (Lacosamide)  Keppra (Levetiracetam)  Diprivan (Propofol)    Interpretation:    Daily EEG Visual Analysis    Findings:  The background was nonreactive, discontinuous, and invariable. At time background was characterized by diffuse attenuated polymorphic delta. At times background is seen to be in burst suppression, less than 1s bursts sharp wave discharges alternating with 2-4 s of diffuse suppression. No posterior dominant rhythm seen.    Background Slowing:  Background predominantly consisted of diffuse polymorphic delta    Focal Slowing:   None were present.    Sleep Background:  Drowsiness and stage II sleep transients were not recorded.    Other Non-Epileptiform Findings:  None were present.    Interictal Epileptiform Activity:   Generalized high amplitude periodic discharges with a frequency of 0.5-1.5Hz (GPDs) with a shifting left right predominance    Events:  Clinical events: None recorded.  Seizures: None recorded.    Activation Procedures:   Hyperventilation was not performed.    Photic stimulation was not performed.     Artifacts:  Intermittent myogenic and movement artifacts were noted.    EEG Summary / Classification:    Abnormal EEG record:    - Near continuous GPDs  - severe generalized slowing.    EEG Impression / Clinical Correlate:    Abnormal EEG study.    Diffuse cortical hyperexcitability with GPDs at times coming in prolonged runs without clear evolution into a distinct ictal pattern, however may fall on the ictal-interictal continuum.   Severe nonspecific diffuse or multifocal cerebral dysfunction.     Overall, this EEG is concerning for diffuse cerebral injury possibly in the setting of hypoxia. Prognosis would appear to be grim.    ____________    Sridhar Malave MD, ELIU  Fellow | [facility]       [facility] EEG Reading Room Ph#: [phone]  Epilepsy Answering Service after 5PM and before 8:30AM: Ph#: [phone].

## 2021-10-30 NOTE — PROGRESS NOTE ADULT - SUBJECTIVE AND OBJECTIVE BOX
Follow Up:  Bacteremia    Interval History: afebrile. seizure event this AM    REVIEW OF SYSTEMS  [ x ] ROS unobtainable because:  intubated/sedated  [  ] All other systems negative except as noted below    Constitutional:  [ ] fever [ ] chills  [ ] weight loss  [ ] weakness  Skin:  [ ] rash [ ] phlebitis	  Eyes: [ ] icterus [ ] pain  [ ] discharge	  ENMT: [ ] sore throat  [ ] thrush [ ] ulcers [ ] exudates  Respiratory: [ ] dyspnea [ ] hemoptysis [ ] cough [ ] sputum	  Cardiovascular:  [ ] chest pain [ ] palpitations [ ] edema	  Gastrointestinal:  [ ] nausea [ ] vomiting [ ] diarrhea [ ] constipation [ ] pain	  Genitourinary:  [ ] dysuria [ ] frequency [ ] hematuria [ ] discharge [ ] flank pain  [ ] incontinence  Musculoskeletal:  [ ] myalgias [ ] arthralgias [ ] arthritis  [ ] back pain  Neurological:  [ ] headache [ ] seizures  [ ] confusion/altered mental status    Allergies  No Known Allergies        ANTIMICROBIALS:      OTHER MEDS:  MEDICATIONS  (STANDING):  amLODIPine   Tablet 10 daily  aspirin  chewable 81 daily  atorvastatin 40 at bedtime  carvedilol 12.5 every 12 hours  dexMEDEtomidine Infusion 0.4 <Continuous>  doxazosin 4 at bedtime  enoxaparin Injectable 40 daily  insulin lispro (ADMELOG) corrective regimen sliding scale  every 6 hours  insulin NPH human recombinant 30 every 6 hours  lacosamide IVPB 100 every 12 hours  levETIRAcetam  IVPB 1000 <User Schedule>  pantoprazole  Injectable 40 daily  polyethylene glycol 3350 17 daily  senna Syrup 10 at bedtime      Vital Signs Last 24 Hrs  T(C): 37 (30 Oct 2021 11:00), Max: 37 (30 Oct 2021 11:00)  T(F): 98.6 (30 Oct 2021 11:00), Max: 98.6 (30 Oct 2021 11:00)  HR: 66 (30 Oct 2021 14:00) (57 - 91)  BP: --  BP(mean): --  RR: 19 (30 Oct 2021 14:00) (13 - 24)  SpO2: 100% (30 Oct 2021 14:00) (99% - 100%)    PHYSICAL EXAMINATION:  General: Intubated and Sedated  HEENT: +ETT  Neck: Supple  Cardiac: RRR, No M/R/G  Resp: CTAB, No Wh/Rh/Ra  Abdomen: NBS, NT/ND, No HSM, No rigidity or guarding  MSK: No LE edema. No Calf tenderness  : marin  Skin: No rashes or lesions. Skin is warm and dry to the touch.    Neuro: Intubated and Sedated  Psych: Unable to assess - intubated and sedated                          11.4   9.77  )-----------( 216      ( 29 Oct 2021 23:40 )             36.3       10-30    147<H>  |  113<H>  |  31<H>  ----------------------------<  180<H>  3.9   |  23  |  1.01    Ca    7.9<L>      30 Oct 2021 12:14  Phos  2.8     10-30  Mg     2.2     10-30    TPro  5.1<L>  /  Alb  2.4<L>  /  TBili  <0.1<L>  /  DBili  x   /  AST  36  /  ALT  13  /  AlkPhos  88  10-29          MICROBIOLOGY:  v  .CSF CSF  10-28-21   No growth  --    No polymorphonuclear cells seen  No organisms seen  by cytocentrifuge      Catheterized Catheterized  10-27-21   No growth  --  --      .Sputum ett  10-27-21   Normal Respiratory Jenifer present  --    Numerous polymorphonuclear leukocytes per low power field  Rare Squamous epithelial cells per low power field  No organisms seen per oil power field      .Blood Blood  10-27-21   No growth to date.  --  --          HSV 1/2 PCR: NotDetec (10-29 @ 03:34)  Rapid RVP Result: NotDetec (10-27 @ 18:26)        RADIOLOGY:    <The imaging below has been reviewed and visualized by me independently. Findings as detailed in report below>    < from: Xray Chest 1 View- PORTABLE-Urgent (Xray Chest 1 View- PORTABLE-Urgent .) (10.28.21 @ 00:27) >  IMPRESSION:  Interval placement of left internal jugular central venous catheter with tip at the left brachiocephalic/SVC junction.    Two small caliber radiopaque lines are approaching from the left upper extremity with a single lying returning towards the midline. These lines are likely external to patient. Clinical correlation is recommended..    Clear lungs.  No pneumothorax.    < end of copied text >

## 2021-10-30 NOTE — GOALS OF CARE CONVERSATION - ADVANCED CARE PLANNING - CONVERSATION DETAILS
Daughter Kalli visited with patient at bedside.  Discussion then had that included all care performed, past and current testing, and plans in the coming days.  The prelim EEG results reviewed "Overall, this EEG is concerning for diffuse cerebral injury possibly in the setting of hypoxia. Prognosis would appear to be grim."  Discussed plan for more detailed scan of head (MRI) and those findings will help us determine brain function and next steps.  Also discussed potential that with time, cognition may improve to where he can breathe on his own again or need a tracheostomy.  Kalli verbalized understanding and visibly upset.  The conversation was left that we would continue conversations tomorrow once we see how he does through the night and plan for a scan at that time.

## 2021-10-30 NOTE — CHART NOTE - NSCHARTNOTEFT_GEN_A_CORE
Called to bedside by nurse.  Patient hypertensive to  with a downward gaze, some left and right eye movement not tracking, irregular respirations. Ativan 2mg IVP x1 with rapid resolution. Discussed event with Dr. Sam

## 2021-10-30 NOTE — PROGRESS NOTE ADULT - ATTENDING COMMENTS
case discussed with residents and transfer to neurol;remy Patient is a 67 y/o M with PMHx DM, HTN, BPH, transferred from [facility] after witnessed tonic clonic seizure fo unknown etiology and chronicity found also with hypoglycemia, hypotension in setting of bacteremia. Currently intubated for airway protection and on sedation versed/ fentanyl. Concern of COVID+. VS: 90/41 (map 57), RR22, 99% vent. CBC: 20.13wbc 89%N, Cr 1.34, alb2.4, ast 49, proBNP 882, Ua w/ glucosuria and proteinuria.     Impression: Patient found w/ GTC medically refractory to keppra/ vimpat at [facility], intubated for airway protection and sedated for concerns of status epilepticus of unknown etiology and chronicity.

## 2021-10-30 NOTE — EEG REPORT - NS EEG TEXT BOX
[facility]  Comprehensive Epilepsy Center  Report of Continuous Video EEG    [facility]: [address], [place] [address], Phone [phone]  [facility]: [address] [place], [place] [address] [address], Phone [phone]  [place] [facility]: [address] [facility], [address], [place] [address] Phone [phone]    [facility]: [address] [place] [address], Phone [phone]  [place] Office: [address] [place] [address], Phone [phone]    Patient Name: Cyndie Hughes    Age: 66 year, : 1955  Patient ID: -, MRN #: MR: [id number], [place]: 5  5   Referring Physician: -  EEG #: [id number]    Study Time/Date: 08:00 on 10/29/2021  	  End Time/Date: 08: 00 on 10/30/2021          			   Duration: 24H    Study Information:    EEG Recording Technique:  The patient underwent continuous Video-EEG monitoring, using Telemetry System hardware on the XLTek Digital System. EEG and video data were stored on a computer hard drive with important events saved in digital archive files. The material was reviewed by a physician (electroencephalographer / epileptologist) on a daily basis. Osmar and seizure detection algorithms were utilized and reviewed. An EEG Technician attended to the patient, and was available throughout daytime work hours.  The epilepsy center neurologist was available in person or on call 24-hours per day.    EEG Placement and Labeling of Electrodes:  The EEG was performed utilizing 20 channel referential EEG connections (coronal over temporal over parasagittal montage) using all standard 10-20 electrode placements, with additional electrodes placed in the inferior temporal region using the modified 10-10 montage electrode placements for elective admissions, or if deemed necessary. Recording was at a sampling rate of 256 samples per second per channel. Time synchronized digital video recording was done simultaneously with EEG recording. A low light infrared camera was used for low light recording.     History:  EMU performed at the bedside  COR: Sedated, Vented  No HV due to covid protocol  No Photic due to Unknown PMHx  65 Y/O Male  P/W: Generalized Twitching  Unknown PMHx      Pertinent Medication  Vimpat (Lacosamide)  Keppra (Levetiracetam)  Diprivan (Propofol)    Interpretation:    Daily EEG Visual Analysis    Findings:  The background was nonreactive, discontinuous, and invariable. At time background was characterized by diffuse attenuated polymorphic delta. At times background is seen to be in burst suppression, less than 1s bursts sharp wave discharges alternating with 2-4 s of diffuse suppression. No posterior dominant rhythm seen.    Background Slowing:  Background predominantly consisted of diffuse polymorphic delta    Focal Slowing:   None were present.    Sleep Background:  Drowsiness and stage II sleep transients were not recorded.    Other Non-Epileptiform Findings:  None were present.    Interictal Epileptiform Activity:   Generalized high amplitude periodic discharges with a frequency of 0.5-1.5Hz (GPDs) with a shifting left right predominance    Events:  Clinical events: None recorded.  Seizures: None recorded.    Activation Procedures:   Hyperventilation was not performed.    Photic stimulation was not performed.     Artifacts:  Intermittent myogenic and movement artifacts were noted.    EEG Summary / Classification:    Abnormal EEG record:    - Near continuous GPDs  - severe generalized slowing.    EEG Impression / Clinical Correlate:    Abnormal EEG study.    Diffuse cortical hyperexcitability with GPDs at times coming in prolonged runs without clear evolution into a distinct ictal pattern, however may fall on the ictal-interictal continuum.   Severe nonspecific diffuse or multifocal cerebral dysfunction.     Overall, this EEG is concerning for diffuse cerebral injury possibly in the setting of hypoxia. Prognosis would appear to be grim.    In absence of additional clinical concerns, recommend consideration for discontinuation of current EEG study with reconnection in future if clinically warranted.  ____________    Sridhar Malave MD, ELIU  Fellow | [facility]       [facility] EEG Reading Room Ph#: [phone]  Epilepsy Answering Service after 5PM and before 8:30AM: Ph#: [phone].   [facility]  Comprehensive Epilepsy Center  Report of Continuous Video EEG    [facility]: [address], [place] [address], Phone [phone]  [facility]: [address] [place], [place] [address] [address], Phone [phone]  [place] Office: [address], [address], [place] [address] Phone [phone]    [facility]: [address] [place] [address], Phone [phone]  [place] Office: [address] [place] [address], Phone [phone]    Patient Name: Cyndie Hughes    Age: 66 year, : 1955  Patient ID: -, MRN #: MR: [id number], [place]: 5  5   Referring Physician: -  EEG #: [id number]    Study Time/Date: 08:00 on 10/29/2021  	  End Time/Date: 08: 00 on 10/30/2021          			   Duration: 24H    Study Information:    EEG Recording Technique:  The patient underwent continuous Video-EEG monitoring, using Telemetry System hardware on the XLTek Digital System. EEG and video data were stored on a computer hard drive with important events saved in digital archive files. The material was reviewed by a physician (electroencephalographer / epileptologist) on a daily basis. Omsar and seizure detection algorithms were utilized and reviewed. An EEG Technician attended to the patient, and was available throughout daytime work hours.  The epilepsy center neurologist was available in person or on call 24-hours per day.    EEG Placement and Labeling of Electrodes:  The EEG was performed utilizing 20 channel referential EEG connections (coronal over temporal over parasagittal montage) using all standard 10-20 electrode placements, with additional electrodes placed in the inferior temporal region using the modified 10-10 montage electrode placements for elective admissions, or if deemed necessary. Recording was at a sampling rate of 256 samples per second per channel. Time synchronized digital video recording was done simultaneously with EEG recording. A low light infrared camera was used for low light recording.     History:  EMU performed at the bedside  COR: Sedated, Vented  No HV due to covid protocol  No Photic due to Unknown PMHx  65 Y/O Male  P/W: Generalized Twitching  Unknown PMHx      Pertinent Medication  Vimpat (Lacosamide)  Keppra (Levetiracetam)  Diprivan (Propofol)    Interpretation:    Daily EEG Visual Analysis    Findings:  The background was nonreactive, discontinuous, and invariable. At time background was characterized by diffuse attenuated polymorphic delta. At times background is seen to be in burst suppression, less than 1s bursts sharp wave discharges alternating with 2-4 s of diffuse suppression. No posterior dominant rhythm seen.    Background Slowing:  Background predominantly consisted of diffuse polymorphic delta    Focal Slowing:   None were present.    Sleep Background:  Drowsiness and stage II sleep transients were not recorded.    Other Non-Epileptiform Findings:  None were present.    Interictal Epileptiform Activity:   Generalized high amplitude periodic discharges with a frequency of 0.5-1.5Hz (GPDs) with a shifting left right predominance    Events:  Clinical events: None recorded.  Seizures: None recorded.    Activation Procedures:   Hyperventilation was not performed.    Photic stimulation was not performed.     Artifacts:  Intermittent myogenic and movement artifacts were noted.    EEG Summary / Classification:    Abnormal EEG record:    - Near continuous GPDs  - severe generalized slowing.    EEG Impression / Clinical Correlate:    Abnormal EEG study.    Diffuse cortical hyperexcitability with GPDs at times coming in prolonged runs without clear evolution into a distinct ictal pattern, however may fall on the ictal-interictal continuum.   Severe nonspecific diffuse or multifocal cerebral dysfunction.     Overall, this EEG is concerning for diffuse cerebral injury possibly in the setting of hypoxia. Poor prognosis    We recommend discontinuation of current EEG.  ____________    Sridhar Malave MD, ELIU  Fellow | [facility]psy [facility]       [facility] EEG Reading Room Ph#: [phone]  Epilepsy Answering Service after 5PM and before 8:30AM: Ph#: [phone].

## 2021-10-30 NOTE — CONSULT NOTE ADULT - SUBJECTIVE AND OBJECTIVE BOX
Patient seen and evaluated at bedside    Chief Complaint:    HPI:  67 y/o male with PMHX DM, HTn and BPH, found lethargic and was brought to [facility] by ambulance on 10/25 after family witnesed tonic clonic seizure. Patient was found hypoglycemic upon arrival to [facility] with a blood sugar of 34. IN the ER pt had another episode of seizure activity  and did not return to baseline and was subsequently intubated for airway protection. EEG was done on 10/26 after sedation was turned off and showed epileptic activity while patient was on Keppra 500bid. After EEG findings Keppra was increased to 100bid, however repeat eeg on 10/27 showed epileptic activity originating from left mid temporal area. On admission the patient was tested positive for covid 19 but no signs of respiratory disease and no findings of PNA on cxr. Pt was seen by the neurology team in [place] who added vimpat in addition to the keppra. Due to refractory seizures, pt was started on propofol for sedation. In addition blood cultures collected on 10/26 showed GPC bacteremia and group B strep isolated from urine culture. Pt has not required pressors during his stay at [facility] and was subsequently transferred to [address] for EEG monitoring and seizure management.     Cardiology consulted for elevated troponin in setting of recent status epilepticus and strep viridans bacteremia.      PMHx:   Hypertension    Diabetes    History of BPH        PSHx:   No significant past surgical history        Allergies:  No Known Allergies      Home Meds:    Current Medications:   amLODIPine   Tablet 5 milliGRAM(s) Oral daily  aspirin  chewable 81 milliGRAM(s) Enteral Tube daily  atorvastatin 40 milliGRAM(s) Oral at bedtime  carvedilol 6.25 milliGRAM(s) Oral every 12 hours  cefTRIAXone   IVPB 2000 milliGRAM(s) IV Intermittent every 24 hours  chlorhexidine 0.12% Liquid 15 milliLiter(s) Oral Mucosa every 12 hours  chlorhexidine 4% Liquid 1 Application(s) Topical <User Schedule>  dexMEDEtomidine Infusion 0.4 MICROgram(s)/kG/Hr IV Continuous <Continuous>  doxazosin 4 milliGRAM(s) Oral at bedtime  enoxaparin Injectable 40 milliGRAM(s) SubCutaneous daily  insulin lispro (ADMELOG) corrective regimen sliding scale   SubCutaneous every 6 hours  insulin NPH human recombinant 30 Unit(s) SubCutaneous every 6 hours  lacosamide IVPB 100 milliGRAM(s) IV Intermittent every 12 hours  levETIRAcetam  IVPB 1000 milliGRAM(s) IV Intermittent <User Schedule>  multivitamin/minerals/iron Oral Solution (CENTRUM) 15 milliLiter(s) Enteral Tube daily  pantoprazole  Injectable 40 milliGRAM(s) IV Push daily  polyethylene glycol 3350 17 Gram(s) Oral daily  senna Syrup 10 milliLiter(s) Oral at bedtime      FAMILY HISTORY:  No pertinent family history in first degree relatives          Physical Exam:  T(F): 98.1 (10-30), Max: 98.4 (10-30)  HR: 59 (10-30) (57 - 108)  BP: --  RR: 20 (10-30)  SpO2: 100% (10-30)  GENERAL: No acute distress, intubated  CHEST/LUNG: intubated  BACK: No spinal tenderness  HEART: Regular rate and rhythm; No murmurs, rubs, or gallops  ABDOMEN: Soft, Nontender, Nondistended; Bowel sounds present  EXTREMITIES:  No clubbing, cyanosis, or edema    Cardiovascular Diagnostic Testing:    ECG: Personally reviewed:    Echo: Personally reviewed:    Stress Testing:    Cath:    Imaging:    CXR: Personally reviewed    Labs: Personally reviewed                        11.4   9.77  )-----------( 216      ( 29 Oct 2021 23:40 )             36.3     10-29    148<H>  |  114<H>  |  34<H>  ----------------------------<  139<H>  3.6   |  25  |  1.07    Ca    7.8<L>      29 Oct 2021 23:40  Phos  2.6     10-29  Mg     2.2     10-29    TPro  5.1<L>  /  Alb  2.4<L>  /  TBili  <0.1<L>  /  DBili  x   /  AST  36  /  ALT  13  /  AlkPhos  88  10-29    PT/INR - ( 29 Oct 2021 00:14 )   PT: 13.0 sec;   INR: 1.09 ratio         PTT - ( 29 Oct 2021 00:14 )  PTT:35.2 sec  Serum Pro-Brain Natriuretic Peptide: 882 pg/mL (10-27 @ 17:50)

## 2021-10-30 NOTE — PROGRESS NOTE ADULT - ASSESSMENT
67 y/o male with PMHx HTN, poorly controlled IDDM, HTN, Alzheimers, and BPH brought to [facility] by ambulance on 10/25 after family witnessed possible seizure activity (found on floor foaming from mouth per daughter).  EMS found patient to have BGL 34 and administered D50.  Per daughter, patient told her he thought he admin too much insulin that same day and has required previous hospitalizations for incorrect dosing Insulin.  In the ER pt had another episode of seizure activity, was intubated for airway protection, and started on Keppra 500 BID.  Covid-19 PCR positive and started on Redesevir and Dexamethasone as well.  Per patient's daughter, patient had Covid in Feb 2021 mild symptoms and received only the 1st dose of Pfizer vaccine on 8/8/21.  EEG 10/26 showed epileptic activity while patient was on Keppra prompting increase to 1g BID.  Repeat EEG on 10/27 showed epileptic activity originating from left mid temporal area. Pt was seen by the neurology team in [place] who added Vimpat to the Keppra. Due to refractory seizures, pt was started on propofol for sedation. In addition blood cultures collected on 10/26 showed GPC bacteremia and GBS in urine culture. Pt transferred to [address] for vEEG monitoring and seizure management.     Neuro:  New onset seizures, ?early Alzheimers  - A&O at baseline  - Sedated on Precedex  - Vimpat 100mg IV q12h and Keppra 1g q8h  - vEEG monitoring (10/27- ?)  - Noncon CTH (10/27) no anoxic ischemic encephalopathy  - Neuro following, Recs appreciated     Respiratory  - Intubated on 10/25 for airway protection  - Mechanical Ventilation: Volume A/C 20/450/5/30; Ppk25, Pplat 15  - SBT as tolerated - apeic on previous trials    CV  Hypotension 2/2 Sedation, pmhx htn, cath 2/21 no stents  - off pressors maintain MAP >/= 65  - Restart home lipitor, ASA, Norvasc (start @ 5, home dose 10 QD), Coreg (start @ 6.25, home dose 12.5 BID)  - TTE (10/29) -> normal function  - Trend Trop 127 --> 130 --> 164. No EKG changes. ?demand ischemia   - Cards consult: [  ].    GI  - NPO with TF per Dietician recs via NGT  - Protonix daily   - Bowel regimen with Miralax and Senna       BPH  - Restart home Cardura   - Morales changed on Admission (10/27)   - Monitor Lytes replete as necessary to maintain Mg>2 Phos>3 K>4   HyperNa  - Free Water 200ml Q8h     Infectious Disease  Covid 19—incidental finding in OSH ER (Negative PCR x2 at [facility])   - OFF Remdesivir x 3doses stopped 2/2 negative Covid diagnosis  - OFF Dexmethasone x1 dose stopped 2/2 neg Covid diagnosis   - BCx + for GPC with UCx + for Group B Strep at [facility]   - F/u repeat Blood cultures x2 NGTD , UCx neg, Sputum Cx neg (10/27)  - Lumbar puncture with CSF Negative for infection (10/28)   - Continue Ceftriaxone (10/27- ?)   - ID consulting, recs appreciated    Endocrine  IDDM- HbA1c 14.8  - Hyperglycemic BGL>400 requiring Insulin gtt, transitioned off 10/29   - NPH and modISS q6h  - Endocrinology consulting, recs appreciated    Heme  - Monitor CBC   - Lovenox DVT PPX  - LE Duplex (10/29) neg for DVT   - Carotid Duplex pending    Lines  - LIJ TLC 10/27  - Right Radial A line 10/27   - Morales Catheter 10/27    Dispo  - Full Code   - Primary contact: Kalli Hughes (daughter) [phone] ICU and intubation day 5.  Remains apneic on low dose Precedex without purposeful movements.  Minimal ventilatory support, stable hemodynamics.  Daughter to visit today.    Neuro:  New onset seizures, ?early Alzheimers  - A&O at baseline  - Sedated on Precedex  - Vimpat 100mg IV q12h and Keppra 1g q8h  - vEEG monitoring (10/27- ?)  - Noncon CTH (10/27) no anoxic ischemic encephalopathy  - Neuro following, Recs appreciated     Respiratory  - Intubated on 10/25 for airway protection  - Mechanical Ventilation: Volume A/C 20/450/5/30; Ppk25, Pplat 15  - SBT as tolerated - apeic on previous trials    CV  Hypotension 2/2 Sedation, pmhx htn, cath 2/21 no stents  - off pressors maintain MAP >/= 65  - Restart home lipitor, ASA, Norvasc (start @ 5, home dose 10 QD), Coreg (start @ 6.25, home dose 12.5 BID)  - TTE (10/29) -> normal function  - Trend Trop 127 --> 130 --> 164. No EKG changes. ?demand ischemia   - Cards consult: [  ].    GI  - NPO with TF per Dietician recs via NGT  - Protonix daily   - Bowel regimen with Miralax and Senna       BPH  - Restart home Cardura   - Morales changed on Admission (10/27)   - Monitor Lytes replete as necessary to maintain Mg>2 Phos>3 K>4   HyperNa  - Free Water 200ml Q8h     Infectious Disease  Covid 19—incidental finding in OSH ER (Negative PCR x2 at [facility])   - OFF Remdesivir x 3doses stopped 2/2 negative Covid diagnosis  - OFF Dexmethasone x1 dose stopped 2/2 neg Covid diagnosis   - BCx + for GPC with UCx + for Group B Strep at [facility]   - F/u repeat Blood cultures x2 NGTD , UCx neg, Sputum Cx neg (10/27)  - Lumbar puncture with CSF Negative for infection (10/28)   - Continue Ceftriaxone (10/27- ?)   - ID consulting, recs appreciated    Endocrine  IDDM- HbA1c 14.8  - Hyperglycemic BGL>400 requiring Insulin gtt, transitioned off 10/29   - NPH and modISS q6h  - Endocrinology consulting, recs appreciated    Heme  - Monitor CBC   - Lovenox DVT PPX  - LE Duplex (10/29) neg for DVT   - Carotid Duplex pending    Lines  - LIJ TLC 10/27  - Right Radial A line 10/27   - Morales Catheter 10/27    Dispo  - Full Code   - Primary contact: Kalli Hughes (daughter) [phone] ICU and intubation day 5.  Remains apneic on low dose Precedex without purposeful movements.  Minimal ventilatory support, stable hemodynamics.  Daughter to visit today.    Neuro:  New onset seizures, ?early Alzheimers  - A&O at baseline  - Sedated on Precedex  - Vimpat 100mg IV q12h and Keppra 1g q8h  - vEEG monitoring (10/27- ?)  - Noncon CTH (10/27) no anoxic ischemic encephalopathy  - Neuro following, Recs appreciated     Respiratory  - Intubated on 10/25 for airway protection  - Mechanical Ventilation: Volume A/C 20/450/5/30; Ppk25, Pplat 15  - SBT as tolerated - apeic on previous trials    CV  Hypotension 2/2 Sedation, pmhx htn, cath 2/21 no stents  - off pressors maintain MAP >/= 65  - Restart home lipitor, ASA, Norvasc (start @ 5, home dose 10 QD), Coreg (start @ 6.25, home dose 12.5 BID)  - TTE (10/29) -> normal function  - Trend Trop 127 --> 130 --> 164. No EKG changes. ?demand ischemia   - Cards consulted 10/30    GI  - NPO with TF per Dietician recs via NGT  - Protonix daily   - Bowel regimen with Miralax and Senna       BPH  - Restart home Cardura   - Morales changed on Admission (10/27)   - Monitor Lytes replete as necessary to maintain Mg>2 Phos>3 K>4   HyperNa  - Free Water 200ml Q8h     Infectious Disease  Covid 19—incidental finding in OSH ER (Negative PCR x2 at [facility])   - OFF Remdesivir x 3doses stopped 2/2 negative Covid diagnosis  - OFF Dexmethasone x1 dose stopped 2/2 neg Covid diagnosis   - BCx + for GPC with UCx + for Group B Strep at [facility]   - F/u repeat Blood cultures x2 NGTD , UCx neg, Sputum Cx neg (10/27)  - Lumbar puncture with CSF Negative for infection (10/28)   - Continue Ceftriaxone (10/27- ?)   - ID consulting, recs appreciated    Endocrine  IDDM- HbA1c 14.8  - Hyperglycemic BGL>400 requiring Insulin gtt, transitioned off 10/29   - NPH and modISS q6h  - Endocrinology consulting, recs appreciated    Heme  - Monitor CBC   - Lovenox DVT PPX  - LE Duplex (10/29) neg for DVT   - Carotid Duplex pending    Lines  - LIJ TLC 10/27  - Right Radial A line 10/27   - Morales Catheter 10/27    Dispo  - Full Code   - Primary contact: Kalli Hughes (daughter) [phone] ICU and intubation day 5.  Remains apneic on low dose Precedex without purposeful movements.  Minimal ventilatory support, stable hemodynamics.  Daughter to visit today.    Neuro:  New onset seizures, ?early Alzheimers  - A&O at baseline  - Sedated on Precedex  - Vimpat 100mg IV q12h and Keppra 1g q8h  - vEEG monitoring (10/27- ?)  - Noncon CTH (10/27) no anoxic ischemic encephalopathy  - Neuro following, Recs appreciated     Respiratory  - Intubated on 10/25 for airway protection  - Mechanical Ventilation: Volume A/C 20/450/5/30; Ppk25, Pplat 15  - SBT as tolerated - apeic on previous trials    CV  Hypotension 2/2 Sedation, pmhx htn, cath 2/21 no stents  - off pressors maintain MAP >/= 65  - Lipitor 40mg qd (home med)  - Aspirin 81mg qd (home med)  - Norvasc 10mg qd (home med)  - Coreg 6.25mg q12h (home dose 12.5 BID)  - TTE (10/29) -> normal function  - Trend Trop 127 --> 130 --> 164. No EKG changes. ?demand ischemia vs acute illness  - Cards consulted, recs appreciated    GI  - NPO with TF per Dietician recs via NGT  - Protonix daily   - Bowel regimen with Miralax and Senna       BPH  - Restart home Cardura   - Morales changed on Admission (10/27)   - Monitor Lytes replete as necessary to maintain Mg>2 Phos>3 K>4   HyperNa  - Free Water 200ml Q8h     Infectious Disease  Covid 19—incidental finding in OSH ER (Negative PCR x2 at [facility])   - OFF Remdesivir x 3doses stopped 2/2 negative Covid diagnosis  - OFF Dexmethasone x1 dose stopped 2/2 neg Covid diagnosis   - BCx + for GPC with UCx + for Group B Strep at [facility]   - F/u repeat Blood cultures x2 NGTD , UCx neg, Sputum Cx neg (10/27)  - Lumbar puncture with CSF Negative for infection (10/28)   - Continue Ceftriaxone (10/27- ?)   - ID consulting, recs appreciated    Endocrine  IDDM- HbA1c 14.8  - Hyperglycemic BGL>400 requiring Insulin gtt, transitioned off 10/29   - NPH and modISS q6h  - Endocrinology consulting, recs appreciated    Heme  - Monitor CBC   - Lovenox DVT PPX  - LE Duplex (10/29) neg for DVT   - Carotid Duplex pending    Lines  - LIJ TLC 10/27  - Right Radial A line 10/27   - Morales Catheter 10/27    Dispo  - Full Code   - Primary contact: Kalli Hughes (daughter) [phone] ICU and intubation day 5.  Remains apneic on low dose Precedex without purposeful movements.  Minimal ventilatory support, stable hemodynamics.  Daughter to visit today.    Neuro:  New onset seizures, ?early Alzheimers  - A&O at baseline  - Sedated on Precedex  - Vimpat 100mg IV q12h and Keppra 1g q8h  - vEEG monitoring (10/27- ?)  - Noncon CTH (10/27) no anoxic ischemic encephalopathy  - Neuro following, Recs appreciated     Respiratory  - Intubated on 10/25 for airway protection  - Mechanical Ventilation: Volume A/C 20/450/5/30; Ppk25, Pplat 15  - SBT as tolerated - apeic on previous trials    CV  Hypotension 2/2 Sedation, pmhx htn, cath 2/21 no stents  - off pressors maintain MAP >/= 65  - Restart home meds: Lipitor 40mg qd, ASA 81mg qd, Norvasc 10mg qd, Coreg 12.5mg q12h  - TTE (10/29) -> normal function  - Trend Trop 127 --> 130 --> 164. No EKG changes. ?demand ischemia vs acute illness  - Cards consulted, recs appreciated    GI - Last BM 10/29  - NPO with TF per Dietician recs via NGT  - Protonix daily   - Bowel regimen with Miralax and Senna       BPH  - Restart home Cardura   - Morales changed on Admission (10/27)   - Monitor Lytes replete as necessary to maintain Mg>2 Phos>3 K>4   HyperNa  - Free Water 200ml Q8h     Infectious Disease  Covid 19—incidental finding in OSH ER (Negative PCR x2 at [facility])   - OFF Remdesivir x 3doses stopped 2/2 negative Covid diagnosis  - OFF Dexmethasone x1 dose stopped 2/2 neg Covid diagnosis   - BCx + for GPC with UCx + for Group B Strep at [facility]   - F/u repeat Blood cultures x2 NGTD , UCx neg, Sputum Cx neg (10/27)  - Lumbar puncture with CSF Negative for infection (10/28)   - Continue Ceftriaxone (10/27- ?)   - ID consulting, recs appreciated    Endocrine  IDDM- HbA1c 14.8  - Hyperglycemic BGL>400 requiring Insulin gtt, transitioned off 10/29   - NPH and modISS q6h  - Endocrinology consulting, recs appreciated    Heme  - Monitor CBC   - Lovenox DVT PPX  - LE Duplex (10/29) neg for DVT   - Carotid Duplex pending    Lines  - LIJ TLC 10/27  - Right Radial A line 10/27   - Morales Catheter 10/27    Dispo  - Full Code   - Primary contact: Kalli Hughes (daughter) [phone] ICU and intubation day 5.  Remains apneic on low dose Precedex without purposeful movements.  Minimal ventilatory support, stable hemodynamics.  Daughter to visit today.    Neuro:  New onset seizures, ?early Alzheimers  - A&O at baseline  - Sedated on Precedex  - Vimpat 100mg IV q12h and Keppra 1g q8h  - vEEG monitoring (10/27- ?)  - Noncon CTH (10/27) no anoxic ischemic encephalopathy  - Neuro following, Recs appreciated     Respiratory  - Intubated on 10/25 for airway protection  - Mechanical Ventilation: Volume A/C 20/450/5/30; Ppk25, Pplat 15  - SBT as tolerated - apeic on previous trials    CV  Hypotension 2/2 Sedation, pmhx htn, cath 2/21 no stents  - off pressors maintain MAP >/= 65  - Restart home meds: Lipitor 40mg qd, ASA 81mg qd, Norvasc 10mg qd, Coreg 12.5mg q12h  - TTE (10/29) -> normal function  - Trend Trop 127 --> 130 --> 164. No EKG changes. ?demand ischemia vs acute illness  - Cards consulted, recs appreciated    GI - Last BM 10/29  - NPO with TF per Dietician recs via NGT  - Protonix daily   - Bowel regimen with Miralax and Senna       BPH  - Restart home Cardura   - Morales changed on Admission (10/27)   - Monitor Lytes replete as necessary to maintain Mg>2 Phos>3 K>4   HyperNa/HyperCl  - Free Water 200ml Q6h     Infectious Disease  Covid 19—incidental finding in OSH ER (Negative PCR x2 at [facility])   - OFF Remdesivir x 3doses stopped 2/2 negative Covid diagnosis  - OFF Dexmethasone x1 dose stopped 2/2 neg Covid diagnosis   - BCx + for GPC with UCx + for Group B Strep at [facility]   - F/u repeat Blood cultures x2 NGTD , UCx neg, Sputum Cx neg (10/27)  - Lumbar puncture with CSF Negative for infection (10/28)   - Continue Ceftriaxone (10/27- ?)   - ID consulting, recs appreciated    Endocrine  IDDM- HbA1c 14.8  - Hyperglycemic BGL>400 requiring Insulin gtt, transitioned off 10/29   - NPH and modISS q6h  - Endocrinology consulting, recs appreciated    Heme  - Monitor CBC   - Lovenox DVT PPX  - LE Duplex (10/29) neg for DVT   - Carotid Duplex pending    Lines  - LIJ TLC 10/27  - Right Radial A line 10/27   - Morales Catheter 10/27    Dispo  - Full Code   - Primary contact: Kalli Hughes (daughter) [phone]   ICU and intubation day 5.  Remains apneic on low dose Precedex without purposeful movements.  Minimal ventilatory support, stable hemodynamics.  Daughter to visit today.    Neuro:  New onset seizures, ?early Alzheimers  - A&O at baseline  - Sedated on Precedex  - Vimpat 100mg IV q12h and Keppra 1g q8h  - vEEG monitoring (10/27- ?)  - Noncon CTH (10/27) no anoxic ischemic encephalopathy  - Neuro following, Recs appreciated     Respiratory  - Intubated on 10/25 for airway protection  - Mechanical Ventilation: Volume A/C 20/450/5/30; Ppk25, Pplat 15  - SBT as tolerated - apeic on previous trials    CV  Hypotension 2/2 Sedation, pmhx htn, cath 2/21 no stents  - off pressors maintain MAP >/= 65  - Restart home meds: Lipitor 40mg qd, ASA 81mg qd, Norvasc 10mg qd, Coreg 12.5mg q12h  - TTE (10/29) -> normal function  - Trend Trop 127 --> 130 --> 164. No EKG changes. ?demand ischemia vs acute illness  - Cards consulted, recs appreciated    GI - Last BM 10/29  - NPO with TF per Dietician recs via NGT  - Protonix daily   - Bowel regimen with Miralax and Senna       BPH  - Restart home Cardura   - Morales changed on Admission (10/27)   - Monitor Lytes replete as necessary to maintain Mg>2 Phos>3 K>4   HyperNa/HyperCl  - Free Water 200ml Q6h     Infectious Disease  Covid 19—incidental finding in OSH ER (Negative PCR x2 at [facility])   - OFF Remdesivir x 3doses stopped 2/2 negative Covid diagnosis  - OFF Dexmethasone x1 dose stopped 2/2 neg Covid diagnosis   - BCx + for GPC with UCx + for Group B Strep at [facility]   - F/u repeat Blood cultures x2 NGTD , UCx neg, Sputum Cx neg (10/27)  - Lumbar puncture with CSF Negative for infection (10/28)   - s/p Ceftriaxone (10/26- 30)   - ID consulting, recs appreciated    Endocrine  IDDM- HbA1c 14.8  - Hyperglycemic BGL>400 requiring Insulin gtt, transitioned off 10/29   - NPH and modISS q6h  - Endocrinology consulting, recs appreciated    Heme  - Monitor CBC   - Lovenox DVT PPX  - LE Duplex (10/29) neg for DVT   - Carotid Duplex pending    Lines  - LIJ TLC 10/27  - Right Radial A line 10/27   - Morales Catheter 10/27    Dispo  - Full Code   - Primary contact: Kalli Hughes (daughter) [phone] ICU and intubation day 5.  Remains apneic on low dose Precedex without purposeful movements.  Minimal ventilatory support, stable hemodynamics.  Daughter to visit today.    Neuro:  New onset seizures, ?early Alzheimers  - A&O at baseline  - Sedated on Precedex  - Vimpat 100mg IV q12h and Keppra 1g q8h  - vEEG monitoring (10/27- ?)  - Noncon CTH (10/27) no anoxic ischemic encephalopathy  - Neuro following, Recs appreciated     Respiratory  - Intubated on 10/25 for airway protection  - Mechanical Ventilation: Volume A/C 20/450/5/30; Ppk25, Pplat 15  - SBT as tolerated - apenic on previous trials    CV  Hypotension 2/2 Sedation, pmhx htn, cath 2/21 no stents  - off pressors maintain MAP >/= 65  - Restart home meds: Lipitor 40mg qd, ASA 81mg qd, Norvasc 10mg qd, Coreg 12.5mg q12h  - TTE (10/29) -> normal function  - Trend Trop 127 --> 130 --> 164. No EKG changes. ?demand ischemia vs acute illness  - Cards consulted, recs appreciated    GI - Last BM 10/29  - NPO with TF per Dietician recs via NGT  - Protonix daily   - Bowel regimen with Miralax and Senna       BPH  - Restart home Cardura   - Morales changed on Admission (10/27)   - Monitor Lytes replete as necessary to maintain Mg>2 Phos>3 K>4   HyperNa/HyperCl  - Free Water 200ml Q6h     Infectious Disease  Covid 19—incidental finding in OSH ER (Negative PCR x2 at [facility])   - OFF Remdesivir x 3doses stopped 2/2 negative Covid diagnosis  - OFF Dexmethasone x1 dose stopped 2/2 neg Covid diagnosis   - BCx + for GPC with UCx + for Group B Strep at [facility]   - F/u repeat Blood cultures x2 NGTD , UCx neg, Sputum Cx neg (10/27)  - Lumbar puncture with CSF Negative for infection (10/28)   - s/p Ceftriaxone (10/26- 30)   - ID consulting, recs appreciated    Endocrine  IDDM- HbA1c 14.8  - Hyperglycemic BGL>400 requiring Insulin gtt, transitioned off 10/29   - NPH and modISS q6h  - Endocrinology consulting, recs appreciated    Heme  - Monitor CBC   - Lovenox DVT PPX  - LE Duplex (10/29) neg for DVT   - Carotid Duplex pending    Lines  - LIJ TLC 10/27  - Right Radial A line 10/27   - Morales Catheter 10/27    Dispo  - Full Code   - Primary contact: Kalli Hughes (daughter) [phone] ICU and intubation day 5.  Remains apneic on low dose Precedex without purposeful movements.  Minimal ventilatory support, stable hemodynamics.  Daughter to visit today.    Neuro:  New onset seizures, ?early Alzheimers  - A&O at baseline  - Sedated on Precedex  - Vimpat 100mg IV q12h and Keppra 1g q8h  - vEEG monitoring (10/27- 30) -Abnormal EEG record: Near continuous GPDs, severe generalized slowing.  - Noncon CTH (10/27) no anoxic ischemic encephalopathy  - Neuro following, Recs appreciated     Respiratory  - Intubated on 10/25 for airway protection  - Mechanical Ventilation: PRVC 20/450/5/30; Ppk25, Pplat 15  - SBT as tolerated - apeic on previous trials, 10/30 PS 10/5 x2hrs    CV  Hypotension 2/2 Sedation, pmhx htn, cath 2/21 no stents  - off pressors maintain MAP >/= 65  - Restart home meds: Lipitor 40mg qd, ASA 81mg qd, Norvasc 10mg qd, Coreg 12.5mg q12h  - TTE (10/29) -> normal function  - Rising Trop 127->130->164->173. No EKG changes. ?demand ischemia vs acute illness  - Cards consulted, recs appreciated    GI - Last BM 10/29  - NPO with TF per Dietician recs via NGT  - Protonix daily   - Bowel regimen with Miralax and Senna       BPH  - Restart home Cardura   - Morales changed on Admission (10/27)   - Monitor Lytes replete as necessary to maintain Mg>2 Phos>3 K>4   HyperNa/HyperCl  - Free Water 200ml Q6h     Infectious Disease  Covid 19—incidental finding in OSH ER (Negative PCR x2 at [facility])   - OFF Remdesivir x 3doses stopped 2/2 negative Covid diagnosis  - OFF Dexmethasone x1 dose stopped 2/2 neg Covid diagnosis   - BCx + for GPC with UCx + for Group B Strep at [facility]   - F/u repeat Blood cultures x2 NGTD , UCx neg, Sputum Cx neg (10/27)  - Lumbar puncture with CSF Negative for infection (10/28)   - s/p Ceftriaxone (10/26- 30)   - ID consulting, recs appreciated    Endocrine  IDDM- HbA1c 14.8  - Hyperglycemic BGL>400 requiring Insulin gtt, transitioned off 10/29   - NPH and modISS q6h  - Endocrinology consulting, recs appreciated    Heme  - Monitor CBC   - Lovenox DVT PPX  - LE Duplex (10/29) neg for DVT   - Carotid Duplex pending    Lines  - Arrows x2 10/30  - Right Radial A line 10/27   - Morales Catheter 10/27    Dispo  - Full Code   - Primary contact: Kalli Wardu (daughter) [phone]

## 2021-10-30 NOTE — PROGRESS NOTE ADULT - ATTENDING COMMENTS
1. Acute hypoxemic respiratory failure. Pt intubated for airway protection during seizures. Continue PS trials as tolerated then back on AC settings. Awaiting for pt to wake up. Off versed x 24 hrs. received ativan last night for possible seizure. Await EEG report.  2. Neuro. Seizure disorder. Continue Keppra 1000mg tid and Vimpat. Monitor EEG.  3.ID. alpha strep bacteremia. Likely contaminant. Discussed with ID. D/C abx.  4. Covid 19. Initially infected in February and then Vaccine x1 in August. Pt did not receive 2nd dose. Initially Covid positive at [facility]. Covid negative x 2. here. Off isolation.  5. Start home cardiac medications.   6 GOC: Full code.

## 2021-10-30 NOTE — PROGRESS NOTE ADULT - ASSESSMENT
66 years old male with PMHx of uncontrolled DM (A1C ~14), HTN and BPH, transferred from [facility] for Epilepsy monitoring    CTH 10/27 negative for intracranial pathologies; No septic emboli  CXR 10/28 no focal consolidation; on minimal vent setting  COVID PCR 10/27, 10/28 negative;   Had COVID infection in 2/2021 hospitalized at [facility]; received Pfizer 1st dose only on 8/8/21  Positive COVID PCR in [place] was likely false positive since patient likely has immunity from natural infection and vaccination    UA (10/25) ([place]) shows pyuria  UCx (10/25) ([place]) grew group B strep in [facility]    BCx (10/25) ([place]) viridans strep 1/4 bottles in [facility]  BCx (10/27) ([facility]) NGTD  Received Rocephin 2gm q24hrs 10/27 - current  TTE no vegetation, mild MR and AR  LP with one nucleated cell. CSF PCR Negative    Called [facility] Today and confirmed that no further growth on 10/25 BCx  Suspect AHS in 1/4 bottles was procurement contaminant  Completed 3 days of Ceftriaxone (had GBS on UCx at [place])  I would discontinue antibiotics at this point    Overall, Positive BCx, Positive UCx, Seizures, Leukocytosis    --Stop Ceftriaxone  --Continue to follow CBC with diff  --Continue to follow temperature curve  --Follow up on preliminary blood cultures    I will continue to follow. Please feel free to contact me with any further questions.    Ad Villa M.D.  [facility] Division of Infectious Disease  8AM-5PM: Pager Number [phone]  After Hours (or if no response): Please contact the Infectious Diseases Office at [phone]     The above assessment and plan were discussed with 5ICU Team

## 2021-10-30 NOTE — CONSULT NOTE ADULT - ASSESSMENT
65 y/o male with PMHX DM, HTn and BPH, admitted to MICU for status epilepticus now intubated and found to have strep viridans bacteremia.   Cardiology consulted for elevated troponin in setting of recent status epilepticus and strep viridans bacteremia.    #Elevated troponin- troponin is highly sensitive but non specific. Recent TTE with normal LV systolic and diastolic function.  -CKMB normal yesterday  -no acute cardiac intervention at this time  -would stop trending troponin at this time unless other signs of ischemia (EKG changes, chest pain once patient is off sedation/extubated)  Elevated trop likely in setting of acute neurological status.  If no further changes, patient can have outpatient cardiology eval for ischemic testing

## 2021-10-30 NOTE — PROGRESS NOTE ADULT - ASSESSMENT
· Assessment	  Patient is a 67 y/o M with PMHx DM, HTN, BPH, transferred from [facility] after witnessed tonic clonic seizure fo unknown etiology and chronicity found also with hypoglycemia, hypotension in setting of bacteremia. Currently intubated for airway protection and on sedation versed/ fentanyl. Concern of COVID+. VS: 90/41 (map 57), RR22, 99% vent. CBC: 20.13wbc 89%N, Cr 1.34, alb2.4, ast 49, proBNP 882, Ua w/ glucosuria and proteinuria.     Impression: Patient found w/ GTC medically refractory to keppra/ vimpat at [facility], intubated for airway protection and sedated for concerns of status epilepticus of unknown etiology and chronicity.     Recommendations:   [ ] CBC, CMP, Mg, P, CpK, UA, Utox, ammonia, troponin, VBG/ABG, basic infectious workup, prolactin  [ ] continue with keppra 1000mg BID IV, vimpat 100mg BID IV  [ ] Stat head CT to assess for structural lesions, signs of stroke, infection, etc.   [ ] MRI brain w/ and w/o contrast when medically able to   [ ] vEEG 24 hr  [ ] If CT head is not showing signs of structural lesions, mass effect, would strongly consider obtaining LP especially if planning for steroids for COVID to assess for CNS infectious etiologies that can cause seizures.   [ ] DVT ppx as per primary team   [ ] Please obtain collaterals from family  [ ] Given concern for seizure, advise patient to not drive, operate heavy machinery, avoid heights, pools, bathtubs, locked doors until cleared by further follow up outpatient by neurology.     Please note: if patient has a convulsion, please document length of episode, specifically what patient was doing paying attention to eye opening vs closure, gaze deviation, shaking of extremities, tongue bite, urinary incontinence, any derangement of vital signs    Case discussed with Neurologist Dr. Fernandez.  · Assessment	  Patient is a 67 y/o M with PMHx DM, HTN, BPH, transferred from [facility] after witnessed tonic clonic seizure fo unknown etiology and chronicity found also with hypoglycemia, hypotension in setting of bacteremia. Currently intubated for airway protection and on sedation versed/ fentanyl. Concern of COVID+. VS: 90/41 (map 57), RR22, 99% vent. CBC: 20.13wbc 89%N, Cr 1.34, alb2.4, ast 49, proBNP 882, Ua w/ glucosuria and proteinuria.     Impression: Patient found w/ GTC medically refractory to keppra/ vimpat at [facility], intubated for airway protection and sedated for concerns of status epilepticus of unknown etiology and chronicity.     Recommendations:   [ ] CBC, CMP, Mg, P, CpK, UA, Utox, ammonia, troponin, VBG/ABG, basic infectious workup, prolactin  [ ] continue with keppra 1000mg BID IV, vimpat 100mg BID IV  [ ] MRI brain w/ and w/o contrast when medically able to   [ ] vEEG 24 hr  [ ] If CT head is not showing signs of structural lesions, mass effect, would strongly consider obtaining LP especially if planning for steroids for COVID to assess for CNS infectious etiologies that can cause seizures.   [ ] DVT ppx as per primary team   [ ] Please obtain collaterals from family  [ ] Given concern for seizure, advise patient to not drive, operate heavy machinery, avoid heights, pools, bathtubs, locked doors until cleared by further follow up outpatient by neurology.     Please note: if patient has a convulsion, please document length of episode, specifically what patient was doing paying attention to eye opening vs closure, gaze deviation, shaking of extremities, tongue bite, urinary incontinence, any derangement of vital signs    Case discussed with Neurologist Dr. Fernandez.  · Assessment	  Patient is a 67 y/o M with PMHx DM, HTN, BPH, transferred from [facility] after witnessed tonic clonic seizure fo unknown etiology and chronicity found also with hypoglycemia, hypotension in setting of bacteremia. Currently intubated for airway protection and on sedation versed/ fentanyl. Concern of COVID+. VS: 90/41 (map 57), RR22, 99% vent. CBC: 20.13wbc 89%N, Cr 1.34, alb2.4, ast 49, proBNP 882, Ua w/ glucosuria and proteinuria.     Impression: Patient found w/ GTC medically refractory to keppra/ vimpat at [facility], intubated for airway protection and sedated for concerns of status epilepticus of unknown etiology and chronicity.     Recommendations:   [ ] CBC, CMP, Mg, P, CpK, UA, Utox, ammonia, troponin, VBG/ABG, basic infectious workup, prolactin  [ ] continue with keppra 1000mg BID IV, vimpat 100mg BID IV  [ ] MRI brain w/ and w/o contrast when medically able to   [ ] vEEG 24 hr  [ ] If CT head is not showing signs of structural lesions, mass effect, would strongly consider obtaining LP especially if planning for steroids for COVID to assess for CNS infectious etiologies that can cause seizures.   [ ] DVT ppx as per primary team   [ ] Please obtain collaterals from family  [ ] Given concern for seizure, advise patient to not drive, operate heavy machinery, avoid heights, pools, bathtubs, locked doors until cleared by further follow up outpatient by neurology.     Please note: if patient has a convulsion, please document length of episode, specifically what patient was doing paying attention to eye opening vs closure, gaze deviation, shaking of extremities, tongue bite, urinary incontinence, any derangement of vital signs    Case discussed with Neurologist Dr. Fernandez.

## 2021-10-30 NOTE — PROGRESS NOTE ADULT - SUBJECTIVE AND OBJECTIVE BOX
SUBJECTIVE:  Patient is a 67 y/o M with PMHx DM, HTN, BPH, transferred from [facility] after family ?tonic clonic seizure. Patient was found hypoglycemic at [facility] with a blood sugar of 34. IN the ER pt had another episode of seizure activity and did not return to baseline and was subsequently intubated for airway protection. EEG was done on 10/26 after sedation was turned off and showed epileptic activity while patient was on Keppra 500bid. After EEG findings Keppra was increased to 100bid, however repeat EEG on 10/27 showed epileptic activity originating from left mid temporal area. On admission the patient was tested positive for covid 19. Neurology team in [place] added vimpat in addition to the keppra. Due to refractory seizures, pt was started on propofol for sedation. In addition blood cultures collected on 10/26 showed GPC bacteremia and group B strep isolated from urine culture. Pt has not required pressors during his stay at [facility] and was subsequently transferred to [address] for EEG monitoring and seizure management. Here, sedated on fentanyl and versed. On pressors for maps in 50. Unable to obtain ROS.     INTERVAL HISTORY: Saw pt at bedside. Pt was intubated and sedated on medications.     PAST MEDICAL & SURGICAL HISTORY:  Hypertension    Diabetes    History of BPH    No significant past surgical history      FAMILY HISTORY:  No pertinent family history in first degree relatives      SOCIAL HISTORY:   T/E/D:   Occupation:   Lives with:     MEDICATIONS (HOME):  Home Medications:  aspirin 81 mg oral tablet, chewable: 1 tab(s) orally once a day (27 Oct 2021 17:54)  Coreg 12.5 mg oral tablet: 1 tab(s) orally 2 times a day (27 Oct 2021 17:54)  Lipitor 40 mg oral tablet: 1 tab(s) orally once a day (27 Oct 2021 17:54)  Norvasc 10 mg oral tablet: 1 tab(s) orally once a day (27 Oct 2021 17:54)    MEDICATIONS  (STANDING):  amLODIPine   Tablet 5 milliGRAM(s) Oral daily  aspirin  chewable 81 milliGRAM(s) Enteral Tube daily  atorvastatin 40 milliGRAM(s) Oral at bedtime  carvedilol 6.25 milliGRAM(s) Oral every 12 hours  cefTRIAXone   IVPB 2000 milliGRAM(s) IV Intermittent every 24 hours  chlorhexidine 0.12% Liquid 15 milliLiter(s) Oral Mucosa every 12 hours  chlorhexidine 4% Liquid 1 Application(s) Topical <User Schedule>  dexMEDEtomidine Infusion 0.4 MICROgram(s)/kG/Hr (7.82 mL/Hr) IV Continuous <Continuous>  doxazosin 4 milliGRAM(s) Oral at bedtime  enoxaparin Injectable 40 milliGRAM(s) SubCutaneous daily  furosemide   Injectable 20 milliGRAM(s) IV Push once  insulin lispro (ADMELOG) corrective regimen sliding scale   SubCutaneous every 6 hours  insulin NPH human recombinant 30 Unit(s) SubCutaneous every 6 hours  lacosamide IVPB 100 milliGRAM(s) IV Intermittent every 12 hours  levETIRAcetam  IVPB 1000 milliGRAM(s) IV Intermittent <User Schedule>  multivitamin/minerals/iron Oral Solution (CENTRUM) 15 milliLiter(s) Enteral Tube daily  pantoprazole  Injectable 40 milliGRAM(s) IV Push daily  polyethylene glycol 3350 17 Gram(s) Oral daily  senna Syrup 10 milliLiter(s) Oral at bedtime    MEDICATIONS  (PRN):    ALLERGIES/INTOLERANCES:  Allergies  No Known Allergies    Intolerances    VITALS & EXAMINATION:  Vital Signs Last 24 Hrs  T(C): 36.7 (30 Oct 2021 07:00), Max: 36.9 (30 Oct 2021 03:00)  T(F): 98.1 (30 Oct 2021 07:00), Max: 98.4 (30 Oct 2021 03:00)  HR: 59 (30 Oct 2021 11:00) (57 - 101)  BP: --  BP(mean): --  RR: 20 (30 Oct 2021 11:00) (13 - 24)  SpO2: 100% (30 Oct 2021 11:00) (99% - 100%)    General:  General: on sedation versed/ fentanyl, healthy appearing  Resp: on vent, not overbreathing  Head: atraumatic  Eyes: nonicteric  Skin: no rashes or discoloration  Ext: no joint deformity  vasc: no edema    General:  lying in bed intubated on sedation   Eyes: Conjunctiva and sclera clear   Neurologic:  - Mental Status: intubated on sedation   - Cranial Nerves II-XII:  Pupils are 3 mm, equal, round, and nonreactive to light.  Eyes b/l fixated on head turn.    - Motor: Flaccid tone throughout, no spontaneous movement. No decorticate or decerebrate posturing on exam.     - Reflexes: 2+ and symmetric at the biceps, triceps, brachioradialis, +1 knees.  - Sensory: sedated  - Coordination & Gait: Unable to obtain.  - upgoing toes b/l feet    LABORATORY:  CBC                       11.4   9.77  )-----------( 216      ( 29 Oct 2021 23:40 )             36.3     Chem 10-29    148<H>  |  114<H>  |  34<H>  ----------------------------<  139<H>  3.6   |  25  |  1.07    Ca    7.8<L>      29 Oct 2021 23:40  Phos  2.6     10-29  Mg     2.2     10-29    TPro  5.1<L>  /  Alb  2.4<L>  /  TBili  <0.1<L>  /  DBili  x   /  AST  36  /  ALT  13  /  AlkPhos  88  10-29    LFTs LIVER FUNCTIONS - ( 29 Oct 2021 23:40 )  Alb: 2.4 g/dL / Pro: 5.1 g/dL / ALK PHOS: 88 U/L / ALT: 13 U/L / AST: 36 U/L / GGT: x           Coagulopathy PT/INR - ( 29 Oct 2021 00:14 )   PT: 13.0 sec;   INR: 1.09 ratio         PTT - ( 29 Oct 2021 00:14 )  PTT:35.2 sec  Lipid Panel   A1c   Cardiac enzymes CARDIAC MARKERS ( 29 Oct 2021 11:21 )  x     / x     / 68 U/L / x     / 1.1 ng/mL  CARDIAC MARKERS ( 29 Oct 2021 00:14 )  x     / x     / 63 U/L / x     / x          U/A   CSF  Immunological  Other    STUDIES & IMAGING:  Studies (EKG, EEG, EMG, etc):     Radiology (XR, CT, MR, U/S, TTE/KATIE):  10/29/21:  EEG Summary / Classification:  Abnormal   - GPDs  - severe generalized slowing.    EEG Impression / Clinical Correlate:  Abnormal EEG study.  Diffuse cortical hyperexcitability with GPD.   Severe nonspecific diffuse or multifocal cerebral dysfunction.   No seizure seen. SUBJECTIVE:  Patient is a 67 y/o M with PMHx DM, HTN, BPH, transferred from [facility] after family ?tonic clonic seizure. Patient was found hypoglycemic at [facility] with a blood sugar of 34. IN the ER pt had another episode of seizure activity and did not return to baseline and was subsequently intubated for airway protection. EEG was done on 10/26 after sedation was turned off and showed epileptic activity while patient was on Keppra 500bid. After EEG findings Keppra was increased to 100bid, however repeat EEG on 10/27 showed epileptic activity originating from left mid temporal area. On admission the patient was tested positive for covid 19. Neurology team in [place] added vimpat in addition to the keppra. Due to refractory seizures, pt was started on propofol for sedation. In addition blood cultures collected on 10/26 showed GPC bacteremia and group B strep isolated from urine culture. Pt has not required pressors during his stay at [facility] and was subsequently transferred to [address] for EEG monitoring and seizure management. Here, sedated on fentanyl and versed. On pressors for maps in 50.     INTERVAL HISTORY: Saw pt at bedside. Pt was intubated and sedated on medications. Unable to get ROS.     PAST MEDICAL & SURGICAL HISTORY:  Hypertension    Diabetes    History of BPH    No significant past surgical history      FAMILY HISTORY:  No pertinent family history in first degree relatives      SOCIAL HISTORY:   T/E/D:   Occupation:   Lives with:     MEDICATIONS (HOME):  Home Medications:  aspirin 81 mg oral tablet, chewable: 1 tab(s) orally once a day (27 Oct 2021 17:54)  Coreg 12.5 mg oral tablet: 1 tab(s) orally 2 times a day (27 Oct 2021 17:54)  Lipitor 40 mg oral tablet: 1 tab(s) orally once a day (27 Oct 2021 17:54)  Norvasc 10 mg oral tablet: 1 tab(s) orally once a day (27 Oct 2021 17:54)    MEDICATIONS  (STANDING):  amLODIPine   Tablet 5 milliGRAM(s) Oral daily  aspirin  chewable 81 milliGRAM(s) Enteral Tube daily  atorvastatin 40 milliGRAM(s) Oral at bedtime  carvedilol 6.25 milliGRAM(s) Oral every 12 hours  cefTRIAXone   IVPB 2000 milliGRAM(s) IV Intermittent every 24 hours  chlorhexidine 0.12% Liquid 15 milliLiter(s) Oral Mucosa every 12 hours  chlorhexidine 4% Liquid 1 Application(s) Topical <User Schedule>  dexMEDEtomidine Infusion 0.4 MICROgram(s)/kG/Hr (7.82 mL/Hr) IV Continuous <Continuous>  doxazosin 4 milliGRAM(s) Oral at bedtime  enoxaparin Injectable 40 milliGRAM(s) SubCutaneous daily  furosemide   Injectable 20 milliGRAM(s) IV Push once  insulin lispro (ADMELOG) corrective regimen sliding scale   SubCutaneous every 6 hours  insulin NPH human recombinant 30 Unit(s) SubCutaneous every 6 hours  lacosamide IVPB 100 milliGRAM(s) IV Intermittent every 12 hours  levETIRAcetam  IVPB 1000 milliGRAM(s) IV Intermittent <User Schedule>  multivitamin/minerals/iron Oral Solution (CENTRUM) 15 milliLiter(s) Enteral Tube daily  pantoprazole  Injectable 40 milliGRAM(s) IV Push daily  polyethylene glycol 3350 17 Gram(s) Oral daily  senna Syrup 10 milliLiter(s) Oral at bedtime    MEDICATIONS  (PRN):    ALLERGIES/INTOLERANCES:  Allergies  No Known Allergies    Intolerances    VITALS & EXAMINATION:  Vital Signs Last 24 Hrs  T(C): 36.7 (30 Oct 2021 07:00), Max: 36.9 (30 Oct 2021 03:00)  T(F): 98.1 (30 Oct 2021 07:00), Max: 98.4 (30 Oct 2021 03:00)  HR: 59 (30 Oct 2021 11:00) (57 - 101)  BP: --  BP(mean): --  RR: 20 (30 Oct 2021 11:00) (13 - 24)  SpO2: 100% (30 Oct 2021 11:00) (99% - 100%)    General:  General: on sedation versed/ fentanyl, healthy appearing  Resp: on vent, not overbreathing  Head: atraumatic  Eyes: nonicteric  Skin: no rashes or discoloration  Ext: no joint deformity  vasc: no edema    General:  lying in bed intubated on sedation   Eyes: Conjunctiva and sclera clear   Neurologic:  - Mental Status: intubated on sedation   - Cranial Nerves II-XII:  Pupils are 3 mm, equal, round, and nonreactive to light.  Eyes b/l fixated on head turn.    - Motor: Flaccid tone throughout, no spontaneous movement. No decorticate or decerebrate posturing on exam.     - Reflexes: 2+ and symmetric at the biceps, triceps, brachioradialis, +1 knees.  - Sensory: sedated  - Coordination & Gait: Unable to obtain.  - upgoing toes b/l feet    LABORATORY:  CBC                       11.4   9.77  )-----------( 216      ( 29 Oct 2021 23:40 )             36.3     Chem 10-29    148<H>  |  114<H>  |  34<H>  ----------------------------<  139<H>  3.6   |  25  |  1.07    Ca    7.8<L>      29 Oct 2021 23:40  Phos  2.6     10-29  Mg     2.2     10-29    TPro  5.1<L>  /  Alb  2.4<L>  /  TBili  <0.1<L>  /  DBili  x   /  AST  36  /  ALT  13  /  AlkPhos  88  10-29    LFTs LIVER FUNCTIONS - ( 29 Oct 2021 23:40 )  Alb: 2.4 g/dL / Pro: 5.1 g/dL / ALK PHOS: 88 U/L / ALT: 13 U/L / AST: 36 U/L / GGT: x           Coagulopathy PT/INR - ( 29 Oct 2021 00:14 )   PT: 13.0 sec;   INR: 1.09 ratio         PTT - ( 29 Oct 2021 00:14 )  PTT:35.2 sec  Lipid Panel   A1c   Cardiac enzymes CARDIAC MARKERS ( 29 Oct 2021 11:21 )  x     / x     / 68 U/L / x     / 1.1 ng/mL  CARDIAC MARKERS ( 29 Oct 2021 00:14 )  x     / x     / 63 U/L / x     / x          U/A   CSF  Immunological  Other    STUDIES & IMAGING:  Studies (EKG, EEG, EMG, etc):     Radiology (XR, CT, MR, U/S, TTE/KATIE):  10/29/21:  EEG Summary / Classification:  Abnormal   - GPDs  - severe generalized slowing.    EEG Impression / Clinical Correlate:  Abnormal EEG study.  Diffuse cortical hyperexcitability with GPD.   Severe nonspecific diffuse or multifocal cerebral dysfunction.   No seizure seen. SUBJECTIVE:  Patient is a 67 y/o M with PMHx DM, HTN, BPH, transferred from [facility] after family ?tonic clonic seizure. Patient was found hypoglycemic at [facility] with a blood sugar of 34. IN the ER pt had another episode of seizure activity and did not return to baseline and was subsequently intubated for airway protection. EEG was done on 10/26 after sedation was turned off and showed epileptic activity while patient was on Keppra 500bid. After EEG findings Keppra was increased to 100bid, however repeat EEG on 10/27 showed epileptic activity originating from left mid temporal area. On admission the patient was tested positive for covid 19. Neurology team in [place] added vimpat in addition to the keppra. Due to refractory seizures, pt was started on propofol for sedation. In addition blood cultures collected on 10/26 showed GPC bacteremia and group B strep isolated from urine culture. Pt has not required pressors during his stay at [facility] and was subsequently transferred to [address] for EEG monitoring and seizure management. Here, sedated on fentanyl and versed. On pressors for maps in 50.     INTERVAL HISTORY: Saw pt at bedside. Pt was intubated and sedated on medications. Unable to get ROS.     PAST MEDICAL & SURGICAL HISTORY:  Hypertension    Diabetes    History of BPH    No significant past surgical history      FAMILY HISTORY:  No pertinent family history in first degree relatives      SOCIAL HISTORY:   T/E/D:   Occupation:   Lives with:     MEDICATIONS (HOME):  Home Medications:  aspirin 81 mg oral tablet, chewable: 1 tab(s) orally once a day (27 Oct 2021 17:54)  Coreg 12.5 mg oral tablet: 1 tab(s) orally 2 times a day (27 Oct 2021 17:54)  Lipitor 40 mg oral tablet: 1 tab(s) orally once a day (27 Oct 2021 17:54)  Norvasc 10 mg oral tablet: 1 tab(s) orally once a day (27 Oct 2021 17:54)    MEDICATIONS  (STANDING):  amLODIPine   Tablet 5 milliGRAM(s) Oral daily  aspirin  chewable 81 milliGRAM(s) Enteral Tube daily  atorvastatin 40 milliGRAM(s) Oral at bedtime  carvedilol 6.25 milliGRAM(s) Oral every 12 hours  cefTRIAXone   IVPB 2000 milliGRAM(s) IV Intermittent every 24 hours  chlorhexidine 0.12% Liquid 15 milliLiter(s) Oral Mucosa every 12 hours  chlorhexidine 4% Liquid 1 Application(s) Topical <User Schedule>  dexMEDEtomidine Infusion 0.4 MICROgram(s)/kG/Hr (7.82 mL/Hr) IV Continuous <Continuous>  doxazosin 4 milliGRAM(s) Oral at bedtime  enoxaparin Injectable 40 milliGRAM(s) SubCutaneous daily  furosemide   Injectable 20 milliGRAM(s) IV Push once  insulin lispro (ADMELOG) corrective regimen sliding scale   SubCutaneous every 6 hours  insulin NPH human recombinant 30 Unit(s) SubCutaneous every 6 hours  lacosamide IVPB 100 milliGRAM(s) IV Intermittent every 12 hours  levETIRAcetam  IVPB 1000 milliGRAM(s) IV Intermittent <User Schedule>  multivitamin/minerals/iron Oral Solution (CENTRUM) 15 milliLiter(s) Enteral Tube daily  pantoprazole  Injectable 40 milliGRAM(s) IV Push daily  polyethylene glycol 3350 17 Gram(s) Oral daily  senna Syrup 10 milliLiter(s) Oral at bedtime    MEDICATIONS  (PRN):    ALLERGIES/INTOLERANCES:  Allergies  No Known Allergies    Intolerances    VITALS & EXAMINATION:  Vital Signs Last 24 Hrs  T(C): 36.7 (30 Oct 2021 07:00), Max: 36.9 (30 Oct 2021 03:00)  T(F): 98.1 (30 Oct 2021 07:00), Max: 98.4 (30 Oct 2021 03:00)  HR: 59 (30 Oct 2021 11:00) (57 - 101)  BP: --  BP(mean): --  RR: 20 (30 Oct 2021 11:00) (13 - 24)  SpO2: 100% (30 Oct 2021 11:00) (99% - 100%)    General:  General: on sedation versed/ fentanyl, healthy appearing  Resp: on vent, not overbreathing  Head: atraumatic  Eyes: nonicteric  Skin: no rashes or discoloration  Ext: no joint deformity  vasc: no edema    General:  lying in bed intubated on sedation   Eyes: Conjunctiva and sclera clear   Neurologic:  - Mental Status: intubated on sedation   - Cranial Nerves II-XII:  Pupils are 3 mm, equal, round, and nonreactive to light.  Eyes b/l fixated on head turn.    - Motor: Flaccid tone throughout, no spontaneous movement. No decorticate or decerebrate posturing on exam.     - Reflexes: 2+ and symmetric at the biceps, triceps, brachioradialis, +1 knees.  - Sensory: sedated  - Coordination & Gait: Unable to obtain.  - upgoing toes b/l feet    LABORATORY:  CBC                       11.4   9.77  )-----------( 216      ( 29 Oct 2021 23:40 )             36.3     Chem 10-29    148<H>  |  114<H>  |  34<H>  ----------------------------<  139<H>  3.6   |  25  |  1.07    Ca    7.8<L>      29 Oct 2021 23:40  Phos  2.6     10-29  Mg     2.2     10-29    TPro  5.1<L>  /  Alb  2.4<L>  /  TBili  <0.1<L>  /  DBili  x   /  AST  36  /  ALT  13  /  AlkPhos  88  10-29    LFTs LIVER FUNCTIONS - ( 29 Oct 2021 23:40 )  Alb: 2.4 g/dL / Pro: 5.1 g/dL / ALK PHOS: 88 U/L / ALT: 13 U/L / AST: 36 U/L / GGT: x           Coagulopathy PT/INR - ( 29 Oct 2021 00:14 )   PT: 13.0 sec;   INR: 1.09 ratio         PTT - ( 29 Oct 2021 00:14 )  PTT:35.2 sec  Lipid Panel   A1c   Cardiac enzymes CARDIAC MARKERS ( 29 Oct 2021 11:21 )  x     / x     / 68 U/L / x     / 1.1 ng/mL  CARDIAC MARKERS ( 29 Oct 2021 00:14 )  x     / x     / 63 U/L / x     / x          U/A   CSF  Immunological  Other    STUDIES & IMAGING:  Studies (EKG, EEG, EMG, etc):     Radiology (XR, CT, MR, U/S, TTE/KATIE):  EEG Summary / Classification:    Abnormal EEG record:    - Near continuous GPDs  - severe generalized slowing.    EEG Impression / Clinical Correlate:    Abnormal EEG study.    Diffuse cortical hyperexcitability with GPDs at times coming in prolonged runs without clear evolution into a distinct ictal pattern, however may fall on the ictal-interictal continuum.   Severe nonspecific diffuse or multifocal cerebral dysfunction.

## 2021-10-30 NOTE — CHART NOTE - NSCHARTNOTEFT_GEN_A_CORE
Called to bedside by nurse.  Patient biting ETT and hypertensive with intermittent shrugging of shoulders.  Red button pushed on vEEG to zully event and Ativan 2mg IVP x1.

## 2021-10-30 NOTE — CONSULT NOTE ADULT - ATTENDING COMMENTS
Agree with plan as outlined above.  Type 2 MI 2/2 seizure  Treat underlying issue                                                                                           Eighty minutes spent in direct patient care and communication
67 y/o M with PMHx DM II uncontrolled , HTN, BPH, transferred from [facility] after witnessed seizure at home, followed second seizure at [place]. Pt was found to be hypoglycemic to 34 and was intubated due to hypoxic respiratory failure. There was initial concern for COVID but pt has since tested negative twice and CXR is clear. He has an elevated WB of 20-->18 and now hyperglycemic. EEG has shown GPDs with cortical irritability but no overt seizure activity and patient is sedated with Propofol, ketamine and versed although versed is now being weaned off. On exam pt does not respond to any stimuli, pupils are 3mm and nonreactive, oculocephalics absent, gag intact, pt triggering the vent, withdrawal with painful stimuli LLE only. At bedside EEG showing 1Hz GPDs which somewhat improved after exam.   - Weaning sedation as per Primary team  - Continue with Keppra 100mg BID and Vimpat 100mg BID  - seizure precautions  - Agree with LP if EEG does not improve  - MRI brain w/wo
66 years old male with PMHx of uncontrolled DM (A1C ~14), HTN and BPH, transferred from [facility] for Epilepsy monitoring    Found to have AHS in 1/4 cultures at [facility]  Unclear if contaminant or true pathogen yet  Would continue Ceftriaxone for coverage pending repeat cultures  Would check TTE, patient also with murmur  LP was not suggestive of meningitis (1 nucleated cell)    I will continue to follow. Please feel free to contact me with any further questions.    Ad Villa M.D.  [facility] Division of Infectious Disease  8AM-5PM: Pager Number [phone]  After Hours (or if no response): Please contact the Infectious Diseases Office at [phone]     The above assessment and plan were discussed with 5ICU Team

## 2021-10-30 NOTE — PROGRESS NOTE ADULT - SUBJECTIVE AND OBJECTIVE BOX
CHIEF COMPLAINT:  Patient is a 66y old  Male who presents with a chief complaint of Transfer from [place] for EEG Monitoring (29 Oct 2021 15:41)    HPI:  65 y/o male with PMHX DM, HTn and BPH, found lethargic and was brought to [facility] by ambulance on 10/25 after family witnesed tonic clonic seizure. Patient was found hypoglycemic upon arrival to [facility] with a blood sugar of 34. IN the ER pt had another episode of seizure activity  and did not return to baseline and was subsequently intubated for airway protection. EEG was done on 10/26 after sedation was turned off and showed epileptic activity while patient was on Keppra 500bid. After EEG findings Keppra was increased to 100bid, however repeat eeg on 10/27 showed epileptic activity originating from left mid temporal area. On admission the patient was tested positive for covid 19 but no signs of respiratory disease and no findings of PNA on cxr. Pt was seen by the neurology team in [place] who added vimpat in addition to the keppra. Due to refractory seizures, pt was started on propofol for sedation. In addition blood cultures collected on 10/26 showed GPC bacteremia and group B strep isolated from urine culture. Pt has not required pressors during his stay at [facility] and was subsequently [address] for [address] monitoring and seizure management.  (27 Oct 2021 17:09)      Interval Events:  - increasing Trop T without EKG changes ?demand ischemia  - Na 148 -> FW 200ml q8h  - Thrombophlebitis noted to b/l UEs -> d/c all PIVs, placed 2 Arrows by US    OBJECTIVE:  ICU Vital Signs Last 24 Hrs  T(C): 36.9 (30 Oct 2021 03:00), Max: 37 (29 Oct 2021 08:15)  T(F): 98.4 (30 Oct 2021 03:00), Max: 98.6 (29 Oct 2021 08:15)  HR: 57 (30 Oct 2021 07:00) (57 - 108)  BP: --  BP(mean): --  ABP: 138/52 (30 Oct 2021 07:00) (102/44 - 211/68)  ABP(mean): 79 (30 Oct 2021 07:00) (61 - 119)  RR: 20 (30 Oct 2021 07:00) (13 - 24)  SpO2: 100% (30 Oct 2021 07:00) (98% - 100%)    Mode: AC/ CMV (Assist Control/ Continuous Mandatory Ventilation), RR (machine): 20, TV (machine): 450, FiO2: 30, PEEP: 5, ITime: 1, MAP: 10, PIP: 21    10-29-21 @ 07:01  -  10-30-21 @ 07:00  --------------------------------------------------------  IN: 2789.2 mL / OUT: 1295 mL / NET: 1494.2 mL      CAPILLARY BLOOD GLUCOSE  POCT Blood Glucose.: 111 mg/dL (30 Oct 2021 05:34)    PHYSICAL EXAM:          HOSPITAL MEDICATIONS:  MEDICATIONS  (STANDING):  amLODIPine   Tablet 5 milliGRAM(s) Oral daily  aspirin  chewable 81 milliGRAM(s) Enteral Tube daily  atorvastatin 40 milliGRAM(s) Oral at bedtime  carvedilol 6.25 milliGRAM(s) Oral every 12 hours  cefTRIAXone   IVPB 2000 milliGRAM(s) IV Intermittent every 24 hours  chlorhexidine 0.12% Liquid 15 milliLiter(s) Oral Mucosa every 12 hours  chlorhexidine 4% Liquid 1 Application(s) Topical <User Schedule>  dexMEDEtomidine Infusion 0.4 MICROgram(s)/kG/Hr (7.82 mL/Hr) IV Continuous <Continuous>  doxazosin 4 milliGRAM(s) Oral at bedtime  enoxaparin Injectable 40 milliGRAM(s) SubCutaneous daily  insulin lispro (ADMELOG) corrective regimen sliding scale   SubCutaneous every 6 hours  insulin NPH human recombinant 30 Unit(s) SubCutaneous every 6 hours  lacosamide IVPB 100 milliGRAM(s) IV Intermittent every 12 hours  levETIRAcetam  IVPB 1000 milliGRAM(s) IV Intermittent <User Schedule>  multivitamin/minerals/iron Oral Solution (CENTRUM) 15 milliLiter(s) Enteral Tube daily  pantoprazole  Injectable 40 milliGRAM(s) IV Push daily  polyethylene glycol 3350 17 Gram(s) Oral daily  senna Syrup 10 milliLiter(s) Oral at bedtime    MEDICATIONS  (PRN):      LABS:                        11.4   9.77  )-----------( 216      ( 29 Oct 2021 23:40 )             36.3     Hgb Trend: 11.4<--, 11.9<--, 12.4<--, 13.7<--  10-29    148<H>  |  114<H>  |  34<H>  ----------------------------<  139<H>  3.6   |  25  |  1.07    Ca    7.8<L>      29 Oct 2021 23:40  Phos  2.6     10-29  Mg     2.2     10-29    TPro  5.1<L>  /  Alb  2.4<L>  /  TBili  <0.1<L>  /  DBili  x   /  AST  36  /  ALT  13  /  AlkPhos  88  10-29    LIVER FUNCTIONS - ( 29 Oct 2021 23:40 )  Alb: 2.4 g/dL / Pro: 5.1 g/dL / ALK PHOS: 88 U/L / ALT: 13 U/L / AST: 36 U/L / GGT: x           Creatinine Trend: 1.07<--, 1.16<--, 1.26<--, 1.26<--, 1.35<--, 1.34<--  PT/INR - ( 29 Oct 2021 00:14 )   PT: 13.0 sec;   INR: 1.09 ratio    PTT - ( 29 Oct 2021 00:14 )  PTT:35.2 sec    Arterial Blood Gas:  10-29 @ 23:40  7.45/40/157/28/97.1/3.5  ABG lactate: --  Arterial Blood Gas:  10-29 @ 00:09  7.40/43/314/27/100.0/1.5  ABG lactate: --        MICROBIOLOGY: Reviewed      RADIOLOGY: Reviewed and interpreted by me    EKG: sinus issac, Qtc 515   CHIEF COMPLAINT:  Patient is a 66y old  Male who presents with a chief complaint of Transfer from [place] for EEG Monitoring (29 Oct 2021 15:41)    HPI:  67 y/o male with PMHX DM, HTn and BPH, found lethargic and was brought to [facility] by ambulance on 10/25 after family witnesed tonic clonic seizure. Patient was found hypoglycemic upon arrival to [facility] with a blood sugar of 34. IN the ER pt had another episode of seizure activity  and did not return to baseline and was subsequently intubated for airway protection. EEG was done on 10/26 after sedation was turned off and showed epileptic activity while patient was on Keppra 500bid. After EEG findings Keppra was increased to 100bid, however repeat eeg on 10/27 showed epileptic activity originating from left mid temporal area. On admission the patient was tested positive for covid 19 but no signs of respiratory disease and no findings of PNA on cxr. Pt was seen by the neurology team in [place] who added vimpat in addition to the keppra. Due to refractory seizures, pt was started on propofol for sedation. In addition blood cultures collected on 10/26 showed GPC bacteremia and group B strep isolated from urine culture. Pt has not required pressors during his stay at [facility] and was subsequently transferred [address] for EEG monitoring and seizure management.  (27 Oct 2021 17:09)      Interval Events:  - increasing Trop T without EKG changes ?demand ischemia  - Na 148 -> FW 200ml q8h  - Thrombophlebitis noted to b/l UEs -> d/c all PIVs, placed 2 Arrows by US    OBJECTIVE:  ICU Vital Signs Last 24 Hrs  T(C): 36.9 (30 Oct 2021 03:00), Max: 37 (29 Oct 2021 08:15)  T(F): 98.4 (30 Oct 2021 03:00), Max: 98.6 (29 Oct 2021 08:15)  HR: 57 (30 Oct 2021 07:00) (57 - 108)  BP: --  BP(mean): --  ABP: 138/52 (30 Oct 2021 07:00) (102/44 - 211/68)  ABP(mean): 79 (30 Oct 2021 07:00) (61 - 119)  RR: 20 (30 Oct 2021 07:00) (13 - 24)  SpO2: 100% (30 Oct 2021 07:00) (98% - 100%)    Mode: AC/ CMV (Assist Control/ Continuous Mandatory Ventilation), RR (machine): 20, TV (machine): 450, FiO2: 30, PEEP: 5, ITime: 1, MAP: 10, PIP: 21    10-29-21 @ 07:01  -  10-30-21 @ 07:00  --------------------------------------------------------  IN: 2789.2 mL / OUT: 1295 mL / NET: 1494.2 mL      CAPILLARY BLOOD GLUCOSE  POCT Blood Glucose.: 111 mg/dL (30 Oct 2021 05:34)    PHYSICAL EXAM:  GENERAL: NAD, sedated  HEENT:  Atraumatic, Normocephalic  EYES: PERRL sluggish 3mm b/l, conjunctiva and sclera clear  NECK: Supple, No JVD  CHEST/LUNG: clear bilaterally; No wheezes, rales, or rhonchi  HEART: Regular rate and rhythm; No murmurs, rubs, or gallops  ABDOMEN: Soft, Nontender, Nondistended; Bowel sounds present  EXTREMITIES:  2+ Peripheral Pulses, No clubbing, cyanosis, or edema  PSYCH: unable as pt is sedated  NEUROLOGY: sedated on precedex, +gag, apneic, withdraws all extremities UE>LE, vEEG in progress  SKIN: lesions noted to extremities      HOSPITAL MEDICATIONS:  MEDICATIONS  (STANDING):  amLODIPine   Tablet 5 milliGRAM(s) Oral daily  aspirin  chewable 81 milliGRAM(s) Enteral Tube daily  atorvastatin 40 milliGRAM(s) Oral at bedtime  carvedilol 6.25 milliGRAM(s) Oral every 12 hours  cefTRIAXone   IVPB 2000 milliGRAM(s) IV Intermittent every 24 hours  chlorhexidine 0.12% Liquid 15 milliLiter(s) Oral Mucosa every 12 hours  chlorhexidine 4% Liquid 1 Application(s) Topical <User Schedule>  dexMEDEtomidine Infusion 0.4 MICROgram(s)/kG/Hr (7.82 mL/Hr) IV Continuous <Continuous>  doxazosin 4 milliGRAM(s) Oral at bedtime  enoxaparin Injectable 40 milliGRAM(s) SubCutaneous daily  insulin lispro (ADMELOG) corrective regimen sliding scale   SubCutaneous every 6 hours  insulin NPH human recombinant 30 Unit(s) SubCutaneous every 6 hours  lacosamide IVPB 100 milliGRAM(s) IV Intermittent every 12 hours  levETIRAcetam  IVPB 1000 milliGRAM(s) IV Intermittent <User Schedule>  multivitamin/minerals/iron Oral Solution (CENTRUM) 15 milliLiter(s) Enteral Tube daily  pantoprazole  Injectable 40 milliGRAM(s) IV Push daily  polyethylene glycol 3350 17 Gram(s) Oral daily  senna Syrup 10 milliLiter(s) Oral at bedtime    MEDICATIONS  (PRN):      LABS:                        11.4   9.77  )-----------( 216      ( 29 Oct 2021 23:40 )             36.3     Hgb Trend: 11.4<--, 11.9<--, 12.4<--, 13.7<--  10-29    148<H>  |  114<H>  |  34<H>  ----------------------------<  139<H>  3.6   |  25  |  1.07    Ca    7.8<L>      29 Oct 2021 23:40  Phos  2.6     10-29  Mg     2.2     10-29    TPro  5.1<L>  /  Alb  2.4<L>  /  TBili  <0.1<L>  /  DBili  x   /  AST  36  /  ALT  13  /  AlkPhos  88  10-29    LIVER FUNCTIONS - ( 29 Oct 2021 23:40 )  Alb: 2.4 g/dL / Pro: 5.1 g/dL / ALK PHOS: 88 U/L / ALT: 13 U/L / AST: 36 U/L / GGT: x           Creatinine Trend: 1.07<--, 1.16<--, 1.26<--, 1.26<--, 1.35<--, 1.34<--  PT/INR - ( 29 Oct 2021 00:14 )   PT: 13.0 sec;   INR: 1.09 ratio    PTT - ( 29 Oct 2021 00:14 )  PTT:35.2 sec    Arterial Blood Gas:  10-29 @ 23:40  7.45/40/157/28/97.1/3.5  ABG lactate: --  Arterial Blood Gas:  10-29 @ 00:09  7.40/43/314/27/100.0/1.5  ABG lactate: --        MICROBIOLOGY: Reviewed      RADIOLOGY: Reviewed and interpreted by me    EKG: sinus issac, Qtc 515   CHIEF COMPLAINT:  Patient is a 66y old  Male who presents with a chief complaint of Transfer from [place] for EEG Monitoring (29 Oct 2021 15:41)    HPI:  65 y/o male with PMHx HTN, poorly controlled IDDM, HTN, Alzheimers, and BPH brought to [facility] by ambulance on 10/25 after family witnessed possible seizure activity (found on floor foaming from mouth per daughter).  EMS found patient to have BGL 34 and administered D50.  Per daughter, patient told her he thought he admin too much insulin that same day and has required previous hospitalizations for incorrect dosing Insulin.  In the ER pt had another episode of seizure activity, was intubated for airway protection, and started on Keppra 500 BID.  Covid-19 PCR positive and started on Redesevir and Dexamethasone as well.  Per patient's daughter, patient had Covid in Feb 2021 mild symptoms and received only the 1st dose of Pfizer vaccine on 8/8/21.  EEG 10/26 showed epileptic activity while patient was on Keppra prompting increase to 1g BID.  Repeat EEG on 10/27 showed epileptic activity originating from left mid temporal area. Pt was seen by the neurology team in [place] who added Vimpat to the Keppra. Due to refractory seizures, pt was started on propofol for sedation. In addition blood cultures collected on 10/26 showed GPC bacteremia and GBS in urine culture. [address] and seizure management.    Interval Events:  - increasing Trop T without EKG changes ?demand ischemia  - Na 148 -> FW 200ml q8h  - Thrombophlebitis noted to b/l UEs -> d/c all PIVs, placed 2 Arrows by US    OBJECTIVE:  ICU Vital Signs Last 24 Hrs  T(C): 36.9 (30 Oct 2021 03:00), Max: 37 (29 Oct 2021 08:15)  T(F): 98.4 (30 Oct 2021 03:00), Max: 98.6 (29 Oct 2021 08:15)  HR: 57 (30 Oct 2021 07:00) (57 - 108)  BP: --  BP(mean): --  ABP: 138/52 (30 Oct 2021 07:00) (102/44 - 211/68)  ABP(mean): 79 (30 Oct 2021 07:00) (61 - 119)  RR: 20 (30 Oct 2021 07:00) (13 - 24)  SpO2: 100% (30 Oct 2021 07:00) (98% - 100%)    Mode: AC/ CMV (Assist Control/ Continuous Mandatory Ventilation), RR (machine): 20, TV (machine): 450, FiO2: 30, PEEP: 5, ITime: 1, MAP: 10, PIP: 21    10-29-21 @ 07:01  -  10-30-21 @ 07:00  --------------------------------------------------------  IN: 2789.2 mL / OUT: 1295 mL / NET: 1494.2 mL      CAPILLARY BLOOD GLUCOSE  POCT Blood Glucose.: 111 mg/dL (30 Oct 2021 05:34)    PHYSICAL EXAM:  GENERAL: NAD, sedated  HEENT:  Atraumatic, Normocephalic  EYES: PERRL sluggish 3mm b/l, conjunctiva and sclera clear  NECK: Supple, No JVD  CHEST/LUNG: clear bilaterally; No wheezes, rales, or rhonchi  HEART: Regular rate and rhythm; No murmurs, rubs, or gallops  ABDOMEN: Soft, Nontender, Nondistended; Bowel sounds present  EXTREMITIES:  2+ Peripheral Pulses, No clubbing, cyanosis, or edema  PSYCH: unable as pt is sedated  NEUROLOGY: sedated on precedex, +gag, apneic, withdraws all extremities UE>LE, vEEG in progress  SKIN: lesions noted to extremities      HOSPITAL MEDICATIONS:  MEDICATIONS  (STANDING):  amLODIPine   Tablet 5 milliGRAM(s) Oral daily  aspirin  chewable 81 milliGRAM(s) Enteral Tube daily  atorvastatin 40 milliGRAM(s) Oral at bedtime  carvedilol 6.25 milliGRAM(s) Oral every 12 hours  cefTRIAXone   IVPB 2000 milliGRAM(s) IV Intermittent every 24 hours  chlorhexidine 0.12% Liquid 15 milliLiter(s) Oral Mucosa every 12 hours  chlorhexidine 4% Liquid 1 Application(s) Topical <User Schedule>  dexMEDEtomidine Infusion 0.4 MICROgram(s)/kG/Hr (7.82 mL/Hr) IV Continuous <Continuous>  doxazosin 4 milliGRAM(s) Oral at bedtime  enoxaparin Injectable 40 milliGRAM(s) SubCutaneous daily  insulin lispro (ADMELOG) corrective regimen sliding scale   SubCutaneous every 6 hours  insulin NPH human recombinant 30 Unit(s) SubCutaneous every 6 hours  lacosamide IVPB 100 milliGRAM(s) IV Intermittent every 12 hours  levETIRAcetam  IVPB 1000 milliGRAM(s) IV Intermittent <User Schedule>  multivitamin/minerals/iron Oral Solution (CENTRUM) 15 milliLiter(s) Enteral Tube daily  pantoprazole  Injectable 40 milliGRAM(s) IV Push daily  polyethylene glycol 3350 17 Gram(s) Oral daily  senna Syrup 10 milliLiter(s) Oral at bedtime    MEDICATIONS  (PRN):      LABS:                        11.4   9.77  )-----------( 216      ( 29 Oct 2021 23:40 )             36.3     Hgb Trend: 11.4<--, 11.9<--, 12.4<--, 13.7<--  10-29    148<H>  |  114<H>  |  34<H>  ----------------------------<  139<H>  3.6   |  25  |  1.07    Ca    7.8<L>      29 Oct 2021 23:40  Phos  2.6     10-29  Mg     2.2     10-29    TPro  5.1<L>  /  Alb  2.4<L>  /  TBili  <0.1<L>  /  DBili  x   /  AST  36  /  ALT  13  /  AlkPhos  88  10-29    LIVER FUNCTIONS - ( 29 Oct 2021 23:40 )  Alb: 2.4 g/dL / Pro: 5.1 g/dL / ALK PHOS: 88 U/L / ALT: 13 U/L / AST: 36 U/L / GGT: x           Creatinine Trend: 1.07<--, 1.16<--, 1.26<--, 1.26<--, 1.35<--, 1.34<--  PT/INR - ( 29 Oct 2021 00:14 )   PT: 13.0 sec;   INR: 1.09 ratio    PTT - ( 29 Oct 2021 00:14 )  PTT:35.2 sec    Arterial Blood Gas:  10-29 @ 23:40  7.45/40/157/28/97.1/3.5  ABG lactate: --  Arterial Blood Gas:  10-29 @ 00:09  7.40/43/314/27/100.0/1.5  ABG lactate: --        MICROBIOLOGY: Reviewed      RADIOLOGY: Reviewed and interpreted by me    EKG: sinus issac, Qtc 515

## 2021-10-31 NOTE — PROGRESS NOTE ADULT - SUBJECTIVE AND OBJECTIVE BOX
CHIEF COMPLAINT:  Patient is a 66y old  Male who presents with a chief complaint of Transfer from [place] for EEG Monitoring (27 Oct 2021 20:35)    HPI:  65 y/o male with PMHX DM, HTN and BPH, found lethargic and was brought to [facility] by ambulance on 10/25 after family witnesed tonic clonic seizure. Patient was found hypoglycemic upon arrival to [facility] with a blood sugar of 34. IN the ER pt had another episode of seizure activity  and did not return to baseline and was subsequently intubated for airway protection. EEG was done on 10/26 after sedation was turned off and showed epileptic activity while patient was on Keppra 500bid. After EEG findings Keppra was increased to 100bid, however repeat eeg on 10/27 showed epileptic activity originating from left mid temporal area. On admission the patient was tested positive for covid 19 but no signs of respiratory disease and no findings of PNA on cxr. Pt was seen by the neurology team in [place] who added vimpat in addition to the keppra. Due to refractory seizures, pt was started on propofol for sedation. In addition blood cultures collected on 10/26 showed GPC bacteremia and group B strep isolated from urine culture. Pt has not required pressors during his stay at [facility] and was subsequently transferred [address] for [address] monitoring and seizure management.  (27 Oct 2021 17:09)    Interval Events:  Overnight: patient noted to have contraction alkalosis, with pH 7.51, was given 250 mg of diamox and RR decreased to 12 from 20. No episodes of seizure like activity noted, Patient was treated for HTN with Hydralazine 5mg x2 and electrolytes were repleted.         REVIEW OF SYSTEMS: unable as pt is sedated/vented      Daily     Daily     Diet, NPO with Tube Feed:   Tube Feeding Modality: Nasogastric  Glucerna 1.5 Ja (GLUCERNA1.5RTH)  Total Volume for 24 Hours (mL): 1200  Continuous  Starting Tube Feed Rate mL per Hour: 20  Until Goal Tube Feed Rate (mL per Hour): 50  Tube Feed Duration (in Hours): 24  Tube Feed Start Time: 18:00 (10-27-21 @ 17:34)      CURRENT MEDS:  Neurologic Medications  dexMEDEtomidine Infusion 0.4 MICROgram(s)/kG/Hr IV Continuous <Continuous>  lacosamide IVPB 100 milliGRAM(s) IV Intermittent every 12 hours  levETIRAcetam  IVPB 1000 milliGRAM(s) IV Intermittent <User Schedule>    Respiratory Medications    Cardiovascular Medications  amLODIPine   Tablet 10 milliGRAM(s) Oral daily  carvedilol 12.5 milliGRAM(s) Oral every 12 hours  doxazosin 4 milliGRAM(s) Oral at bedtime    Gastrointestinal Medications  multivitamin/minerals/iron Oral Solution (CENTRUM) 15 milliLiter(s) Enteral Tube daily  pantoprazole  Injectable 40 milliGRAM(s) IV Push daily  polyethylene glycol 3350 17 Gram(s) Oral every 12 hours  potassium phosphate IVPB 15 milliMole(s) IV Intermittent once  senna Syrup 10 milliLiter(s) Oral at bedtime    Genitourinary Medications    Hematologic/Oncologic Medications  aspirin  chewable 81 milliGRAM(s) Enteral Tube daily  enoxaparin Injectable 40 milliGRAM(s) SubCutaneous daily    Antimicrobial/Immunologic Medications    Endocrine/Metabolic Medications  atorvastatin 40 milliGRAM(s) Oral at bedtime  insulin lispro (ADMELOG) corrective regimen sliding scale   SubCutaneous every 6 hours  insulin NPH human recombinant 30 Unit(s) SubCutaneous every 6 hours    Topical/Other Medications  chlorhexidine 0.12% Liquid 15 milliLiter(s) Oral Mucosa every 12 hours  chlorhexidine 4% Liquid 1 Application(s) Topical <User Schedule>      ICU Vital Signs Last 24 Hrs  T(C): 36.6 (31 Oct 2021 00:00), Max: 37.3 (30 Oct 2021 18:00)  T(F): 97.8 (31 Oct 2021 00:00), Max: 99.1 (30 Oct 2021 18:00)  HR: 75 (31 Oct 2021 06:00) (57 - 84)  BP: --  BP(mean): --  ABP: 174/54 (31 Oct 2021 06:00) (132/50 - 192/61)  ABP(mean): 91 (31 Oct 2021 06:00) (70 - 105)  RR: 16 (31 Oct 2021 06:00) (14 - 21)  SpO2: 97% (31 Oct 2021 06:00) (96% - 100%)      Adult Advanced Hemodynamics Last 24 Hrs  CVP(mm Hg): --  CVP(cm H2O): --  CO: --  CI: --  PA: --  PA(mean): --  PCWP: --  SVR: --  SVRI: --  PVR: --  PVRI: --    Mode: AC/ CMV (Assist Control/ Continuous Mandatory Ventilation)  RR (machine): 12  TV (machine): 450  FiO2: 30  PEEP: 5  ITime: 1  MAP: 9  PIP: 14    ABG - ( 31 Oct 2021 04:59 )  pH, Arterial: 7.51  pH, Blood: x     /  pCO2: 43    /  pO2: 88    / HCO3: 34    / Base Excess: 10.1  /  SaO2: 98.3                I&O's Summary    29 Oct 2021 07:01  -  30 Oct 2021 07:00  --------------------------------------------------------  IN: 2789.2 mL / OUT: 1295 mL / NET: 1494.2 mL    30 Oct 2021 07:01  -  31 Oct 2021 06:39  --------------------------------------------------------  IN: 3126.7 mL / OUT: 5910 mL / NET: -2783.3 mL      I&O's Detail    29 Oct 2021 07:01  -  30 Oct 2021 07:00  --------------------------------------------------------  IN:    Dexmedetomidine: 174 mL    Enteral Tube Flush: 120 mL    Free Water: 200 mL    Glucerna 1.5: 1100 mL    Insulin: 82 mL    IV PiggyBack: 1062.5 mL    Ketamine: 15.6 mL    Propofol: 35.1 mL  Total IN: 2789.2 mL    OUT:    Indwelling Catheter - Urethral (mL): 1295 mL    Norepinephrine: 0 mL  Total OUT: 1295 mL    Total NET: 1494.2 mL      30 Oct 2021 07:01  -  31 Oct 2021 06:39  --------------------------------------------------------  IN:    Dexmedetomidine: 249.4 mL    Enteral Tube Flush: 240 mL    Free Water: 799.8 mL    Glucerna 1.5: 1200 mL    IV PiggyBack: 637.5 mL  Total IN: 3126.7 mL    OUT:    Indwelling Catheter - Urethral (mL): 5910 mL  Total OUT: 5910 mL    Total NET: -2783.3 mL          PHYSICAL EXAM:    General/Neuro  GCS:     = E1   / V1   / M  4  Exam done off all sedation:  -brisk withdrawal to all extremities  -RUE & LUE, proximal withdrawal  -LLE > RLE dorsiflexion only  -noted spontaneous dorsiflexion of RLE with slight wiggling of toes  -noted spontaneous RUE proximal shrug   Pupils: 4mm round equal , sluggish but responsive to light, downward gaze    Lungs:      clear to auscultation, vented on PRVC 12/450/21/5    Cardiovascular : S1, S2.  Regular rate and rhythm.  ++ Peripheral edema   Cardiac Rhythm: Normal Sinus Rhythm    GI: Abdomen soft, Non-tender, Non-distended.  NGT in place, TFs @ 50cc/hr    Extremities: Extremities warm, pink, well-perfused. Pulses: palpable DP b/l +2    Derm: Good skin turgor, no skin breakdown.      :       Morales catheter in place. clear yellow urine      CXR:     LABS:  CAPILLARY BLOOD GLUCOSE      POCT Blood Glucose.: 190 mg/dL (30 Oct 2021 17:23)  POCT Blood Glucose.: 129 mg/dL (30 Oct 2021 11:34)                          12.1   11.45 )-----------( 207      ( 31 Oct 2021 01:04 )             38.0       10-31    145  |  109<H>  |  28<H>  ----------------------------<  152<H>  3.6   |  28  |  1.03    Ca    8.2<L>      31 Oct 2021 01:04  Phos  2.9     10-31  Mg     2.2     10-31    TPro  5.4<L>  /  Alb  2.5<L>  /  TBili  <0.1<L>  /  DBili  x   /  AST  30  /  ALT  14  /  AlkPhos  87  10-31      PT/INR - ( 31 Oct 2021 01:04 )   PT: 12.1 sec;   INR: 1.01 ratio         PTT - ( 31 Oct 2021 01:04 )  PTT:39.5 sec  CARDIAC MARKERS ( 29 Oct 2021 11:21 )  x     / x     / 68 U/L / x     / 1.1 ng/mL          Culture - Fungal, CSF (collected 28 Oct 2021 20:32)  Source: .CSF CSF  Preliminary Report (29 Oct 2021 08:25):    Testing in progress    Culture - Acid Fast - CSF (collected 28 Oct 2021 20:32)  Source: .CSF CSF  Preliminary Report (30 Oct 2021 15:04):    Culture is being performed.    Culture - CSF with Gram Stain (collected 28 Oct 2021 20:32)  Source: .CSF CSF  Gram Stain (28 Oct 2021 21:14):    No polymorphonuclear cells seen    No organisms seen    by cytocentrifuge  Preliminary Report (29 Oct 2021 16:40):    No growth       CHIEF COMPLAINT:  Patient is a 66y old  Male who presents with a chief complaint of Transfer from [place] for EEG Monitoring (27 Oct 2021 20:35)    HPI:  65 y/o male with PMHX DM, HTN and BPH, found lethargic and was brought to [facility] by ambulance on 10/25 after family witnesed tonic clonic seizure. Patient was found hypoglycemic upon arrival to [facility] with a blood sugar of 34. IN the ER pt had another episode of seizure activity  and did not return to baseline and was subsequently intubated for airway protection. EEG was done on 10/26 after sedation was turned off and showed epileptic activity while patient was on Keppra 500bid. After EEG findings Keppra was increased to 100bid, however repeat eeg on 10/27 showed epileptic activity originating from left mid temporal area. On admission the patient was tested positive for covid 19 but no signs of respiratory disease and no findings of PNA on cxr. Pt was seen by the neurology team in [place] who added vimpat in addition to the keppra. Due to refractory seizures, pt was started on propofol for sedation. In addition blood cultures collected on 10/26 showed GPC bacteremia and group B strep isolated from urine culture. Pt has not required pressors during his stay at [facility] and was subsequently transferred [address] for EEG monitoring and seizure management.  (27 Oct 2021 17:09)    Interval Events:  Overnight: patient noted to have contraction alkalosis, with pH 7.51, was given 250 mg of diamox and RR decreased to 12 from 20. No episodes of seizure like activity noted, Patient was treated for HTN with Hydralazine 5mg x2 and electrolytes were repleted.       REVIEW OF SYSTEMS: unable as pt is sedated/vented      Daily     Diet, NPO with Tube Feed:   Tube Feeding Modality: Nasogastric  Glucerna 1.5 Ja (GLUCERNA1.5RTH)  Total Volume for 24 Hours (mL): 1200  Continuous  Starting Tube Feed Rate mL per Hour: 20  Until Goal Tube Feed Rate (mL per Hour): 50  Tube Feed Duration (in Hours): 24  Tube Feed Start Time: 18:00 (10-27-21 @ 17:34)      CURRENT MEDS:  Neurologic Medications  dexMEDEtomidine Infusion 0.4 MICROgram(s)/kG/Hr IV Continuous <Continuous>  lacosamide IVPB 100 milliGRAM(s) IV Intermittent every 12 hours  levETIRAcetam  IVPB 1000 milliGRAM(s) IV Intermittent <User Schedule>    Respiratory Medications    Cardiovascular Medications  amLODIPine   Tablet 10 milliGRAM(s) Oral daily  carvedilol 12.5 milliGRAM(s) Oral every 12 hours  doxazosin 4 milliGRAM(s) Oral at bedtime    Gastrointestinal Medications  multivitamin/minerals/iron Oral Solution (CENTRUM) 15 milliLiter(s) Enteral Tube daily  pantoprazole  Injectable 40 milliGRAM(s) IV Push daily  polyethylene glycol 3350 17 Gram(s) Oral every 12 hours  potassium phosphate IVPB 15 milliMole(s) IV Intermittent once  senna Syrup 10 milliLiter(s) Oral at bedtime    Genitourinary Medications    Hematologic/Oncologic Medications  aspirin  chewable 81 milliGRAM(s) Enteral Tube daily  enoxaparin Injectable 40 milliGRAM(s) SubCutaneous daily    Antimicrobial/Immunologic Medications    Endocrine/Metabolic Medications  atorvastatin 40 milliGRAM(s) Oral at bedtime  insulin lispro (ADMELOG) corrective regimen sliding scale   SubCutaneous every 6 hours  insulin NPH human recombinant 30 Unit(s) SubCutaneous every 6 hours    Topical/Other Medications  chlorhexidine 0.12% Liquid 15 milliLiter(s) Oral Mucosa every 12 hours  chlorhexidine 4% Liquid 1 Application(s) Topical <User Schedule>      ICU Vital Signs Last 24 Hrs  T(C): 36.6 (31 Oct 2021 00:00), Max: 37.3 (30 Oct 2021 18:00)  T(F): 97.8 (31 Oct 2021 00:00), Max: 99.1 (30 Oct 2021 18:00)  HR: 75 (31 Oct 2021 06:00) (57 - 84)  BP: --  BP(mean): --  ABP: 174/54 (31 Oct 2021 06:00) (132/50 - 192/61)  ABP(mean): 91 (31 Oct 2021 06:00) (70 - 105)  RR: 16 (31 Oct 2021 06:00) (14 - 21)  SpO2: 97% (31 Oct 2021 06:00) (96% - 100%)      Adult Advanced Hemodynamics Last 24 Hrs  CVP(mm Hg): --  CVP(cm H2O): --  CO: --  CI: --  PA: --  PA(mean): --  PCWP: --  SVR: --  SVRI: --  PVR: --  PVRI: --    Mode: AC/ CMV (Assist Control/ Continuous Mandatory Ventilation)  RR (machine): 12  TV (machine): 450  FiO2: 30  PEEP: 5  ITime: 1  MAP: 9  PIP: 14    ABG - ( 31 Oct 2021 04:59 )  pH, Arterial: 7.51  pH, Blood: x     /  pCO2: 43    /  pO2: 88    / HCO3: 34    / Base Excess: 10.1  /  SaO2: 98.3                I&O's Summary    29 Oct 2021 07:01  -  30 Oct 2021 07:00  --------------------------------------------------------  IN: 2789.2 mL / OUT: 1295 mL / NET: 1494.2 mL    30 Oct 2021 07:01  -  31 Oct 2021 06:39  --------------------------------------------------------  IN: 3126.7 mL / OUT: 5910 mL / NET: -2783.3 mL      I&O's Detail    29 Oct 2021 07:01  -  30 Oct 2021 07:00  --------------------------------------------------------  IN:    Dexmedetomidine: 174 mL    Enteral Tube Flush: 120 mL    Free Water: 200 mL    Glucerna 1.5: 1100 mL    Insulin: 82 mL    IV PiggyBack: 1062.5 mL    Ketamine: 15.6 mL    Propofol: 35.1 mL  Total IN: 2789.2 mL    OUT:    Indwelling Catheter - Urethral (mL): 1295 mL    Norepinephrine: 0 mL  Total OUT: 1295 mL    Total NET: 1494.2 mL      30 Oct 2021 07:01  -  31 Oct 2021 06:39  --------------------------------------------------------  IN:    Dexmedetomidine: 249.4 mL    Enteral Tube Flush: 240 mL    Free Water: 799.8 mL    Glucerna 1.5: 1200 mL    IV PiggyBack: 637.5 mL  Total IN: 3126.7 mL    OUT:    Indwelling Catheter - Urethral (mL): 5910 mL  Total OUT: 5910 mL    Total NET: -2783.3 mL          PHYSICAL EXAM:    General/Neuro  GCS:     = E1   / V1   / M  4  Exam done off all sedation:  -brisk withdrawal to all extremities  -RUE & LUE, proximal withdrawal  -LLE > RLE dorsiflexion only  -noted spontaneous dorsiflexion of RLE with slight wiggling of toes  -noted spontaneous RUE proximal shrug   Pupils: 4mm round equal , sluggish but responsive to light, downward gaze    Lungs:      clear to auscultation, vented on PRVC 12/450/21/5    Cardiovascular : S1, S2.  Regular rate and rhythm.  ++ Peripheral edema   Cardiac Rhythm: Normal Sinus Rhythm    GI: Abdomen soft, Non-tender, Non-distended.  NGT in place, TFs @ 50cc/hr    Extremities: Extremities warm, pink, well-perfused. Pulses: palpable DP b/l +2    Derm: Good skin turgor, no skin breakdown.      :       Morales catheter in place. clear yellow urine      CXR:     LABS:  CAPILLARY BLOOD GLUCOSE      POCT Blood Glucose.: 190 mg/dL (30 Oct 2021 17:23)  POCT Blood Glucose.: 129 mg/dL (30 Oct 2021 11:34)                          12.1   11.45 )-----------( 207      ( 31 Oct 2021 01:04 )             38.0       10-31    145  |  109<H>  |  28<H>  ----------------------------<  152<H>  3.6   |  28  |  1.03    Ca    8.2<L>      31 Oct 2021 01:04  Phos  2.9     10-31  Mg     2.2     10-31    TPro  5.4<L>  /  Alb  2.5<L>  /  TBili  <0.1<L>  /  DBili  x   /  AST  30  /  ALT  14  /  AlkPhos  87  10-31      PT/INR - ( 31 Oct 2021 01:04 )   PT: 12.1 sec;   INR: 1.01 ratio         PTT - ( 31 Oct 2021 01:04 )  PTT:39.5 sec  CARDIAC MARKERS ( 29 Oct 2021 11:21 )  x     / x     / 68 U/L / x     / 1.1 ng/mL          Culture - Fungal, CSF (collected 28 Oct 2021 20:32)  Source: .CSF CSF  Preliminary Report (29 Oct 2021 08:25):    Testing in progress    Culture - Acid Fast - CSF (collected 28 Oct 2021 20:32)  Source: .CSF CSF  Preliminary Report (30 Oct 2021 15:04):    Culture is being performed.    Culture - CSF with Gram Stain (collected 28 Oct 2021 20:32)  Source: .CSF CSF  Gram Stain (28 Oct 2021 21:14):    No polymorphonuclear cells seen    No organisms seen    by cytocentrifuge  Preliminary Report (29 Oct 2021 16:40):    No growth

## 2021-10-31 NOTE — PROGRESS NOTE ADULT - SUBJECTIVE AND OBJECTIVE BOX
Referring: Lory    HPI:  65 y/o male with PMHX DM, HTn and BPH, found lethargic and was brought to [facility] by ambulance on 10/25 after family witnesed tonic clonic seizure. Patient was found hypoglycemic upon arrival to [facility] with a blood sugar of 34. IN the ER pt had another episode of seizure activity  and did not return to baseline and was subsequently intubated for airway protection. EEG was done on 10/26 after sedation was turned off and showed epileptic activity while patient was on Keppra 500bid. After EEG findings Keppra was increased to 100bid, however repeat eeg on 10/27 showed epileptic activity originating from left mid temporal area. On admission the patient was tested positive for covid 19 but no signs of respiratory disease and no findings of PNA on cxr. Pt was seen by the neurology team in [place] who added vimpat in addition to the keppra. Due to refractory seizures, pt was started on propofol for sedation. In addition blood cultures collected on 10/26 showed GPC bacteremia and group B strep isolated from urine culture. Pt has not required pressors during his stay at [facility] and was subsequently transferred to [address] for EEG monitoring and seizure management.     Cardiology consulted for elevated troponin in setting of recent status epilepticus and strep viridans bacteremia.    ===========================  Interval Events  -   - No sig interval changes in vitals, except elevated BP  - +Fever  ===========================        PMHx:   Hypertension    Diabetes    History of BPH        PSHx:   No significant past surgical history        Allergies:  No Known Allergies      Home Meds:    Current Medications:   amLODIPine   Tablet 5 milliGRAM(s) Oral daily  aspirin  chewable 81 milliGRAM(s) Enteral Tube daily  atorvastatin 40 milliGRAM(s) Oral at bedtime  carvedilol 6.25 milliGRAM(s) Oral every 12 hours  cefTRIAXone   IVPB 2000 milliGRAM(s) IV Intermittent every 24 hours  chlorhexidine 0.12% Liquid 15 milliLiter(s) Oral Mucosa every 12 hours  chlorhexidine 4% Liquid 1 Application(s) Topical <User Schedule>  dexMEDEtomidine Infusion 0.4 MICROgram(s)/kG/Hr IV Continuous <Continuous>  doxazosin 4 milliGRAM(s) Oral at bedtime  enoxaparin Injectable 40 milliGRAM(s) SubCutaneous daily  insulin lispro (ADMELOG) corrective regimen sliding scale   SubCutaneous every 6 hours  insulin NPH human recombinant 30 Unit(s) SubCutaneous every 6 hours  lacosamide IVPB 100 milliGRAM(s) IV Intermittent every 12 hours  levETIRAcetam  IVPB 1000 milliGRAM(s) IV Intermittent <User Schedule>  multivitamin/minerals/iron Oral Solution (CENTRUM) 15 milliLiter(s) Enteral Tube daily  pantoprazole  Injectable 40 milliGRAM(s) IV Push daily  polyethylene glycol 3350 17 Gram(s) Oral daily  senna Syrup 10 milliLiter(s) Oral at bedtime      FAMILY HISTORY:  No pertinent family history in first degree relatives          Physical Exam:  T(F): 98.1 (10-30), Max: 98.4 (10-30)  HR: 59 (10-30) (57 - 108)  BP: --  RR: 20 (10-30)  SpO2: 100% (10-30)  GENERAL: No acute distress, intubated  CHEST/LUNG: intubated  BACK: No spinal tenderness  HEART: Regular rate and rhythm; No murmurs, rubs, or gallops  ABDOMEN: Soft, Nontender, Nondistended; Bowel sounds present  EXTREMITIES:  No clubbing, cyanosis, or edema    Cardiovascular Diagnostic Testing:    ECG: Personally reviewed:    Echo: Personally reviewed:    Stress Testing:    Cath:    Imaging:    CXR: Personally reviewed    Labs: Personally reviewed                        11.4   9.77  )-----------( 216      ( 29 Oct 2021 23:40 )             36.3     10-29    148<H>  |  114<H>  |  34<H>  ----------------------------<  139<H>  3.6   |  25  |  1.07    Ca    7.8<L>      29 Oct 2021 23:40  Phos  2.6     10-29  Mg     2.2     10-29    TPro  5.1<L>  /  Alb  2.4<L>  /  TBili  <0.1<L>  /  DBili  x   /  AST  36  /  ALT  13  /  AlkPhos  88  10-29    PT/INR - ( 29 Oct 2021 00:14 )   PT: 13.0 sec;   INR: 1.09 ratio         PTT - ( 29 Oct 2021 00:14 )  PTT:35.2 sec  Serum Pro-Brain Natriuretic Peptide: 882 pg/mL (10-27 @ 17:50)            Assessment and Recommendation:   · Assessment	  65 y/o male with PMHX DM, HTn and BPH, admitted to MICU for status epilepticus now intubated and found to have strep viridans bacteremia.   Cardiology consulted for elevated troponin in setting of recent status epilepticus and strep viridans bacteremia.    #Elevated troponin- troponin is highly sensitive but non specific. Recent TTE with normal LV systolic and diastolic function.  Type 2 MI 2/2 to bacteremia/septic shock/seizures  -CKMB normal   -no acute cardiac intervention at this time  -would no longer trend troponin  unless other signs of ischemia (EKG changes, chest pain once patient is off sedation/extubated)  Elevated trop likely in setting of acute neurological status.  If no further changes, patient can have outpatient cardiology eval for ischemic testing

## 2021-10-31 NOTE — PROGRESS NOTE ADULT - ATTENDING COMMENTS
Patient is a 65 y/o M with PMHx DM, HTN, BPH, transferred from [facility] after witnessed tonic clonic seizure fo unknown etiology and chronicity found also with hypoglycemia, hypotension in setting of bacteremia. Currently intubated for airway protection and on sedation versed/ fentanyl. Concern of COVID+. VS: 90/41 (map 57), RR22, 99% vent. CBC: 20.13wbc 89%N, Cr 1.34, alb2.4, ast 49, proBNP 882, Ua w/ glucosuria and proteinuria. Patient had myoclonic of LUE and given versed. Patient EEG noted to show diffuse cortical hyperexcitability with GPDs, prognosis is poor as noted in EEG report. Patient started on Depakote today s/p 1 g load. Will continue to monitor. If episodes persist, contact epilepsy team.     Impression: Patient found w/ GTC medically refractory to keppra/ vimpat at [facility], intubated for airway protection and sedated for concerns of status epilepticus of unknown etiology and hypoxic encephalopathy.    Recommendations:   []1g Depakote load x1, followed by 500mg Q8hrs   [ ] continue with keppra 1000mg Q8hr  vimpat 100mg BID IV  []Depakote, keppra, vimpat level in the AM .  prognosis is poor.

## 2021-10-31 NOTE — PROGRESS NOTE ADULT - SUBJECTIVE AND OBJECTIVE BOX
MRN-[id number]  Patient is a 66y old  Male who presents with a chief complaint of Transfer from [place] for EEG Monitoring (31 Oct 2021 06:37)    Subjective/24hrs: Patient noted to have seizure and was given 2mg versed.     Objective:   MEDICATIONS  (STANDING):  amLODIPine   Tablet 10 milliGRAM(s) Oral daily  aspirin  chewable 81 milliGRAM(s) Enteral Tube daily  atorvastatin 40 milliGRAM(s) Oral at bedtime  carvedilol 12.5 milliGRAM(s) Oral every 12 hours  chlorhexidine 0.12% Liquid 15 milliLiter(s) Oral Mucosa every 12 hours  chlorhexidine 4% Liquid 1 Application(s) Topical <User Schedule>  dexMEDEtomidine Infusion 0.4 MICROgram(s)/kG/Hr (7.82 mL/Hr) IV Continuous <Continuous>  doxazosin 4 milliGRAM(s) Oral at bedtime  enoxaparin Injectable 40 milliGRAM(s) SubCutaneous daily  insulin lispro (ADMELOG) corrective regimen sliding scale   SubCutaneous every 6 hours  insulin NPH human recombinant 30 Unit(s) SubCutaneous every 6 hours  lacosamide IVPB 100 milliGRAM(s) IV Intermittent every 12 hours  levETIRAcetam  IVPB 1000 milliGRAM(s) IV Intermittent <User Schedule>  multivitamin/minerals/iron Oral Solution (CENTRUM) 15 milliLiter(s) Enteral Tube daily  pantoprazole  Injectable 40 milliGRAM(s) IV Push daily  polyethylene glycol 3350 17 Gram(s) Oral every 12 hours  senna Syrup 10 milliLiter(s) Oral at bedtime  valproate sodium IVPB 1000 milliGRAM(s) IV Intermittent once  valproate sodium IVPB 500 milliGRAM(s) IV Intermittent every 8 hours    MEDICATIONS  (PRN):    Vital Signs Last 24 Hrs  T(C): 37.5 (31 Oct 2021 08:00), Max: 37.5 (31 Oct 2021 08:00)  T(F): 99.5 (31 Oct 2021 08:00), Max: 99.5 (31 Oct 2021 08:00)  HR: 85 (31 Oct 2021 09:55) (64 - 85)  BP: --  BP(mean): --  RR: 16 (31 Oct 2021 09:00) (14 - 21)  SpO2: 97% (31 Oct 2021 09:55) (96% - 100%)    GENERAL EXAM:  Constitutional: awake and alert. NAD  HEENT: pupils symmetric b/l. intubated.   Extremities: no edema, no cyanosis  Musculoskeletal: no joint swelling/tenderness, no abnormal movements  Skin: no rashes    NEUROLOGICAL EXAM:  MS: Does not awake with verbal stimulation. Intubated.   CN: pupils symmetric b/l. PERRL  V1-3 intact,   Motor: Strength: spontaneous movement noted. LUE myoclonic jerk noted.   Tone: normal. Bulk: normal.   Plantar flex b/l.   Sensation: intact to noxious stimuli   Coordination: deferred.   Gait:  deferred.      Labs:   cbc                      12.1   11.45 )-----------( 207      ( 31 Oct 2021 01:04 )             38.0     [id number]    145  |  109<H>  |  28<H>  ----------------------------<  152<H>  3.6   |  28  |  1.03    Ca    8.2<L>      31 Oct 2021 01:04  Phos  2.9     10-31  Mg     2.2     10-31    TPro  5.4<L>  /  Alb  2.5<L>  /  TBili  <0.1<L>  /  DBili  x   /  AST  30  /  ALT  14  /  AlkPhos  87  10-31    CoagsPT/INR - ( 31 Oct 2021 01:04 )   PT: 12.1 sec;   INR: 1.01 ratio         PTT - ( 31 Oct 2021 01:04 )  PTT:39.5 sec      LFTsLIVER FUNCTIONS - ( 31 Oct 2021 01:04 )  Alb: 2.5 g/dL / Pro: 5.4 g/dL / ALK PHOS: 87 U/L / ALT: 14 U/L / AST: 30 U/L / GGT: x             Radiology:  LE dopplers : IMPRESSION:  No evidence of deep venous thrombosis in either lower extremity.    CT head w/o contrast: IMPRESSION:Large ventricles likely to atrophy. No evidence of anoxic ischemic encephalopathyThere is no acute hemorrhage, mass effect, or midline shift.    EEG Impression / Clinical Correlate:    Abnormal EEG study.    Diffuse cortical hyperexcitability with GPDs at times coming in prolonged runs without clear evolution into a distinct ictal pattern, however may fall on the ictal-interictal continuum.   Severe nonspecific diffuse or multifocal cerebral dysfunction.     Overall, this EEG is concerning for diffuse cerebral injury possibly in the setting of hypoxia. Prognosis would appear to be grim.

## 2021-10-31 NOTE — PROGRESS NOTE ADULT - ASSESSMENT
Neuro:  New onset seizures, ?early Alzheimers  - A&O at baseline  - Sedated on Precedex  - Vimpat 100mg IV q12h and Keppra 1g q8h  - vEEG monitoring (10/27- 30)  - Noncon CTH (10/27) no anoxic ischemic encephalopathy  - q4 neuro checks  [ ] pending MRI w/&w/o  - Neuro following, Recs appreciated     Respiratory  - Intubated on 10/25 for airway protection  - SBT as tolerated - apeic on previous trials, 10/30 PS 10/5 x2hrs  ABG - ( 31 Oct 2021 04:59 )  pH, Arterial: 7.51  pH, Blood: x     /  pCO2: 43    /  pO2: 88    / HCO3: 34    / Base Excess: 10.1  /  SaO2: 98.  - Mechanical Ventilation: PRVC 12/450/5/21; Ppk21, Pplat 11  --RR decreased to 12 overnight & given diamox for metabolic alkalosis in setting of high risk for seizures  [ ] FU rpt ABG at 8am    CV  Hypotension 2/2 Sedation, resolved  - remains off pressors maintain MAP >/= 65    PMHx HTN, Cath 2/21 no stents  - intermittent HTN to 180s s/p hydralazine 5mg x 2  [ ] if remains HTN consider increasing Coreg   - c/w home meds: Lipitor 40mg qd, ASA 81mg qd, Norvasc 10mg qd, Coreg 12.5mg q12h  - TTE (10/29) -> normal function  - Trend Trop 127 --> 130 --> 164-->173. No EKG changes. lieklytype 2 secondary to seizures in setting of normal echo, continue to treat underlying, trops nonspecific do not continue to trend unless clinically indicated.  - Cards consulted, recs appreciated    GI - Last BM 10/29  - NPO with TF (Glucerna 1.5cal) per Dietician recs via NGT  - Protonix daily   - Bowel regimen with Miralax BID senna, no gastric residual noted  [ ] will advance BR if no BM today      Hx BPH  - c/w home Cardura 4mg qd  - Morales changed on Admission (10/27)   - Monitor Lytes replete as necessary to maintain Mg>2 Phos>3 K>4     HyperNa/HyperCl  - Free Water 200ml Q6h   - Na 145, Cl 107    Fluid Overload  - s/p lasix 20mg IVP on 10/30  - net -2.9L  - suspected contraction alkalosis, s/p 250 mg Diamox   - FU rpt ABG     Labs:          BUN/Cr- 34/1.07  -->,  31/1.01  -->,  28/1.03  -->          Electrolytes-Na 145 // K 3.6 // Mg 2.2 //  Phos 2.9 (10-31 @ 01:04)    Infectious Disease  Covid 19—incidental finding in OSH ER (Negative PCR x2 at [facility])   - OFF Remdesivir x 3doses stopped 2/2 negative Covid diagnosis  - OFF Dexmethasone x1 dose stopped 2/2 neg Covid diagnosis   - BCx + for GPC with UCx + for Group B Strep at [facility]   - F/u repeat Blood cultures x2 NGTD , UCx neg, Sputum Cx neg (10/27)  - Lumbar puncture with CSF Negative for infection (10/28)   - s/p Ceftriaxone (10/26- 30)   - ID consulting, recs appreciated  - Tmax 99.1, WBC 11.4 9.77 --> 11.4     Endocrine  IDDM- HbA1c 14.8  - Hyperglycemic BGL>400 requiring Insulin gtt, transitioned off 10/29   - NPH and mod ISS q6h  - Endocrinology consulting, recs appreciated    Heme  - Monitor CBC   - Lovenox DVT PPX  - LE Duplex (10/29) neg for DVT   [ ] Carotid Duplex pending    Lines  - Arrows x2 10/30  - Right Radial A line 10/27   - Morales Catheter 10/27    Dispo  - Full Code   - Primary contact: Kalli Wardu (daughter) [phone]   Neuro:  New onset seizures, ?early Alzheimers  - A&O at baseline  - Sedated on Precedex  - Vimpat 100mg IV q12h and Keppra 1g q8h  - vEEG monitoring (10/27- 30)  - Noncon CTH (10/27) no anoxic ischemic encephalopathy  - q4 neuro checks  [ ] pending MRI w/&w/o  - Neuro following, Recs appreciated     Respiratory  - Intubated on 10/25 for airway protection  - SBT as tolerated - apeic on previous trials, 10/30 PS 10/5 x2hrs  ABG - ( 31 Oct 2021 04:59 )  pH, Arterial: 7.51  pH, Blood: x     /  pCO2: 43    /  pO2: 88    / HCO3: 34    / Base Excess: 10.1  /  SaO2: 98.  - Mechanical Ventilation: PRVC 12/450/5/21; Ppk21, Pplat 11  --RR decreased to 12 overnight & given diamox for metabolic alkalosis in setting of high risk for seizures  [ ] FU rpt ABG at 8am    CV  Hypotension 2/2 Sedation, resolved  - remains off pressors maintain MAP >/= 65    PMHx HTN, Cath 2/21 no stents  - intermittent HTN to 180s s/p hydralazine 5mg x 2  [ ] if remains HTN consider increasing Coreg   - c/w home meds: Lipitor 40mg qd, ASA 81mg qd, Norvasc 10mg qd, Coreg 12.5mg q12h  - TTE (10/29) -> normal function  - Trend Trop 127 --> 130 --> 164-->173. No EKG changes. lieklytype 2 secondary to seizures in setting of normal echo, continue to treat underlying, trops nonspecific do not continue to trend unless clinically indicated.  - Cards consulted, recs appreciated    GI - Last BM 10/29  - NPO with TF (Glucerna 1.5cal) per Dietician recs via NGT  - Protonix daily   - Bowel regimen with Miralax BID senna, no gastric residual noted  [ ] will advance BR if no BM today      Hx BPH  - c/w home Cardura 4mg qd  - Morales changed on Admission (10/27)   - Monitor Lytes replete as necessary to maintain Mg>2 Phos>3 K>4     HyperNa/HyperCl  - Free Water 200ml Q6h   - Na 145, Cl 107    Fluid Overload  - s/p lasix 20mg IVP on 10/30  - net -2.9L  - suspected contraction alkalosis, s/p 250 mg Diamox   - FU rpt ABG     Labs:          BUN/Cr- 34/1.07  -->,  31/1.01  -->,  28/1.03  -->          Electrolytes-Na 145 // K 3.6 // Mg 2.2 //  Phos 2.9 (10-31 @ 01:04)    Infectious Disease  Covid 19—incidental finding in OSH ER (Negative PCR x2 at [facility])   - OFF Remdesivir x 3doses stopped 2/2 negative Covid diagnosis  - OFF Dexmethasone x1 dose stopped 2/2 neg Covid diagnosis   - BCx + for GPC with UCx + for Group B Strep at [facility]   - F/u repeat Blood cultures x2 NGTD , UCx neg, Sputum Cx neg (10/27)  - Lumbar puncture with CSF Negative for infection (10/28)   - s/p Ceftriaxone (10/26- 30)   - ID consulting, recs appreciated  - Tmax 99.1, WBC 11.4 9.77 --> 11.4     Endocrine  IDDM- HbA1c 14.8  - Hyperglycemic BGL>400 requiring Insulin gtt, transitioned off 10/29   - NPH and mod ISS q6h  - Endocrinology consulting, recs appreciated    Heme  - Monitor CBC   - Lovenox DVT PPX  - LE Duplex (10/29) neg for DVT   [ ] Carotid Duplex pending    Lines  - Arrows x2 10/30  - Right Radial A line 10/27   - Morales Catheter 10/27    Dispo  - Full Code   - Primary contact: Kalli Wardu (daughter) [phone]   Neuro:  New onset seizures, ?early Alzheimers  - A&O at baseline  - Sedated on Precedex  - Vimpat 100mg IV q12h and Keppra 1g q8h  - vEEG monitoring (10/27- 30)  - Noncon CTH (10/27) no anoxic ischemic encephalopathy  - q4 neuro checks  [ ] pending MRI w/&w/o  - Neuro following, Recs appreciated   - poor prognosis     Respiratory  - Intubated on 10/25 for airway protection  - SBT as tolerated - apeic on previous trials, 10/30 PS 10/5 x2hrs  ABG - ( 31 Oct 2021 04:59 )  pH, Arterial: 7.51  pH, Blood: x     /  pCO2: 43    /  pO2: 88    / HCO3: 34    / Base Excess: 10.1  /  SaO2: 98.  - Mechanical Ventilation: PRVC 12/450/5/21; Ppk21, Pplat 11  --RR decreased to 12 overnight & given diamox for metabolic alkalosis in setting of high risk for seizures  [ ] FU rpt ABG at 8am    CV  Hypotension 2/2 Sedation, resolved  - remains off pressors maintain MAP >/= 65    PMHx HTN, Cath 2/21 no stents  - intermittent HTN to 180s s/p hydralazine 5mg x 2  [ ] if remains HTN consider increasing Coreg   - c/w home meds: Lipitor 40mg qd, ASA 81mg qd, Norvasc 10mg qd, Coreg 12.5mg q12h  - TTE (10/29) -> normal function  - Trend Trop 127 --> 130 --> 164-->173. No EKG changes. lieklytype 2 secondary to seizures in setting of normal echo, continue to treat underlying, trops nonspecific do not continue to trend unless clinically indicated.  - Cards consulted, recs appreciated    GI - Last BM 10/29  - NPO with TF (Glucerna 1.5cal) per Dietician recs via NGT  - Protonix daily   - Bowel regimen with Miralax BID senna, no gastric residual noted  [ ] will advance BR if no BM today      Hx BPH  - c/w home Cardura 4mg qd  - Morales changed on Admission (10/27)   - Monitor Lytes replete as necessary to maintain Mg>2 Phos>3 K>4     HyperNa/HyperCl  - Free Water 200ml Q6h   - Na 145, Cl 107    Fluid Overload  - s/p lasix 20mg IVP on 10/30  - net -2.9L  - suspected contraction alkalosis, s/p 250 mg Diamox   - FU rpt ABG     Labs:          BUN/Cr- 34/1.07  -->,  31/1.01  -->,  28/1.03  -->          Electrolytes-Na 145 // K 3.6 // Mg 2.2 //  Phos 2.9 (10-31 @ 01:04)    Infectious Disease  Covid 19—incidental finding in OSH ER (Negative PCR x2 at [facility])   - OFF Remdesivir x 3doses stopped 2/2 negative Covid diagnosis  - OFF Dexmethasone x1 dose stopped 2/2 neg Covid diagnosis   - BCx + for GPC with UCx + for Group B Strep at [facility]   - F/u repeat Blood cultures x2 NGTD , UCx neg, Sputum Cx neg (10/27)  - Lumbar puncture with CSF Negative for infection (10/28)   - s/p Ceftriaxone (10/26- 30)   - ID consulting, recs appreciated  - Tmax 99.1, WBC 11.4 9.77 --> 11.4     Endocrine  IDDM- HbA1c 14.8  - Hyperglycemic BGL>400 requiring Insulin gtt, transitioned off 10/29   - NPH and mod ISS q6h  - Endocrinology consulting, recs appreciated    Heme  - Monitor CBC   - Lovenox DVT PPX  - LE Duplex (10/29) neg for DVT   [ ] Carotid Duplex pending    Lines  - Arrows x2 10/30  - Right Radial A line 10/27   - Morales Catheter 10/27    Dispo  - Full Code   - Primary contact: Kalli Hughes (daughter) [phone]   Neuro:  New onset seizures, ?early Alzheimers  - A&O at baseline  - Sedated on Precedex  - Vimpat 100mg IV q12h and Keppra 1g q8h  - vEEG monitoring (10/27- 30)  - Noncon CTH (10/27) no anoxic ischemic encephalopathy  - q4 neuro checks  [ ] pending MRI w/&w/o  - Neuro following, Recs appreciated   - VEEG read by Neurology: poor prognosis with evidence of hypoxic brain injury and breakthrough seizures  - loaded with 1g of depakote IV and will continue 500mg q8hr per recommendations    Respiratory  - Intubated on 10/25 for airway protection  - SBT as tolerated - apeic on previous trials, 10/30 PS 10/5 x2hrs  ABG - ( 31 Oct 2021 04:59 )  pH, Arterial: 7.51  pH, Blood: x     /  pCO2: 43    /  pO2: 88    / HCO3: 34    / Base Excess: 10.1  /  SaO2: 98.  - Mechanical Ventilation: PRVC 12/450/5/21; Ppk21, Pplat 11  --RR decreased to 12 overnight & given diamox for metabolic alkalosis in setting of high risk for seizures  [ ] FU rpt ABG at 8am    CV  Hypotension 2/2 Sedation, resolved  - remains off pressors maintain MAP >/= 65    PMHx HTN, Cath 2/21 no stents  - intermittent HTN to 180s s/p hydralazine 5mg x 2  [ ] if remains HTN consider increasing Coreg   - c/w home meds: Lipitor 40mg qd, ASA 81mg qd, Norvasc 10mg qd, Coreg 12.5mg q12h  - TTE (10/29) -> normal function  - Trend Trop 127 --> 130 --> 164-->173. No EKG changes. lieklytype 2 secondary to seizures in setting of normal echo, continue to treat underlying, trops nonspecific do not continue to trend unless clinically indicated.  - Cards consulted, recs appreciated    GI - Last BM 10/29  - NPO with TF (Glucerna 1.5cal) per Dietician recs via NGT  - Protonix daily   - Bowel regimen with Miralax BID senna, no gastric residual noted  [ ] will advance BR if no BM today      Hx BPH  - c/w home Cardura 4mg qd  - Morales changed on Admission (10/27)   - Monitor Lytes replete as necessary to maintain Mg>2 Phos>3 K>4     HyperNa/HyperCl  - Free Water 200ml Q6h   - Na 145, Cl 107    Fluid Overload  - s/p lasix 20mg IVP on 10/30  - net -2.9L  - suspected contraction alkalosis, s/p 250 mg Diamox   - FU rpt ABG     Labs:          BUN/Cr- 34/1.07  -->,  31/1.01  -->,  28/1.03  -->          Electrolytes-Na 145 // K 3.6 // Mg 2.2 //  Phos 2.9 (10-31 @ 01:04)    Infectious Disease  Covid 19—incidental finding in OSH ER (Negative PCR x2 at [facility])   - OFF Remdesivir x 3doses stopped 2/2 negative Covid diagnosis  - OFF Dexmethasone x1 dose stopped 2/2 neg Covid diagnosis   - BCx + for GPC with UCx + for Group B Strep at [facility]   - F/u repeat Blood cultures x2 NGTD , UCx neg, Sputum Cx neg (10/27)  - Lumbar puncture with CSF Negative for infection (10/28)   - s/p Ceftriaxone (10/26- 30)   - ID consulting, recs appreciated  - Tmax 99.1, WBC 11.4 9.77 --> 11.4     Endocrine  IDDM- HbA1c 14.8  - Hyperglycemic BGL>400 requiring Insulin gtt, transitioned off 10/29   - NPH and mod ISS q6h  - Endocrinology consulting, recs appreciated    Heme  - Monitor CBC   - Lovenox DVT PPX  - LE Duplex (10/29) neg for DVT   [ ] Carotid Duplex pending    Lines  - Arrows x2 10/30  - Right Radial A line 10/27   - Morales Catheter 10/27    GOC:   [ ] Palliative care consult today  [ ] will discuss vEEG read with Daughter Kalli and will offer in person family meeting   - remains Full Code   - Primary contact: Kalli Hughes (daughter) [phone]      DISPO: continue ICU level of care, above plan discussed with Dr Andrade   Neuro:  New onset seizures, ?early Alzheimers  - A&O at baseline  - Sedated on Precedex  - Vimpat 100mg IV q12h and Keppra 1g q8h  - vEEG monitoring (10/27- 30)  - Noncon CTH (10/27) no anoxic ischemic encephalopathy  - q4 neuro checks  [ ] pending MRI w/&w/o  - Neuro following, Dr Fernandez, Recs appreciated   - VEEG read by Neurology: poor prognosis with evidence of hypoxic brain injury and breakthrough seizures    - loaded with 1g of depakote IV and will continue 500mg q8hr per recommendations    Respiratory  - Intubated on 10/25 for airway protection  - SBT as tolerated - apeic on previous trials, 10/30 PS 10/5 x2hrs  ABG - ( 31 Oct 2021 04:59 )  pH, Arterial: 7.51  pH, Blood: x     /  pCO2: 43    /  pO2: 88    / HCO3: 34    / Base Excess: 10.1  /  SaO2: 98.  - Mechanical Ventilation: PRVC 12/400/5/21; Ppk21, Pplat 11  --RR decreased to 12 overnight & given diamox for metabolic alkalosis in setting of high risk for seizures  [ ] FU rpt ABG at 8am    CV  Hypotension 2/2 Sedation, resolved  - remains off pressors maintain MAP >/= 65    PMHx HTN, Cath 2/21 no stents  - intermittent HTN to 180s s/p hydralazine 5mg x 2  [ ] if remains HTN consider increasing Coreg   - c/w home meds: Lipitor 40mg qd, ASA 81mg qd, Norvasc 10mg qd, Coreg 12.5mg q12h  - TTE (10/29) -> normal function  - Trend Trop 127 --> 130 --> 164-->173. No EKG changes. lieklytype 2 secondary to seizures in setting of normal echo, continue to treat underlying, trops nonspecific do not continue to trend unless clinically indicated.  - Cards consulted, recs appreciated    GI - Last BM 10/29  - NPO with TF (Glucerna 1.5cal) per Dietician recs via NGT  - Protonix daily   - Bowel regimen with Miralax BID senna, no gastric residual noted  [ ] will advance BR if no BM today      Hx BPH  - c/w home Cardura 4mg qd  - Morales changed on Admission (10/27)   - Monitor Lytes replete as necessary to maintain Mg>2 Phos>3 K>4     HyperNa/HyperCl  - Free Water 200ml Q6h   - Na 145, Cl 107    Fluid Overload  - s/p lasix 20mg IVP on 10/30  - net -2.9L  - suspected contraction alkalosis, s/p 250 mg Diamox & 500 NS bolus    Labs:          BUN/Cr- 34/1.07  -->,  31/1.01  -->,  28/1.03  -->          Electrolytes-Na 145 // K 3.6 // Mg 2.2 //  Phos 2.9 (10-31 @ 01:04)    Infectious Disease  Covid 19—incidental finding in OSH ER (Negative PCR x2 at [facility])   - OFF Remdesivir x 3doses stopped 2/2 negative Covid diagnosis  - OFF Dexmethasone x1 dose stopped 2/2 neg Covid diagnosis   - BCx + for GPC with UCx + for Group B Strep at [facility]   - F/u repeat Blood cultures x2 NGTD , UCx neg, Sputum Cx neg (10/27)  - Lumbar puncture with CSF Negative for infection (10/28)   - s/p Ceftriaxone (10/26- 30)   - ID consulting, recs appreciated  - Tmax 99.1, WBC 11.4 9.77 --> 11.4     Endocrine  IDDM- HbA1c 14.8  - Hyperglycemic BGL>400 requiring Insulin gtt, transitioned off 10/29   - NPH and mod ISS q6h  - Endocrinology consulting, recs appreciated    Heme  - Monitor CBC   - Lovenox DVT PPX  - LE Duplex (10/29) neg for DVT   [ ] Carotid Duplex pending    Lines  - Arrows x2 10/30  - Right Radial A line 10/27   - Morales Catheter 10/27    GOC:   [ ] Palliative care consult today  [ ] will discuss YanethEG read with Daughter Kalli and will offer in person family meeting   - remains Full Code   - Primary contact: Kalli Hughes (daughter) [phone]      DISPO: continue ICU level of care, above plan discussed with Dr Andrade

## 2021-10-31 NOTE — PROGRESS NOTE ADULT - ATTENDING COMMENTS
1. Acute hypoxemic respiratory failure. Pt intubated for airway protection during seizures. Continue PS trials as tolerated then back on AC settings. Awaiting for pt to wake up. Off versed x 24 hrs. received ativan last night for possible seizure. Await EEG report.  2. Neuro. Seizure disorder. Continue Keppra 1000mg tid and Vimpat.  EEG sow evidence of severe anoix brain injury with seizures. Started on Depakote.  3.ID. alpha strep bacteremia. Likely contaminant. Discussed with ID. D/C abx.  4. Covid 19. Initially infected in February and then Vaccine x1 in August. Pt did not receive 2nd dose. Initially Covid positive at [facility]. Covid negative x 2. here. Off isolation.  5. Start home cardiac medications.   6 GOC: Full code. In light of recent EEG findings need to re address GOC. Palliative care consult.

## 2021-10-31 NOTE — PROGRESS NOTE ADULT - ASSESSMENT
Patient is a 67 y/o M with PMHx DM, HTN, BPH, transferred from [facility] after witnessed tonic clonic seizure fo unknown etiology and chronicity found also with hypoglycemia, hypotension in setting of bacteremia. Currently intubated for airway protection and on sedation versed/ fentanyl. Concern of COVID+. VS: 90/41 (map 57), RR22, 99% vent. CBC: 20.13wbc 89%N, Cr 1.34, alb2.4, ast 49, proBNP 882, Ua w/ glucosuria and proteinuria. Patient had myoclonic of LUE and given versed. Patient EEG noted to show diffuse cortical hyperexcitability with GPDs, prognosis is poor as noted in EEG report. Patient started on Depakote today s/p 1 g load. Will continue to monitor. If episodes persist, contact epilepsy team.     Impression: Patient found w/ GTC medically refractory to keppra/ vimpat at [facility], intubated for airway protection and sedated for concerns of status epilepticus of unknown etiology and chronicity.     Recommendations:   []1g Depakote load x1, followed by 500mg Q8hrs   [ ] continue with keppra 1000mg Q8hr  vimpat 100mg BID IV  []Depakote, keppra, vimpat level in the AM   [ ] MRI brain w/ and w/o contrast when medically able to    [x]CBC, CMP, Mg, P, CpK, UA, Utox, ammonia, troponin, basic infectious workup, prolactin  [ ] DVT ppx as per primary team   [x ] Given concern for seizure, advise patient to not drive, operate heavy machinery, avoid heights, pools, bathtubs, locked doors until cleared by further follow up outpatient by neurology.     Please note: if patient has a convulsion, please document length of episode, specifically what patient was doing paying attention to eye opening vs closure, gaze deviation, shaking of extremities, tongue bite, urinary incontinence, any derangement of vital signs    Case discussed with Neurologist Dr. Fernandez

## 2021-11-01 NOTE — PROGRESS NOTE ADULT - ASSESSMENT
67 y/o M w/ uncontrolled Type 2 DM w/ hyperglycemia exacerbated by steroids for COVID , now off isolation , admitted for status epilepticus (high risk patient with severely uncontrolled diabetes with A1c of 14.8% at high risk of CAD and CVA with high level decision-making). Endocrine consulted for DM2 and steroid induced hyperglycemia. Now off steroids. BG Goal 100-180mg/dl

## 2021-11-01 NOTE — PROGRESS NOTE ADULT - ATTENDING COMMENTS
Mr. Hughes is a 67 y/o M with PMHx DM, HTN, BPH who was transferred from [facility] after generalized tonic clonic seizure of unknown length. Also with hypoglycemia, hypotension in setting of bacteremia. On admission he was intubated for airway protection and currently is on sedation  Concern of COVID+but ruled out negative.     VS: 90/41 (map 57), RR22, 99% vent.   Sedated on propofol  + corneal, dilated but reactive pupils bilaterally  + gag.  withdrawal bilateral upper extremity to painful stimulus.  no spontaneous movement or speech.    CBC: 20.13wbc 89%N, Cr 1.34, alb2.4, ast 49, proBNP 882, Ua w/ glucosuria and proteinuria. Patient had myoclonic of LUE and given versed. Patient EEG noted to show diffuse cortical hyperexcitability with GPDs, prognosis is poor as noted in EEG report. Patient started on Depakote yesterday s/p 1 g load.      Impression: Patient had GTC medically refractory to keppra/ vimpat at [facility], intubated for airway protection and sedated.  Unknown period of time of seizure.  r/o anoxic brain injury    Recommendations:   1g Depakote 500mg Q8hrs    continue with keppra 1000mg Q8hr  vimpat 100mg BID IV   MRI brain w/ and w/o contrast when medically able to -- note, given CT head which did not demonstrate any changes, but EEG showing hyperexcitability, MRI is indicated to help with evaluating underlying change, as well as helping to determine prognosis  CBC, CMP, Mg, P, CpK, UA, Utox, ammonia, troponin, basic infectious workup, prolactin  DVT ppx as per primary team   advise patient to not drive, operate heavy machinery, avoid heights, pools, bathtubs, locked doors until cleared by further follow up outpatient by neurology.

## 2021-11-01 NOTE — CONSULT NOTE ADULT - ASSESSMENT
Full note to f/u.   GOC: The patient's daughter is his legal surrogate. However, she also appears to be his HCP and she is trying to get a copy of that document. NP Bee and I met with the patient's daughter and updated her about the patient's current medical issues (still with lack of alertness and though with low vent setting still on mechanical ventilation due to concerns for him to be able to protect his airway). We discuss about the patient's most recent EEG "...showing Diffuse cortical hyperexcitability with GPDs at times coming in prolonged runs without clear evolution into a distinct ictal pattern, however may fall on the ictal-interictal continuum. Severe nonspecific diffuse or multifocal cerebral dysfunction. Overall, this EEG is concerning for diffuse cerebral injury possibly in the setting of hypoxia. Prognosis would appear to be grim." However, that as per d/w Neurology, in order to better define the patient's prognosis a brain MRI will be needed. It was emphasized the patient is critically ill and that his lack of improvement is concerning to the medical team.  The patient's daughter was advised to think about the patient's wishes believes and values and start thinking about what his wishes may have been if his condition did not improve and he were to remain with a high degree of cognitive deficit. His daughter was crying episodically during the meeting. Her emotions were recognized and actively listening and support were provide.     Will continue to f/u Neuro inputs and faciliate GOC discussions as appropriate.         Jean Paul Garg MD   Geriatrics and Palliative Care (GAP) Consult Service    of Geriatric and Palliative Medicine  [facility]      Please page the following number for clinical matters between the hours of 9 am and 5 pm from Monday through Friday : [phone]    After 5pm and on weekends, please see the contact information below:    In the event of newly developing, evolving, or worsening symptoms, please contact the Palliative Medicine team via pager (if the patient is at [facility] #[phone] or if the patient is at [facility] #[phone]) The Geriatric and Palliative Medicine service has coverage 24 hours a day/ 7 days a week to provide medical recommendations regarding symptom management needs via telephone  67 y/o male with  DM, HTn and BPH, s/p witness generalized seizure associated to hypoglycemia. He initially presented to [facility] where he intubated for airway protection. Due to refractory seizures, he was started on propofol for sedation. In addition blood cultures collected on 10/26 showed GPC bacteremia and group B strep isolated from urine culture. He was subsequently transferred to [facility] for EEG monitoring and seizure management.  While at [facility], the patient's mental status has not improved. He has been treated for Seizures and is currently on 3 anti-epileptic medications. Most recent EEG showed "Diffuse cortical hyperexcitability with GPDs at times coming in prolonged runs without clear evolution into a distinct ictal pattern, however may fall on the ictal-interictal continuum. Severe nonspecific diffuse or multifocal cerebral dysfunction. Overall, this EEG is concerning for diffuse cerebral injury possibly in the setting of hypoxia. Prognosis would appear to be grim." When it comes to his respiratory status, vent setting and minimal; however, extubation is not been done due to concerns about the patient being able to protect his airway. Palliative care was called for GOC and ACP.

## 2021-11-01 NOTE — PROGRESS NOTE ADULT - ASSESSMENT
66 years old male with PMHx of uncontrolled DM (A1C ~14), HTN and BPH, transferred from [facility] for Epilepsy monitoring    CTH 10/27 negative for intracranial pathologies; No septic emboli  CXR 10/28 no focal consolidation; on minimal vent setting  COVID PCR 10/27, 10/28 negative;   Had COVID infection in 2/2021 hospitalized at [facility]; received Pfizer 1st dose only on 8/8/21  Positive COVID PCR in [place] was likely false positive since patient likely has immunity from natural infection and vaccination    [facility]  UCx (10/25) ([place]) grew group B strep in [facility]    BCx (10/25) ([place]) viridans strep 1/4 bottles in [facility]  BCx (10/27) ([facility]) NGTD  Received Rocephin 2gm q24hrs 10/27 - current  TTE no vegetation, mild MR and AR  LP with one nucleated cell. CSF PCR Negative    Called [facility] Today and confirmed that no further growth on 10/25 BCx  Suspect AHS in 1/4 bottles was procurement contaminant  Completed 3 days of Ceftriaxone (had GBS on UCx at [place])    Febrile 10/31 -->  U/A 10/31 without pyuria  Possible from CNS injury in context of anoxia  Would monitor off of antibiotics and follow blood and sputum cultures     RECOMMENDATIONS    #Fever, Leukocytosis  --Monitor off of antibiotics  --Follow up on BCx and sputum cx  --Follow fever curve  --Follow WBC    #Positive Rubella IgM  --Doubt active infection at this time, does not fit clinical context. IgG is also positive which makes acute infection less likely at this point.     #Positive BCx, Positive UCx, Seizures  --Continue to follow CBC with diff  --Continue to follow temperature curve  --Follow up on preliminary blood cultures    I will continue to follow. Please feel free to contact me with any further questions.    Ad Villa M.D.  [facility] Division of Infectious Disease  8AM-5PM: Pager Number [phone]  After Hours (or if no response): Please contact the Infectious Diseases Office at [phone]     The above assessment and plan were discussed with 5ICU Team

## 2021-11-01 NOTE — CONSULT NOTE ADULT - CONVERSATION DETAILS
The patient's daughter is his legal surrogate. However, she also appears to be his HCP and she is trying to get a copy of that document which she believes the patient singed at [facility]. CLAUDE Gamboa and I met with the patient's daughter and updated her about the patient's current medical issues (still with lack of alertness and though with low vent setting still on mechanical ventilation due to concerns for him to be able to protect his airway). We discussed about the patient's most recent EEG "...showing Diffuse cortical hyperexcitability with GPDs at times coming in prolonged runs without clear evolution into a distinct ictal pattern, however may fall on the ictal-interictal continuum. Severe nonspecific diffuse or multifocal cerebral dysfunction. Overall, this EEG is concerning for diffuse cerebral injury possibly in the setting of hypoxia. Prognosis would appear to be grim." However, that as it was d/w Neurology, Dr. Clifford, in order to better define the patient's prognosis a brain MRI will be needed. It was emphasized the patient is critically ill and that his lack of improvement is concerning to the medical team.  The patient's daughter was advised to think about the patient's wishes believes and values and start thinking about what his wishes may have been if his condition did not improve and he were to remain with a high degree of cognitive deficit and functional impairment.     His daughter was crying episodically during the meeting. Her emotions were recognized and actively listening and support were provide.

## 2021-11-01 NOTE — PROGRESS NOTE ADULT - SUBJECTIVE AND OBJECTIVE BOX
Referring: Lory    HPI:  65 y/o male with PMHX DM, HTn and BPH, found lethargic and was brought to [facility] by ambulance on 10/25 after family witnesed tonic clonic seizure. Patient was found hypoglycemic upon arrival to [facility] with a blood sugar of 34. IN the ER pt had another episode of seizure activity  and did not return to baseline and was subsequently intubated for airway protection. EEG was done on 10/26 after sedation was turned off and showed epileptic activity while patient was on Keppra 500bid. After EEG findings Keppra was increased to 100bid, however repeat eeg on 10/27 showed epileptic activity originating from left mid temporal area. On admission the patient was tested positive for covid 19 but no signs of respiratory disease and no findings of PNA on cxr. Pt was seen by the neurology team in [place] who added vimpat in addition to the keppra. Due to refractory seizures, pt was started on propofol for sedation. In addition blood cultures collected on 10/26 showed GPC bacteremia and group B strep isolated from urine culture. Pt has not required pressors during his stay at [facility] and was subsequently transferred to [address] for EEG monitoring and seizure management.     Cardiology consulted for elevated troponin in setting of recent status epilepticus and strep viridans bacteremia.    ===========================  Interval Events  -   - Copntinued elevated BP  - No further cardiac enzymes checked  ===========================        PMHx:   Hypertension    Diabetes    History of BPH        PSHx:   No significant past surgical history        Allergies:  No Known Allergies      Home Meds:    Current Medications:   amLODIPine   Tablet 5 milliGRAM(s) Oral daily  aspirin  chewable 81 milliGRAM(s) Enteral Tube daily  atorvastatin 40 milliGRAM(s) Oral at bedtime  carvedilol 6.25 milliGRAM(s) Oral every 12 hours  cefTRIAXone   IVPB 2000 milliGRAM(s) IV Intermittent every 24 hours  chlorhexidine 0.12% Liquid 15 milliLiter(s) Oral Mucosa every 12 hours  chlorhexidine 4% Liquid 1 Application(s) Topical <User Schedule>  dexMEDEtomidine Infusion 0.4 MICROgram(s)/kG/Hr IV Continuous <Continuous>  doxazosin 4 milliGRAM(s) Oral at bedtime  enoxaparin Injectable 40 milliGRAM(s) SubCutaneous daily  insulin lispro (ADMELOG) corrective regimen sliding scale   SubCutaneous every 6 hours  insulin NPH human recombinant 30 Unit(s) SubCutaneous every 6 hours  lacosamide IVPB 100 milliGRAM(s) IV Intermittent every 12 hours  levETIRAcetam  IVPB 1000 milliGRAM(s) IV Intermittent <User Schedule>  multivitamin/minerals/iron Oral Solution (CENTRUM) 15 milliLiter(s) Enteral Tube daily  pantoprazole  Injectable 40 milliGRAM(s) IV Push daily  polyethylene glycol 3350 17 Gram(s) Oral daily  senna Syrup 10 milliLiter(s) Oral at bedtime      FAMILY HISTORY:  No pertinent family history in first degree relatives          Physical Exam:  T(F): 98.1 (10-30), Max: 98.4 (10-30)  HR: 59 (10-30) (57 - 108)  BP: --  RR: 20 (10-30)  SpO2: 100% (10-30)  GENERAL: No acute distress, intubated  CHEST/LUNG: intubated  BACK: No spinal tenderness  HEART: Regular rate and rhythm; No murmurs, rubs, or gallops  ABDOMEN: Soft, Nontender, Nondistended; Bowel sounds present  EXTREMITIES:  No clubbing, cyanosis, or edema    Cardiovascular Diagnostic Testing:    ECG: Personally reviewed:    Echo: Personally reviewed:    Stress Testing:    Cath:    Imaging:    CXR: Personally reviewed    Labs: Personally reviewed                        11.4   9.77  )-----------( 216      ( 29 Oct 2021 23:40 )             36.3     10-29    148<H>  |  114<H>  |  34<H>  ----------------------------<  139<H>  3.6   |  25  |  1.07    Ca    7.8<L>      29 Oct 2021 23:40  Phos  2.6     10-29  Mg     2.2     10-29    TPro  5.1<L>  /  Alb  2.4<L>  /  TBili  <0.1<L>  /  DBili  x   /  AST  36  /  ALT  13  /  AlkPhos  88  10-29    PT/INR - ( 29 Oct 2021 00:14 )   PT: 13.0 sec;   INR: 1.09 ratio         PTT - ( 29 Oct 2021 00:14 )  PTT:35.2 sec  Serum Pro-Brain Natriuretic Peptide: 882 pg/mL (10-27 @ 17:50)            Assessment and Recommendation:   · Assessment	  65 y/o male with PMHX DM, HTn and BPH, admitted to MICU for status epilepticus now intubated and found to have strep viridans bacteremia.   Cardiology consulted for elevated troponin in setting of recent status epilepticus and strep viridans bacteremia.    #Elevated troponin- troponin is highly sensitive but non specific. Recent TTE with normal LV systolic and diastolic function.  Type 2 MI 2/2 to bacteremia/septic shock/seizures  -CKMB normal   -no acute cardiac intervention at this time  -would no longer trend troponin  unless other signs of ischemia (EKG changes, chest pain once patient is off sedation/extubated)  Elevated trop likely in setting of acute neurological status.  If no further changes, patient can have outpatient cardiology eval for ischemic testing  Continue drips for intensive care monitorinig                                                                                                                                                                                                                                                                                                                                                40 minutes spent in face-to-face time with critical care of ICU patient with end-organ dysfunction continuous drips, including evaluation, formulation of plan, discussion with primary team. > 50% of time spent in counseling with family and other physicians involved in care of patient.     All labs, imaging personally reviewed.

## 2021-11-01 NOTE — PROGRESS NOTE ADULT - SUBJECTIVE AND OBJECTIVE BOX
CHIEF COMPLAINT:  Patient is a 66y old  Male who presents with a chief complaint of Transfer from [place] for EEG Monitoring (31 Oct 2021 13:51)    HPI:  65 y/o male with PMHX DM, HTn and BPH, found lethargic and was brought to [facility] by ambulance on 10/25 after family witnesed tonic clonic seizure. Patient was found hypoglycemic upon arrival to [facility] with a blood sugar of 34. IN the ER pt had another episode of seizure activity  and did not return to baseline and was subsequently intubated for airway protection. EEG was done on 10/26 after sedation was turned off and showed epileptic activity while patient was on Keppra 500bid. After EEG findings Keppra was increased to 100bid, however repeat eeg on 10/27 showed epileptic activity originating from left mid temporal area. On admission the patient was tested positive for covid 19 but no signs of respiratory disease and no findings of PNA on cxr. Pt was seen by the neurology team in [place] who added vimpat in addition to the keppra. Due to refractory seizures, pt was started on propofol for sedation. In addition blood cultures collected on 10/26 showed GPC bacteremia and group B strep isolated from urine culture. Pt has not required pressors during his stay at [facility] and was subsequently transferred [address] for [address] monitoring and seizure management.  (27 Oct 2021 17:09)      Interval Events: hypotensive overnight, given 1 LR x1      REVIEW OF SYSTEMS: unable to as pt is unconscious          OBJECTIVE:  ICU Vital Signs Last 24 Hrs  T(C): 36.6 (2021 07:00), Max: 38.2 (31 Oct 2021 16:00)  T(F): 97.9 (2021 07:00), Max: 100.8 (31 Oct 2021 16:00)  HR: 72 (2021 07:00) (69 - 92)  BP: --  BP(mean): --  ABP: 168/52 (2021 07:00) (91/36 - 189/54)  ABP(mean): 89 (2021 07:00) (51 - 97)  RR: 19 (2021 07:00) (16 - 26)  SpO2: 99% (2021 07:00) (96% - 99%)    Mode: AC/ CMV (Assist Control/ Continuous Mandatory Ventilation), RR (machine): 12, TV (machine): 400, FiO2: 21, PEEP: 5, ITime: 1, MAP: 8, PIP: 12    10-31 @ 07:01  -   @ 07:00  --------------------------------------------------------  IN: 4242.8 mL / OUT: 3700 mL / NET: 542.8 mL      CAPILLARY BLOOD GLUCOSE      POCT Blood Glucose.: 205 mg/dL (2021 05:20)          PHYSICAL EXAM:  GENERAL: NAD,  HEENT:  Atraumatic, Normocephalic  EYES: PERRLA, conjunctiva and sclera clear  NECK: Supple, No JVD  CHEST/LUNG: diminished bilaterally; No wheezes, rales, or rhonchi  HEART: Regular rate and rhythm; No murmurs, rubs, or gallops  ABDOMEN: Soft, Nontender, Nondistended; Bowel sounds present  EXTREMITIES:  2+ Peripheral Pulses, No clubbing, cyanosis, or edema  PSYCH: unable as pt is sedated  NEUROLOGY: anoxic brain injury  SKIN: No rashes or lesions        HOSPITAL MEDICATIONS:  MEDICATIONS  (STANDING):  amLODIPine   Tablet 10 milliGRAM(s) Oral daily  aspirin  chewable 81 milliGRAM(s) Enteral Tube daily  atorvastatin 40 milliGRAM(s) Oral at bedtime  carvedilol 12.5 milliGRAM(s) Oral every 12 hours  chlorhexidine 0.12% Liquid 15 milliLiter(s) Oral Mucosa every 12 hours  chlorhexidine 4% Liquid 1 Application(s) Topical <User Schedule>  dexMEDEtomidine Infusion 0.4 MICROgram(s)/kG/Hr (7.82 mL/Hr) IV Continuous <Continuous>  doxazosin 4 milliGRAM(s) Oral at bedtime  enoxaparin Injectable 40 milliGRAM(s) SubCutaneous daily  insulin lispro (ADMELOG) corrective regimen sliding scale   SubCutaneous every 6 hours  insulin NPH human recombinant 10 Unit(s) SubCutaneous every 6 hours  lacosamide IVPB 100 milliGRAM(s) IV Intermittent every 12 hours  lactated ringers. 1000 milliLiter(s) (125 mL/Hr) IV Continuous <Continuous>  levETIRAcetam  IVPB 1000 milliGRAM(s) IV Intermittent <User Schedule>  multivitamin/minerals/iron Oral Solution (CENTRUM) 15 milliLiter(s) Enteral Tube daily  pantoprazole  Injectable 40 milliGRAM(s) IV Push daily  polyethylene glycol 3350 17 Gram(s) Oral every 12 hours  senna Syrup 10 milliLiter(s) Oral at bedtime  valproate sodium IVPB 500 milliGRAM(s) IV Intermittent every 8 hours    MEDICATIONS  (PRN):  bisacodyl Suppository 10 milliGRAM(s) Rectal daily PRN Constipation      LABS:                        11.3   12.71 )-----------( 201      ( 2021 00:50 )             34.5     Hgb Trend: 11.3<--, 8.3<--, 12.1<--, 11.4<--, 11.9<--  11-01    137  |  104  |  28<H>  ----------------------------<  173<H>  3.9   |  22  |  1.08    Ca    7.8<L>      2021 00:14  Phos  3.0       Mg     2.1         TPro  5.2<L>  /  Alb  2.1<L>  /  TBili  0.1<L>  /  DBili  x   /  AST  39  /  ALT  20  /  AlkPhos  90  11    LIVER FUNCTIONS - ( 2021 00:14 )  Alb: 2.1 g/dL / Pro: 5.2 g/dL / ALK PHOS: 90 U/L / ALT: 20 U/L / AST: 39 U/L / GGT: x           Creatinine Trend: 1.08<--, 0.99<--, 1.03<--, 1.01<--, 1.07<--, 1.16<--  PT/INR - ( 2021 00:15 )   PT: 12.3 sec;   INR: 1.03 ratio         PTT - ( 2021 00:15 )  PTT:37.5 sec  Urinalysis Basic - ( 31 Oct 2021 14:25 )    Color: Light Yellow / Appearance: Clear / S.015 / pH: x  Gluc: x / Ketone: Negative  / Bili: Negative / Urobili: Negative   Blood: x / Protein: 100 mg/dL / Nitrite: Negative   Leuk Esterase: Negative / RBC: 4 /hpf / WBC 6 /HPF   Sq Epi: x / Non Sq Epi: 1 /hpf / Bacteria: Negative      Arterial Blood Gas:   @ 00:03  7.46/39/101/28/99.2/3.6  ABG lactate: --  Arterial Blood Gas:  10-31 @ 18:26  7.47/39/103/28/99.0/4.4  ABG lactate: --  Arterial Blood Gas:  10-31 @ 11:47  7.51/37/92/30/99.2/6.2  ABG lactate: --  Arterial Blood Gas:  10-31 @ 10:21  7.49/40/105/30/99.3/6.6  ABG lactate: --  Arterial Blood Gas:  10-31 @ 08:08  7.49/43/96/33/99.3/8.5  ABG lactate: --  Arterial Blood Gas:  10-31 @ 04:59  7.51/43/88/34/98.3/10.1  ABG lactate: --  Arterial Blood Gas:  10-31 @ 02:03  7.49/43/89/33/99.0/8.5  ABG lactate: --  Arterial Blood Gas:  10-31 @ 00:58  7.49/42/150/32/99.8/7.8  ABG lactate: --        MICROBIOLOGY:     RADIOLOGY:  [ ] Reviewed and interpreted by me    EKG:

## 2021-11-01 NOTE — PROGRESS NOTE ADULT - ASSESSMENT
65 y/o male with PMHx HTN, poorly controlled IDDM, Alzheimers, and BPH brought to [facility] by ambulance on 10/25 after family witnessed possible seizure activity (found on floor foaming from mouth per daughter).  EMS found patient to have BGL 34 and administered D50.  Per daughter, patient told her he thought he admin too much insulin that same day and has required previous hospitalizations for incorrect dosing Insulin.  In the ER pt had another episode of seizure activity, was intubated for airway protection, and started on Keppra 500 BID.  Covid-19 PCR positive and started on Redesevir and Dexamethasone as well.  Per patient's daughter, patient had Covid in Feb 2021 mild symptoms and received only the 1st dose of Pfizer vaccine on 8/8/21.  EEG 10/26 showed epileptic activity while patient was on Keppra prompting increase to 1g BID.  Repeat EEG on 10/27 showed epileptic activity originating from left mid temporal area. Pt was seen by the neurology team in [place] who added Vimpat to the Keppra. Due to refractory seizures, pt was started on propofol for sedation. In addition blood cultures collected on 10/26 showed GPC bacteremia and GBS in urine culture. [address] and seizure management.     Neuro:  New onset seizures-status epilepticus of unknown etiology and hypoxic encephalopathy.  - Sedated on Precedex   - vEEG monitoring (10/27- 30) Diffuse cortical hyperexcitability with GPDs at times coming in prolonged runs without clear evolution into a distinct ictal pattern-poor prognosis  - Vimpat 100mg IV q12h and Keppra 1g q8h  - (10/31) loaded with 1g of depakote IV and will continue 500mg q8hr per recommendations  [ ] breakthrough seizures, prn versed 2mg & ativan 2mg   - Noncon CTH (10/27) no anoxic ischemic encephalopathy  - q4 neuro checks  - Neuro following, Dr Fernandez, Recs appreciated   [ ] FU drug levels Vimpat, Keppra & Depakote --56  [ ] pending MRI w/&w/o    Respiratory  - Intubated on 10/25 for airway protection  - SBT as tolerated   - Mechanical Ventilation: PRVC 12/400/5/21; Ppk15, Pplat 11  --alkalosis, dec RR 12, TV to 400  CV  Hypotension 2/2 Sedation, resolved  - remains off pressors maintain MAP >/= 65    PMHx HTN, Cath 2/21 no stents  - intermittent HTN to 180s s/p hydralazine 5mg x 2  [ ] if remains HTN consider increasing Coreg   - c/w home meds: Lipitor 40mg qd, ASA 81mg qd, Norvasc 10mg qd, Coreg 12.5mg q12h  - TTE (10/29) -> normal function  - Trend Trop 127 --> 130 --> 164-->173. No EKG changes. likely secondary to seizures in setting of normal echo, continue to treat underlying, trops nonspecific do not continue to trend unless clinically indicated. OP ischemic w/u.  - Cards consulted, recs appreciated    GI - Last BM 11/1-smear  - NPO with TF (Glucerna 1.5cal) per Dietician recs via NGT  - Protonix daily   - Bowel regimen with Miralax BID & senna, no gastric residual noted  [ ] FU BM       Hx BPH  - c/w home Cardura 4mg qd  - Morales changed on Admission (10/27)   - Monitor Lytes replete as necessary to maintain Mg>2 Phos>3 K>4     HyperNa/HyperCl, resolved  - Free Water 200ml Q6h,   - monitor NA    Fluid Overload  - s/p lasix 20mg IVP on 10/30  - suspected contraction alkalosis, s/p 250 mg Diamox & 500 NS bolus  [ ] FU ABG, avoid alkalosis in setting of seizures    Infectious Disease  Covid 19—incidental finding in OSH ER (Negative PCR x2 at [facility])   - OFF Remdesivir x 3doses stopped 2/2 negative Covid diagnosis  - OFF Dexmethasone x1 dose stopped 2/2 neg Covid diagnosis   - BCx + for GPC with UCx + for Group B Strep at [facility]   - F/u repeat Blood cultures x2 NGTD , UCx neg, Sputum Cx neg (10/27)  - Lumbar puncture with CSF Negative for infection (10/28)   - s/p Ceftriaxone (10/26- 30)   - ID consulting, recs appreciated  - febrile intermittently  -  sputum with few yeast like cells 10/31, blood cx, UA negative (10/27)  -repeat bld cx sent overnight 10/31 for temp 100.6    Endocrine  IDDM- HbA1c 14.8  - Hyperglycemic BGL>400 requiring Insulin gtt, transitioned off 10/29   - episode of hypoglycemia FS 70, given 1 AMP d50, held 6pm NPH.  [ ] FS q 6 avoid hypoglycemia   - NPH 10 Q6hr and mod ISS q6h  - Endocrinology consulting, recs appreciated    Heme  - Monitor CBC   - Lovenox DVT PPX  - LE Duplex (10/29) neg for DVT   [ ] Carotid Duplex pending    Lines  - Arrows x2 10/30  - Right Radial A line 10/27   - Morales Catheter 10/27    GOC:   -  Palliative care consulted  -  discussed vEEG read with Daughter Kalli and will offer in person family meeting   - remains Full Code   - Primary contact: Kalli Hughes (daughter) [phone]    DISPO: continue ICU level of care, above plan discussed with Dr Andrade

## 2021-11-01 NOTE — CONSULT NOTE ADULT - SUBJECTIVE AND OBJECTIVE BOX
HPI:  67 y/o male with PMHX DM, HTn and BPH, found lethargic and was brought to [facility] by ambulance on 10/25 after family witnesed tonic clonic seizure. Patient was found hypoglycemic upon arrival to [facility] with a blood sugar of 34. IN the ER pt had another episode of seizure activity  and did not return to baseline and was subsequently intubated for airway protection. EEG was done on 10/26 after sedation was turned off and showed epileptic activity while patient was on Keppra 500bid. After EEG findings Keppra was increased to 100bid, however repeat eeg on 10/27 showed epileptic activity originating from left mid temporal area. On admission the patient was tested positive for covid 19 but no signs of respiratory disease and no findings of PNA on cxr. Pt was seen by the neurology team in [place] who added vimpat in addition to the keppra. Due to refractory seizures, pt was started on propofol for sedation. In addition blood cultures collected on 10/26 showed GPC bacteremia and group B strep isolated from urine culture. Pt has not required pressors during his stay at [facility] and was subsequently transferred to [address] for EEG monitoring and seizure management.  (27 Oct 2021 17:09)    PERTINENT PM/SXH:   Hypertension    Diabetes    History of BPH      No significant past surgical history      FAMILY HISTORY:  No pertinent family history in first degree relatives      ITEMS NOT CHECKED ARE NOT PRESENT    SOCIAL HISTORY:   Significant other/partner[ ]  Children[ ]  Islam/Spirituality:  Substance hx:  [ ]   Tobacco hx:  [ ]   Alcohol hx: [ ]   Home Opioid hx:  [ ] I-Stop Reference No:  Living Situation: [ ]Home  [ ]Long term care  [ ]Rehab [ ]Other    ADVANCE DIRECTIVES:    DNR  MOLST  [ ]  Living Will  [ ]   DECISION MAKER(s):  [ ] Health Care Proxy(s)  [ ] Surrogate(s)  [ ] Guardian           Name(s): Phone Number(s):    BASELINE (I)ADL(s) (prior to admission):  Irene: [ ]Total  [ ] Moderate [ ]Dependent    Allergies    No Known Allergies    Intolerances    MEDICATIONS  (STANDING):  amLODIPine   Tablet 10 milliGRAM(s) Oral daily  aspirin  chewable 81 milliGRAM(s) Enteral Tube daily  atorvastatin 40 milliGRAM(s) Oral at bedtime  carvedilol 12.5 milliGRAM(s) Oral every 12 hours  chlorhexidine 0.12% Liquid 15 milliLiter(s) Oral Mucosa every 12 hours  chlorhexidine 4% Liquid 1 Application(s) Topical <User Schedule>  dexMEDEtomidine Infusion 0.4 MICROgram(s)/kG/Hr (7.82 mL/Hr) IV Continuous <Continuous>  doxazosin 4 milliGRAM(s) Oral at bedtime  enoxaparin Injectable 40 milliGRAM(s) SubCutaneous daily  insulin lispro (ADMELOG) corrective regimen sliding scale   SubCutaneous every 6 hours  insulin NPH human recombinant 20 Unit(s) SubCutaneous every 6 hours  lacosamide IVPB 100 milliGRAM(s) IV Intermittent every 12 hours  lactated ringers. 1000 milliLiter(s) (125 mL/Hr) IV Continuous <Continuous>  levETIRAcetam  IVPB 1000 milliGRAM(s) IV Intermittent <User Schedule>  multivitamin/minerals/iron Oral Solution (CENTRUM) 15 milliLiter(s) Enteral Tube daily  pantoprazole  Injectable 40 milliGRAM(s) IV Push daily  polyethylene glycol 3350 17 Gram(s) Oral every 12 hours  senna Syrup 10 milliLiter(s) Oral at bedtime  valproate sodium IVPB 500 milliGRAM(s) IV Intermittent every 8 hours    MEDICATIONS  (PRN):  bisacodyl Suppository 10 milliGRAM(s) Rectal daily PRN Constipation    PRESENT SYMPTOMS: [ ]Unable to obtain due to poor mentation   Source if other than patient:  [ ]Family   [ ]Team     Pain: [ ]yes [ ]no  QOL impact -   Location -                    Aggravating factors -  Quality -  Radiation -  Timing-  Severity (0-10 scale):  Minimal acceptable level (0-10 scale):     CPOT:    https://www.sccm.org/getattachment/ggw80f60-0i5e-9o7f-1f3s-1511v3567c9y/Critical-Care-Pain-Observation-Tool-(CPOT)      PAIN AD Score:     http://geriatrictoolkit.[place].[email or web address]/cog/painad.pdf (press ctrl +  left click to view)    Dyspnea:                           [ ]Mild [ ]Moderate [ ]Severe  Anxiety:                             [ ]Mild [ ]Moderate [ ]Severe  Fatigue:                             [ ]Mild [ ]Moderate [ ]Severe  Nausea:                             [ ]Mild [ ]Moderate [ ]Severe  Loss of appetite:              [ ]Mild [ ]Moderate [ ]Severe  Constipation:                    [ ]Mild [ ]Moderate [ ]Severe    Other Symptoms:  [ ]All other review of systems negative     Palliative Performance Status Version 2:         %    http://[facility].org/files/news/palliative_performance_scale_ppsv2.pdf  PHYSICAL EXAM:  Vital Signs Last 24 Hrs  T(C): 36.6 (2021 15:00), Max: 38.1 (31 Oct 2021 19:00)  T(F): 97.9 (2021 15:00), Max: 100.6 (31 Oct 2021 19:00)  HR: 82 (2021 17:00) (67 - 92)  BP: --  BP(mean): --  RR: 19 (2021 17:00) (14 - 26)  SpO2: 98% (2021 17:00) (96% - 100%) I&O's Summary    31 Oct 2021 07:01  -  2021 07:00  --------------------------------------------------------  IN: 4242.8 mL / OUT: 3700 mL / NET: 542.8 mL    2021 07:01  -  2021 17:39  --------------------------------------------------------  IN: 674 mL / OUT: 875 mL / NET: -201 mL      GENERAL:  [ ]Alert  [ ]Oriented x   [ ]Lethargic  [ ]Cachexia  [ ]Unarousable  [ ]Verbal  [ ]Non-Verbal  Behavioral:   [ ] Anxiety  [ ] Delirium [ ] Agitation [ ] Other  HEENT:  [ ]Normal   [ ]Dry mouth   [ ]ET Tube/Trach  [ ]Oral lesions  PULMONARY:   [ ]Clear [ ]Tachypnea  [ ]Audible excessive secretions   [ ]Rhonchi        [ ]Right [ ]Left [ ]Bilateral  [ ]Crackles        [ ]Right [ ]Left [ ]Bilateral  [ ]Wheezing     [ ]Right [ ]Left [ ]Bilateral  [ ]Diminished breath sounds [ ]right [ ]left [ ]bilateral  CARDIOVASCULAR:    [ ]Regular [ ]Irregular [ ]Tachy  [ ]Sal [ ]Murmur [ ]Other  GASTROINTESTINAL:  [ ]Soft  [ ]Distended   [ ]+BS  [ ]Non tender [ ]Tender  [ ]PEG [ ]OGT/ NGT  Last BM:   GENITOURINARY:  [ ]Normal [ ] Incontinent   [ ]Oliguria/Anuria   [ ]Morales  MUSCULOSKELETAL:   [ ]Normal   [ ]Weakness  [ ]Bed/Wheelchair bound [ ]Edema  NEUROLOGIC:   [ ]No focal deficits  [ ]Cognitive impairment  [ ]Dysphagia [ ]Dysarthria [ ]Paresis [ ]Other   SKIN:   [ ]Normal    [ ]Rash  [ ]Pressure ulcer(s)       Present on admission [ ]y [ ]n    CRITICAL CARE:  [ ] Shock Present  [ ]Septic [ ]Cardiogenic [ ]Neurologic [ ]Hypovolemic  [ ]  Vasopressors [ ]  Inotropes   [ ]Respiratory failure present [ ]Mechanical ventilation [ ]Non-invasive ventilatory support [ ]High flow  Mode: CPAP with PS, FiO2: 21, PEEP: 5, PS: 10, MAP: 9  [ ]Acute  [ ]Chronic [ ]Hypoxic  [ ]Hypercarbic [ ]Other  [ ]Other organ failure     LABS:                        11.3   12.71 )-----------( 201      ( 2021 00:50 )             34.5   11    137  |  104  |  28<H>  ----------------------------<  173<H>  3.9   |  22  |  1.08    Ca    7.8<L>      2021 00:14  Phos  3.0       Mg     2.1         TPro  5.2<L>  /  Alb  2.1<L>  /  TBili  0.1<L>  /  DBili  x   /  AST  39  /  ALT  20  /  AlkPhos  90    PT/INR - ( 2021 00:15 )   PT: 12.3 sec;   INR: 1.03 ratio         PTT - ( 2021 00:15 )  PTT:37.5 sec    Urinalysis Basic - ( 31 Oct 2021 14:25 )    Color: Light Yellow / Appearance: Clear / S.015 / pH: x  Gluc: x / Ketone: Negative  / Bili: Negative / Urobili: Negative   Blood: x / Protein: 100 mg/dL / Nitrite: Negative   Leuk Esterase: Negative / RBC: 4 /hpf / WBC 6 /HPF   Sq Epi: x / Non Sq Epi: 1 /hpf / Bacteria: Negative      RADIOLOGY & ADDITIONAL STUDIES:    PROTEIN CALORIE MALNUTRITION PRESENT: [ ]mild [ ]moderate [ ]severe [ ]underweight [ ]morbid obesity  https://www.andeal.org/vault/2440/web/files/ONC/Table_Clinical%20Characteristics%20to%20Document%20Malnutrition-White%20JV%20et%20al%280571.pdf    Height (cm): 177.8 (10-28-21 @ 15:58)  Weight (kg): 78.2 (10-27-21 @ 17:07)  BMI (kg/m2): 24.7 (10-28-21 @ 15:58)    [ ]PPSV2 < or = to 30% [ ]significant weight loss  [ ]poor nutritional intake  [ ]anasarca      [ ]Artificial Nutrition      REFERRALS:   [ ]Chaplaincy  [ ]Hospice  [ ]Child Life  [ ]Social Work  [ ]Case management [ ]Holistic Therapy     Goals of Care Document:  HPI:  67 y/o male with  DM, HTn and BPH, found lethargic and was brought to [facility] by ambulance on 10/25 after family witnesed tonic clonic seizure. Patient was found hypoglycemic upon arrival to [facility] with a blood sugar of 34. IN the ER pt had another episode of seizure activity  and did not return to baseline and was subsequently intubated for airway protection. EEG was done on 10/26 after sedation was turned off and showed epileptic activity while patient was on Keppra 500bid. After EEG findings Keppra was increased to 100bid, however repeat eeg on 10/27 showed epileptic activity originating from left mid temporal area. On admission the patient was tested positive for covid 19 but no signs of respiratory disease and no findings of PNA on cxr. Pt was seen by the neurology team in [place] who added vimpat in addition to the keppra. Due to refractory seizures, pt was started on propofol for sedation. In addition blood cultures collected on 10/26 showed GPC bacteremia and group B strep isolated from urine culture. Pt has not required pressors during his stay at [facility] and was subsequently transferred to [facility] for EEG monitoring and seizure management.  (27 Oct 2021 17:09)    While at [facility], the patient's mental status has not improved. He has been treated for Seizures and is currently on 3 anti-epileptic medications. Most recent EEG showed "Diffuse cortical hyperexcitability with GPDs at times coming in prolonged runs without clear evolution into a distinct ictal pattern, however may fall on the ictal-interictal continuum. Severe nonspecific diffuse or multifocal cerebral dysfunction. Overall, this EEG is concerning for diffuse cerebral injury possibly in the setting of hypoxia. Prognosis would appear to be grim." When it comes to his respiratory status, vent setting and minimal; however, extubation is not been done due to concerns about the patient being able to protect his airway.     Palliative care was consulted for GOC and ACP.       PERTINENT PM/SXH:   Hypertension    Diabetes    History of BPH      No significant past surgical history      FAMILY HISTORY:  No pertinent family history in first degree relatives      ITEMS NOT CHECKED ARE NOT PRESENT    SOCIAL HISTORY:   Significant other/partner[ x]   Children[x ] 2 Jehovah's witness/Spirituality:  Substance hx:  [ ]   Tobacco hx:  [ ]   Alcohol hx: [ ]   Home Opioid hx:  [ ] I-Stop Reference No:  Living Situation: [x ]Home  [ ]Long term care  [ ]Rehab [ ]Other  As per his daughter, Kalli, the patient is  and neither his ex-wife, nor his son talk to him. Hence, she is the surrogate decision maker.   ADVANCE DIRECTIVES:    DNR  MOLST  [ ]  Living Will  [ ]   DECISION MAKER(s):  [ ] Health Care Proxy(s)  [x ] Surrogate(s)  [ ] Guardian           Name(s): Phone Number(s): Daughter     BASELINE (I)ADL(s) (prior to admission):  [place]: [ ]Total  [ ] Moderate [ ]Dependent    Allergies    No Known Allergies    Intolerances    MEDICATIONS  (STANDING):  amLODIPine   Tablet 10 milliGRAM(s) Oral daily  aspirin  chewable 81 milliGRAM(s) Enteral Tube daily  atorvastatin 40 milliGRAM(s) Oral at bedtime  carvedilol 12.5 milliGRAM(s) Oral every 12 hours  chlorhexidine 0.12% Liquid 15 milliLiter(s) Oral Mucosa every 12 hours  chlorhexidine 4% Liquid 1 Application(s) Topical <User Schedule>  dexMEDEtomidine Infusion 0.4 MICROgram(s)/kG/Hr (7.82 mL/Hr) IV Continuous <Continuous>  doxazosin 4 milliGRAM(s) Oral at bedtime  enoxaparin Injectable 40 milliGRAM(s) SubCutaneous daily  insulin lispro (ADMELOG) corrective regimen sliding scale   SubCutaneous every 6 hours  insulin NPH human recombinant 20 Unit(s) SubCutaneous every 6 hours  lacosamide IVPB 100 milliGRAM(s) IV Intermittent every 12 hours  lactated ringers. 1000 milliLiter(s) (125 mL/Hr) IV Continuous <Continuous>  levETIRAcetam  IVPB 1000 milliGRAM(s) IV Intermittent <User Schedule>  multivitamin/minerals/iron Oral Solution (CENTRUM) 15 milliLiter(s) Enteral Tube daily  pantoprazole  Injectable 40 milliGRAM(s) IV Push daily  polyethylene glycol 3350 17 Gram(s) Oral every 12 hours  senna Syrup 10 milliLiter(s) Oral at bedtime  valproate sodium IVPB 500 milliGRAM(s) IV Intermittent every 8 hours    MEDICATIONS  (PRN):  bisacodyl Suppository 10 milliGRAM(s) Rectal daily PRN Constipation    PRESENT SYMPTOMS: [ ]Unable to obtain due to poor mentation   Source if other than patient:  [ ]Family   [ ]Team     Pain: [ ]yes [x ]no  QOL impact -   Location -                    Aggravating factors -  Quality -  Radiation -  Timing-  Severity (0-10 scale):  Minimal acceptable level (0-10 scale):     CPOT:    https://www.[facility].org/getattachment/aii10e00-2x9e-[email or web address]/Critical-Care-Pain-Observation-Tool-(CPOT)      PAIN AD Score:     http://geriatrictoolkit.[facility]/cog/painad.pdf (press ctrl +  left click to view)    Dyspnea:                           [ ]Mild [ ]Moderate [ ]Severe  Anxiety:                             [ ]Mild [ ]Moderate [ ]Severe  Fatigue:                             [ ]Mild [ ]Moderate [ ]Severe  Nausea:                             [ ]Mild [ ]Moderate [ ]Severe  Loss of appetite:              [ ]Mild [ ]Moderate [ ]Severe  Constipation:                    [ ]Mild [ ]Moderate [ ]Severe    Other Symptoms:  [ ]All other review of systems negative     Palliative Performance Status Version 2:  10       %    http://npcrc.org/files/news/palliative_performance_scale_ppsv2.pdf  PHYSICAL EXAM:  Vital Signs Last 24 Hrs  T(C): 36.6 (2021 15:00), Max: 38.1 (31 Oct 2021 19:00)  T(F): 97.9 (2021 15:00), Max: 100.6 (31 Oct 2021 19:00)  HR: 82 (2021 17:00) (67 - 92)  BP: --  BP(mean): --  RR: 19 (2021 17:00) (14 - 26)  SpO2: 98% (2021 17:00) (96% - 100%) I&O's Summary    31 Oct 2021 07:  -  2021 07:00  --------------------------------------------------------  IN: 4242.8 mL / OUT: 3700 mL / NET: 542.8 mL    2021 07:01  -  2021 17:39  --------------------------------------------------------  IN: 674 mL / OUT: 875 mL / NET: -201 mL      GENERAL:  [ ]Alert  [ ]Oriented x   [ ]Lethargic  [ ]Cachexia  [x ]Unarousable  [ ]Verbal  [ ]Non-Verbal  Behavioral:   [ ] Anxiety  [ ] Delirium [ ] Agitation [ ] Other  HEENT:  [ ]Normal   [ ]Dry mouth   [x ]ET Tube/Trach  [ ]Oral lesions  PULMONARY:   [x ]Clear [ ]Tachypnea  [ ]Audible excessive secretions   [ ]Rhonchi        [ ]Right [ ]Left [ ]Bilateral  [ ]Crackles        [ ]Right [ ]Left [ ]Bilateral  [ ]Wheezing     [ ]Right [ ]Left [ ]Bilateral  [ ]Diminished breath sounds [ ]right [ ]left [ ]bilateral  CARDIOVASCULAR:    [ x]Regular [ ]Irregular [ ]Tachy  [ ]Sal [ ]Murmur [ ]Other  GASTROINTESTINAL:  [x ]Soft  [ ]Distended   [x ]+BS  [x ]Non tender [ ]Tender  [ ]PEG [ ]OGT/ NGT  Last BM:   GENITOURINARY:  [ ]Normal [ ] Incontinent   [ ]Oliguria/Anuria   [ x]Morales  MUSCULOSKELETAL:   [ ]Normal   [x ]Weakness  [ ]Bed/Wheelchair bound [ ]Edema  NEUROLOGIC:   [ ]No focal deficits  [ ]Cognitive impairment  [ ]Dysphagia [ ]Dysarthria [ ]Paresis [x ]Other: Unarousable.    SKIN:   [x ]Normal    [ ]Rash  [ ]Pressure ulcer(s)       Present on admission [ ]y [ ]n    CRITICAL CARE:  [ ] Shock Present  [ ]Septic [ ]Cardiogenic [ ]Neurologic [ ]Hypovolemic  [ ]  Vasopressors [ ]  Inotropes   [ ]Respiratory failure present [ ]Mechanical ventilation [ ]Non-invasive ventilatory support [ ]High flow  Mode: CPAP with PS, FiO2: 21, PEEP: 5, PS: 10, MAP: 9  [ ]Acute  [ ]Chronic [ ]Hypoxic  [ ]Hypercarbic [ ]Other  [ ]Other organ failure     LABS:                        11.3   12.71 )-----------( 201      ( 2021 00:50 )             34.5       137  |  104  |  28<H>  ----------------------------<  173<H>  3.9   |  22  |  1.08    Ca    7.8<L>      2021 00:14  Phos  3.0       Mg     2.1         TPro  5.2<L>  /  Alb  2.1<L>  /  TBili  0.1<L>  /  DBili  x   /  AST  39  /  ALT  20  /  AlkPhos  90    PT/INR - ( 2021 00:15 )   PT: 12.3 sec;   INR: 1.03 ratio         PTT - ( 2021 00:15 )  PTT:37.5 sec    Urinalysis Basic - ( 31 Oct 2021 14:25 )    Color: Light Yellow / Appearance: Clear / S.015 / pH: x  Gluc: x / Ketone: Negative  / Bili: Negative / Urobili: Negative   Blood: x / Protein: 100 mg/dL / Nitrite: Negative   Leuk Esterase: Negative / RBC: 4 /hpf / WBC 6 /HPF   Sq Epi: x / Non Sq Epi: 1 /hpf / Bacteria: Negative      RADIOLOGY & ADDITIONAL STUDIES:  < from: CT Head No Cont (10.27.21 @ 23:48) >  IMPRESSION:  Large ventricles likely to atrophy. No evidence of anoxic ischemic encephalopathy    There is no acute hemorrhage, mass effect, or midline shift.    --- End of Report ---              ALLEN HUERTA DO; Resident Radiologist  This document has been electronically signed.  HI MEANS MD; Attending Radiologist    < end of copied text >    PROTEIN CALORIE MALNUTRITION PRESENT: [ ]mild [ ]moderate [ ]severe [ ]underweight [ ]morbid obesity  https://www.andeal.org/vault/2440/web/files/ONC/Table_Clinical%20Characteristics%20to%20Document%20Malnutrition-White%20JV%20et%20al%2020.pdf    Height (cm): 177.8 (10-28-21 @ 15:58)  Weight (kg): 78.2 (10-27-21 @ 17:07)  BMI (kg/m2): 24.7 (10-28-21 @ 15:58)    [ ]PPSV2 < or = to 30% [ ]significant weight loss  [ ]poor nutritional intake  [ ]anasarca      [ ]Artificial Nutrition      REFERRALS:   [ ]Chaplaincy  [ ]Hospice  [ ]Child Life  [ ]Social Work  [ ]Case management [ ]Holistic Therapy     Goals of Care Document:

## 2021-11-01 NOTE — GOALS OF CARE CONVERSATION - ADVANCED CARE PLANNING - CONVERSATION DETAILS
Met with Kalli who is the patients daughter and her boyfriend. It was established that Kalli is the only child who remains in contact with her father. She has mentioned that her brother and mother are estranged from the patient and are not involved with any decisions for the patient. Kalli was updated with patient medical status and was told that neurology would like to send the patient for MRI brain to look for any structural defects that could have contributed to his seizures/GPDs. Kalli asked about prognosis and functional status for in the future and was told that the situation looks grim but neurology could not give a prognosis based on the fact that they were still trying to control these electrical impulses with antiepileptics and they are in need of an MRI of the brain to help to define prognosis as d/w neurology this am. She verbalized understanding and will await the MRI and assessment from neurology. Meeting held with Dr. Garg from palliative care.

## 2021-11-01 NOTE — CONSULT NOTE ADULT - PROBLEM SELECTOR RECOMMENDATION 3
on statin as outpatient    Jos Harry D.O  [phone]
Will continue to f/u for GOC, ACP, family support, and resources.   Jean Paul Garg MD   Geriatrics and Palliative Care (GAP) Consult Service    of Geriatric and Palliative Medicine  [facility]      Please page the following number for clinical matters between the hours of 9 am and 5 pm from Monday through Friday : [phone]    After 5pm and on weekends, please see the contact information below:    In the event of newly developing, evolving, or worsening symptoms, please contact the Palliative Medicine team via pager (if the patient is at [facility] #[phone] or if the patient is at [facility] #[phone]) The Geriatric and Palliative Medicine service has coverage 24 hours a day/ 7 days a week to provide medical recommendations regarding symptom management needs via telephone

## 2021-11-01 NOTE — CHART NOTE - NSCHARTNOTEFT_GEN_A_CORE
Nutrition Follow Up Note  Patient seen for: Nutrition follow up     Chart reviewed, events noted. Pt is a 65 yo M with PMH: DM, HTN, BPH. Found lethargic and was brought to OSH on 10/25 after family witnessed tonic clonic seizure. Pt found hypoglycemic upon arrive to OSH with a blood sugar of 34. Blood cultures collected on 10/26 showed GPC bacteremia and group B strep isolated from urine culture. Was subsequently transferred to [facility] for EEG monitoring for seizure management. Remains intubated at this time. Negative PCR x 2 for COVID at [facility]. Off Remdesivir and Dexamethasone. S/P antibiotics.     Source: [] Patient       [x] EMR        [x] RN        [] Family at bedside       [x] Other: interdisciplinary medical team.     -If unable to interview patient: [x] Trach/Vent/BiPAP  [x] Disoriented/confused/inappropriate to interview    Diet Order:   Diet, NPO with Tube Feed:   Tube Feeding Modality: Nasogastric  Glucerna 1.5 Ja (GLUCERNA1.5RTH)  Total Volume for 24 Hours (mL): 1200  Continuous  Starting Tube Feed Rate {mL per Hour}: 20  Until Goal Tube Feed Rate (mL per Hour): 50  Tube Feed Duration (in Hours): 24  Tube Feed Start Time: 18:00 (10-27-21)    EN Order Provides: 1200ml, 1800kcal and 99g protein     EN Provision (per nursing flow sheet):   (): 400ml (thus far)  (10/31): 100% of goal  (10/30): 96% of goal  (10/29): 96% of goal  (10/28): 67% of goal - EN feeds titrating up towards goal rate    Is current diet order appropriate/adequate? [x] Yes  []  No:     Nutrition-related concerns:  -Prescribed free water 200ml q 6 hrs. Na 137. Downtrending. Had been hypernatremic on 10/29-10/30. S/P Lactated Ringers bolus on 10/31; IV continuous Lactated Ringers on 10/31-, now held. S/P Lasix 10/30.   -Last BM (): x 1 (smear, thus far); (10/29): x 2. On bowel regimen as ordered (senna, Miralax).  -Continues on insulin lispro and NPH to aid in management of BG. A1c 14.8%. Diet education deferred at this time given clinical status of pt.   -Continues on multivitamin as ordered.      Weights:   Daily Weight in k.3 (10-31)    MEDICATIONS  (STANDING):  amLODIPine   Tablet  atorvastatin  carvedilol  doxazosin  insulin lispro (ADMELOG) corrective regimen sliding scale  insulin NPH human recombinant  lactated ringers.  multivitamin/minerals/iron Oral Solution (CENTRUM)  pantoprazole  Injectable  polyethylene glycol 3350  senna Syrup    Pertinent Labs:  @ 00:14: Na 137, BUN 28<H>, Cr 1.08, <H>, K+ 3.9, Phos 3.0, Mg 2.1, Alk Phos 90, ALT/SGPT 20, AST/SGOT 39, HbA1c --  10-31 @ 11:49: Na 144, BUN 27<H>, Cr 0.99, BG 83, K+ 3.9, Phos 3.8, Mg 2.2, Alk Phos 90, ALT/SGPT 16, AST/SGOT 41<H>, HbA1c --    A1C with Estimated Average Glucose Result: 14.8 % (10-28-21 @ 02:51)    Finger Sticks:  POCT Blood Glucose.: 205 mg/dL ( @ 05:20)  POCT Blood Glucose.: 144 mg/dL (10-31 @ 23:31)  POCT Blood Glucose.: 171 mg/dL (10-31 @ 17:38)  POCT Blood Glucose.: 70 mg/dL (10-31 @ 17:09)    Skin per nursing documentation: no documented pressure injuries   Edema: 2+ generalized    (based on dosing wt 78.2kg):   Estimated Energy Needs: (20-25kcal/kg): 1564-1955kcal  Estimated Protein Needs: (1.2-2.0g protein/kg): 94-156g protein    Previous Nutrition Diagnosis: Altered Nutrition Related  Nutrition Diagnosis is: [x] ongoing     New Nutrition Diagnosis: [x] Not applicable       Recommendations:      1. Recommend to continue Glucerna 1.5 at 50ml/hr x 24 hrs, however add No Carb Prosource TF 1x daily (+40kcal, +11g protein). To provide: 1200ml, 1840kcal and 110g protein. Based on dosing wt (78.2kg), provides: 23.5kcal/kg and 1.4g protein/kg.   2. Monitor GI tolerance. RD to remain available to adjust EN formulary, volume/rate PRN.   3. Monitor wt trends/labs/skin integrity/hydration status/bowel regularity.   4. Diet education deferred; RD to remain available PRN as per request of pt/medical team.  5. Continue multivitamin as ordered.     Monitoring and Evaluation:   Continue to monitor nutritional intake, tolerance to diet prescription, weights, labs, skin integrity      RD remains available upon request and will follow up per protocol

## 2021-11-01 NOTE — PROGRESS NOTE ADULT - SUBJECTIVE AND OBJECTIVE BOX
Chief Complaint: f/u DM2    History:  Patient seen at bedside this afternoon. Remains on tube feeds at 50 cc/hr. BG was in the 70s yesterday at 5 pm - NPH was reduced to 15 units q6, however he did not receive a dose until 5 am today. NPH was then further reduced to 10 units. BG now in the 200s.    MEDICATIONS  (STANDING):  amLODIPine   Tablet 10 milliGRAM(s) Oral daily  aspirin  chewable 81 milliGRAM(s) Enteral Tube daily  atorvastatin 40 milliGRAM(s) Oral at bedtime  carvedilol 12.5 milliGRAM(s) Oral every 12 hours  chlorhexidine 0.12% Liquid 15 milliLiter(s) Oral Mucosa every 12 hours  chlorhexidine 4% Liquid 1 Application(s) Topical <User Schedule>  dexMEDEtomidine Infusion 0.4 MICROgram(s)/kG/Hr (7.82 mL/Hr) IV Continuous <Continuous>  doxazosin 4 milliGRAM(s) Oral at bedtime  enoxaparin Injectable 40 milliGRAM(s) SubCutaneous daily  insulin lispro (ADMELOG) corrective regimen sliding scale   SubCutaneous every 6 hours  insulin NPH human recombinant 20 Unit(s) SubCutaneous every 6 hours  lacosamide IVPB 100 milliGRAM(s) IV Intermittent every 12 hours  lactated ringers. 1000 milliLiter(s) (125 mL/Hr) IV Continuous <Continuous>  levETIRAcetam  IVPB 1000 milliGRAM(s) IV Intermittent <User Schedule>  multivitamin/minerals/iron Oral Solution (CENTRUM) 15 milliLiter(s) Enteral Tube daily  pantoprazole  Injectable 40 milliGRAM(s) IV Push daily  polyethylene glycol 3350 17 Gram(s) Oral every 12 hours  senna Syrup 10 milliLiter(s) Oral at bedtime  valproate sodium IVPB 500 milliGRAM(s) IV Intermittent every 8 hours    MEDICATIONS  (PRN):  bisacodyl Suppository 10 milliGRAM(s) Rectal daily PRN Constipation      Allergies  No Known Allergies    Review of Systems:  unable to obtain     PHYSICAL EXAM:  VITALS: T(C): 37 (11-01-21 @ 11:00)  T(F): 98.6 (11-01-21 @ 11:00), Max: 100.8 (10-31-21 @ 16:00)  HR: 74 (11-01-21 @ 13:00) (67 - 92)  BP: --  RR:  (14 - 26)  SpO2:  (96% - 100%)  Wt(kg): --  GENERAL: NAD, well-developed  HEENT:  Atraumatic, Normocephalic  RESPIRATORY: + air movement bilaterally, + ventilator  CARDIOVASCULAR: HR in 70s on monitor   GI: Soft, nontender, non distended  PSYCH: unable to assess       POCT Blood Glucose.: 210 mg/dL (11-01-21 @ 11:21)  POCT Blood Glucose.: 205 mg/dL (11-01-21 @ 05:20)  POCT Blood Glucose.: 144 mg/dL (10-31-21 @ 23:31)  POCT Blood Glucose.: 171 mg/dL (10-31-21 @ 17:38)  POCT Blood Glucose.: 70 mg/dL (10-31-21 @ 17:09)  POCT Blood Glucose.: 190 mg/dL (10-30-21 @ 17:23)  POCT Blood Glucose.: 129 mg/dL (10-30-21 @ 11:34)  POCT Blood Glucose.: 111 mg/dL (10-30-21 @ 05:34)  POCT Blood Glucose.: 120 mg/dL (10-30-21 @ 03:07)  POCT Blood Glucose.: 109 mg/dL (10-30-21 @ 00:33)  POCT Blood Glucose.: 83 mg/dL (10-29-21 @ 18:32)  POCT Blood Glucose.: 110 mg/dL (10-29-21 @ 17:22)  POCT Blood Glucose.: 140 mg/dL (10-29-21 @ 16:07)  POCT Blood Glucose.: 182 mg/dL (10-29-21 @ 14:43)      11-01    137  |  104  |  28<H>  ----------------------------<  173<H>  3.9   |  22  |  1.08    EGFR if : 81  EGFR if non : 70    Ca    7.8<L>      11-01  Mg     2.1     11-01  Phos  3.0     11-01    TPro  5.2<L>  /  Alb  2.1<L>  /  TBili  0.1<L>  /  DBili  x   /  AST  39  /  ALT  20  /  AlkPhos  90  11-01

## 2021-11-01 NOTE — PROGRESS NOTE ADULT - PROBLEM SELECTOR PLAN 3
on statin as outpatient    Giovanna Sanz MD   Pager # [phone]  On evenings and weekends, please call the office at [phone] or page endocrine fellow on call. Please note that this patient may be followed by different provider tomorrow. If no answer, contact the office.

## 2021-11-01 NOTE — PROGRESS NOTE ADULT - SUBJECTIVE AND OBJECTIVE BOX
Neurology  Progress Note  11-01-21    Name: ASHA DUNN 66y    Interval History / ROS: hypotensive overnight, given 1 LR x1    Medications:  MEDICATIONS  (STANDING):  amLODIPine   Tablet 10 milliGRAM(s) Oral daily  aspirin  chewable 81 milliGRAM(s) Enteral Tube daily  atorvastatin 40 milliGRAM(s) Oral at bedtime  carvedilol 12.5 milliGRAM(s) Oral every 12 hours  chlorhexidine 0.12% Liquid 15 milliLiter(s) Oral Mucosa every 12 hours  chlorhexidine 4% Liquid 1 Application(s) Topical <User Schedule>  dexMEDEtomidine Infusion 0.4 MICROgram(s)/kG/Hr (7.82 mL/Hr) IV Continuous <Continuous>  doxazosin 4 milliGRAM(s) Oral at bedtime  enoxaparin Injectable 40 milliGRAM(s) SubCutaneous daily  insulin lispro (ADMELOG) corrective regimen sliding scale   SubCutaneous every 6 hours  insulin NPH human recombinant 20 Unit(s) SubCutaneous every 6 hours  lacosamide IVPB 100 milliGRAM(s) IV Intermittent every 12 hours  lactated ringers. 1000 milliLiter(s) (125 mL/Hr) IV Continuous <Continuous>  levETIRAcetam  IVPB 1000 milliGRAM(s) IV Intermittent <User Schedule>  multivitamin/minerals/iron Oral Solution (CENTRUM) 15 milliLiter(s) Enteral Tube daily  pantoprazole  Injectable 40 milliGRAM(s) IV Push daily  polyethylene glycol 3350 17 Gram(s) Oral every 12 hours  senna Syrup 10 milliLiter(s) Oral at bedtime  valproate sodium IVPB 500 milliGRAM(s) IV Intermittent every 8 hours    MEDICATIONS  (PRN):  bisacodyl Suppository 10 milliGRAM(s) Rectal daily PRN Constipation      Vitals:  T(C): 36.6 (11-01-21 @ 15:00), Max: 38.2 (10-31-21 @ 16:00)  HR: 80 (11-01-21 @ 15:13) (67 - 92)  BP: --  RR: 18 (11-01-21 @ 15:00) (14 - 26)  SpO2: 99% (11-01-21 @ 15:13) (96% - 100%)    Physical Examination:  GENERAL EXAM:  Constitutional: awake and alert. NAD  HEENT: pupils symmetric b/l. intubated.   Extremities: no edema, no cyanosis  Musculoskeletal: no joint swelling/tenderness, no abnormal movements  Skin: no rashes    NEUROLOGICAL EXAM:  MS: Does not awake with verbal stimulation. Intubated.   CN: pupils symmetric b/l. PERRL  V1-3 intact,   Motor: Strength: spontaneous movement noted. LUE myoclonic jerk noted.   Tone: normal. Bulk: normal.   Plantar flex b/l.   Sensation: intact to noxious stimuli   Coordination: deferred.   Gait:  deferred.      Labs:                        11.3   12.71 )-----------( 201      ( 01 Nov 2021 00:50 )             34.5     11-01    137  |  104  |  28<H>  ----------------------------<  173<H>  3.9   |  22  |  1.08    Ca    7.8<L>      01 Nov 2021 00:14  Phos  3.0     11-01  Mg     2.1     11-01    TPro  5.2<L>  /  Alb  2.1<L>  /  TBili  0.1<L>  /  DBili  x   /  AST  39  /  ALT  20  /  AlkPhos  90  11-01    CAPILLARY BLOOD GLUCOSE      POCT Blood Glucose.: 210 mg/dL (01 Nov 2021 11:21)    LIVER FUNCTIONS - ( 01 Nov 2021 00:14 )  Alb: 2.1 g/dL / Pro: 5.2 g/dL / ALK PHOS: 90 U/L / ALT: 20 U/L / AST: 39 U/L / GGT: x             Culture - Sputum (collected 01 Nov 2021 00:40)  Source: .Sputum Sputum  Gram Stain (01 Nov 2021 03:21):    Few polymorphonuclear leukocytes per low power field    No Squamous epithelial cells per low power field    Few Yeast like cells per oil power field      PT/INR - ( 01 Nov 2021 00:15 )   PT: 12.3 sec;   INR: 1.03 ratio         PTT - ( 01 Nov 2021 00:15 )  PTT:37.5 sec  CSF:    Total Nucleated Cell Count, CSF: 1 /uL (10-28-21 @ 16:13)  RBC Count - Spinal Fluid: 4 /uL (10-28-21 @ 16:13)    Protein, CSF: 30 mg/dL (10-28-21 @ 16:13)    Radiology:  CT Head No Cont:  (27 Oct 2021 23:48)  FINDINGS:  Large ventricles suggesting moderate atrophy. There are no areas of abnormal attenuation within the brain parenchyma. There is no intraparenchymal hematoma, mass effect or midline shift. The basal cisterns are patent. No abnormal extra-axial fluid collections are present.  Calcification of the vertebral arteries.  Partially visualized nasogastric tube and endotracheal tube.  The calvarium is intact. The visualized intraorbital compartments, paranasal sinuses and mastoid complexes appear free of acute disease.    IMPRESSION:  Large ventricles likely to atrophy. No evidence of anoxic ischemic encephalopathy  There is no acute hemorrhage, mass effect, or midline shift.      EEG report 10/30/21:  EEG Summary / Classification:    Abnormal EEG record:    - Near continuous GPDs  - severe generalized slowing.    EEG Impression / Clinical Correlate:    Abnormal EEG study.    Diffuse cortical hyperexcitability with GPDs at times coming in prolonged runs without clear evolution into a distinct ictal pattern, however may fall on the ictal-interictal continuum.   Severe nonspecific diffuse or multifocal cerebral dysfunction.

## 2021-11-01 NOTE — PROGRESS NOTE ADULT - SUBJECTIVE AND OBJECTIVE BOX
Follow Up:  Fever, Positive BCx    Interval History: febrile over last 24H. Per RN no diarrhea. No secretions in ETT.     REVIEW OF SYSTEMS  [ x ] ROS unobtainable because:  intubated/sedated  [  ] All other systems negative except as noted below    Constitutional:  [ ] fever [ ] chills  [ ] weight loss  [ ] weakness  Skin:  [ ] rash [ ] phlebitis	  Eyes: [ ] icterus [ ] pain  [ ] discharge	  ENMT: [ ] sore throat  [ ] thrush [ ] ulcers [ ] exudates  Respiratory: [ ] dyspnea [ ] hemoptysis [ ] cough [ ] sputum	  Cardiovascular:  [ ] chest pain [ ] palpitations [ ] edema	  Gastrointestinal:  [ ] nausea [ ] vomiting [ ] diarrhea [ ] constipation [ ] pain	  Genitourinary:  [ ] dysuria [ ] frequency [ ] hematuria [ ] discharge [ ] flank pain  [ ] incontinence  Musculoskeletal:  [ ] myalgias [ ] arthralgias [ ] arthritis  [ ] back pain  Neurological:  [ ] headache [ ] seizures  [ ] confusion/altered mental status      Allergies  No Known Allergies        ANTIMICROBIALS:      OTHER MEDS:  MEDICATIONS  (STANDING):  amLODIPine   Tablet 10 daily  aspirin  chewable 81 daily  atorvastatin 40 at bedtime  bisacodyl Suppository 10 daily PRN  carvedilol 12.5 every 12 hours  dexMEDEtomidine Infusion 0.2 <Continuous>  doxazosin 4 at bedtime  enoxaparin Injectable 40 daily  insulin lispro (ADMELOG) corrective regimen sliding scale  every 6 hours  insulin NPH human recombinant 20 every 6 hours  lacosamide IVPB 100 every 12 hours  levETIRAcetam  IVPB 1000 <User Schedule>  pantoprazole  Injectable 40 daily  polyethylene glycol 3350 17 every 12 hours  senna Syrup 10 at bedtime  valproate sodium IVPB 500 every 8 hours      Vital Signs Last 24 Hrs  T(C): 37.3 (2021 19:00), Max: 37.7 (31 Oct 2021 23:00)  T(F): 99.1 (2021 19:00), Max: 99.9 (31 Oct 2021 23:00)  HR: 73 (2021 19:00) (67 - 92)  BP: --  BP(mean): --  RR: 21 (2021 19:00) (14 - 25)  SpO2: 97% (2021 19:00) (96% - 100%)    PHYSICAL EXAMINATION:  General: Intubated and Sedated  HEENT: +ETT  Neck: Supple  Cardiac: RRR, No M/R/G  Resp: CTAB, No Wh/Rh/Ra  Abdomen: NBS, NT/ND, No HSM, No rigidity or guarding  MSK: No LE edema. No Calf tenderness  : marin  Skin: No rashes or lesions. Skin is warm and dry to the touch.    Neuro: Intubated and Sedated  Psych: Unable to assess - intubated and sedated                          11.3   12.71 )-----------( 201      ( 2021 00:50 )             34.5           137  |  104  |  28<H>  ----------------------------<  173<H>  3.9   |  22  |  1.08    Ca    7.8<L>      2021 00:14  Phos  3.0       Mg     2.1         TPro  5.2<L>  /  Alb  2.1<L>  /  TBili  0.1<L>  /  DBili  x   /  AST  39  /  ALT  20  /  AlkPhos  90        Urinalysis Basic - ( 31 Oct 2021 14:25 )    Color: Light Yellow / Appearance: Clear / S.015 / pH: x  Gluc: x / Ketone: Negative  / Bili: Negative / Urobili: Negative   Blood: x / Protein: 100 mg/dL / Nitrite: Negative   Leuk Esterase: Negative / RBC: 4 /hpf / WBC 6 /HPF   Sq Epi: x / Non Sq Epi: 1 /hpf / Bacteria: Negative        MICROBIOLOGY:  v  .Sputum Sputum  21 --  --    Few polymorphonuclear leukocytes per low power field  No Squamous epithelial cells per low power field  Few Yeast like cells per oil power field      .CSF CSF  10-28-21   No growth at 3 days.  --    No polymorphonuclear cells seen  No organisms seen  by cytocentrifuge      Catheterized Catheterized  10-27-21   No growth  --  --      .Sputum ett  10-27-21   Normal Respiratory Jenifer present  --    Numerous polymorphonuclear leukocytes per low power field  Rare Squamous epithelial cells per low power field  No organisms seen per oil power field      .Blood Blood  10-27-21   No growth to date.  --  --          HSV 1/2 PCR: NotDete (10-29 @ 03:34)  Rapid RVP Result: NotDete (10-27 @ 18:26)        RADIOLOGY:    <The imaging below has been reviewed and visualized by me independently. Findings as detailed in report below>    < from: Xray Chest 1 View- PORTABLE-Urgent (Xray Chest 1 View- PORTABLE-Urgent .) (10.28.21 @ 00:27) >  IMPRESSION:  Interval placement of left internal jugular central venous catheter with tip at the left brachiocephalic/SVC junction.    Two small caliber radiopaque lines are approaching from the left upper extremity with a single lying returning towards the midline. These lines are likely external to patient. Clinical correlation is recommended..    Clear lungs.  No pneumothorax.    < end of copied text >

## 2021-11-01 NOTE — PROGRESS NOTE ADULT - ASSESSMENT
Patient is a 67 y/o M with PMHx DM, HTN, BPH, transferred from [facility] after witnessed tonic clonic seizure fo unknown etiology and chronicity found also with hypoglycemia, hypotension in setting of bacteremia. Currently intubated for airway protection and on sedation versed/ fentanyl. Concern of COVID+. VS: 90/41 (map 57), RR22, 99% vent. CBC: 20.13wbc 89%N, Cr 1.34, alb2.4, ast 49, proBNP 882, Ua w/ glucosuria and proteinuria. Patient had myoclonic of LUE and given versed. Patient EEG noted to show diffuse cortical hyperexcitability with GPDs, prognosis is poor as noted in EEG report. Patient started on Depakote today s/p 1 g load. Will continue to monitor. If episodes persist, contact epilepsy team.     Impression: Patient found w/ GTC medically refractory to keppra/ vimpat at [facility], intubated for airway protection and sedated for concerns of status epilepticus of unknown etiology and chronicity.     Recommendations:   [x] 1g Depakote load x1, followed by 500mg Q8hrs   [x] continue with keppra 1000mg Q8hr  vimpat 100mg BID IV  [ ]Depakote, keppra, vimpat level in the AM   [ ] MRI brain w/ and w/o contrast when medically able to -- note, given CT head which did not demonstrate any changes, but EEG showing hyperexcitability, MRI is indicated to help with evaluating underlying change, as well as helping to determine prognosis  [x] CBC, CMP, Mg, P, CpK, UA, Utox, ammonia, troponin, basic infectious workup, prolactin  [x] DVT ppx as per primary team   [x] Given concern for seizure, advise patient to not drive, operate heavy machinery, avoid heights, pools, bathtubs, locked doors until cleared by further follow up outpatient by neurology.     Please note: if patient has a convulsion, please document length of episode, specifically what patient was doing paying attention to eye opening vs closure, gaze deviation, shaking of extremities, tongue bite, urinary incontinence, any derangement of vital signs      * Case to be discussed with Neurology Attending Dr. Clifford *

## 2021-11-01 NOTE — PROGRESS NOTE ADULT - PROBLEM SELECTOR PLAN 1
- remains on Glucerna at 50 cc/hr  - last dose of steroid was morning of 10/28  - adjust NPH to 20 units q6 - plan conveyed to team today   - c/w moderate correction scale qac and qhd  - check FS q6  - will follow    Dispo:  Outpt. endo follow-up  Discharge plan to be based on insulin requirements, confirmation of outpatient regimen, and feed modality at the time of discharge.

## 2021-11-01 NOTE — CONSULT NOTE ADULT - PROBLEM SELECTOR RECOMMENDATION 2
On norvasc at home. Antihypertensives on hold as he was on pressors.
See GOC note above.   On a day by day base and pending on current work up and inputs from Neuro and the primary team regarding Dx, Rx option, and prognosis will help with better defining GOC and ACP.

## 2021-11-02 NOTE — PROGRESS NOTE ADULT - SUBJECTIVE AND OBJECTIVE BOX
DIABETES FOLLOW UP : Seen earlier today    INTERVAL HX: TF held since 5 am by nursing although pt received 6am NPH and is ordered for 24 hours feeds  . At this time BG 99, spoke with primary team concerned as pt seizing , 1/2 amp D50% to be given to prevent hypoglycemia. NPH held at 12noon as TF being held at this time for scan. BG hyperglycemic 180s-220s at 1800 and MN after NPH 20 units       Review of Systems:  pt unable to participate     Allergies    No Known Allergies    Intolerances      MEDICATIONS  (STANDING):  amLODIPine   Tablet 10 milliGRAM(s) Oral daily  aspirin  chewable 81 milliGRAM(s) Enteral Tube daily  atorvastatin 40 milliGRAM(s) Oral at bedtime  carvedilol 12.5 milliGRAM(s) Oral every 12 hours  chlorhexidine 0.12% Liquid 15 milliLiter(s) Oral Mucosa every 12 hours  chlorhexidine 4% Liquid 1 Application(s) Topical <User Schedule>  dexMEDEtomidine Infusion 0.2 MICROgram(s)/kG/Hr (3.91 mL/Hr) IV Continuous <Continuous>  doxazosin 4 milliGRAM(s) Oral at bedtime  enoxaparin Injectable 40 milliGRAM(s) SubCutaneous daily  insulin lispro (ADMELOG) corrective regimen sliding scale   SubCutaneous every 6 hours  insulin NPH human recombinant 20 Unit(s) SubCutaneous every 6 hours  lacosamide IVPB 100 milliGRAM(s) IV Intermittent every 12 hours  levETIRAcetam  IVPB 1000 milliGRAM(s) IV Intermittent <User Schedule>  multivitamin/minerals/iron Oral Solution (CENTRUM) 15 milliLiter(s) Enteral Tube daily  norepinephrine Infusion 0.05 MICROgram(s)/kG/Min (7.33 mL/Hr) IV Continuous <Continuous>  pantoprazole  Injectable 40 milliGRAM(s) IV Push daily  polyethylene glycol 3350 17 Gram(s) Oral every 12 hours  propofol Infusion 50 MICROgram(s)/kG/Min (23.5 mL/Hr) IV Continuous <Continuous>  senna Syrup 10 milliLiter(s) Oral at bedtime  valproate sodium IVPB 500 milliGRAM(s) IV Intermittent every 8 hours      PHYSICAL EXAM:  VITALS: T(C): 37.1 (11-02-21 @ 07:00)  T(F): 98.8 (11-02-21 @ 07:00), Max: 99.7 (11-01-21 @ 19:30)  HR: 68 (11-02-21 @ 11:00) (60 - 83)  BP: 140/64 (11-02-21 @ 11:00) (140/64 - 175/76)  RR:  (11 - 21)  SpO2:  (97% - 100%)  Wt(kg): --  GENERAL: male laying in bed in NAD  Abdomen: Soft, nontender, non distended NGT;  TF OFF   Extremities: Warm  NEURO: sedated    LABS:  POCT Blood Glucose.: 197 mg/dL (11-02-21 @ 05:48)  POCT Blood Glucose.: 183 mg/dL (11-01-21 @ 23:45)  POCT Blood Glucose.: 225 mg/dL (11-01-21 @ 18:14)  POCT Blood Glucose.: 210 mg/dL (11-01-21 @ 11:21)  POCT Blood Glucose.: 205 mg/dL (11-01-21 @ 05:20)  POCT Blood Glucose.: 144 mg/dL (10-31-21 @ 23:31)  POCT Blood Glucose.: 171 mg/dL (10-31-21 @ 17:38)  POCT Blood Glucose.: 70 mg/dL (10-31-21 @ 17:09)  POCT Blood Glucose.: 190 mg/dL (10-30-21 @ 17:23)                            10.0   9.84  )-----------( 177      ( 02 Nov 2021 01:45 )             31.7       11-02    138  |  104  |  32<H>  ----------------------------<  196<H>  3.7   |  25  |  1.08    EGFR if : 81  EGFR if non : 70    Ca    7.8<L>      11-02  Mg     2.4     11-02  Phos  3.5     11-02    TPro  4.9<L>  /  Alb  2.2<L>  /  TBili  0.2  /  DBili  x   /  AST  27  /  ALT  14  /  AlkPhos  72  11-02          Thyroid Function Tests:          A1C with Estimated Average Glucose Result: 14.8 % (10-28-21 @ 02:51)      Estimated Average Glucose: 378 mg/dL (10-28-21 @ 02:51)

## 2021-11-02 NOTE — PROGRESS NOTE ADULT - SUBJECTIVE AND OBJECTIVE BOX
Esvin Novoa MD  Internal Medicine  Pager #[phone]    CHIEF COMPLAINT:Patient is a 66y old  Male who presents with a chief complaint of Transfer from [place] for EEG Monitoring (2021 19:26)        Interval Events:    REVIEW OF SYSTEMS:  CONSTITUTIONAL: No weakness, fevers or chills  EYES/ENT: No visual changes;  No vertigo or throat pain   NECK: No pain or stiffness  RESPIRATORY: No cough, wheezing, hemoptysis; No shortness of breath  CARDIOVASCULAR: No chest pain or palpitations  GASTROINTESTINAL: No abdominal or epigastric pain. No nausea, vomiting, or hematemesis; No diarrhea or constipation. No melena or hematochezia.  GENITOURINARY: No dysuria, frequency or hematuria  NEUROLOGICAL: No numbness or weakness  SKIN: No itching, rashes    OBJECTIVE:  ICU Vital Signs Last 24 Hrs  T(C): 37.1 (2021 07:00), Max: 37.6 (2021 19:30)  T(F): 98.8 (:00), Max: 99.7 (2021 19:30)  HR: 64 (2021 07:) (60 - 83)  BP: --  BP(mean): --  ABP: 166/57 (2021 07:00) (111/39 - 186/67)  ABP(mean): 92 (2021 07:00) (57 - 103)  RR: 14 (2021 07:00) (11 - 21)  SpO2: 100% (:) (97% - 100%)    Mode: CPAP with PS, FiO2: 21, PEEP: 5, PS: 10, MAP: 10, PIP: 15     @ : @ 07:00  --------------------------------------------------------  IN: 2478.5 mL / OUT: 1635 mL / NET: 843.5 mL     @ : @ 08:09  --------------------------------------------------------  IN: 9.8 mL / OUT: 75 mL / NET: -65.2 mL      CAPILLARY BLOOD GLUCOSE  197 (2021 06:00)      POCT Blood Glucose.: 197 mg/dL (2021 05:48)      PHYSICAL EXAM:  GENERAL: patient appears well, no acute distress, appropriate, pleasant  EYES: sclera clear, no exudates  ENMT: oropharynx clear without erythema, no exudates, moist mucous membranes  NECK: supple, soft, no thyromegaly noted  LUNGS: good air entry bilaterally, clear to auscultation, symmetric breath sounds, no wheezing or rhonchi appreciated  HEART: soft S1/S2, regular rate and rhythm, no murmurs noted, no lower extremity edema  GASTROINTESTINAL: abdomen is soft, nontender, nondistended, normoactive bowel sounds, no palpable masses  INTEGUMENT: good skin turgor, no lesions noted  MUSCULOSKELETAL: no clubbing or cyanosis, no obvious deformity  NEUROLOGIC: awake, alert, oriented x3, good muscle tone in 4 extremities, no obvious sensory deficits  PSYCHIATRIC: mood is good, affect is congruent, linear and logical thought process  HEME/LYMPH: no palpable supraclavicular nodules, no obvious ecchymosis or petechiae     LINES:    HOSPITAL MEDICATIONS:  Standing Meds:  amLODIPine   Tablet 10 milliGRAM(s) Oral daily  aspirin  chewable 81 milliGRAM(s) Enteral Tube daily  atorvastatin 40 milliGRAM(s) Oral at bedtime  carvedilol 12.5 milliGRAM(s) Oral every 12 hours  chlorhexidine 0.12% Liquid 15 milliLiter(s) Oral Mucosa every 12 hours  chlorhexidine 4% Liquid 1 Application(s) Topical <User Schedule>  dexMEDEtomidine Infusion 0.2 MICROgram(s)/kG/Hr IV Continuous <Continuous>  doxazosin 4 milliGRAM(s) Oral at bedtime  enoxaparin Injectable 40 milliGRAM(s) SubCutaneous daily  insulin lispro (ADMELOG) corrective regimen sliding scale   SubCutaneous every 6 hours  insulin NPH human recombinant 20 Unit(s) SubCutaneous every 6 hours  lacosamide IVPB 100 milliGRAM(s) IV Intermittent every 12 hours  levETIRAcetam  IVPB 1000 milliGRAM(s) IV Intermittent <User Schedule>  multivitamin/minerals/iron Oral Solution (CENTRUM) 15 milliLiter(s) Enteral Tube daily  pantoprazole  Injectable 40 milliGRAM(s) IV Push daily  polyethylene glycol 3350 17 Gram(s) Oral every 12 hours  senna Syrup 10 milliLiter(s) Oral at bedtime  valproate sodium IVPB 500 milliGRAM(s) IV Intermittent every 8 hours      PRN Meds:  bisacodyl Suppository 10 milliGRAM(s) Rectal daily PRN      LABS:                        10.0   9.84  )-----------( 177      ( 2021 01:45 )             31.7     Hgb Trend: 10.0<--, 11.3<--, 8.3<--, 12.1<--, 11.4<--  11    138  |  104  |  32<H>  ----------------------------<  196<H>  3.7   |  25  |  1.08    Ca    7.8<L>      2021 01:45  Phos  3.5     11  Mg     2.4         TPro  4.9<L>  /  Alb  2.2<L>  /  TBili  0.2  /  DBili  x   /  AST  27  /  ALT  14  /  AlkPhos  72      Creatinine Trend: 1.08<--, 1.08<--, 0.99<--, 1.03<--, 1.01<--, 1.07<--  PT/INR - ( 2021 01:45 )   PT: 12.5 sec;   INR: 1.04 ratio         PTT - ( 2021 01:45 )  PTT:36.0 sec  Urinalysis Basic - ( 31 Oct 2021 14:25 )    Color: Light Yellow / Appearance: Clear / S.015 / pH: x  Gluc: x / Ketone: Negative  / Bili: Negative / Urobili: Negative   Blood: x / Protein: 100 mg/dL / Nitrite: Negative   Leuk Esterase: Negative / RBC: 4 /hpf / WBC 6 /HPF   Sq Epi: x / Non Sq Epi: 1 /hpf / Bacteria: Negative      Arterial Blood Gas:   @ 00:03  7.46/39/101/28/99.2/3.6  ABG lactate: --  Arterial Blood Gas:  10-31 @ 18:26  7.47/39/103/28/99.0/4.4  ABG lactate: --  Arterial Blood Gas:  10-31 @ 11:47  7.51/37/92/30/99.2/6.2  ABG lactate: --  Arterial Blood Gas:  10-31 @ 10:21  7.49/40/105/30/99.3/6.6  ABG lactate: --        MICROBIOLOGY:     Culture - Sputum (collected 2021 00:40)  Source: .Sputum Sputum  Gram Stain (2021 03:21):    Few polymorphonuclear leukocytes per low power field    No Squamous epithelial cells per low power field    Few Yeast like cells per oil power field  Preliminary Report (2021 20:19):    Normal Respiratory Jenifer present    Culture - Blood (collected 31 Oct 2021 21:04)  Source: .Blood Blood-Peripheral  Preliminary Report (2021 22:02):    No growth to date.    Culture - Blood (collected 31 Oct 2021 21:04)  Source: .Blood Blood-Peripheral  Preliminary Report (2021 22:02):    No growth to date.      RADIOLOGY:  [ ] Reviewed and interpreted by me    EKG:     Esvin Novoa MD  Internal Medicine  Pager #[phone]    CHIEF COMPLAINT:Patient is a 66y old  Male who presents with a chief complaint of Transfer from [place] for EEG Monitoring (2021 19:26)        Interval Events:    Bilateral downward gaze with jerking RLE ativan 2,2,4 pending likely initiation prop gtt    pending MRI, neuro contacted [facility] in interval and we can arrange EEG in meantime    REVIEW OF SYSTEMS:    Unable to obtain due to intubated and sedated.    OBJECTIVE:  ICU Vital Signs Last 24 Hrs  T(C): 37.1 (2021 07:00), Max: 37.6 (2021 19:30)  T(F): 98.8 (:), Max: 99.7 (2021 19:30)  HR: 64 (2021 07:) (60 - 83)  BP: --  BP(mean): --  ABP: 166/57 (:00) (111/39 - 186/67)  ABP(mean): 92 (:) (57 - 103)  RR: 14 (2021 07:00) (11 - 21)  SpO2: 100% (:) (97% - 100%)    Mode: CPAP with PS, FiO2: 21, PEEP: 5, PS: 10, MAP: 10, PIP: 15     @ 07:  -   @ 07:00  --------------------------------------------------------  IN: 2478.5 mL / OUT: 1635 mL / NET: 843.5 mL     @ 07:  -   @ 08:09  --------------------------------------------------------  IN: 9.8 mL / OUT: 75 mL / NET: -65.2 mL      CAPILLARY BLOOD GLUCOSE  197 (2021 06:00)      POCT Blood Glucose.: 197 mg/dL (2021 05:48)      PHYSICAL EXAM:  GENERAL: NAD,  HEENT:  Atraumatic, Normocephalic  EYES: PERRLA, conjunctiva and sclera clear  NECK: Supple, No JVD  CHEST/LUNG: diminished bilaterally; No wheezes, rales, or rhonchi  HEART: Regular rate and rhythm; No murmurs, rubs, or gallops  ABDOMEN: Soft, Nontender, Nondistended; Bowel sounds present  EXTREMITIES:  2+ Peripheral Pulses, No clubbing, cyanosis, or edema  PSYCH: unable as pt is sedated  NEUROLOGY: pupils 4-5mm, with downward gaze on repeat physical, RLE jerking  SKIN: No rashes or lesions    LINES:    HOSPITAL MEDICATIONS:  Standing Meds:  amLODIPine   Tablet 10 milliGRAM(s) Oral daily  aspirin  chewable 81 milliGRAM(s) Enteral Tube daily  atorvastatin 40 milliGRAM(s) Oral at bedtime  carvedilol 12.5 milliGRAM(s) Oral every 12 hours  chlorhexidine 0.12% Liquid 15 milliLiter(s) Oral Mucosa every 12 hours  chlorhexidine 4% Liquid 1 Application(s) Topical <User Schedule>  dexMEDEtomidine Infusion 0.2 MICROgram(s)/kG/Hr IV Continuous <Continuous>  doxazosin 4 milliGRAM(s) Oral at bedtime  enoxaparin Injectable 40 milliGRAM(s) SubCutaneous daily  insulin lispro (ADMELOG) corrective regimen sliding scale   SubCutaneous every 6 hours  insulin NPH human recombinant 20 Unit(s) SubCutaneous every 6 hours  lacosamide IVPB 100 milliGRAM(s) IV Intermittent every 12 hours  levETIRAcetam  IVPB 1000 milliGRAM(s) IV Intermittent <User Schedule>  multivitamin/minerals/iron Oral Solution (CENTRUM) 15 milliLiter(s) Enteral Tube daily  pantoprazole  Injectable 40 milliGRAM(s) IV Push daily  polyethylene glycol 3350 17 Gram(s) Oral every 12 hours  senna Syrup 10 milliLiter(s) Oral at bedtime  valproate sodium IVPB 500 milliGRAM(s) IV Intermittent every 8 hours      PRN Meds:  bisacodyl Suppository 10 milliGRAM(s) Rectal daily PRN      LABS:                        10.0   9.84  )-----------( 177      ( 2021 01:45 )             31.7     Hgb Trend: 10.0<--, 11.3<--, 8.3<--, 12.1<--, 11.4<--  1102    138  |  104  |  32<H>  ----------------------------<  196<H>  3.7   |  25  |  1.08    Ca    7.8<L>      2021 01:45  Phos  3.5       Mg     2.4         TPro  4.9<L>  /  Alb  2.2<L>  /  TBili  0.2  /  DBili  x   /  AST  27  /  ALT  14  /  AlkPhos  72      Creatinine Trend: 1.08<--, 1.08<--, 0.99<--, 1.03<--, 1.01<--, 1.07<--  PT/INR - ( 2021 01:45 )   PT: 12.5 sec;   INR: 1.04 ratio         PTT - ( 2021 01:45 )  PTT:36.0 sec  Urinalysis Basic - ( 31 Oct 2021 14:25 )    Color: Light Yellow / Appearance: Clear / S.015 / pH: x  Gluc: x / Ketone: Negative  / Bili: Negative / Urobili: Negative   Blood: x / Protein: 100 mg/dL / Nitrite: Negative   Leuk Esterase: Negative / RBC: 4 /hpf / WBC 6 /HPF   Sq Epi: x / Non Sq Epi: 1 /hpf / Bacteria: Negative      Arterial Blood Gas:   @ 00:03  7.46/39/101/28/99.2/3.6  ABG lactate: --  Arterial Blood Gas:  10-31 @ 18:26  7.47/39/103/28/99.0/4.4  ABG lactate: --  Arterial Blood Gas:  10-31 @ 11:47  7.51/37/92/30/99.2/6.2  ABG lactate: --  Arterial Blood Gas:  10-31 @ 10:21  7.49/40/105/30/99.3/6.6  ABG lactate: --        MICROBIOLOGY:     Culture - Sputum (collected 2021 00:40)  Source: .Sputum Sputum  Gram Stain (2021 03:21):    Few polymorphonuclear leukocytes per low power field    No Squamous epithelial cells per low power field    Few Yeast like cells per oil power field  Preliminary Report (2021 20:19):    Normal Respiratory Jenifer present    Culture - Blood (collected 31 Oct 2021 21:04)  Source: .Blood Blood-Peripheral  Preliminary Report (2021 22:02):    No growth to date.    Culture - Blood (collected 31 Oct 2021 21:04)  Source: .Blood Blood-Peripheral  Preliminary Report (2021 22:02):    No growth to date.      RADIOLOGY:  [ ] Reviewed and interpreted by me    EKG:

## 2021-11-02 NOTE — PROGRESS NOTE ADULT - ASSESSMENT
67 y/o M w/ uncontrolled Type 2 DM w/ hyperglycemia exacerbated by steroids for COVID , now off isolation , admitted for status epilepticus (high risk patient with severely uncontrolled diabetes with A1c of 14.8% at high risk of CAD and CVA with high level decision-making). Endocrine consulted for DM2 and steroid induced hyperglycemia. Now off steroids. TF held by staff overnight , pt received NPH and is now with tightly controlled BG. D50% 1/2 amp given by primary team as pt w/ active seizures , TF being held now as pt going for scan. BG Goal 100-180mg/dl

## 2021-11-02 NOTE — PROGRESS NOTE ADULT - PROBLEM SELECTOR PLAN 3
on statin as outpatient    Discussed with patient and primary  team MICU CLAUDE Ocampo  Contact via [employer] Teams Tues/Thurs/Friday  On evenings and weekends, please call [phone] or page endocrine fellow on call.   Please note that this patient may be followed by different provider tomorrow. If no answer, contact endocrine fellow on call [phone] M-F 9a-5pm  [employer] password: [email or web address]

## 2021-11-02 NOTE — PROGRESS NOTE ADULT - ATTENDING COMMENTS
65 y/o F w/DM admitted to OSH with hypoglycemic seizures after accidental insulin overdose. Patient had persistent seizures at OSH and was transferred to [facility] for management of status epilepticus. Patient intubated for acute respiratory failure in setting of status epilepticus. Seizures have been suppressed, however concern for breakthrough seizure today.    - Continue AEDs as per neuro  - Ativan for possible breakthrough seizure  - Repeat EEG  - MRI brain when able for prognostication  - Insulin for DM  - Poor prognosis for neurologic recovery  - Continue GOC discussions

## 2021-11-02 NOTE — PROGRESS NOTE ADULT - ASSESSMENT
67 y/o male with PMHx HTN, poorly controlled IDDM, Alzheimers, and BPH brought to [facility] by ambulance on 10/25 after family witnessed possible seizure activity (found on floor foaming from mouth per daughter).  EMS found patient to have BGL 34 and administered D50.  Per daughter, patient told her he thought he admin too much insulin that same day and has required previous hospitalizations for incorrect dosing Insulin.  In the ER pt had another episode of seizure activity, was intubated for airway protection, and started on Keppra 500 BID.  Covid-19 PCR positive and started on Redesevir and Dexamethasone as well.  Per patient's daughter, patient had Covid in Feb 2021 mild symptoms and received only the 1st dose of Pfizer vaccine on 8/8/21.  EEG 10/26 showed epileptic activity while patient was on Keppra prompting increase to 1g BID.  Repeat EEG on 10/27 showed epileptic activity originating from left mid temporal area. Pt was seen by the neurology team in [place] who added Vimpat to the Keppra. Due to refractory seizures, pt was started on propofol for sedation. In addition blood cultures collected on 10/26 showed GPC bacteremia and GBS in urine culture. [address] and seizure management.     Neuro:  New onset seizures-status epilepticus of unknown etiology and hypoxic encephalopathy.  - Sedated on Precedex   - vEEG monitoring (10/27- 30) Diffuse cortical hyperexcitability with GPDs at times coming in prolonged runs without clear evolution into a distinct ictal pattern-poor prognosis  - Vimpat 100mg IV q12h and Keppra 1g q8h  - (10/31) loaded with 1g of depakote IV and will continue 500mg q8hr per recommendations  [ ] breakthrough seizures, prn versed 2mg & ativan 2mg   - Noncon CTH (10/27) no anoxic ischemic encephalopathy  - q4 neuro checks  - Neuro following, Dr Fernandez, Recs appreciated   [ ] FU drug levels Vimpat, Keppra & Depakote --56  [ ] pending MRI w/&w/o    Respiratory  - Intubated on 10/25 for airway protection  - SBT as tolerated   - Mechanical Ventilation: PRVC 12/400/5/21; Ppk15, Pplat 11  --alkalosis, dec RR 12, TV to 400  CV  Hypotension 2/2 Sedation, resolved  - remains off pressors maintain MAP >/= 65    PMHx HTN, Cath 2/21 no stents  - intermittent HTN to 180s s/p hydralazine 5mg x 2  [ ] if remains HTN consider increasing Coreg   - c/w home meds: Lipitor 40mg qd, ASA 81mg qd, Norvasc 10mg qd, Coreg 12.5mg q12h  - TTE (10/29) -> normal function  - Trend Trop 127 --> 130 --> 164-->173. No EKG changes. likely secondary to seizures in setting of normal echo, continue to treat underlying, trops nonspecific do not continue to trend unless clinically indicated. OP ischemic w/u.  - Cards consulted, recs appreciated    GI - Last BM 11/1-smear  - NPO with TF (Glucerna 1.5cal) per Dietician recs via NGT  - Protonix daily   - Bowel regimen with Miralax BID & senna, no gastric residual noted  [ ] FU BM       Hx BPH  - c/w home Cardura 4mg qd  - Morales changed on Admission (10/27)   - Monitor Lytes replete as necessary to maintain Mg>2 Phos>3 K>4     HyperNa/HyperCl, resolved  - Free Water 200ml Q6h,   - monitor NA    Fluid Overload  - s/p lasix 20mg IVP on 10/30  - suspected contraction alkalosis, s/p 250 mg Diamox & 500 NS bolus  [ ] FU ABG, avoid alkalosis in setting of seizures    Infectious Disease  Covid 19—incidental finding in OSH ER (Negative PCR x2 at [facility])   - OFF Remdesivir x 3doses stopped 2/2 negative Covid diagnosis  - OFF Dexmethasone x1 dose stopped 2/2 neg Covid diagnosis   - BCx + for GPC with UCx + for Group B Strep at [facility]   - F/u repeat Blood cultures x2 NGTD , UCx neg, Sputum Cx neg (10/27)  - Lumbar puncture with CSF Negative for infection (10/28)   - s/p Ceftriaxone (10/26- 30)   - ID consulting, recs appreciated  - febrile intermittently  -  sputum with few yeast like cells 10/31, blood cx, UA negative (10/27)  -repeat bld cx sent overnight 10/31 for temp 100.6    Endocrine  IDDM- HbA1c 14.8  - Hyperglycemic BGL>400 requiring Insulin gtt, transitioned off 10/29   - episode of hypoglycemia FS 70, given 1 AMP d50, held 6pm NPH.  [ ] FS q 6 avoid hypoglycemia   - NPH 10 Q6hr and mod ISS q6h  - Endocrinology consulting, recs appreciated    Heme  - Monitor CBC   - Lovenox DVT PPX  - LE Duplex (10/29) neg for DVT   [ ] Carotid Duplex pending    Lines  - Arrows x2 10/30  - Right Radial A line 10/27   - Moraels Catheter 10/27    GOC:   -  Palliative care consulted  -  discussed vEEG read with Daughter Kalli and will offer in person family meeting   - remains Full Code   - Primary contact: Kalli Hughes (daughter) [phone]    DISPO: continue ICU level of care, above plan discussed with Dr Andrade     67 y/o male with PMHx HTN, poorly controlled IDDM, Alzheimers COVID infxn originally transferred from [facility] generalized tonic clonic seizures 22 hypoglycemia w insulin admistration now with likely anoxic brain injury.     Neuro:  New onset seizures-status epilepticus of unknown etiology and hypoxic encephalopathy.  - Sedated on Precedex   - vEEG monitoring (10/27- 30) Diffuse cortical hyperexcitability with GPDs at times coming in prolonged runs without clear evolution into a distinct ictal pattern-poor prognosis  - Vimpat 100mg IV q12h and Keppra 1g q8h  - (10/31) loaded with 1g of depakote IV and will continue 500mg q8hr per recommendations  [ ] breakthrough seizures, prn versed 2mg & ativan 2mg   - Noncon CTH (10/27) no anoxic ischemic encephalopathy  - q4 neuro checks  - Neuro following, Dr Fernandez, Recs appreciated   [ ]CTH  [ ] EEG repeat  [ ] FU drug levels Vimpat, Keppra & Depakote --56  [ ] pending MRI w/&w/o. MR safety consent in chart    Respiratory  - Intubated on 10/25 for airway protection  - PSV        CV  Hypotension 2/2 Sedation, resolved  - remains off pressors maintain MAP >/= 65    PMHx HTN, Cath 2/21 no stents  - intermittent HTN to 180s s/p hydralazine 5mg x 2  [ ] if remains HTN consider increasing Coreg   - c/w home meds: Lipitor 40mg qd, ASA 81mg qd, Norvasc 10mg qd, Coreg 12.5mg q12h  - TTE (10/29) -> normal function  - Trend Trop 127 --> 130 --> 164-->173. No EKG changes. likely secondary to seizures in setting of normal echo, continue to treat underlying, trops nonspecific do not continue to trend unless clinically indicated. OP ischemic w/u.  - Cards consulted, recs appreciated    GI - 11/1-smear  - NPO with TF (Glucerna 1.5cal) per Dietician recs via NGT  - Protonix daily   - Bowel regimen with Miralax BID & senna, no gastric residual noted        Hx BPH  - c/w home Cardura 4mg qd  - Morales changed on Admission (10/27)   - Monitor Lytes replete as necessary to maintain Mg>2 Phos>3 K>4     HyperNa/HyperCl, resolved  - Free Water 200ml Q6h,   - monitor NA    Fluid Overload  - s/p lasix 20mg IVP on 10/30  - suspected contraction alkalosis, s/p 250 mg Diamox & 500 NS bolus      Infectious Disease  Covid 19—incidental finding in OSH ER (Negative PCR x2 at [facility])   - OFF Remdesivir x 3doses stopped 2/2 negative Covid diagnosis  - OFF Dexmethasone x1 dose stopped 2/2 neg Covid diagnosis   - BCx + for GPC with UCx + for Group B Strep at [facility]   - F/u repeat Blood cultures x2 NGTD , UCx neg, Sputum Cx neg (10/27)  - Lumbar puncture with CSF Negative for infection (10/28)   - s/p Ceftriaxone (10/26- 30)   - ID consulting, recs appreciated  - febrile intermittently  -  sputum with few yeast like cells 10/31, blood cx, UA negative (10/27)  -repeat bld cx sent overnight 10/31 for temp 100.6    Endocrine  IDDM- HbA1c 14.8  - Hyperglycemic BGL>400 requiring Insulin gtt, transitioned off 10/29   - episode of hypoglycemia FS 70, given 1 AMP d50, held 6pm NPH.  [ ] FS q 6 avoid hypoglycemia   - NPH 20 Q6hr and mod ISS q6h  - Endocrinology consulting, recs appreciated    Heme  - Monitor CBC   - Lovenox DVT PPX  - LE Duplex (10/29) neg for DVT   [ ] Carotid Duplex pending    Lines  - Arrows x2 10/30  - Right Radial A line 10/27   - Morales Catheter 10/27-11/2. Condom Cath placed 11/2    GOC:   -  Palliative care consulted  -  discussed vEEG read with Daughter Kalli and will offer in person family meeting   - remains Full Code   - Primary contact: Kalli Hughes (daughter) [phone]    DISPO: continue ICU level of care, above plan discussed with Dr Andrade     67 y/o male with PMHx HTN, poorly controlled IDDM, Alzheimers COVID infxn originally transferred from [facility] generalized tonic clonic seizures 22 hypoglycemia w insulin admistration now with likely anoxic brain injury.     Neuro:  New onset seizures-status epilepticus of unknown etiology and hypoxic encephalopathy.  - Sedated on Precedex   - vEEG monitoring (10/27- 30) Diffuse cortical hyperexcitability with GPDs at times coming in prolonged runs without clear evolution into a distinct ictal pattern-poor prognosis  - Vimpat 100mg IV q12h and Keppra 1g q8h  - (10/31) loaded with 1g of depakote IV and will continue 500mg q8hr per recommendations  [ ] breakthrough seizures, prn versed 2mg & ativan 2mg   - Noncon CTH (10/27) no anoxic ischemic encephalopathy  - q4 neuro checks  - Neuro following, Dr Fernandez, Recs appreciated   -11/2 AM ativan 2mg, 2mg, 4mg with seizurelike activity bilateral downward gaze R leg jerking  [ ]CTH  [ ] EEG repeat  [ ] FU drug levels Vimpat, Keppra & Depakote --56  [ ] pending MRI w/&w/o. MR safety consent in chart    Respiratory  - Intubated on 10/25 for airway protection  - PSV        CV  Hypotension 2/2 Sedation, resolved  - remains off pressors maintain MAP >/= 65    PMHx HTN, Cath 2/21 no stents  - intermittent HTN to 180s s/p hydralazine 5mg x 2  [ ] if remains HTN consider increasing Coreg   - c/w home meds: Lipitor 40mg qd, ASA 81mg qd, Norvasc 10mg qd, Coreg 12.5mg q12h  - TTE (10/29) -> normal function  - Trend Trop 127 --> 130 --> 164-->173. No EKG changes. likely secondary to seizures in setting of normal echo, continue to treat underlying, trops nonspecific do not continue to trend unless clinically indicated. OP ischemic w/u.  - Cards consulted, recs appreciated    GI - 11/1-smear  - NPO with TF (Glucerna 1.5cal) per Dietician recs via NGT  - Protonix daily   - Bowel regimen with Miralax BID & senna, no gastric residual noted        Hx BPH  - c/w home Cardura 4mg qd  - Morales changed on Admission (10/27)   - Monitor Lytes replete as necessary to maintain Mg>2 Phos>3 K>4     HyperNa/HyperCl, resolved  - Free Water 200ml Q6h,   - monitor NA    Fluid Overload  - s/p lasix 20mg IVP on 10/30  - suspected contraction alkalosis, s/p 250 mg Diamox & 500 NS bolus      Infectious Disease  Covid 19—incidental finding in OSH ER (Negative PCR x2 at [facility])   - OFF Remdesivir x 3doses stopped 2/2 negative Covid diagnosis  - OFF Dexmethasone x1 dose stopped 2/2 neg Covid diagnosis   - BCx + for GPC with UCx + for Group B Strep at [facility]   - F/u repeat Blood cultures x2 NGTD , UCx neg, Sputum Cx neg (10/27)  - Lumbar puncture with CSF Negative for infection (10/28)   - s/p Ceftriaxone (10/26- 30)   - ID consulting, recs appreciated  - febrile intermittently  -  sputum with few yeast like cells 10/31, blood cx, UA negative (10/27)  -repeat bld cx sent overnight 10/31 for temp 100.6    Endocrine  IDDM- HbA1c 14.8  - Hyperglycemic BGL>400 requiring Insulin gtt, transitioned off 10/29   - episode of hypoglycemia FS 70, given 1 AMP d50, held 6pm NPH.  [ ] FS q 6 avoid hypoglycemia   - NPH 20 Q6hr and mod ISS q6h  - Endocrinology consulting, recs appreciated    Heme  - Monitor CBC   - Lovenox DVT PPX  - LE Duplex (10/29) neg for DVT   [ ] Carotid Duplex pending    Lines  - Arrows x2 10/30  - Right Radial A line 10/27   - Morales Catheter 10/27-11/2. Condom Cath placed 11/2    GOC:   -  Palliative care consulted  -  discussed vEEG read with Daughter Kalli and will offer in person family meeting   - remains Full Code   - Primary contact: Kalli Hughes (daughter) [phone]    DISPO: continue ICU level of care, above plan discussed with Dr Andrade

## 2021-11-02 NOTE — PROGRESS NOTE ADULT - SUBJECTIVE AND OBJECTIVE BOX
Referring: Lory    HPI:  65 y/o male with PMHX DM, HTn and BPH, found lethargic and was brought to [facility] by ambulance on 10/25 after family witnesed tonic clonic seizure. Patient was found hypoglycemic upon arrival to [facility] with a blood sugar of 34. IN the ER pt had another episode of seizure activity  and did not return to baseline and was subsequently intubated for airway protection. EEG was done on 10/26 after sedation was turned off and showed epileptic activity while patient was on Keppra 500bid. After EEG findings Keppra was increased to 100bid, however repeat eeg on 10/27 showed epileptic activity originating from left mid temporal area. On admission the patient was tested positive for covid 19 but no signs of respiratory disease and no findings of PNA on cxr. Pt was seen by the neurology team in [place] who added vimpat in addition to the keppra. Due to refractory seizures, pt was started on propofol for sedation. In addition blood cultures collected on 10/26 showed GPC bacteremia and group B strep isolated from urine culture. Pt has not required pressors during his stay at [facility] and was subsequently transferred to [address] for EEG monitoring and seizure management.     Cardiology consulted for elevated troponin in setting of recent status epilepticus and strep viridans bacteremia.    ===========================  Interval Events  -   - requiring pressors  - Unable to obtain review of systems due to intubated status  - No further cardiac enzymes checked  ===========================        PMHx:   Hypertension    Diabetes    History of BPH        PSHx:   No significant past surgical history        Allergies:  No Known Allergies      Home Meds:    Current Medications:   amLODIPine   Tablet 5 milliGRAM(s) Oral daily  aspirin  chewable 81 milliGRAM(s) Enteral Tube daily  atorvastatin 40 milliGRAM(s) Oral at bedtime  carvedilol 6.25 milliGRAM(s) Oral every 12 hours  cefTRIAXone   IVPB 2000 milliGRAM(s) IV Intermittent every 24 hours  chlorhexidine 0.12% Liquid 15 milliLiter(s) Oral Mucosa every 12 hours  chlorhexidine 4% Liquid 1 Application(s) Topical <User Schedule>  dexMEDEtomidine Infusion 0.4 MICROgram(s)/kG/Hr IV Continuous <Continuous>  doxazosin 4 milliGRAM(s) Oral at bedtime  enoxaparin Injectable 40 milliGRAM(s) SubCutaneous daily  insulin lispro (ADMELOG) corrective regimen sliding scale   SubCutaneous every 6 hours  insulin NPH human recombinant 30 Unit(s) SubCutaneous every 6 hours  lacosamide IVPB 100 milliGRAM(s) IV Intermittent every 12 hours  levETIRAcetam  IVPB 1000 milliGRAM(s) IV Intermittent <User Schedule>  multivitamin/minerals/iron Oral Solution (CENTRUM) 15 milliLiter(s) Enteral Tube daily  pantoprazole  Injectable 40 milliGRAM(s) IV Push daily  polyethylene glycol 3350 17 Gram(s) Oral daily  senna Syrup 10 milliLiter(s) Oral at bedtime      FAMILY HISTORY:  No pertinent family history in first degree relatives          Physical Exam:  T(F): 98.1 (10-30), Max: 98.4 (10-30)  HR: 59 (10-30) (57 - 108)  BP: --  RR: 20 (10-30)  SpO2: 100% (10-30)  GENERAL: No acute distress, intubated  CHEST/LUNG: intubated  BACK: No spinal tenderness  HEART: Regular rate and rhythm; No murmurs, rubs, or gallops  ABDOMEN: Soft, Nontender, Nondistended; Bowel sounds present  EXTREMITIES:  No clubbing, cyanosis, or edema    Cardiovascular Diagnostic Testing:    ECG: Personally reviewed:    Echo: Personally reviewed:    Stress Testing:    Cath:    Imaging:    CXR: Personally reviewed    Labs: Personally reviewed 11-1                        11.4   9.77  )-----------( 216      ( 29 Oct 2021 23:40 )             36.3     10-29    148<H>  |  114<H>  |  34<H>  ----------------------------<  139<H>  3.6   |  25  |  1.07    Ca    7.8<L>      29 Oct 2021 23:40  Phos  2.6     10-29  Mg     2.2     10-29    TPro  5.1<L>  /  Alb  2.4<L>  /  TBili  <0.1<L>  /  DBili  x   /  AST  36  /  ALT  13  /  AlkPhos  88  10-29    PT/INR - ( 29 Oct 2021 00:14 )   PT: 13.0 sec;   INR: 1.09 ratio         PTT - ( 29 Oct 2021 00:14 )  PTT:35.2 sec  Serum Pro-Brain Natriuretic Peptide: 882 pg/mL (10-27 @ 17:50)            Assessment and Recommendation:   · Assessment	  65 y/o male with PMHX DM, HTn and BPH, admitted to MICU for status epilepticus now intubated and found to have strep viridans bacteremia.   Cardiology consulted for elevated troponin in setting of recent status epilepticus and strep viridans bacteremia.    #Elevated troponin- troponin is highly sensitive but non specific. Recent TTE with normal LV systolic and diastolic function.  Type 2 MI 2/2 to bacteremia/septic shock/seizures  -continue pressors (if to remain on pressors, would rec discontinuing antihypertensives)  -CKMB normal   -no acute cardiac intervention at this time  -would no longer trend troponin  unless other signs of ischemia (EKG changes, chest pain once patient is off sedation/extubated)  Elevated trop likely in setting of acute neurological status.  Will sign off                                                                                                                                                                                                                                                                                                                                               40 minutes spent in face-to-face time with critical care of ICU patient with end-organ dysfunction continuous drips, including evaluation, formulation of plan, discussion with primary team. > 50% of time spent in counseling with family and other physicians involved in care of patient.     All labs, imaging personally reviewed.

## 2021-11-02 NOTE — PROGRESS NOTE ADULT - PROBLEM SELECTOR PLAN 1
- ordered for Glucerna at 50 cc/hr 24 hours feeds- TF HELD TODAY 11/2 PT GOING FOR SCAN   - last dose of steroid was morning of 10/28  - adjust NPH to 20 units q6h HOLD NPH  IF TF HELD  - if TF restarted and pt remains hyperglycemic >180 consider increasing NPH to 22 units  - c/w moderate correction scale qac and qhd  - check FS q6  - will follow  - hypoglycemia protocol for BG <70    Dispo:  Outpt. endo follow-up  Discharge plan to be based on insulin requirements, confirmation of outpatient regimen, and feed modality at the time of discharge.

## 2021-11-03 NOTE — PROGRESS NOTE ADULT - ASSESSMENT
65 y/o male with PMHx HTN, poorly controlled IDDM, Alzheimers COVID infxn originally transferred from [facility] generalized tonic clonic seizures 22 hypoglycemia w insulin admistration now with likely anoxic brain injury.     Neuro:  New onset seizures-status epilepticus of unknown etiology and hypoxic encephalopathy.  - Sedated on Precedex   - vEEG monitoring (10/27- 30) Diffuse cortical hyperexcitability with GPDs at times coming in prolonged runs without clear evolution into a distinct ictal pattern-poor prognosis  - Vimpat 100mg IV q12h and Keppra 1g q8h  - 10/31 loaded with 1g of depakote IV and will continue 500mg q8hr per recommendations  - Noncon CTH (10/27) no anoxic ischemic encephalopathy, 11/3 CT non con anoxic   - q4 neuro checks  - Neuro following, Dr Fernandez, Recs appreciated   -11/2 AM ativan 2mg, 2mg, 4mg with seizurelike activity bilateral downward gaze R leg jerking  - FU drug levels Vimpat, Keppra & Depakote --56  [ ] pending MRI w/&w/o. MR safety consent in chart    Respiratory  - Intubated on 10/25 for airway protection  - onVol AC 10/0.35/5/21%        CV  hypotension w labile BPs and wide pulse pressure  -likely required because of propofol sedation   -on levophed   -goal SBP>130    PMHx HTN, Cath 2/21 no stents  - intermittent HTN to 180s s/p hydralazine 5mg x 2  [ ] if remains HTN consider increasing Coreg   - c/w home meds: Lipitor 40mg qd, ASA 81mg qd, Norvasc 10mg qd, Coreg 12.5mg q12h  - TTE (10/29) -> normal function  - Trend Trop 127 --> 130 --> 164-->173. No EKG changes. likely secondary to seizures in setting of normal echo, continue to treat underlying, trops nonspecific do not continue to trend unless clinically indicated. OP ischemic w/u. Demand ischemia.   - Cards consulted, recs appreciated    GI - 11/1-smear  - NPO with TF (Glucerna 1.5cal) per Dietician recs via NGT  - Protonix daily   - Bowel regimen with Miralax BID & senna, no gastric residual noted        Hx BPH  - c/w home Cardura 4mg qd  - Morales changed on Admission (10/27)   - Monitor Lytes replete as necessary to maintain Mg>2 Phos>3 K>4     HyperNa/HyperCl, resolved  - Free Water 200ml Q6h,   - monitor NA    Fluid Overload  - s/p lasix 20mg IVP on 10/30  - suspected contraction alkalosis, s/p 250 mg Diamox & 500 NS bolus      Infectious Disease  Covid 19—incidental finding in OSH ER (Negative PCR x2 at [facility])   - OFF Remdesivir x 3doses stopped 2/2 negative Covid diagnosis  - OFF Dexmethasone x1 dose stopped 2/2 neg Covid diagnosis   - BCx + for GPC with UCx + for Group B Strep at [facility]   - F/u repeat Blood cultures x2 NGTD , UCx neg, Sputum Cx neg (10/27)  - Lumbar puncture with CSF Negative for infection (10/28)   - s/p Ceftriaxone (10/26- 30)   - ID consulting, recs appreciated  - febrile intermittently  -  sputum with few yeast like cells 10/31, blood cx, UA negative (10/27)  -repeat bld cx sent overnight 10/31 for temp 100.6 NGTD  -11/1 sputum cx no respiratory delbert    Endocrine  IDDM- HbA1c 14.8  - Hyperglycemic BGL>400 requiring Insulin gtt, transitioned off 10/29   - episode of hypoglycemia FS 70, given 1 AMP d50, held 6pm NPH.  [ ] FS q 6 avoid hypoglycemia   - NPH 20 Q6hr and mod ISS q6h  -11/2 ON held midnight dose and reduced AM dose to 10U  - Endocrinology consulting, recs appreciated    Heme  - Monitor CBC   - Lovenox DVT PPX  - LE Duplex (10/29) neg for DVT   [ ] Carotid Duplex pending    Lines  - Arrows x2 10/30  - Right Radial A line 10/27   - Morales Catheter 10/27-11/2. Condom Cath placed 11/2    GOC:   -  Palliative care consulted  -  discussed vEEG read with Daughter Kalli and will offer in person family meeting   - remains Full Code   - Primary contact: Kalli Hughes (daughter) [phone]    DISPO: continue ICU level of care, above plan discussed with Dr Andrade     65 y/o male with PMHx HTN, poorly controlled IDDM, Alzheimers COVID infxn originally transferred from [facility] generalized tonic clonic seizures 22 hypoglycemia w insulin admistration now with likely anoxic brain injury.     Neuro:  New onset seizures-status epilepticus of unknown etiology and hypoxic encephalopathy.  - Sedated on Precedex   - vEEG monitoring (10/27- 30) Diffuse cortical hyperexcitability with GPDs at times coming in prolonged runs without clear evolution into a distinct ictal pattern-poor prognosis  - Vimpat 100mg IV q12h and Keppra 1g q8h  - 10/31 loaded with 1g of depakote IV and will continue 500mg q8hr per recommendations  - Noncon CTH (10/27) no anoxic ischemic encephalopathy, 11/3 CT non con anoxic   - q4 neuro checks  - Neuro following, Dr Fernandez, Recs appreciated   -11/2 AM ativan 2mg, 2mg, 4mg with seizurelike activity bilateral downward gaze R leg jerking  - FU drug levels Vimpat, Keppra & Depakote --56  [ ] pending MRI w/&w/o. MR safety consent in chart    Respiratory  - Intubated on 10/25 for airway protection  - onVol AC 10/0.35/5/21%        CV  hypotension w labile BPs and wide pulse pressure  -likely required because of propofol sedation   -on levophed   -goal SBP>130    PMHx HTN, Cath 2/21 no stents  - intermittent HTN to 180s s/p hydralazine 5mg x 2  - c/w home meds: Lipitor 40mg qd, ASA 81mg qd, Norvasc 10mg qd, Coreg 12.5mg q12h  - TTE (10/29) -> normal function  - Trend Trop 127 --> 130 --> 164-->173. No EKG changes. likely secondary to seizures in setting of normal echo, continue to treat underlying, trops nonspecific do not continue to trend unless clinically indicated. OP ischemic w/u. Demand ischemia.   - Cards consulted, recs appreciated    GI - 11/1-smear  - NPO with TF (Glucerna 1.5cal) per Dietician recs via NGT  - Protonix daily   - Bowel regimen with Miralax BID & senna, no gastric residual noted        Hx BPH  - c/w home Cardura 4mg qd  - Morales changed on Admission (10/27)   - Monitor Lytes replete as necessary to maintain Mg>2 Phos>3 K>4     HyperNa/HyperCl, resolved  - Free Water 200ml Q6h,   - monitor NA    Fluid Overload  - s/p lasix 20mg IVP on 10/30  - suspected contraction alkalosis, s/p 250 mg Diamox & 500 NS bolus      Infectious Disease  Covid 19—incidental finding in OSH ER (Negative PCR x2 at [facility])   - OFF Remdesivir x 3doses stopped 2/2 negative Covid diagnosis  - OFF Dexmethasone x1 dose stopped 2/2 neg Covid diagnosis   - BCx + for GPC with UCx + for Group B Strep at [facility]   - F/u repeat Blood cultures x2 NGTD , UCx neg, Sputum Cx neg (10/27)  - Lumbar puncture with CSF Negative for infection (10/28)   - s/p Ceftriaxone (10/26- 30)   - ID consulting, recs appreciated  - febrile intermittently  -  sputum with few yeast like cells 10/31, blood cx, UA negative (10/27)  -repeat bld cx sent overnight 10/31 for temp 100.6 NGTD  -11/1 sputum cx no respiratory delbert    Endocrine  IDDM- HbA1c 14.8  - Hyperglycemic BGL>400 requiring Insulin gtt, transitioned off 10/29   - episode of hypoglycemia FS 70, given 1 AMP d50, held 6pm NPH.  [ ] FS q 6 avoid hypoglycemia   - NPH 20 Q6hr and mod ISS q6h  -11/2 ON held midnight dose and reduced AM dose to 10U  - Endocrinology consulting, recs appreciated    Heme  - Monitor CBC   - Lovenox DVT PPX  - LE Duplex (10/29) neg for DVT   - Carotid duplex no clinically significant stenosis  -pending reads US upper extremity and lower extremity DVT studies    Lines  - Arrows x2 10/30  - Right Radial A line 10/27   - Morales Catheter 10/27-11/2. Condom Cath placed 11/2    GOC:   -  Palliative care consulted  -  discussed vEEG read with Daughter Kalli and will offer in person family meeting   - remains Full Code   - Primary contact: Kalli Hughes (daughter) [phone]    DISPO: continue ICU level of care, above plan discussed with Dr Andrade

## 2021-11-03 NOTE — PROGRESS NOTE ADULT - ASSESSMENT
67 y/o M w/ uncontrolled Type 2 DM w/ hyperglycemia exacerbated by steroids for COVID , now off isolation , admitted for status epilepticus (high risk patient with severely uncontrolled diabetes with A1c of 14.8% at high risk of CAD and CVA with high level decision-making). Endocrine consulted for DM2 and steroid induced hyperglycemia. Now off steroids. Tolerating TF @ goal rate, restarted today. BG goal (100-180mg/dl).

## 2021-11-03 NOTE — PROGRESS NOTE ADULT - PROBLEM SELECTOR PLAN 1
-test BG Q6h  -c/w NPH 20 units Q6h (hold if TF off). If BG less than 120mg/dl, consider reducing dose by 25%. Trend glucose and make adjustments based on trend  -c/w Admelog moderate correction scale Q6h  -notify endocrine team for any change in TF regimen  Dispo:  Outpt. endo follow-up  Discharge plan to be based on insulin requirements, confirmation of outpatient regimen, and feed modality at the time of discharge.

## 2021-11-03 NOTE — PROGRESS NOTE ADULT - SUBJECTIVE AND OBJECTIVE BOX
Follow Up:  Fever    Interval History: febrile today. no other noted clinical status changes.     REVIEW OF SYSTEMS  [ x ] ROS unobtainable because:  intubated/sedated  [  ] All other systems negative except as noted below    Constitutional:  [ ] fever [ ] chills  [ ] weight loss  [ ] weakness  Skin:  [ ] rash [ ] phlebitis	  Eyes: [ ] icterus [ ] pain  [ ] discharge	  ENMT: [ ] sore throat  [ ] thrush [ ] ulcers [ ] exudates  Respiratory: [ ] dyspnea [ ] hemoptysis [ ] cough [ ] sputum	  Cardiovascular:  [ ] chest pain [ ] palpitations [ ] edema	  Gastrointestinal:  [ ] nausea [ ] vomiting [ ] diarrhea [ ] constipation [ ] pain	  Genitourinary:  [ ] dysuria [ ] frequency [ ] hematuria [ ] discharge [ ] flank pain  [ ] incontinence  Musculoskeletal:  [ ] myalgias [ ] arthralgias [ ] arthritis  [ ] back pain  Neurological:  [ ] headache [ ] seizures  [ ] confusion/altered mental status    Allergies  No Known Allergies        ANTIMICROBIALS:  cefTRIAXone   IVPB 2000 every 24 hours      OTHER MEDS:  MEDICATIONS  (STANDING):  acetaminophen    Suspension .. 650 every 6 hours PRN  aspirin  chewable 81 daily  atorvastatin 40 at bedtime  bisacodyl Suppository 10 daily PRN  enoxaparin Injectable 80 two times a day  insulin lispro (ADMELOG) corrective regimen sliding scale  every 6 hours  insulin NPH human recombinant 20 every 6 hours  lacosamide IVPB 100 every 12 hours  levETIRAcetam  IVPB 1000 <User Schedule>  norepinephrine Infusion 0.05 <Continuous>  pantoprazole  Injectable 40 daily  polyethylene glycol 3350 17 every 12 hours  propofol Infusion. 50 <Continuous>  senna Syrup 10 at bedtime  valproate sodium IVPB 500 every 8 hours      Vital Signs Last 24 Hrs  T(C): 37.9 (03 Nov 2021 18:45), Max: 38 (03 Nov 2021 12:00)  T(F): 100.2 (03 Nov 2021 18:45), Max: 100.4 (03 Nov 2021 12:00)  HR: 67 (03 Nov 2021 19:00) (53 - 71)  BP: 138/63 (02 Nov 2021 19:45) (138/63 - 138/63)  BP(mean): 91 (02 Nov 2021 19:45) (91 - 91)  RR: 34 (03 Nov 2021 19:00) (13 - 43)  SpO2: 95% (03 Nov 2021 19:00) (91% - 100%)    PHYSICAL EXAMINATION:  General: Intubated and Sedated  HEENT: +ETT  Neck: Supple  Cardiac: RRR, No M/R/G  Resp: CTAB, No Wh/Rh/Ra  Abdomen: NBS, NT/ND, No HSM, No rigidity or guarding  MSK: No LE edema. No Calf tenderness  : marin  Skin: No rashes or lesions. Skin is warm and dry to the touch.    Neuro: Intubated and Sedated  Psych: Unable to assess - intubated and sedated                            10.3   9.40  )-----------( 195      ( 03 Nov 2021 00:25 )             32.6       11-03    142  |  107  |  31<H>  ----------------------------<  131<H>  3.8   |  25  |  1.11    Ca    7.8<L>      03 Nov 2021 00:25  Phos  4.1     11-03  Mg     2.5     11-03    TPro  5.2<L>  /  Alb  2.0<L>  /  TBili  0.1<L>  /  DBili  x   /  AST  44<H>  /  ALT  11  /  AlkPhos  73  11-03          MICROBIOLOGY:  v  .Sputum Sputum  11-01-21   Normal Respiratory Jenifer present  --    Few polymorphonuclear leukocytes per low power field  No Squamous epithelial cells per low power field  Few Yeast like cells per oil power field      .Blood Blood-Peripheral  10-31-21   No growth to date.  --  --      .CSF CSF  10-28-21   No growth at 3 days.  --    No polymorphonuclear cells seen  No organisms seen  by cytocentrifuge      Catheterized Catheterized  10-27-21   No growth  --  --      .Sputum ett  10-27-21   Normal Respiratory Jenifer present  --    Numerous polymorphonuclear leukocytes per low power field  Rare Squamous epithelial cells per low power field  No organisms seen per oil power field      .Blood Blood  10-27-21   No Growth Final  --  --          HSV 1/2 PCR: NotDetec (10-29 @ 03:34)        RADIOLOGY:    <The imaging below has been reviewed and visualized by me independently. Findings as detailed in report below>    < from: MR Head w/wo IV Cont (11.02.21 @ 22:30) >  Restricted diffusion diffusely involves the bilateral cerebral cortices and striate nuclei, consistent with hypoxic ischemic encephalopathy. No hemorrhagic transformation.    < end of copied text >

## 2021-11-03 NOTE — GOALS OF CARE CONVERSATION - ADVANCED CARE PLANNING - CONVERSATION DETAILS
The patient's daughter is his legal surrogate. However, she also appears to be his HCP and she is trying to get a copy of that document which she believes the patient singed at [facility].    MRI results were noticed. I d/w the primary team, Dr. Blanton and Dr. Novoa (that has also d/w Neurology), that agree with my inputs regarding prognosis for meaningful recovery (capacity to regain independency or to be able to interact with his surroundings) being extremely poor.  I then updated the patient's daughter regarding the most recent MRI results (with anoxic brain injury) and current expectations regarding prognosis. His daughter was emotionally appropriate. She expressed some guilt because she was not able to get to the patient's home sooner. However, I indicated that the series of event that brought her father into the current situation were unpredictable and that it was almost impossible she may had been able to change the current outcomes. I will f/u with SW so further support is provided. After providing actively listening and support, I discussed with his daughter about the importance of trying to define what the patient may have wanted if he were to understand his current condition and prognosis (Quality vs Quantity of life). We discussed about options for care including trach, PEG, and vent facility (if able to survive this admission) vs. compassionate extubation. His daughter indicated she needed time to think about it. Furthermore, that though his wife and son did not talk to the patient that she wanted to reach out to them so they were to have a chance to see him.     Will f/u with her tomorrow.     d/w the primary team.         Jean Paul Garg MD   Geriatrics and Palliative Care (GAP) Consult Service    of Geriatric and Palliative Medicine  [facility]      Please page the following number for clinical matters between the hours of 9 am and 5 pm from Monday through Friday : [phone]    After 5pm and on weekends, please see the contact information below:    In the event of newly developing, evolving, or worsening symptoms, please contact the Palliative Medicine team via pager (if the patient is at [facility] #[phone] or if the patient is at [facility] #[phone]) The Geriatric and Palliative Medicine service has coverage 24 hours a day/ 7 days a week to provide medical recommendations regarding symptom management needs via telephone

## 2021-11-03 NOTE — PROGRESS NOTE ADULT - ASSESSMENT
66 years old male with PMHx of uncontrolled DM (A1C ~14), HTN and BPH, transferred from [facility] for Epilepsy monitoring    CTH 10/27 negative for intracranial pathologies; No septic emboli  CXR 10/28 no focal consolidation; on minimal vent setting  COVID PCR 10/27, 10/28 negative;   Had COVID infection in 2/2021 hospitalized at [facility]; received Pfizer 1st dose only on 8/8/21  Positive COVID PCR in [place] was likely false positive since patient likely has immunity from natural infection and vaccination    UA (10/25) ([place]) shows pyuria  UCx (10/25) ([place]) grew group B strep in [facility]    BCx (10/25) ([place]) viridans strep 1/4 bottles in [facility]  BCx (10/27) ([facility]) NGTD  Received Rocephin 2gm q24hrs 10/27 - current  TTE no vegetation, mild MR and AR  LP with one nucleated cell. CSF PCR Negative    Called [facility] Today and confirmed that no further growth on 10/25 BCx  Suspect AHS in 1/4 bottles was procurement contaminant  Completed 3 days of Ceftriaxone (had GBS on UCx at [place] [facility])    Febrile 10/31 -->  U/A 10/31 without pyuria  Possible from CNS injury in context of anoxia (?central fevers)  Would monitor off of antibiotics and follow blood cultures  RECOMMENDATIONS    #Fever (?central fevers), Leukocytosis  --Monitor off of antibiotics  --Sputum culture with normal respiratory delbert  --Follow up on BCx  --Follow fever curve  --Follow WBC    #Positive Rubella IgM  --Doubt active infection at this time, does not fit clinical context. IgG is also positive which makes acute infection less likely at this point.     #Positive BCx, Positive UCx, Seizures  --Continue to follow CBC with diff  --Continue to follow temperature curve  --Follow up on preliminary blood cultures    I will continue to follow. Please feel free to contact me with any further questions.    Ad Villa M.D.  [facility] Division of Infectious Disease  8AM-5PM: Pager Number [phone]  After Hours (or if no response): Please contact the Infectious Diseases Office at [phone]

## 2021-11-03 NOTE — PROGRESS NOTE ADULT - SUBJECTIVE AND OBJECTIVE BOX
Esvin Novoa MD  Internal Medicine  Pager #[phone]    CHIEF COMPLAINT:Patient is a 66y old  Male who presents with a chief complaint of Transfer from [place] for EEG Monitoring (02 Nov 2021 15:05)        Interval Events:    No acute events overnight. EEG in place, MR read pending. Remains on Prop 50 and AEDs without reports from RN team of clinical signs seizure like activity. Titrating NPH monitoring blood glucose.     REVIEW OF SYSTEMS:    Unable to assess due to intubated and sedated    OBJECTIVE:  ICU Vital Signs Last 24 Hrs  T(C): 36.7 (03 Nov 2021 04:00), Max: 37.3 (02 Nov 2021 12:00)  T(F): 98.1 (03 Nov 2021 04:00), Max: 99.1 (02 Nov 2021 12:00)  HR: 61 (03 Nov 2021 07:00) (53 - 68)  BP: 138/63 (02 Nov 2021 19:45) (94/55 - 175/76)  BP(mean): 91 (02 Nov 2021 19:45) (70 - 109)  ABP: 147/48 (03 Nov 2021 07:00) (94/36 - 174/55)  ABP(mean): 75 (03 Nov 2021 07:00) (51 - 94)  RR: 20 (03 Nov 2021 07:00) (12 - 27)  SpO2: 94% (03 Nov 2021 07:00) (91% - 100%)    Mode: AC/ CMV (Assist Control/ Continuous Mandatory Ventilation), RR (machine): 10, TV (machine): 350, FiO2: 21, PEEP: 5, ITime: 1, MAP: 7, PIP: 17    11-02 @ 07:01  -  11-03 @ 07:00  --------------------------------------------------------  IN: 1522.3 mL / OUT: 2735 mL / NET: -1212.7 mL      CAPILLARY BLOOD GLUCOSE  197 (02 Nov 2021 06:00)      POCT Blood Glucose.: 199 mg/dL (03 Nov 2021 05:02)      PHYSICAL EXAM:  GENERAL: intubated and sedated appears stated age  HEENT:  Atraumatic, Normocephalic  EYES: PERRLA, conjunctiva and sclera clear  NECK: Supple, No JVD  CHEST/LUNG: diminished bilaterally; No wheezes, rales, or rhonchi  HEART: Regular rate and rhythm; No murmurs, rubs, or gallops  ABDOMEN: Soft, Nontender, Nondistended; Bowel sounds present  EXTREMITIES:  2+ Peripheral Pulses, No clubbing, cyanosis, or edema  PSYCH: unable as pt is sedated  NEUROLOGY: pupils 4-5mm, no conjugate downward gaze. pupils equal. no withdrawal to pain.   SKIN: No rashes or lesions    LINES:    HOSPITAL MEDICATIONS:  Standing Meds:  aspirin  chewable 81 milliGRAM(s) Enteral Tube daily  atorvastatin 40 milliGRAM(s) Oral at bedtime  chlorhexidine 0.12% Liquid 15 milliLiter(s) Oral Mucosa every 12 hours  chlorhexidine 4% Liquid 1 Application(s) Topical <User Schedule>  enoxaparin Injectable 80 milliGRAM(s) SubCutaneous two times a day  insulin lispro (ADMELOG) corrective regimen sliding scale   SubCutaneous every 6 hours  insulin NPH human recombinant 20 Unit(s) SubCutaneous every 6 hours  lacosamide IVPB 100 milliGRAM(s) IV Intermittent every 12 hours  levETIRAcetam  IVPB 1000 milliGRAM(s) IV Intermittent <User Schedule>  multivitamin/minerals/iron Oral Solution (CENTRUM) 15 milliLiter(s) Enteral Tube daily  norepinephrine Infusion 0.05 MICROgram(s)/kG/Min IV Continuous <Continuous>  pantoprazole  Injectable 40 milliGRAM(s) IV Push daily  polyethylene glycol 3350 17 Gram(s) Oral every 12 hours  propofol Infusion. 50 MICROgram(s)/kG/Min IV Continuous <Continuous>  senna Syrup 10 milliLiter(s) Oral at bedtime  valproate sodium IVPB 500 milliGRAM(s) IV Intermittent every 8 hours      PRN Meds:  bisacodyl Suppository 10 milliGRAM(s) Rectal daily PRN      LABS:                        10.3   9.40  )-----------( 195      ( 03 Nov 2021 00:25 )             32.6     Hgb Trend: 10.3<--, 10.0<--, 11.3<--, 8.3<--, 12.1<--  11-03    142  |  107  |  31<H>  ----------------------------<  131<H>  3.8   |  25  |  1.11    Ca    7.8<L>      03 Nov 2021 00:25  Phos  4.1     11-03  Mg     2.5     11-03    TPro  5.2<L>  /  Alb  2.0<L>  /  TBili  0.1<L>  /  DBili  x   /  AST  44<H>  /  ALT  11  /  AlkPhos  73  11-03    Creatinine Trend: 1.11<--, 1.05<--, 1.08<--, 1.08<--, 0.99<--, 1.03<--  PT/INR - ( 03 Nov 2021 00:25 )   PT: 12.6 sec;   INR: 1.05 ratio         PTT - ( 03 Nov 2021 00:25 )  PTT:45.1 sec    Arterial Blood Gas:  11-03 @ 00:23  7.41/48/112/30/99.6/5.0  ABG lactate: --  Arterial Blood Gas:  11-02 @ 17:01  7.47/40/97/29/98.0/5.0  ABG lactate: --  Arterial Blood Gas:  11-02 @ 11:41  7.49/39/100/30/99.1/5.9  ABG lactate: --    Venous Blood Gas:  11-02 @ 12:50  7.43/48/64/32/94.1  VBG Lactate: 1.0      MICROBIOLOGY:     Culture - Sputum (collected 01 Nov 2021 00:40)  Source: .Sputum Sputum  Gram Stain (01 Nov 2021 03:21):    Few polymorphonuclear leukocytes per low power field    No Squamous epithelial cells per low power field    Few Yeast like cells per oil power field  Final Report (02 Nov 2021 20:02):    Normal Respiratory Jenifer present    Culture - Blood (collected 31 Oct 2021 21:04)  Source: .Blood Blood-Peripheral  Preliminary Report (01 Nov 2021 22:02):    No growth to date.    Culture - Blood (collected 31 Oct 2021 21:04)  Source: .Blood Blood-Peripheral  Preliminary Report (01 Nov 2021 22:02):    No growth to date.      RADIOLOGY:  [ ] Reviewed and interpreted by me    EKG:

## 2021-11-03 NOTE — PROGRESS NOTE ADULT - ATTENDING COMMENTS
67 y/o F w/DM admitted to OSH with hypoglycemic seizures after accidental insulin overdose. Patient had persistent seizures at OSH and was transferred to [facility] for management of status epilepticus. Patient intubated for acute respiratory failure in setting of status epilepticus. Seizures have been suppressed, however concern for breakthrough seizure 11/2. No further clinical seizures, EEG read pending.    - Continue AEDs as per neuro, Ativan PRN  - Follow up EEG read, if no seizures, wean propofol  - Follow up read of MRI brain  - Insulin for DM  - Poor prognosis for neurologic recovery  - Continue GOC discussions 67 y/o F w/DM admitted to OSH with hypoglycemic seizures after accidental insulin overdose. Patient had persistent seizures at OSH and was transferred to [facility] for management of status epilepticus. Patient intubated for acute respiratory failure in setting of status epilepticus. Seizures have been suppressed, however concern for breakthrough seizure 11/2. No further clinical seizures, EEG read pending. RIJ DVT as well as numerous superficial clots seen on upper extremities b/l while attempting to place IVs.    - Continue AEDs as per neuro, Ativan PRN  - Follow up EEG read, if no seizures, wean propofol  - Follow up read of MRI brain  - Insulin for DM  - Dopplers of b/l upper and lower extremities for eval for DVT  - Therapeutic AC, will d/c if no clots seen on official duplex  - Poor prognosis for neurologic recovery  - Continue GOC discussions

## 2021-11-03 NOTE — PROGRESS NOTE ADULT - SUBJECTIVE AND OBJECTIVE BOX
Diabetes Follow up note:    Chief complaint: T2DM    Interval Hx: Tube feeds restarted this AM. Tolerating Glucerna @ goal rate. Last BG 238mg/dl, was only given half of NPH dose this AM. Remains intubated and sedated on pressors.     Review of Systems:  unable to provide ROS.     MEDS:  atorvastatin 40 milliGRAM(s) Oral at bedtime  insulin lispro (ADMELOG) corrective regimen sliding scale   SubCutaneous every 6 hours  insulin NPH human recombinant 20 Unit(s) SubCutaneous every 6 hours      Allergies    No Known Allergies        PE:  General: Male lying in bed. Intubated.   Vital Signs Last 24 Hrs  T(C): 38 (03 Nov 2021 12:00), Max: 38 (03 Nov 2021 12:00)  T(F): 100.4 (03 Nov 2021 12:00), Max: 100.4 (03 Nov 2021 12:00)  HR: 68 (03 Nov 2021 14:30) (53 - 69)  BP: 138/63 (02 Nov 2021 19:45) (138/63 - 138/63)  BP(mean): 91 (02 Nov 2021 19:45) (91 - 91)  RR: 25 (03 Nov 2021 14:30) (13 - 43)  SpO2: 95% (03 Nov 2021 14:30) (91% - 100%)  HEENT: ETT in place. On vent. NGT in place.   Abd: Soft, NT,ND,   Extremities: Warm. B/L LE/UE edema  Neuro: Sedated     LABS:  POCT Blood Glucose.: 238 mg/dL (11-03-21 @ 11:49)  POCT Blood Glucose.: 199 mg/dL (11-03-21 @ 05:02)  POCT Blood Glucose.: 99 mg/dL (11-02-21 @ 11:17)  POCT Blood Glucose.: 197 mg/dL (11-02-21 @ 05:48)  POCT Blood Glucose.: 183 mg/dL (11-01-21 @ 23:45)  POCT Blood Glucose.: 225 mg/dL (11-01-21 @ 18:14)  POCT Blood Glucose.: 210 mg/dL (11-01-21 @ 11:21)  POCT Blood Glucose.: 205 mg/dL (11-01-21 @ 05:20)  POCT Blood Glucose.: 144 mg/dL (10-31-21 @ 23:31)  POCT Blood Glucose.: 171 mg/dL (10-31-21 @ 17:38)  POCT Blood Glucose.: 70 mg/dL (10-31-21 @ 17:09)                            10.3   9.40  )-----------( 195      ( 03 Nov 2021 00:25 )             32.6       11-03    142  |  107  |  31<H>  ----------------------------<  131<H>  3.8   |  25  |  1.11    Ca    7.8<L>      03 Nov 2021 00:25  Phos  4.1     11-03  Mg     2.5     11-03    TPro  5.2<L>  /  Alb  2.0<L>  /  TBili  0.1<L>  /  DBili  x   /  AST  44<H>  /  ALT  11  /  AlkPhos  73  11-03        A1C with Estimated Average Glucose Result: 14.8 % (10-28-21 @ 02:51)          Contact number: martínez [phone] or [phone]

## 2021-11-03 NOTE — PROGRESS NOTE ADULT - SUBJECTIVE AND OBJECTIVE BOX
Referring: Lory    HPI:  67 y/o male with PMHX DM, HTn and BPH, found lethargic and was brought to [facility] by ambulance on 10/25 after family witnesed tonic clonic seizure. Patient was found hypoglycemic upon arrival to [facility] with a blood sugar of 34. IN the ER pt had another episode of seizure activity  and did not return to baseline and was subsequently intubated for airway protection. EEG was done on 10/26 after sedation was turned off and showed epileptic activity while patient was on Keppra 500bid. After EEG findings Keppra was increased to 100bid, however repeat eeg on 10/27 showed epileptic activity originating from left mid temporal area. On admission the patient was tested positive for covid 19 but no signs of respiratory disease and no findings of PNA on cxr. Pt was seen by the neurology team in [place] who added vimpat in addition to the keppra. Due to refractory seizures, pt was started on propofol for sedation. In addition blood cultures collected on 10/26 showed GPC bacteremia and group B strep isolated from urine culture. Pt has not required pressors during his stay at [facility] and was subsequently transferred to [address] for EEG monitoring and seizure management.     Cardiology consulted for elevated troponin in setting of recent status epilepticus and strep viridans bacteremia.    ===========================  Interval Events  -   - requiring pressors  - Unable to obtain review of systems due to intubated status  - No further cardiac enzymes checked  ===========================        PMHx:   Hypertension    Diabetes    History of BPH        PSHx:   No significant past surgical history        Allergies:  No Known Allergies      Home Meds:    Current Medications:   amLODIPine   Tablet 5 milliGRAM(s) Oral daily  aspirin  chewable 81 milliGRAM(s) Enteral Tube daily  atorvastatin 40 milliGRAM(s) Oral at bedtime  carvedilol 6.25 milliGRAM(s) Oral every 12 hours  cefTRIAXone   IVPB 2000 milliGRAM(s) IV Intermittent every 24 hours  chlorhexidine 0.12% Liquid 15 milliLiter(s) Oral Mucosa every 12 hours  chlorhexidine 4% Liquid 1 Application(s) Topical <User Schedule>  dexMEDEtomidine Infusion 0.4 MICROgram(s)/kG/Hr IV Continuous <Continuous>  doxazosin 4 milliGRAM(s) Oral at bedtime  enoxaparin Injectable 40 milliGRAM(s) SubCutaneous daily  insulin lispro (ADMELOG) corrective regimen sliding scale   SubCutaneous every 6 hours  insulin NPH human recombinant 30 Unit(s) SubCutaneous every 6 hours  lacosamide IVPB 100 milliGRAM(s) IV Intermittent every 12 hours  levETIRAcetam  IVPB 1000 milliGRAM(s) IV Intermittent <User Schedule>  multivitamin/minerals/iron Oral Solution (CENTRUM) 15 milliLiter(s) Enteral Tube daily  pantoprazole  Injectable 40 milliGRAM(s) IV Push daily  polyethylene glycol 3350 17 Gram(s) Oral daily  senna Syrup 10 milliLiter(s) Oral at bedtime      FAMILY HISTORY:  No pertinent family history in first degree relatives          Physical Exam:  T(F): 98.1 (10-30), Max: 98.4 (10-30)  HR: 59 (10-30) (57 - 108)  BP: --  RR: 20 (10-30)  SpO2: 100% (10-30)  GENERAL: No acute distress, intubated  CHEST/LUNG: intubated  BACK: No spinal tenderness  HEART: Regular rate and rhythm; No murmurs, rubs, or gallops  ABDOMEN: Soft, Nontender, Nondistended; Bowel sounds present  EXTREMITIES:  No clubbing, cyanosis, or edema    Cardiovascular Diagnostic Testing:    ECG: Personally reviewed:    Echo: Personally reviewed:    Stress Testing:    Cath:    Imaging:    CXR: Personally reviewed    Labs: Personally reviewed 11-3                        11.4   9.77  )-----------( 216      ( 29 Oct 2021 23:40 )             36.3     10-29    148<H>  |  114<H>  |  34<H>  ----------------------------<  139<H>  3.6   |  25  |  1.07    Ca    7.8<L>      29 Oct 2021 23:40  Phos  2.6     10-29  Mg     2.2     10-29    TPro  5.1<L>  /  Alb  2.4<L>  /  TBili  <0.1<L>  /  DBili  x   /  AST  36  /  ALT  13  /  AlkPhos  88  10-29    PT/INR - ( 29 Oct 2021 00:14 )   PT: 13.0 sec;   INR: 1.09 ratio         PTT - ( 29 Oct 2021 00:14 )  PTT:35.2 sec  Serum Pro-Brain Natriuretic Peptide: 882 pg/mL (10-27 @ 17:50)            Assessment and Recommendation:   · Assessment	  67 y/o male with PMHX DM, HTn and BPH, admitted to MICU for status epilepticus now intubated and found to have strep viridans bacteremia.   Cardiology consulted for elevated troponin in setting of recent status epilepticus and strep viridans bacteremia.    #Elevated troponin- troponin is highly sensitive but non specific. Recent TTE with normal LV systolic and diastolic function.  Type 2 MI 2/2 to bacteremia/septic shock/seizures  -continue pressors (if to remain on pressors, would rec discontinuing antihypertensives)  -CKMB normal   -no acute cardiac intervention at this time  -would no longer trend troponin  unless other signs of ischemia (EKG changes, chest pain once patient is off sedation/extubated)  Elevated trop likely in setting of acute neurological status.  Will sign off                                                                                                                                                                                                                                                                                                                                               40 minutes spent in face-to-face time with critical care of ICU patient with end-organ dysfunction continuous drips, including evaluation, formulation of plan, discussion with primary team. > 50% of time spent in counseling with family and other physicians involved in care of patient.     All labs, imaging personally reviewed.

## 2021-11-03 NOTE — EEG REPORT - NS EEG TEXT BOX
[facility]   COMPREHENSIVE EPILEPSY CENTER   REPORT OF CONTINUOUS VIDEO EEG     [facility]: [address], [place] [address], Ph#: [phone]  [facility]: [address] [place] [place] [address], Ph#: [phone]  [facility]: [address] [place] [address], Ph#: [phone]    Patient Name: ASHA DUNN  Age and : 66y ([birth year])  MRN #: [id number]  [facility] [place] [address] [address]  Referring Physician: Hiro Frias    Start Time/Date: 14:50 on 21  End Time/Date: 08:00 on 11-3-21  Duration:  16 H 26 mins      (disconnected from 21:25 to 01:25)    _____________________________________________________________  STUDY INFORMATION    EEG Recording Technique:  The patient underwent continuous Video-EEG monitoring, using Telemetry System hardware on the XLTek Digital System. EEG and video data were stored on a computer hard drive with important events saved in digital archive files. The material was reviewed by a physician (electroencephalographer / epileptologist) on a daily basis. Osmar and seizure detection algorithms were utilized and reviewed. An EEG Technician attended to the patient, and was available throughout daytime work hours.  The epilepsy center neurologist was available in person or on call 24-hours per day.    EEG Placement and Labeling of Electrodes:  The EEG was performed utilizing 20 channel referential EEG connections (coronal over temporal over parasagittal montage) using all standard 10-20 electrode placements with EKG, with additional electrodes placed in the inferior temporal region using the modified 10-10 montage electrode placements for elective admissions, or if deemed necessary. Recording was at a sampling rate of 256 samples per second per channel. Time synchronized digital video recording was done simultaneously with EEG recording. A low light infrared camera was used for low light recording.     _____________________________________________________________  HISTORY    Patient is a 66y old  Male who presents with a chief complaint of Transfer from [place] for EEG Monitoring (2021 09:02)      PERTINENT MEDICATION:  lacosamide IVPB 100 milliGRAM(s) IV Intermittent every 12 hours  levETIRAcetam  IVPB 1000 milliGRAM(s) IV Intermittent <User Schedule>  propofol Infusion. 50 MICROgram(s)/kG/Min (23.5 mL/Hr) IV Continuous <Continuous>  valproate sodium IVPB 500 milliGRAM(s) IV Intermittent every 8 hours    _____________________________________________________________  STUDY INTERPRETATION    Findings: The background was nonreactive and in asymmetric burst suppression, predominantly consisted of 1-3s bursts of sharp theta and delta activities, alternating with 3-5s of diffuse suppression; with left hemisphere appearing more attenuated.   No posterior dominant rhythm seen.    Interictal Epileptiform Activity:   Sharp wave discharges upto 1Hz maximal right frontal region (LPDs vs. GPDs with left sided attenuation)    Events:  Clinical events: None recorded.  Seizures: None recorded.    Activation Procedures:   Hyperventilation was not performed.    Photic stimulation was not performed.     Artifacts:  Intermittent myogenic and movement artifacts were noted.    ECG:  The heart rate on single channel ECG was predominantly between 60-80 BPM.    _____________________________________________________________  EEG SUMMARY/CLASSIFICATION    Abnormal EEG in a comatose patient.    - Sharp wave discharges upto 1Hz maximal right frontal region (LPDs vs. GPDs with left sided attenuation)   - Burst suppression pattern with left sided attenuation.    _____________________________________________________________  EEG IMPRESSION/CLINICAL CORRELATE    Abnormal EEG study.    Asymmetric burst suppression with more prominent grey matter dysfunction in left hemisphere. Right frontal maximal periodic discharges may indicate a risk for seizures from that region versus asymmetric GPDs which are indicative of diffuse cortical injury.   No seizure seen.    In absence of additional clinical concerns, recommend consideration for discontinuation of current EEG study with reconnection in future if clinically warranted.  _____________________________________________    Sridhar Malave MD, ELIU  Fellow, [facility]  [place] [facility] Neurology and Neurosurgery     [facility]   COMPREHENSIVE EPILEPSY CENTER   REPORT OF CONTINUOUS VIDEO EEG     [facility]: [address], [place] [address], Ph#: [phone]  [facility]: [address] [place] [address], Ph#: [phone]  [facility]: [address] [place] [address], Ph#: [phone]    Patient Name: ASHA DUNN  Age and : 66y (51)  MRN #: [id number]  [facility] [place] [address] [facility]  Referring Physician: Hiro Frias    Start Time/Date: 14:50 on 21  End Time/Date: 10:22 on 11-3-21  Duration:  18 H 46 mins      (disconnected from 21:25 to 01:25)    _____________________________________________________________  STUDY INFORMATION    EEG Recording Technique:  The patient underwent continuous Video-EEG monitoring, using Telemetry System hardware on the XLTek Digital System. EEG and video data were stored on a computer hard drive with important events saved in digital archive files. The material was reviewed by a physician (electroencephalographer / epileptologist) on a daily basis. Osmar and seizure detection algorithms were utilized and reviewed. An EEG Technician attended to the patient, and was available throughout daytime work hours.  The epilepsy center neurologist was available in person or on call 24-hours per day.    EEG Placement and Labeling of Electrodes:  The EEG was performed utilizing 20 channel referential EEG connections (coronal over temporal over parasagittal montage) using all standard 10-20 electrode placements with EKG, with additional electrodes placed in the inferior temporal region using the modified 10-10 montage electrode placements for elective admissions, or if deemed necessary. Recording was at a sampling rate of 256 samples per second per channel. Time synchronized digital video recording was done simultaneously with EEG recording. A low light infrared camera was used for low light recording.     _____________________________________________________________  HISTORY    Patient is a 66y old  Male who presents with a chief complaint of Transfer from [place] for EEG Monitoring (2021 09:02)      PERTINENT MEDICATION:  lacosamide IVPB 100 milliGRAM(s) IV Intermittent every 12 hours  levETIRAcetam  IVPB 1000 milliGRAM(s) IV Intermittent <User Schedule>  propofol Infusion. 50 MICROgram(s)/kG/Min (23.5 mL/Hr) IV Continuous <Continuous>  valproate sodium IVPB 500 milliGRAM(s) IV Intermittent every 8 hours    _____________________________________________________________  STUDY INTERPRETATION    Findings: The background was nonreactive and in asymmetric burst suppression, predominantly consisted of 1-3s bursts of sharp theta and delta activities, alternating with 3-5s of diffuse suppression; with left hemisphere appearing more attenuated.   No posterior dominant rhythm seen.    Interictal Epileptiform Activity:   Sharp wave discharges upto 1Hz maximal right frontal region (LPDs vs. GPDs with left sided attenuation)    Events:  Clinical events: None recorded.  Seizures: None recorded.    Activation Procedures:   Hyperventilation was not performed.    Photic stimulation was not performed.     Artifacts:  Intermittent myogenic and movement artifacts were noted.    ECG:  The heart rate on single channel ECG was predominantly between 60-80 BPM.    _____________________________________________________________  EEG SUMMARY/CLASSIFICATION    Abnormal EEG in a comatose patient.    - Sharp wave discharges upto 1Hz maximal right frontal region (LPDs vs. GPDs with left sided attenuation)   - Burst suppression pattern with left sided attenuation.    _____________________________________________________________  EEG IMPRESSION/CLINICAL CORRELATE    Abnormal EEG study.    Asymmetric burst suppression with more prominent grey matter dysfunction in left hemisphere. Right frontal maximal periodic discharges may indicate a risk for seizures from that region versus asymmetric GPDs which are indicative of diffuse cortical injury.   No seizure seen.    In absence of additional clinical concerns, recommend consideration for discontinuation of current EEG study with reconnection in future if clinically warranted.  _____________________________________________    Sridhar Malave MD, ELIU  Fellow, [facility]  [place] of Neurology and Neurosurgery     Health system   COMPREHENSIVE EPILEPSY CENTER   REPORT OF CONTINUOUS VIDEO EEG     [facility]: [address], [place] [address], Ph#: [phone]  [facility]: [address] [place] [address], Ph#: [phone]  [facility]: [address] [place] [address], Ph#: [phone]    Patient Name: ASHA DUNN  Age and : 66y (51)  MRN #: [id number]  [facility] [place] [address] [facility]  Referring Physician: Hiro Frias    Start Time/Date: 14:50 on 21  End Time/Date: 10:22 on 11-3-21  Duration:  18 H 46 mins      (disconnected from 21:25 to 01:25)    _____________________________________________________________  STUDY INFORMATION    EEG Recording Technique:  The patient underwent continuous Video-EEG monitoring, using Telemetry System hardware on the XLTek Digital System. EEG and video data were stored on a computer hard drive with important events saved in digital archive files. The material was reviewed by a physician (electroencephalographer / epileptologist) on a daily basis. Osmar and seizure detection algorithms were utilized and reviewed. An EEG Technician attended to the patient, and was available throughout daytime work hours.  The epilepsy center neurologist was available in person or on call 24-hours per day.    EEG Placement and Labeling of Electrodes:  The EEG was performed utilizing 20 channel referential EEG connections (coronal over temporal over parasagittal montage) using all standard 10-20 electrode placements with EKG, with additional electrodes placed in the inferior temporal region using the modified 10-10 montage electrode placements for elective admissions, or if deemed necessary. Recording was at a sampling rate of 256 samples per second per channel. Time synchronized digital video recording was done simultaneously with EEG recording. A low light infrared camera was used for low light recording.     _____________________________________________________________  HISTORY  Patient is a 66y old  Male who presents with a chief complaint of Transfer from [place] for EEG Monitoring (2021 09:02)    PERTINENT MEDICATION:  lacosamide IVPB 100 milliGRAM(s) IV Intermittent every 12 hours  levETIRAcetam  IVPB 1000 milliGRAM(s) IV Intermittent <User Schedule>  propofol Infusion. 50 MICROgram(s)/kG/Min (23.5 mL/Hr) IV Continuous <Continuous>  valproate sodium IVPB 500 milliGRAM(s) IV Intermittent every 8 hours    _____________________________________________________________  STUDY INTERPRETATION    Findings: The background was nonreactive and in asymmetric burst suppression, predominantly consisted of 1-3s bursts of sharp theta and delta activities, alternating with 3-5s of diffuse suppression; with left hemisphere appearing more attenuated.   No posterior dominant rhythm seen.    Interictal Epileptiform Activity:   Sharp wave discharges up to 1Hz maximal right > left  frontal region    Events:  Clinical events: None recorded.  Seizures: None recorded.    Activation Procedures:   Hyperventilation was not performed.    Photic stimulation was not performed.     Artifacts:  Intermittent myogenic and movement artifacts were noted.    ECG:  The heart rate on single channel ECG was predominantly between 60-80 BPM.    _____________________________________________________________  EEG SUMMARY/CLASSIFICATION    Abnormal EEG in a comatose patient.    - Sharp wave discharges upto 1Hz maximal right > left frontal region (LPDs vs. GPDs with left sided attenuation)   - Burst suppression pattern with left sided attenuation.    _____________________________________________________________  EEG IMPRESSION/CLINICAL CORRELATE    Abnormal EEG study.    Asymmetric burst suppression with more prominent grey matter dysfunction in left hemisphere. Right frontal maximal periodic discharges may indicate a risk for seizures from that region versus asymmetric GPDs which are indicative of diffuse cortical injury.   No seizure seen.    _____________________________________________    Sridhar Malave MD, ELIU  Fellow, [facility]  [place] [facility] Neurology and Neurosurgery

## 2021-11-04 NOTE — GOALS OF CARE CONVERSATION - ADVANCED CARE PLANNING - SURROGATE NAME
As per his daughter, Kalli, the patient is  and neither his ex-wife, nor his son talk to him. Hence, she is the surrogate decision maker.

## 2021-11-04 NOTE — PROGRESS NOTE ADULT - NUTRITIONAL ASSESSMENT
Diet, NPO with Tube Feed:   Tube Feeding Modality: Nasogastric  Glucerna 1.5 Ja (GLUCERNA1.5RTH)  Total Volume for 24 Hours (mL): 1200  Continuous  Starting Tube Feed Rate {mL per Hour}: 20  Until Goal Tube Feed Rate (mL per Hour): 50  Tube Feed Duration (in Hours): 24  Tube Feed Start Time: 18:00 (10-27-21 @ 17:34) [Active]
Diet, NPO with Tube Feed:   Tube Feeding Modality: Nasogastric  Glucerna 1.5 Ja (GLUCERNA1.5RTH)  Total Volume for 24 Hours (mL): 1200  Continuous  Starting Tube Feed Rate {mL per Hour}: 20  Until Goal Tube Feed Rate (mL per Hour): 50  Tube Feed Duration (in Hours): 24  Tube Feed Start Time: 18:00 (10-27-21 @ 17:34) [Active]

## 2021-11-04 NOTE — PROGRESS NOTE ADULT - SUBJECTIVE AND OBJECTIVE BOX
Follow Up:  Fever    Interval History: afebrile today so far. GoC discussion ongoing.     REVIEW OF SYSTEMS  [ x ] ROS unobtainable because:  intubated/sedated  [  ] All other systems negative except as noted below    Constitutional:  [ ] fever [ ] chills  [ ] weight loss  [ ] weakness  Skin:  [ ] rash [ ] phlebitis	  Eyes: [ ] icterus [ ] pain  [ ] discharge	  ENMT: [ ] sore throat  [ ] thrush [ ] ulcers [ ] exudates  Respiratory: [ ] dyspnea [ ] hemoptysis [ ] cough [ ] sputum	  Cardiovascular:  [ ] chest pain [ ] palpitations [ ] edema	  Gastrointestinal:  [ ] nausea [ ] vomiting [ ] diarrhea [ ] constipation [ ] pain	  Genitourinary:  [ ] dysuria [ ] frequency [ ] hematuria [ ] discharge [ ] flank pain  [ ] incontinence  Musculoskeletal:  [ ] myalgias [ ] arthralgias [ ] arthritis  [ ] back pain  Neurological:  [ ] headache [ ] seizures  [ ] confusion/altered mental status      Allergies  No Known Allergies        ANTIMICROBIALS:      OTHER MEDS:  MEDICATIONS  (STANDING):  acetaminophen    Suspension .. 650 every 6 hours PRN  amLODIPine   Tablet 10 daily  aspirin  chewable 81 daily  atorvastatin 40 at bedtime  bisacodyl Suppository 10 daily PRN  enoxaparin Injectable 80 two times a day  insulin lispro (ADMELOG) corrective regimen sliding scale  every 6 hours  insulin NPH human recombinant 20 every 6 hours  lacosamide IVPB 100 every 12 hours  levETIRAcetam  IVPB 1000 <User Schedule>  pantoprazole  Injectable 40 daily  polyethylene glycol 3350 17 every 12 hours  propofol Infusion. 50 <Continuous>  senna Syrup 10 at bedtime  valproate sodium IVPB 500 every 8 hours      Vital Signs Last 24 Hrs  T(C): 37.4 (04 Nov 2021 16:00), Max: 37.9 (03 Nov 2021 18:45)  T(F): 99.3 (04 Nov 2021 16:00), Max: 100.2 (03 Nov 2021 18:45)  HR: 71 (04 Nov 2021 17:00) (63 - 74)  BP: --  BP(mean): --  RR: 17 (04 Nov 2021 17:00) (13 - 42)  SpO2: 93% (04 Nov 2021 17:00) (91% - 100%)    PHYSICAL EXAMINATION:  General: Intubated and Sedated  HEENT: +ETT  Neck: Supple  Cardiac: RRR, No M/R/G  Resp: CTAB, No Wh/Rh/Ra  Abdomen: NBS, NT/ND, No HSM, No rigidity or guarding  MSK: No LE edema. No Calf tenderness  : marin  Skin: No rashes or lesions. Skin is warm and dry to the touch.    Neuro: Intubated and Sedated  Psych: Unable to assess - intubated and sedated                          9.9    9.31  )-----------( 199      ( 04 Nov 2021 00:08 )             32.1       11-04    141  |  107  |  29<H>  ----------------------------<  211<H>  4.8   |  25  |  1.04    Ca    7.6<L>      04 Nov 2021 00:08  Phos  3.5     11-04  Mg     2.7     11-04    TPro  5.2<L>  /  Alb  1.9<L>  /  TBili  <0.1<L>  /  DBili  x   /  AST  47<H>  /  ALT  12  /  AlkPhos  78  11-04          MICROBIOLOGY:  v  .Sputum Sputum  11-01-21   Normal Respiratory Jenifer present  --    Few polymorphonuclear leukocytes per low power field  No Squamous epithelial cells per low power field  Few Yeast like cells per oil power field      .Blood Blood-Peripheral  10-31-21   No growth to date.  --  --      .CSF CSF  10-28-21   No growth at 3 days.  --    No polymorphonuclear cells seen  No organisms seen  by cytocentrifuge      Catheterized Catheterized  10-27-21   No growth  --  --      .Sputum ett  10-27-21   Normal Respiratory Jenifer present  --    Numerous polymorphonuclear leukocytes per low power field  Rare Squamous epithelial cells per low power field  No organisms seen per oil power field      .Blood Blood  10-27-21   No Growth Final  --  --          HSV 1/2 PCR: NotDeteluisana (10-29 @ 03:34)        RADIOLOGY:    <The imaging below has been reviewed and visualized by me independently. Findings as detailed in report below>    < from: [facility] Duplex Upper Ext Vein Scan, Bilat (11.04.21 @ 13:15) >  IMPRESSION:  No evidence of deep venous thrombosis in either upper extremity.    Superficial thrombosis of the cephalic vein in both upper extremities.    < end of copied text >

## 2021-11-04 NOTE — PROGRESS NOTE ADULT - ATTENDING COMMENTS
65 y/o F w/DM admitted to OSH with hypoglycemic seizures after accidental insulin overdose. Patient had persistent seizures at OSH and was transferred to [facility] for management of status epilepticus. Patient intubated for acute respiratory failure in setting of status epilepticus. Seizures have been suppressed, however concern for breakthrough seizure 11/2. No further clinical seizures, EEG read pending. RIJ DVT as well as numerous superficial clots seen on upper extremities b/l while attempting to place IVs. MRI consistent with anoxic brain injury.    - Continue AEDs as per neuro, Ativan PRN  - Insulin for DM  - Dopplers of b/l upper and lower extremities for eval for DVT  - Therapeutic AC, will d/c if no clots seen on official duplex  - Poor prognosis for neurologic recovery  - Continue GOC discussions, appreciate palliative assistance 67 y/o F w/DM admitted to OSH with hypoglycemic seizures after accidental insulin overdose. Patient had persistent seizures at OSH and was transferred to [facility] for management of status epilepticus. Patient intubated for acute respiratory failure in setting of status epilepticus. Seizures have been suppressed, however concern for breakthrough seizure 11/2. No further clinical seizures, EEG read pending. RIJ DVT as well as numerous superficial clots seen on upper extremities b/l while attempting to place IVs. MRI consistent with anoxic brain injury.    - Continue AEDs as per neuro, Ativan PRN  - Insulin for DM  - Dopplers of b/l upper and lower extremities for eval for DVT  - Therapeutic AC, will d/c if no clots seen on official duplex  - Poor prognosis for neurologic recovery  - Monitor off abx  - Continue GOC discussions, appreciate palliative assistance

## 2021-11-04 NOTE — PROGRESS NOTE ADULT - SUBJECTIVE AND OBJECTIVE BOX
Esvin Novoa MD  Internal Medicine  Pager #[phone]    CHIEF COMPLAINT:Patient is a 66y old  Male who presents with a chief complaint of Transfer from [place] for EEG Monitoring (03 Nov 2021 19:11)        Interval Events:    REVIEW OF SYSTEMS:    OBJECTIVE:  ICU Vital Signs Last 24 Hrs  T(C): 37 (04 Nov 2021 04:00), Max: 38 (03 Nov 2021 12:00)  T(F): 98.6 (04 Nov 2021 04:00), Max: 100.4 (03 Nov 2021 12:00)  HR: 67 (04 Nov 2021 07:45) (63 - 74)  BP: --  BP(mean): --  ABP: 168/48 (04 Nov 2021 07:45) (57/57 - 201/66)  ABP(mean): 81 (04 Nov 2021 07:45) (57 - 110)  RR: 20 (04 Nov 2021 07:45) (15 - 43)  SpO2: 96% (04 Nov 2021 07:45) (91% - 100%)    Mode: AC/ CMV (Assist Control/ Continuous Mandatory Ventilation), RR (machine): 10, TV (machine): 350, FiO2: 21, PEEP: 5, ITime: 1, MAP: 8, PIP: 19    11-03 @ 07:01 - 11-04 @ 07:00  --------------------------------------------------------  IN: 3076.5 mL / OUT: 1050 mL / NET: 2026.5 mL    11-04 @ 07:01 - 11-04 @ 08:21  --------------------------------------------------------  IN: 73.5 mL / OUT: 0 mL / NET: 73.5 mL      CAPILLARY BLOOD GLUCOSE      POCT Blood Glucose.: 171 mg/dL (04 Nov 2021 05:21)      PHYSICAL EXAM:      LINES:    HOSPITAL MEDICATIONS:  Standing Meds:  aspirin  chewable 81 milliGRAM(s) Enteral Tube daily  atorvastatin 40 milliGRAM(s) Oral at bedtime  cefTRIAXone   IVPB 2000 milliGRAM(s) IV Intermittent every 24 hours  chlorhexidine 0.12% Liquid 15 milliLiter(s) Oral Mucosa every 12 hours  chlorhexidine 4% Liquid 1 Application(s) Topical <User Schedule>  enoxaparin Injectable 80 milliGRAM(s) SubCutaneous two times a day  insulin lispro (ADMELOG) corrective regimen sliding scale   SubCutaneous every 6 hours  insulin NPH human recombinant 20 Unit(s) SubCutaneous every 6 hours  lacosamide IVPB 100 milliGRAM(s) IV Intermittent every 12 hours  levETIRAcetam  IVPB 1000 milliGRAM(s) IV Intermittent <User Schedule>  multivitamin/minerals/iron Oral Solution (CENTRUM) 15 milliLiter(s) Enteral Tube daily  norepinephrine Infusion 0.05 MICROgram(s)/kG/Min IV Continuous <Continuous>  pantoprazole  Injectable 40 milliGRAM(s) IV Push daily  polyethylene glycol 3350 17 Gram(s) Oral every 12 hours  propofol Infusion. 50 MICROgram(s)/kG/Min IV Continuous <Continuous>  senna Syrup 10 milliLiter(s) Oral at bedtime  valproate sodium IVPB 500 milliGRAM(s) IV Intermittent every 8 hours      PRN Meds:  acetaminophen    Suspension .. 650 milliGRAM(s) Oral every 6 hours PRN  bisacodyl Suppository 10 milliGRAM(s) Rectal daily PRN      LABS:                        9.9    9.31  )-----------( 199      ( 04 Nov 2021 00:08 )             32.1     Hgb Trend: 9.9<--, 10.3<--, 10.0<--, 11.3<--, 8.3<--  11-04    141  |  107  |  29<H>  ----------------------------<  211<H>  4.8   |  25  |  1.04    Ca    7.6<L>      04 Nov 2021 00:08  Phos  3.5     11-04  Mg     2.7     11-04    TPro  5.2<L>  /  Alb  1.9<L>  /  TBili  <0.1<L>  /  DBili  x   /  AST  47<H>  /  ALT  12  /  AlkPhos  78  11-04    Creatinine Trend: 1.04<--, 1.11<--, 1.05<--, 1.08<--, 1.08<--, 0.99<--  PT/INR - ( 04 Nov 2021 00:08 )   PT: 12.6 sec;   INR: 1.05 ratio         PTT - ( 04 Nov 2021 00:08 )  PTT:51.0 sec    Arterial Blood Gas:  11-04 @ 00:02  7.41/49/89/31/98.2/5.6  ABG lactate: --  Arterial Blood Gas:  11-03 @ 00:23  7.41/48/112/30/99.6/5.0  ABG lactate: --  Arterial Blood Gas:  11-02 @ 17:01  7.47/40/97/29/98.0/5.0  ABG lactate: --  Arterial Blood Gas:  11-02 @ 11:41  7.49/39/100/30/99.1/5.9  ABG lactate: --    Venous Blood Gas:  11-02 @ 12:50  7.43/48/64/32/94.1  VBG Lactate: 1.0      MICROBIOLOGY:     RADIOLOGY:  [ ] Reviewed and interpreted by me    EKG:     Esvin Novoa MD  Internal Medicine  Pager #[phone]    CHIEF COMPLAINT:Patient is a 66y old  Male who presents with a chief complaint of Transfer from [place] for EEG Monitoring (03 Nov 2021 19:11)        Interval Events:    Febrile 11/3 PM given tylenol cultured CTX now monitoring off abx    Seizure like activity 11/4 AM placed back on propofol after being off briefly    Ongoing GOC discussion     REVIEW OF SYSTEMS:    Unable to assess due to intubated and sedated    OBJECTIVE:  ICU Vital Signs Last 24 Hrs  T(C): 37 (04 Nov 2021 04:00), Max: 38 (03 Nov 2021 12:00)  T(F): 98.6 (04 Nov 2021 04:00), Max: 100.4 (03 Nov 2021 12:00)  HR: 67 (04 Nov 2021 07:45) (63 - 74)  BP: --  BP(mean): --  ABP: 168/48 (04 Nov 2021 07:45) (57/57 - 201/66)  ABP(mean): 81 (04 Nov 2021 07:45) (57 - 110)  RR: 20 (04 Nov 2021 07:45) (15 - 43)  SpO2: 96% (04 Nov 2021 07:45) (91% - 100%)    Mode: AC/ CMV (Assist Control/ Continuous Mandatory Ventilation), RR (machine): 10, TV (machine): 350, FiO2: 21, PEEP: 5, ITime: 1, MAP: 8, PIP: 19    11-03 @ 07:01 - 11-04 @ 07:00  --------------------------------------------------------  IN: 3076.5 mL / OUT: 1050 mL / NET: 2026.5 mL    11-04 @ 07:01 - 11-04 @ 08:21  --------------------------------------------------------  IN: 73.5 mL / OUT: 0 mL / NET: 73.5 mL      CAPILLARY BLOOD GLUCOSE      POCT Blood Glucose.: 171 mg/dL (04 Nov 2021 05:21)      PHYSICAL EXAM:    GENERAL: intubated and sedated appears stated age  HEENT:  Atraumatic, Normocephalic  EYES: PERRLA, conjunctiva and sclera clear  NECK: Supple, No JVD  CHEST/LUNG: diminished bilaterally; No wheezes, rales, or rhonchi  HEART: Regular rate and rhythm; No murmurs, rubs, or gallops  ABDOMEN: Soft, Nontender, Nondistended; Bowel sounds present  EXTREMITIES:  2+ Peripheral Pulses, No clubbing, cyanosis, or edema  PSYCH: unable as pt is sedated  NEUROLOGY: pupils 4-5mm, no conjugate downward gaze. pupils equal. no withdrawal to pain.   SKIN: No rashes or lesions      LINES:    HOSPITAL MEDICATIONS:  Standing Meds:  aspirin  chewable 81 milliGRAM(s) Enteral Tube daily  atorvastatin 40 milliGRAM(s) Oral at bedtime  cefTRIAXone   IVPB 2000 milliGRAM(s) IV Intermittent every 24 hours  chlorhexidine 0.12% Liquid 15 milliLiter(s) Oral Mucosa every 12 hours  chlorhexidine 4% Liquid 1 Application(s) Topical <User Schedule>  enoxaparin Injectable 80 milliGRAM(s) SubCutaneous two times a day  insulin lispro (ADMELOG) corrective regimen sliding scale   SubCutaneous every 6 hours  insulin NPH human recombinant 20 Unit(s) SubCutaneous every 6 hours  lacosamide IVPB 100 milliGRAM(s) IV Intermittent every 12 hours  levETIRAcetam  IVPB 1000 milliGRAM(s) IV Intermittent <User Schedule>  multivitamin/minerals/iron Oral Solution (CENTRUM) 15 milliLiter(s) Enteral Tube daily  norepinephrine Infusion 0.05 MICROgram(s)/kG/Min IV Continuous <Continuous>  pantoprazole  Injectable 40 milliGRAM(s) IV Push daily  polyethylene glycol 3350 17 Gram(s) Oral every 12 hours  propofol Infusion. 50 MICROgram(s)/kG/Min IV Continuous <Continuous>  senna Syrup 10 milliLiter(s) Oral at bedtime  valproate sodium IVPB 500 milliGRAM(s) IV Intermittent every 8 hours      PRN Meds:  acetaminophen    Suspension .. 650 milliGRAM(s) Oral every 6 hours PRN  bisacodyl Suppository 10 milliGRAM(s) Rectal daily PRN      LABS:                        9.9    9.31  )-----------( 199      ( 04 Nov 2021 00:08 )             32.1     Hgb Trend: 9.9<--, 10.3<--, 10.0<--, 11.3<--, 8.3<--  11-04    141  |  107  |  29<H>  ----------------------------<  211<H>  4.8   |  25  |  1.04    Ca    7.6<L>      04 Nov 2021 00:08  Phos  3.5     11-04  Mg     2.7     11-04    TPro  5.2<L>  /  Alb  1.9<L>  /  TBili  <0.1<L>  /  DBili  x   /  AST  47<H>  /  ALT  12  /  AlkPhos  78  11-04    Creatinine Trend: 1.04<--, 1.11<--, 1.05<--, 1.08<--, 1.08<--, 0.99<--  PT/INR - ( 04 Nov 2021 00:08 )   PT: 12.6 sec;   INR: 1.05 ratio         PTT - ( 04 Nov 2021 00:08 )  PTT:51.0 sec    Arterial Blood Gas:  11-04 @ 00:02  7.41/49/89/31/98.2/5.6  ABG lactate: --  Arterial Blood Gas:  11-03 @ 00:23  7.41/48/112/30/99.6/5.0  ABG lactate: --  Arterial Blood Gas:  11-02 @ 17:01  7.47/40/97/29/98.0/5.0  ABG lactate: --  Arterial Blood Gas:  11-02 @ 11:41  7.49/39/100/30/99.1/5.9  ABG lactate: --    Venous Blood Gas:  11-02 @ 12:50  7.43/48/64/32/94.1  VBG Lactate: 1.0      MICROBIOLOGY:     RADIOLOGY:  [ ] Reviewed and interpreted by me    EKG:

## 2021-11-04 NOTE — GOALS OF CARE CONVERSATION - ADVANCED CARE PLANNING - CONVERSATION DETAILS
full note to f/u.   d/w the patient's daughter (surrogate) about codes status and I advised for DNR. She will further think about it. I also d/w the primary team that will f/u with the patient's daughter later today.         Jean Paul Garg MD   Geriatrics and Palliative Care (GAP) Consult Service    of Geriatric and Palliative Medicine  [facility]      Please page the following number for clinical matters between the hours of 9 am and 5 pm from Monday through Friday : [phone]    After 5pm and on weekends, please see the contact information below:    In the event of newly developing, evolving, or worsening symptoms, please contact the Palliative Medicine team via pager (if the patient is at [facility] #[phone] or if the patient is at [facility] #[phone]) The Geriatric and Palliative Medicine service has coverage 24 hours a day/ 7 days a week to provide medical recommendations regarding symptom management needs via telephone d/w the patient's daughter (surrogate) about codes status and I advised for DNR. I indicated that understanding the patient's anoxic brain injury, in the setting of a cardiac arrest, if able to survive, which is unlikely, he his neurologic state will be even worst than what it is now. She will further think about it. I also d/w the primary team that will f/u with the patient's daughter later today.         Jean Paul Garg MD   Geriatrics and Palliative Care (GAP) Consult Service    of Geriatric and Palliative Medicine  [facility]      Please page the following number for clinical matters between the hours of 9 am and 5 pm from Monday through Friday : [phone]    After 5pm and on weekends, please see the contact information below:    In the event of newly developing, evolving, or worsening symptoms, please contact the Palliative Medicine team via pager (if the patient is at [facility] #[phone] or if the patient is at [facility] #[phone]) The Geriatric and Palliative Medicine service has coverage 24 hours a day/ 7 days a week to provide medical recommendations regarding symptom management needs via telephone

## 2021-11-04 NOTE — CHART NOTE - NSCHARTNOTEFT_GEN_A_CORE
Contacted Daughter Kalli at [phone]. Referenced previous GOC discussion per Dr. Garg note. Daughter has been able to reach out  ex wife (who is also her mother) and her brother to discuss GOC. Additionally, there may be an in-law who would like to see Mr. Hughes. They would like to come in Sunday 11/7/21 to see their relative and make decisions from there. ICU team available for future concerns and appreciate palliative help with Mr. Hughes.    Esvin Novoa

## 2021-11-04 NOTE — PROGRESS NOTE ADULT - ASSESSMENT
65 y/o male with PMHx HTN, poorly controlled IDDM, Alzheimers COVID infxn originally transferred from [facility] generalized tonic clonic seizures 22 hypoglycemia w insulin admistration now with likely anoxic brain injury.     Neuro:  New onset seizures-status epilepticus of unknown etiology and hypoxic encephalopathy.  - vEEG monitoring (10/27- 30) Diffuse cortical hyperexcitability with GPDs at times coming in prolonged runs without clear evolution into a distinct ictal pattern-poor prognosis  - Vimpat 100mg IV q12h and Keppra 1g q8h  - 10/31 loaded with 1g of depakote IV and will continue 500mg q8hr per recommendations  - Noncon CTH (10/27) no anoxic ischemic encephalopathy, 11/3 CT non contrast anoxic   - q4 neuro checks  - Neuro following, Dr Fernandez, Recs appreciated   -11/2 AM ativan 2mg, 2mg, 4mg with seizurelike activity bilateral downward gaze R leg jerking  - FU drug levels Vimpat, Keppra & Depakote --56  - MR with anoxic brain injury  -11/4 AM requiring prop gtt for seizure activity     Respiratory  - Intubated on 10/25 for airway protection  - onVol AC 10/0.35/5/21%        CV  previous hypotension w labile BPs and wide pulse pressure  -likely required because of propofol sedation   -monitoring BPs while on propofol gtt      PMHx HTN, Cath 2/21 no stents  - intermittent HTN to 180s s/p hydralazine 5mg x 2  - c/w home meds: Lipitor 40mg qd, ASA 81mg qd, Norvasc 10mg qd, Coreg 12.5mg q12h  - TTE (10/29) -> normal function  - Trend Trop 127 --> 130 --> 164-->173. No EKG changes. likely secondary to seizures in setting of normal echo, continue to treat underlying, trops nonspecific do not continue to trend unless clinically indicated. OP ischemic w/u. Demand ischemia.   - Cards consulted, recs appreciated    GI - 11/1-smear  - NPO with TF (Glucerna 1.5cal) per Dietician recs via NGT  - Protonix daily   - Bowel regimen with Miralax BID & senna, no gastric residual noted        Hx BPH  - c/w home Cardura 4mg qd  - Morales changed on Admission (10/27)   -Morales removed condom cath   - Monitor Lytes replete as necessary to maintain Mg>2 Phos>3 K>4     HyperNa/HyperCl, resolved  - Free Water 200ml Q6h,   - monitor NA    Fluid overload resolved.      Infectious Disease  Covid 19—incidental finding in OSH ER (Negative PCR x2 at [facility])   - OFF Remdesivir x 3doses stopped 2/2 negative Covid diagnosis  - OFF Dexmethasone x1 dose stopped 2/2 neg Covid diagnosis   - BCx + for GPC with UCx + for Group B Strep at [facility]   - F/u repeat Blood cultures x2 NGTD , UCx neg, Sputum Cx neg (10/27)  - Lumbar puncture with CSF Negative for infection (10/28)   - s/p Ceftriaxone (10/26- 30)   - ID consulting, recs appreciated  - febrile intermittently  -  sputum with few yeast like cells 10/31, blood cx, UA negative (10/27)  -repeat bld cx sent overnight 10/31 for temp 100.6 NGTD  -11/1 sputum cx no respiratory delbert  -11/3 febrile now monitoring off abx. Repeat cultures pending     Endocrine  IDDM- HbA1c 14.8  - Hyperglycemic BGL>400 requiring Insulin gtt, transitioned off 10/29   - episode of hypoglycemia FS 70, given 1 AMP d50, held 6pm NPH.  - FS q 6 avoid hypoglycemia   - NPH 20 Q6hr and mod ISS q6h  -11/2 ON held midnight dose and reduced AM dose to 10U      Heme  - Monitor CBC   - Lovenox DVT PPX  - LE Duplex (10/29) neg for DVT   - Carotid duplex no clinically significant stenosis  -pending reads US upper extremity and lower extremity DVT studies    Lines  - Arrows x2 10/30  - Right Radial A line 10/27   - Morales Catheter 10/27-11/2. Condom Cath placed 11/2    GOC:   -  Palliative care consulted  -  discussed vEEG read with Daughter Kalli   - remains Full Code   - Primary contact: Kalli Hughes (daughter) [phone]  -pending further GOC discussion with palliative Dr. Garg, cj kalli, ex-wife and son may also be involved in GOC discussion and palliative extubation/withdrawal of care versus trach    DISPO: continue ICU level of care, above plan discussed with attending

## 2021-11-04 NOTE — PROGRESS NOTE ADULT - ASSESSMENT
66 years old male with PMHx of uncontrolled DM (A1C ~14), HTN and BPH, transferred from [facility] for Epilepsy monitoring    CTH 10/27 negative for intracranial pathologies; No septic emboli  CXR 10/28 no focal consolidation; on minimal vent setting  COVID PCR 10/27, 10/28 negative;   Had COVID infection in 2/2021 hospitalized at [facility]; received Pfizer 1st dose only on 8/8/21  Positive COVID PCR in [place] was likely false positive since patient likely has immunity from natural infection and vaccination    UA (10/25) ([place]) shows pyuria  UCx (10/25) ([place]) grew group B strep in [facility]    BCx (10/25) ([place]) viridans strep 1/4 bottles in [facility]  BCx (10/27) ([facility]) NGTD  Received Rocephin 2gm q24hrs 10/27 - current  TTE no vegetation, mild MR and AR  LP with one nucleated cell. CSF PCR Negative    Called [facility] Today and confirmed that no further growth on 10/25 BCx  Suspect AHS in 1/4 bottles was procurement contaminant  Completed 3 days of Ceftriaxone (had GBS on UCx at [place])    Febrile 10/31 -->  U/A 10/31 without pyuria  Possible from CNS injury in context of anoxia or ?Thrombophlebitis of bilateral cephalic veins  Would monitor off of antibiotics and follow blood cultures    RECOMMENDATIONS    #Fever (?central fevers, ?Thrombophlebitis of bilateral cephalic veins), Leukocytosis  --Monitor off of antibiotics  --Sputum culture with normal respiratory delbert  --Follow up on BCx  --Follow fever curve  --Follow WBC    #Positive Rubella IgM  --Doubt active infection at this time, does not fit clinical context. IgG is also positive which makes acute infection less likely at this point.     #Positive BCx, Positive UCx, Seizures  --Continue to follow CBC with diff  --Continue to follow temperature curve  --Follow up on preliminary blood cultures    I will follow the patient as needed. Please feel free to contact me with any further questions or concerns.    Ad Villa M.D.  [facility] Division of Infectious Disease  8AM-5PM: Pager Number [phone]  After Hours (or if no response): Please contact the Infectious Diseases Office at [phone]

## 2021-11-04 NOTE — PROGRESS NOTE ADULT - PROBLEM SELECTOR PLAN 3
on statin as outpatient      Discussed with Micu Resident Dr Novoa   Contact via [employer] Teams Tues/Thurs/Friday  On evenings and weekends, please call [phone] or page endocrine fellow on call.   Please note that this patient may be followed by different provider tomorrow. If no answer, contact endocrine fellow on call [phone] M-F 9a-5pm  [employer] password: [email or web address]

## 2021-11-04 NOTE — PROGRESS NOTE ADULT - ASSESSMENT
65 y/o M w/ uncontrolled Type 2 DM w/ hyperglycemia exacerbated by steroids for COVID , now off isolation , admitted for status epilepticus (high risk patient with severely uncontrolled diabetes with A1c of 14.8% at high risk of CAD and CVA with high level decision-making). Endocrine consulted for DM2 and steroid induced hyperglycemia. Now off steroids. Tolerating TF @ goal rate with BG mostly at goal. BG goal (100-180mg/dl). Prevent hypoglycemia as pt seizing today requiring propofol.

## 2021-11-04 NOTE — PROGRESS NOTE ADULT - PROBLEM SELECTOR PLAN 1
-test BG Q6h  -c/w NPH 20 units Q6h (hold if TF off). If BG less than 120mg/dl, consider reducing dose by 25%. Trend glucose and make adjustments based on trend. HOLD NPH if tube feeds held!   -c/w Admelog moderate correction scale Q6h  -notify endocrine team for any change in TF regimen  -Hypoglycemia protocol for BG <70     endocrine to sign off at this time as BG mostly at goal on current regimen, spoke with team MICU team Dr Novoa  to adjust regimen as needed. Call endocrine back for any persistent hyperglycemia >200 or hypoglycemia .   Please call endocrine back for d/c recs close to discharge,  will be based on insulin requirements and feeding modality.     Dispo:  Outpt. endo follow-up  Discharge plan to be based on insulin requirements, confirmation of outpatient regimen, and feed modality at the time of discharge.

## 2021-11-05 NOTE — CHART NOTE - NSCHARTNOTEFT_GEN_A_CORE
Discussed GOC with daughter Kalli regarding decision to resuscitate. Kalli expressed desire to continue resuscitation in the event Mr. Hughes's heart were to stop understanding it may not change his overall clinical outcome but would enable his family to visit him this Saturday 11/6 and Sunday 11/7. She is planning to come in tomorrow with her brother and mother to visit. Expressed that she may "say goodbye" on Sunday and compassionate extubation discussed.     Esvin Novoa

## 2021-11-05 NOTE — PROGRESS NOTE ADULT - SUBJECTIVE AND OBJECTIVE BOX
Follow Up:  fever    Interval History: continued fevers. per RN significant diarrhea over last 24H.     REVIEW OF SYSTEMS  [ x ] ROS unobtainable because:  intubated/sedated  [  ] All other systems negative except as noted below    Constitutional:  [ ] fever [ ] chills  [ ] weight loss  [ ] weakness  Skin:  [ ] rash [ ] phlebitis	  Eyes: [ ] icterus [ ] pain  [ ] discharge	  ENMT: [ ] sore throat  [ ] thrush [ ] ulcers [ ] exudates  Respiratory: [ ] dyspnea [ ] hemoptysis [ ] cough [ ] sputum	  Cardiovascular:  [ ] chest pain [ ] palpitations [ ] edema	  Gastrointestinal:  [ ] nausea [ ] vomiting [ ] diarrhea [ ] constipation [ ] pain	  Genitourinary:  [ ] dysuria [ ] frequency [ ] hematuria [ ] discharge [ ] flank pain  [ ] incontinence  Musculoskeletal:  [ ] myalgias [ ] arthralgias [ ] arthritis  [ ] back pain  Neurological:  [ ] headache [ ] seizures  [ ] confusion/altered mental status    Allergies  No Known Allergies        ANTIMICROBIALS:      OTHER MEDS:  MEDICATIONS  (STANDING):  acetaminophen    Suspension .. 650 every 6 hours PRN  amLODIPine   Tablet 10 daily  aspirin  chewable 81 daily  atorvastatin 40 at bedtime  bisacodyl Suppository 10 daily PRN  enoxaparin Injectable 80 two times a day  insulin lispro (ADMELOG) corrective regimen sliding scale  every 6 hours  insulin NPH human recombinant 15 every 6 hours  lacosamide IVPB 100 every 12 hours  levETIRAcetam  IVPB 1000 <User Schedule>  pantoprazole  Injectable 40 daily  polyethylene glycol 3350 17 every 12 hours  propofol Infusion. 50 <Continuous>  senna Syrup 10 at bedtime  valproate sodium IVPB 500 every 8 hours      Vital Signs Last 24 Hrs  T(C): 37.8 (05 Nov 2021 14:00), Max: 38 (04 Nov 2021 20:00)  T(F): 100 (05 Nov 2021 14:00), Max: 100.4 (04 Nov 2021 20:00)  HR: 72 (05 Nov 2021 16:00) (68 - 74)  BP: 110/53 (05 Nov 2021 16:00) (110/53 - 133/63)  BP(mean): 77 (05 Nov 2021 16:00) (77 - 90)  RR: 18 (05 Nov 2021 16:00) (16 - 25)  SpO2: 97% (05 Nov 2021 16:00) (93% - 99%)    PHYSICAL EXAMINATION:  General: Intubated and Sedated  HEENT: +ETT  Neck: Supple  Cardiac: RRR, No M/R/G  Resp: CTAB, No Wh/Rh/Ra  Abdomen: NBS, NT/ND, No HSM, No rigidity or guarding  MSK: No LE edema. No Calf tenderness  : marin  Skin: No rashes or lesions. Skin is warm and dry to the touch.    Neuro: Intubated and Sedated  Psych: Unable to assess - intubated and sedated                        9.8    9.89  )-----------( 168      ( 05 Nov 2021 00:13 )             31.5       11-05    144  |  109<H>  |  24<H>  ----------------------------<  155<H>  4.8   |  27  |  0.96    Ca    7.6<L>      05 Nov 2021 00:13  Phos  2.8     11-05  Mg     2.6     11-05    TPro  5.4<L>  /  Alb  2.1<L>  /  TBili  <0.1<L>  /  DBili  x   /  AST  55<H>  /  ALT  14  /  AlkPhos  85  11-05          MICROBIOLOGY:  v  .Blood Blood-Venous  11-03-21   No growth to date.  --  --      .Sputum Sputum  11-01-21   Normal Respiratory Jenifer present  --    Few polymorphonuclear leukocytes per low power field  No Squamous epithelial cells per low power field  Few Yeast like cells per oil power field      .Blood Blood-Peripheral  10-31-21   No growth to date.  --  --      .CSF CSF  10-28-21   No growth at 3 days.  --    No polymorphonuclear cells seen  No organisms seen  by cytocentrifuge      Catheterized Catheterized  10-27-21   No growth  --  --      .Sputum ett  10-27-21   Normal Respiratory Jenifer present  --    Numerous polymorphonuclear leukocytes per low power field  Rare Squamous epithelial cells per low power field  No organisms seen per oil power field      .Blood Blood  10-27-21   No Growth Final  --  --    RADIOLOGY:    <The imaging below has been reviewed and visualized by me independently. Findings as detailed in report below>    < from: [facility] Duplex Upper Ext Vein Scan, Bilat (11.04.21 @ 13:15) >  IMPRESSION:  No evidence of deep venous thrombosis in either upper extremity.    Superficial thrombosis of the cephalic vein in both upper extremities.    < end of copied text >

## 2021-11-05 NOTE — PROGRESS NOTE ADULT - SUBJECTIVE AND OBJECTIVE BOX
Esvin Novoa MD  Internal Medicine  Pager #[phone]    CHIEF COMPLAINT:Patient is a 66y old  Male who presents with a chief complaint of Transfer from [place] for EEG Monitoring (04 Nov 2021 17:32)        Interval Events:    No acute events overnight remains intubated and sedated on propofol. not on pressors. At time of examination normotensive.     REVIEW OF SYSTEMS:     Unable to assess intubated and sedated      OBJECTIVE:  ICU Vital Signs Last 24 Hrs  T(C): 37.5 (05 Nov 2021 04:00), Max: 38 (04 Nov 2021 20:00)  T(F): 99.5 (05 Nov 2021 04:00), Max: 100.4 (04 Nov 2021 20:00)  HR: 69 (05 Nov 2021 07:00) (65 - 74)  BP: --  BP(mean): --  ABP: 129/43 (05 Nov 2021 07:00) (112/42 - 189/56)  ABP(mean): 67 (05 Nov 2021 07:00) (62 - 97)  RR: 16 (05 Nov 2021 07:00) (13 - 26)  SpO2: 98% (05 Nov 2021 07:00) (93% - 100%)    Mode: AC/ CMV (Assist Control/ Continuous Mandatory Ventilation), RR (machine): 10, TV (machine): 350, FiO2: 30, PEEP: 5, ITime: 1, MAP: 8, PIP: 18    11-04 @ 07:01  -  11-05 @ 07:00  --------------------------------------------------------  IN: 2604 mL / OUT: 1850 mL / NET: 754 mL      CAPILLARY BLOOD GLUCOSE      POCT Blood Glucose.: 131 mg/dL (05 Nov 2021 05:04)      PHYSICAL EXAM:  GENERAL: intubated and sedated appears stated age  HEENT:  Atraumatic, Normocephalic  EYES: PERRLA, conjunctiva and sclera clear  NECK: Supple, No JVD  CHEST/LUNG: diminished bilaterally; No wheezes, rales, or rhonchi  HEART: Regular rate and rhythm; No murmurs, rubs, or gallops  ABDOMEN: Soft, Nontender, Nondistended; Bowel sounds present  EXTREMITIES:  2+ Peripheral Pulses, No clubbing, cyanosis, or edema  PSYCH: unable as pt is sedated  NEUROLOGY: pupils 4-5mm, no conjugate downward gaze. pupils equal, reactive . no withdrawal to pain.   SKIN: No rashes or lesions    LINES:    HOSPITAL MEDICATIONS:  Standing Meds:  amLODIPine   Tablet 10 milliGRAM(s) Oral daily  aspirin  chewable 81 milliGRAM(s) Enteral Tube daily  atorvastatin 40 milliGRAM(s) Oral at bedtime  chlorhexidine 0.12% Liquid 15 milliLiter(s) Oral Mucosa every 12 hours  chlorhexidine 4% Liquid 1 Application(s) Topical <User Schedule>  enoxaparin Injectable 80 milliGRAM(s) SubCutaneous two times a day  insulin lispro (ADMELOG) corrective regimen sliding scale   SubCutaneous every 6 hours  insulin NPH human recombinant 20 Unit(s) SubCutaneous every 6 hours  lacosamide IVPB 100 milliGRAM(s) IV Intermittent every 12 hours  levETIRAcetam  IVPB 1000 milliGRAM(s) IV Intermittent <User Schedule>  multivitamin/minerals/iron Oral Solution (CENTRUM) 15 milliLiter(s) Enteral Tube daily  pantoprazole  Injectable 40 milliGRAM(s) IV Push daily  polyethylene glycol 3350 17 Gram(s) Oral every 12 hours  propofol Infusion. 50 MICROgram(s)/kG/Min IV Continuous <Continuous>  senna Syrup 10 milliLiter(s) Oral at bedtime  valproate sodium IVPB 500 milliGRAM(s) IV Intermittent every 8 hours      PRN Meds:  acetaminophen    Suspension .. 650 milliGRAM(s) Oral every 6 hours PRN  bisacodyl Suppository 10 milliGRAM(s) Rectal daily PRN      LABS:                        9.8    9.89  )-----------( 168      ( 05 Nov 2021 00:13 )             31.5     Hgb Trend: 9.8<--, 9.9<--, 10.3<--, 10.0<--, 11.3<--  11-05    144  |  109<H>  |  24<H>  ----------------------------<  155<H>  4.8   |  27  |  0.96    Ca    7.6<L>      05 Nov 2021 00:13  Phos  2.8     11-05  Mg     2.6     11-05    TPro  5.4<L>  /  Alb  2.1<L>  /  TBili  <0.1<L>  /  DBili  x   /  AST  55<H>  /  ALT  14  /  AlkPhos  85  11-05    Creatinine Trend: 0.96<--, 1.04<--, 1.11<--, 1.05<--, 1.08<--, 1.08<--  PT/INR - ( 05 Nov 2021 00:13 )   PT: 12.4 sec;   INR: 1.04 ratio         PTT - ( 05 Nov 2021 00:13 )  PTT:47.0 sec    Arterial Blood Gas:  11-05 @ 00:07  7.46/45/113/32/99.5/7.3  ABG lactate: --  Arterial Blood Gas:  11-04 @ 00:02  7.41/49/89/31/98.2/5.6  ABG lactate: --    Venous Blood Gas:  11-04 @ 12:48  7.40/53/42/33/69.4  VBG Lactate: 1.1      MICROBIOLOGY:     Culture - Blood (collected 03 Nov 2021 21:56)  Source: .Blood Blood-Peripheral  Preliminary Report (04 Nov 2021 22:01):    No growth to date.    Culture - Blood (collected 03 Nov 2021 21:56)  Source: .Blood Blood-Venous  Preliminary Report (04 Nov 2021 22:01):    No growth to date.      RADIOLOGY:  [ ] Reviewed and interpreted by me    EKG:

## 2021-11-05 NOTE — PROGRESS NOTE ADULT - ASSESSMENT
66 years old male with PMHx of uncontrolled DM (A1C ~14), HTN and BPH, transferred from [facility] for Epilepsy monitoring    CTH 10/27 negative for intracranial pathologies; No septic emboli  CXR 10/28 no focal consolidation; on minimal vent setting  COVID PCR 10/27, 10/28 negative;   Had COVID infection in 2/2021 hospitalized at [facility]; received Pfizer 1st dose only on 8/8/21  Positive COVID PCR in [place] was likely false positive since patient likely has immunity from natural infection and vaccination    UA (10/25) ([place]) shows pyuria  UCx (10/25) ([place]) grew group B strep in [facility]    BCx (10/25) ([place]) viridans strep 1/4 bottles in [facility]  BCx (10/27) ([facility]) NGTD  Received Rocephin 2gm q24hrs 10/27 - current  TTE no vegetation, mild MR and AR  LP with one nucleated cell. CSF PCR Negative    Called [facility] Today and confirmed that no further growth on 10/25 BCx  Suspect AHS in 1/4 bottles was procurement contaminant  Completed 3 days of Ceftriaxone (had GBS on UCx at [place])    Febrile 10/31 -->  U/A 10/31 without pyuria  Possible from CNS injury in context of anoxia or ?Thrombophlebitis of bilateral cephalic veins  Would monitor off of antibiotics and follow blood cultures    RECOMMENDATIONS    #Diarrhea  --Check C diff toxin assay    #Fever (?central fevers, ?Thrombophlebitis of bilateral cephalic veins), Leukocytosis  --Monitor off of antibiotics  --Sputum culture with normal respiratory delbert  --Follow up on BCx  --Follow fever curve  --Follow WBC    #Positive Rubella IgM  --Doubt active infection at this time, does not fit clinical context. IgG is also positive which makes acute infection less likely at this point.     #Positive BCx, Positive UCx, Seizures  --Continue to follow CBC with diff  --Continue to follow temperature curve  --Follow up on preliminary blood cultures    I will be away over this upcoming weekend. Please contact the Infectious Diseases Office with any further questions or concerns.     Ad Villa M.D.  [facility] Division of Infectious Disease  8AM-5PM: Pager Number [phone]  After Hours (or if no response): Please contact the Infectious Diseases Office at [phone]     The above assessment and plan were discussed with MICU Team

## 2021-11-05 NOTE — PROGRESS NOTE ADULT - ATTENDING COMMENTS
65 y/o F w/DM admitted to OSH with hypoglycemic seizures after accidental insulin overdose. Patient had persistent seizures at OSH and was transferred to [facility] for management of status epilepticus. Patient intubated for acute respiratory failure in setting of status epilepticus. Seizures have been suppressed, however concern for breakthrough seizure 11/2. No further clinical seizures, EEG read pending. RIJ DVT as well as numerous superficial clots seen on upper extremities b/l while attempting to place IVs. MRI consistent with anoxic brain injury.    - Continue AEDs as per neuro, Ativan PRN  - Insulin for DM  - Therapeutic AC  - Poor prognosis for neurologic recovery  - Monitor off abx  - Continue GOC discussions, appreciate palliative assistance

## 2021-11-05 NOTE — PROGRESS NOTE ADULT - ASSESSMENT
65 y/o male with PMHx HTN, poorly controlled IDDM, Alzheimers COVID infxn originally transferred from [facility] generalized tonic clonic seizures 22 hypoglycemia w insulin admistration now with likely anoxic brain injury.     Neuro:  New onset seizures-status epilepticus of unknown etiology and hypoxic encephalopathy.  - vEEG monitoring (10/27- 30) Diffuse cortical hyperexcitability with GPDs at times coming in prolonged runs without clear evolution into a distinct ictal pattern-poor prognosis  - Vimpat 100mg IV q12h and Keppra 1g q8h  - 10/31 loaded with 1g of depakote IV and will continue 500mg q8hr per recommendations  - Noncon CTH (10/27) no anoxic ischemic encephalopathy, 11/3 CT non contrast anoxic   - q4 neuro checks  - Neuro following, Dr Fernandez, Recs appreciated   -11/2 AM ativan 2mg, 2mg, 4mg with seizurelike activity bilateral downward gaze R leg jerking  - FU drug levels Vimpat, Keppra & Depakote --56  - MR with anoxic brain injury  -11/4 AM requiring prop gtt for seizure activity     Respiratory  - Intubated on 10/25 for airway protection  - onVol AC 10/0.35/5/21%        CV  previous hypotension w labile BPs and wide pulse pressure  -likely required because of propofol sedation   -monitoring BPs while on propofol gtt      PMHx HTN, Cath 2/21 no stents  - intermittent HTN to 180s s/p hydralazine 5mg x 2  - c/w home meds: Lipitor 40mg qd, ASA 81mg qd, Norvasc 10mg qd, Coreg 12.5mg q12h  - TTE (10/29) -> normal function  - Trend Trop 127 --> 130 --> 164-->173. No EKG changes. likely secondary to seizures in setting of normal echo, continue to treat underlying, trops nonspecific do not continue to trend unless clinically indicated. OP ischemic w/u. Demand ischemia.   - Cards consulted, recs appreciated    GI -  - NPO with TF (Glucerna 1.5cal) per Dietician recs via NGT  - Protonix daily   - Bowel regimen with Miralax BID & senna, no gastric residual noted  -11/4 per disucssion w nurse multiple BMs        Hx BPH  - c/w home Cardura 4mg qd  - Morales changed on Admission (10/27)   -Morales removed condom cath   - Monitor Lytes replete as necessary to maintain Mg>2 Phos>3 K>4     HyperNa/HyperCl, resolved  - Free Water 200ml Q6h,   - monitor NA    Fluid overload resolved.      Infectious Disease  Covid 19—incidental finding in OSH ER (Negative PCR x2 at [facility])   - OFF Remdesivir x 3doses stopped 2/2 negative Covid diagnosis  - OFF Dexmethasone x1 dose stopped 2/2 neg Covid diagnosis   - BCx + for GPC with UCx + for Group B Strep at [facility]   - F/u repeat Blood cultures x2 NGTD , UCx neg, Sputum Cx neg (10/27)  - Lumbar puncture with CSF Negative for infection (10/28)   - s/p Ceftriaxone (10/26- 30)   - ID consulting, recs appreciated  - febrile intermittently  -  sputum with few yeast like cells 10/31, blood cx, UA negative (10/27)  -repeat bld cx sent overnight 10/31 for temp 100.6 NGTD  -11/1 sputum cx no respiratory delbert  -11/3 febrile now monitoring off abx. Repeat cultures pending     Endocrine  IDDM- HbA1c 14.8  - Hyperglycemic BGL>400 requiring Insulin gtt, transitioned off 10/29   - episode of hypoglycemia FS 70, given 1 AMP d50, held 6pm NPH.  - FS q 6 avoid hypoglycemia   - NPH 20 Q6hr and mod ISS q6h  -11/2 ON held midnight dose and reduced AM dose to 10U      Heme  - Monitor CBC   - Lovenox DVT PPX  - LE Duplex (10/29) neg for DVT   - Carotid duplex no clinically significant stenosis  -pending reads US upper extremity and lower extremity DVT studies    Lines  - Arrows x2 10/30  - Right Radial A line 10/27   - Morales Catheter 10/27-11/2. Condom Cath placed 11/2    GOC:   -  Palliative care consulted  -  discussed vEEG read with Daughter Kalli   - remains Full Code   - Primary contact: Kalli Hughes (daughter) [phone]  -pending further GOC discussion with palliative Dr. Garg, cj marlow, ex-wife and son may also be involved in GOC discussion and palliative extubation/withdrawal of care versus trach. Ongoing code status discussion.   -Plans for family to come in 11/7 Sunday. Discussed w Dr. Garg.     DISPO: continue ICU level of care, above plan discussed with attending

## 2021-11-06 NOTE — CONSULT NOTE ADULT - CONSULT REQUESTED DATE/TIME
28-Oct-2021 14:30
30-Oct-2021 09:50
01-Nov-2021 06:08
06-Nov-2021 13:12
28-Oct-2021 15:19
27-Oct-2021 18:00

## 2021-11-06 NOTE — PROGRESS NOTE ADULT - SUBJECTIVE AND OBJECTIVE BOX
CHIEF COMPLAINT:    Interval Events:    Episode of bloody vomiting overnight, DVT prophylaxis held.     REVIEW OF SYSTEMS:  [x] All other systems negative    OBJECTIVE:  ICU Vital Signs Last 24 Hrs  T(C): 37.9 (06 Nov 2021 04:00), Max: 38.1 (05 Nov 2021 20:00)  T(F): 100.2 (06 Nov 2021 04:00), Max: 100.6 (05 Nov 2021 20:00)  HR: 71 (06 Nov 2021 07:00) (68 - 79)  BP: 111/48 (06 Nov 2021 07:00) (110/53 - 198/73)  BP(mean): 69 (06 Nov 2021 07:00) (69 - 111)  ABP: 148/50 (05 Nov 2021 13:00) (141/44 - 162/54)  ABP(mean): 80 (05 Nov 2021 13:00) (71 - 87)  RR: 19 (06 Nov 2021 07:00) (16 - 23)  SpO2: 98% (06 Nov 2021 07:00) (93% - 100%)    Mode: AC/ CMV (Assist Control/ Continuous Mandatory Ventilation), RR (machine): 10, TV (machine): 350, FiO2: 30, PEEP: 5, ITime: 0.72, MAP: 8, PIP: 19    11-05 @ 07:01  -  11-06 @ 07:00  --------------------------------------------------------  IN: 2184 mL / OUT: 1800 mL / NET: 384 mL      CAPILLARY BLOOD GLUCOSE      POCT Blood Glucose.: 187 mg/dL (06 Nov 2021 05:42)      PHYSICAL EXAM:  GENERAL: intubated, OG tube in place  HEAD:  Atraumatic, Normocephalic  EYES: EOMI, PERRLA, conjunctiva and sclera clear  ENT: Moist mucous membranes  NECK: Supple, No JVD  CHEST/LUNG: Clear to auscultation bilaterally; No rales, rhonchi, wheezing, or rubs. Unlabored respirations  HEART: Regular rate and rhythm; No murmurs, rubs, or gallops  ABDOMEN: Bowel sounds present; Soft, Nontender, Nondistended   EXTREMITIES:  2+ Peripheral Pulses, brisk capillary refill. No clubbing, cyanosis, or edema  NERVOUS SYSTEM:  unable to assess  MSK: FROM all 4 extremities, full and equal strength  SKIN: No rashes or lesions      LINES:    HOSPITAL MEDICATIONS:  Standing Meds:  amLODIPine   Tablet 10 milliGRAM(s) Oral daily  aspirin  chewable 81 milliGRAM(s) Enteral Tube daily  atorvastatin 40 milliGRAM(s) Oral at bedtime  chlorhexidine 0.12% Liquid 15 milliLiter(s) Oral Mucosa every 12 hours  chlorhexidine 4% Liquid 1 Application(s) Topical <User Schedule>  enoxaparin Injectable 80 milliGRAM(s) SubCutaneous two times a day  insulin lispro (ADMELOG) corrective regimen sliding scale   SubCutaneous every 6 hours  insulin NPH human recombinant 15 Unit(s) SubCutaneous every 6 hours  lacosamide IVPB 100 milliGRAM(s) IV Intermittent every 12 hours  levETIRAcetam  IVPB 1000 milliGRAM(s) IV Intermittent <User Schedule>  multivitamin/minerals/iron Oral Solution (CENTRUM) 15 milliLiter(s) Enteral Tube daily  pantoprazole  Injectable 40 milliGRAM(s) IV Push daily  polyethylene glycol 3350 17 Gram(s) Oral every 12 hours  propofol Infusion. 50 MICROgram(s)/kG/Min IV Continuous <Continuous>  senna Syrup 10 milliLiter(s) Oral at bedtime  valproate sodium IVPB 500 milliGRAM(s) IV Intermittent every 8 hours      PRN Meds:  acetaminophen    Suspension .. 650 milliGRAM(s) Oral every 6 hours PRN  bisacodyl Suppository 10 milliGRAM(s) Rectal daily PRN      LABS:                        10.3   12.45 )-----------( 193      ( 06 Nov 2021 00:27 )             34.1     Hgb Trend: 10.3<--, 9.8<--, 9.9<--, 10.3<--, 10.0<--  11-06    142  |  108  |  23  ----------------------------<  141<H>  5.0   |  26  |  0.98    Ca    7.7<L>      06 Nov 2021 00:27  Phos  2.8     11-06  Mg     2.4     11-06    TPro  5.6<L>  /  Alb  2.1<L>  /  TBili  <0.1<L>  /  DBili  x   /  AST  71<H>  /  ALT  21  /  AlkPhos  105  11-06    Creatinine Trend: 0.98<--, 0.96<--, 1.04<--, 1.11<--, 1.05<--, 1.08<--  PT/INR - ( 06 Nov 2021 00:27 )   PT: 12.5 sec;   INR: 1.04 ratio         PTT - ( 06 Nov 2021 00:27 )  PTT:50.5 sec    Arterial Blood Gas:  11-05 @ 00:07  7.46/45/113/32/99.5/7.3  ABG lactate: --    Venous Blood Gas:  11-04 @ 12:48  7.40/53/42/33/69.4  VBG Lactate: 1.1      MICROBIOLOGY:     Culture - Blood (collected 03 Nov 2021 21:56)  Source: .Blood Blood-Peripheral  Preliminary Report (04 Nov 2021 22:01):    No growth to date.    Culture - Blood (collected 03 Nov 2021 21:56)  Source: .Blood Blood-Venous  Preliminary Report (04 Nov 2021 22:01):    No growth to date.      RADIOLOGY:  [ ] Reviewed and interpreted by me    EKG:   CHIEF COMPLAINT:    Interval Events:    Episode of bloody vomiting overnight, DVT prophylaxis held. Otherwise no other acute events.     REVIEW OF SYSTEMS:  [x] All other systems negative    OBJECTIVE:  ICU Vital Signs Last 24 Hrs  T(C): 37.9 (06 Nov 2021 04:00), Max: 38.1 (05 Nov 2021 20:00)  T(F): 100.2 (06 Nov 2021 04:00), Max: 100.6 (05 Nov 2021 20:00)  HR: 71 (06 Nov 2021 07:00) (68 - 79)  BP: 111/48 (06 Nov 2021 07:00) (110/53 - 198/73)  BP(mean): 69 (06 Nov 2021 07:00) (69 - 111)  ABP: 148/50 (05 Nov 2021 13:00) (141/44 - 162/54)  ABP(mean): 80 (05 Nov 2021 13:00) (71 - 87)  RR: 19 (06 Nov 2021 07:00) (16 - 23)  SpO2: 98% (06 Nov 2021 07:00) (93% - 100%)    Mode: AC/ CMV (Assist Control/ Continuous Mandatory Ventilation), RR (machine): 10, TV (machine): 350, FiO2: 30, PEEP: 5, ITime: 0.72, MAP: 8, PIP: 19    11-05 @ 07:01  -  11-06 @ 07:00  --------------------------------------------------------  IN: 2184 mL / OUT: 1800 mL / NET: 384 mL      CAPILLARY BLOOD GLUCOSE      POCT Blood Glucose.: 187 mg/dL (06 Nov 2021 05:42)      PHYSICAL EXAM:  GENERAL: intubated, OG tube in place  HEAD:  Atraumatic, Normocephalic  EYES: EOMI, PERRLA, conjunctiva and sclera clear  ENT: Moist mucous membranes  NECK: Supple, No JVD  CHEST/LUNG: Clear to auscultation bilaterally; No rales, rhonchi, wheezing, or rubs. Unlabored respirations  HEART: Regular rate and rhythm; No murmurs, rubs, or gallops  ABDOMEN: Bowel sounds present; Soft, Nontender, Nondistended   EXTREMITIES:  2+ Peripheral Pulses, brisk capillary refill. No clubbing, cyanosis, or edema  NERVOUS SYSTEM:  unable to assess  MSK: FROM all 4 extremities, full and equal strength  SKIN: No rashes or lesions      LINES:    HOSPITAL MEDICATIONS:  Standing Meds:  amLODIPine   Tablet 10 milliGRAM(s) Oral daily  aspirin  chewable 81 milliGRAM(s) Enteral Tube daily  atorvastatin 40 milliGRAM(s) Oral at bedtime  chlorhexidine 0.12% Liquid 15 milliLiter(s) Oral Mucosa every 12 hours  chlorhexidine 4% Liquid 1 Application(s) Topical <User Schedule>  enoxaparin Injectable 80 milliGRAM(s) SubCutaneous two times a day  insulin lispro (ADMELOG) corrective regimen sliding scale   SubCutaneous every 6 hours  insulin NPH human recombinant 15 Unit(s) SubCutaneous every 6 hours  lacosamide IVPB 100 milliGRAM(s) IV Intermittent every 12 hours  levETIRAcetam  IVPB 1000 milliGRAM(s) IV Intermittent <User Schedule>  multivitamin/minerals/iron Oral Solution (CENTRUM) 15 milliLiter(s) Enteral Tube daily  pantoprazole  Injectable 40 milliGRAM(s) IV Push daily  polyethylene glycol 3350 17 Gram(s) Oral every 12 hours  propofol Infusion. 50 MICROgram(s)/kG/Min IV Continuous <Continuous>  senna Syrup 10 milliLiter(s) Oral at bedtime  valproate sodium IVPB 500 milliGRAM(s) IV Intermittent every 8 hours      PRN Meds:  acetaminophen    Suspension .. 650 milliGRAM(s) Oral every 6 hours PRN  bisacodyl Suppository 10 milliGRAM(s) Rectal daily PRN      LABS:                        10.3   12.45 )-----------( 193      ( 06 Nov 2021 00:27 )             34.1     Hgb Trend: 10.3<--, 9.8<--, 9.9<--, 10.3<--, 10.0<--  11-06    142  |  108  |  23  ----------------------------<  141<H>  5.0   |  26  |  0.98    Ca    7.7<L>      06 Nov 2021 00:27  Phos  2.8     11-06  Mg     2.4     11-06    TPro  5.6<L>  /  Alb  2.1<L>  /  TBili  <0.1<L>  /  DBili  x   /  AST  71<H>  /  ALT  21  /  AlkPhos  105  11-06    Creatinine Trend: 0.98<--, 0.96<--, 1.04<--, 1.11<--, 1.05<--, 1.08<--  PT/INR - ( 06 Nov 2021 00:27 )   PT: 12.5 sec;   INR: 1.04 ratio         PTT - ( 06 Nov 2021 00:27 )  PTT:50.5 sec    Arterial Blood Gas:  11-05 @ 00:07  7.46/45/113/32/99.5/7.3  ABG lactate: --    Venous Blood Gas:  11-04 @ 12:48  7.40/53/42/33/69.4  VBG Lactate: 1.1      MICROBIOLOGY:     Culture - Blood (collected 03 Nov 2021 21:56)  Source: .Blood Blood-Peripheral  Preliminary Report (04 Nov 2021 22:01):    No growth to date.    Culture - Blood (collected 03 Nov 2021 21:56)  Source: .Blood Blood-Venous  Preliminary Report (04 Nov 2021 22:01):    No growth to date.      RADIOLOGY:  [ ] Reviewed and interpreted by me    EKG:

## 2021-11-06 NOTE — CONSULT NOTE ADULT - SUBJECTIVE AND OBJECTIVE BOX
CC: oral bleed     HPI:  67 y/o male with PMHx HTN, poorly controlled IDDM, Alzheimers COVID infxn originally transferred from [facility] generalized tonic clonic seizures 22 hypoglycemia w insulin administered now with likely anoxic brain injury. ENT called for oral bleed. Pt intubated unable to answer further modifying factors.     PAST MEDICAL & SURGICAL HISTORY:  Hypertension    Diabetes    History of BPH    No significant past surgical history      Allergies    No Known Allergies    Intolerances      MEDICATIONS  (STANDING):  amLODIPine   Tablet 10 milliGRAM(s) Oral daily  aspirin  chewable 81 milliGRAM(s) Enteral Tube daily  atorvastatin 40 milliGRAM(s) Oral at bedtime  chlorhexidine 0.12% Liquid 15 milliLiter(s) Oral Mucosa every 12 hours  chlorhexidine 4% Liquid 1 Application(s) Topical <User Schedule>  insulin lispro (ADMELOG) corrective regimen sliding scale   SubCutaneous every 6 hours  insulin NPH human recombinant 15 Unit(s) SubCutaneous every 6 hours  lacosamide IVPB 100 milliGRAM(s) IV Intermittent every 12 hours  levETIRAcetam  IVPB 1000 milliGRAM(s) IV Intermittent <User Schedule>  multivitamin/minerals/iron Oral Solution (CENTRUM) 15 milliLiter(s) Enteral Tube daily  pantoprazole  Injectable 40 milliGRAM(s) IV Push daily  polyethylene glycol 3350 17 Gram(s) Oral every 12 hours  propofol Infusion. 50 MICROgram(s)/kG/Min (23.5 mL/Hr) IV Continuous <Continuous>  senna Syrup 10 milliLiter(s) Oral at bedtime  valproate sodium IVPB 500 milliGRAM(s) IV Intermittent every 8 hours    MEDICATIONS  (PRN):  acetaminophen    Suspension .. 650 milliGRAM(s) Oral every 6 hours PRN Temp greater or equal to 38C (100.4F), Mild Pain (1 - 3)  bisacodyl Suppository 10 milliGRAM(s) Rectal daily PRN Constipation      Social History: no tobacco, no etoh     Family history: Pt denies any sign FHx    ROS:   ENT: all negative except as noted in HPI   CV: denies palpitations  Pulm: denies SOB, cough, hemoptysis  GI: denies change in apetite, indigestion, n/v  : denies pertinent urinary symptoms, urgency  Neuro: denies numbness/tingling, loss of sensation  Psych: denies anxiety  MS: denies muscle weakness, instability  Heme: denies easy bruising or bleeding  Endo: denies heat/cold intolerance, excessive sweating  Vascular: denies LE edema    Vital Signs Last 24 Hrs  T(C): 38.4 (06 Nov 2021 12:00), Max: 38.4 (06 Nov 2021 12:00)  T(F): 101.1 (06 Nov 2021 12:00), Max: 101.1 (06 Nov 2021 12:00)  HR: 85 (06 Nov 2021 12:00) (70 - 87)  BP: 133/53 (06 Nov 2021 12:00) (110/53 - 198/73)  BP(mean): 76 (06 Nov 2021 12:00) (65 - 111)  RR: 22 (06 Nov 2021 12:00) (16 - 25)  SpO2: 97% (06 Nov 2021 12:00) (93% - 100%)                          10.3   12.45 )-----------( 193      ( 06 Nov 2021 00:27 )             34.1    11-06    142  |  108  |  23  ----------------------------<  141<H>  5.0   |  26  |  0.98    Ca    7.7<L>      06 Nov 2021 00:27  Phos  2.8     11-06  Mg     2.4     11-06    TPro  5.6<L>  /  Alb  2.1<L>  /  TBili  <0.1<L>  /  DBili  x   /  AST  71<H>  /  ALT  21  /  AlkPhos  105  11-06   PT/INR - ( 06 Nov 2021 00:27 )   PT: 12.5 sec;   INR: 1.04 ratio         PTT - ( 06 Nov 2021 00:27 )  PTT:50.5 sec    PHYSICAL EXAM:  Gen: NAD  Skin: No rashes, bruises, or lesions  Head: Normocephalic, Atraumatic  Face: no edema, erythema, or fluctuance. Parotid glands soft without mass  Eyes: no scleral injection  Nose: + right ng tube in place,  blood from b/l nares suctioned, blood stopped after oral packing in place   Mouth: No Stridor / Drooling / Trismus.  Mucosa moist, tongue/uvula midline, oropharynx with ETT in place. difficult oral exam 2/2 big tongue. + oral bleed, clots suctioned, unable to see active source to cauterize 2/2 increased blood pooling. two small kerlix packing placed. able to move ETT laterally. + poor dentition.   Neck: Flat, supple, no lymphadenopathy, trachea midline, no masses  Lymphatic: No lymphadenopathy  Resp: on vent   CV: no peripheral edema/cyanosis  GI: nondistended   Peripheral vascular: no JVD or edema  Neuro: facial nerve intact, no facial droop           CC: oral bleed     HPI:  67 y/o male with PMHx HTN, poorly controlled IDDM, Alzheimers COVID infxn originally transferred from [facility] generalized tonic clonic seizures 22 hypoglycemia w insulin administered now with likely anoxic brain injury. ENT called for oral bleed. Pt intubated unable to answer further modifying factors.     PAST MEDICAL & SURGICAL HISTORY:  Hypertension    Diabetes    History of BPH    No significant past surgical history      Allergies    No Known Allergies    Intolerances      MEDICATIONS  (STANDING):  amLODIPine   Tablet 10 milliGRAM(s) Oral daily  aspirin  chewable 81 milliGRAM(s) Enteral Tube daily  atorvastatin 40 milliGRAM(s) Oral at bedtime  chlorhexidine 0.12% Liquid 15 milliLiter(s) Oral Mucosa every 12 hours  chlorhexidine 4% Liquid 1 Application(s) Topical <User Schedule>  insulin lispro (ADMELOG) corrective regimen sliding scale   SubCutaneous every 6 hours  insulin NPH human recombinant 15 Unit(s) SubCutaneous every 6 hours  lacosamide IVPB 100 milliGRAM(s) IV Intermittent every 12 hours  levETIRAcetam  IVPB 1000 milliGRAM(s) IV Intermittent <User Schedule>  multivitamin/minerals/iron Oral Solution (CENTRUM) 15 milliLiter(s) Enteral Tube daily  pantoprazole  Injectable 40 milliGRAM(s) IV Push daily  polyethylene glycol 3350 17 Gram(s) Oral every 12 hours  propofol Infusion. 50 MICROgram(s)/kG/Min (23.5 mL/Hr) IV Continuous <Continuous>  senna Syrup 10 milliLiter(s) Oral at bedtime  valproate sodium IVPB 500 milliGRAM(s) IV Intermittent every 8 hours    MEDICATIONS  (PRN):  acetaminophen    Suspension .. 650 milliGRAM(s) Oral every 6 hours PRN Temp greater or equal to 38C (100.4F), Mild Pain (1 - 3)  bisacodyl Suppository 10 milliGRAM(s) Rectal daily PRN Constipation      Social History: no tobacco, no etoh     Family history: Pt denies any sign FHx    ROS:   ENT: all negative except as noted in HPI   CV: denies palpitations  Pulm: denies SOB, cough, hemoptysis  GI: denies change in apetite, indigestion, n/v  : denies pertinent urinary symptoms, urgency  Neuro: denies numbness/tingling, loss of sensation  Psych: denies anxiety  MS: denies muscle weakness, instability  Heme: denies easy bruising or bleeding  Endo: denies heat/cold intolerance, excessive sweating  Vascular: denies LE edema    Vital Signs Last 24 Hrs  T(C): 38.4 (06 Nov 2021 12:00), Max: 38.4 (06 Nov 2021 12:00)  T(F): 101.1 (06 Nov 2021 12:00), Max: 101.1 (06 Nov 2021 12:00)  HR: 85 (06 Nov 2021 12:00) (70 - 87)  BP: 133/53 (06 Nov 2021 12:00) (110/53 - 198/73)  BP(mean): 76 (06 Nov 2021 12:00) (65 - 111)  RR: 22 (06 Nov 2021 12:00) (16 - 25)  SpO2: 97% (06 Nov 2021 12:00) (93% - 100%)                          10.3   12.45 )-----------( 193      ( 06 Nov 2021 00:27 )             34.1    11-06    142  |  108  |  23  ----------------------------<  141<H>  5.0   |  26  |  0.98    Ca    7.7<L>      06 Nov 2021 00:27  Phos  2.8     11-06  Mg     2.4     11-06    TPro  5.6<L>  /  Alb  2.1<L>  /  TBili  <0.1<L>  /  DBili  x   /  AST  71<H>  /  ALT  21  /  AlkPhos  105  11-06   PT/INR - ( 06 Nov 2021 00:27 )   PT: 12.5 sec;   INR: 1.04 ratio         PTT - ( 06 Nov 2021 00:27 )  PTT:50.5 sec    PHYSICAL EXAM:  Gen: NAD  Skin: No rashes, bruises, or lesions  Head: Normocephalic, Atraumatic  Face: no edema, erythema, or fluctuance. Parotid glands soft without mass  Eyes: no scleral injection  Nose: + right ng tube in place,  blood from b/l nares suctioned, blood stopped after oral packing in place   Mouth: No Stridor / Drooling / Trismus.  Mucosa moist, tongue/uvula midline, oropharynx with ETT in place. difficult oral exam 2/2 big tongue. + oral bleed, clots suctioned, unable to see active source to cauterize 2/2 increased blood pooling. two small kerlix packing placed. able to move ETT laterally. + broken teeth on bottom, excoriating ventral aspect of tongue. poor dentition.   Neck: Flat, supple, no lymphadenopathy, trachea midline, no masses  Lymphatic: No lymphadenopathy  Resp: on vent   CV: no peripheral edema/cyanosis  GI: nondistended   Peripheral vascular: no JVD or edema  Neuro: facial nerve intact, no facial droop

## 2021-11-06 NOTE — PROGRESS NOTE ADULT - ASSESSMENT
67 y/o male with PMHx HTN, poorly controlled IDDM, Alzheimers COVID infxn originally transferred from [facility] generalized tonic clonic seizures 22 hypoglycemia w insulin admistration now with likely anoxic brain injury.     Neuro:  New onset seizures-status epilepticus of unknown etiology and hypoxic encephalopathy.  - vEEG monitoring (10/27- 30) Diffuse cortical hyperexcitability with GPDs at times coming in prolonged runs without clear evolution into a distinct ictal pattern-poor prognosis  - Vimpat 100mg IV q12h and Keppra 1g q8h  - 10/31 loaded with 1g of depakote IV and will continue 500mg q8hr per recommendations  - Noncon CTH (10/27) no anoxic ischemic encephalopathy, 11/3 CT non contrast anoxic   - q4 neuro checks  - Neuro following, Dr Fernandez, Recs appreciated   -11/2 AM ativan 2mg, 2mg, 4mg with seizurelike activity bilateral downward gaze R leg jerking  - FU drug levels Vimpat, Keppra & Depakote --56  - MR with anoxic brain injury  -11/4 AM requiring prop gtt for seizure activity     Respiratory  - Intubated on 10/25 for airway protection  - onVol AC 10/0.35/5/21%        CV  previous hypotension w labile BPs and wide pulse pressure  -likely required because of propofol sedation   -monitoring BPs while on propofol gtt      PMHx HTN, Cath 2/21 no stents  - intermittent HTN to 180s s/p hydralazine 5mg x 2  - c/w home meds: Lipitor 40mg qd, ASA 81mg qd, Norvasc 10mg qd, Coreg 12.5mg q12h  - TTE (10/29) -> normal function  - Trend Trop 127 --> 130 --> 164-->173. No EKG changes. likely secondary to seizures in setting of normal echo, continue to treat underlying, trops nonspecific do not continue to trend unless clinically indicated. OP ischemic w/u. Demand ischemia.   - Cards consulted, recs appreciated    GI -  - NPO with TF (Glucerna 1.5cal) per Dietician recs via NGT  - Protonix daily   - Bowel regimen with Miralax BID & senna, no gastric residual noted  -11/4 per disucssion w nurse multiple BMs        Hx BPH  - c/w home Cardura 4mg qd  - Morales changed on Admission (10/27)   -Morales removed condom cath   - Monitor Lytes replete as necessary to maintain Mg>2 Phos>3 K>4     HyperNa/HyperCl, resolved  - Free Water 200ml Q6h,   - monitor NA    Fluid overload resolved.      Infectious Disease  Covid 19—incidental finding in OSH ER (Negative PCR x2 at [facility])   - OFF Remdesivir x 3doses stopped 2/2 negative Covid diagnosis  - OFF Dexmethasone x1 dose stopped 2/2 neg Covid diagnosis   - BCx + for GPC with UCx + for Group B Strep at [facility]   - F/u repeat Blood cultures x2 NGTD , UCx neg, Sputum Cx neg (10/27)  - Lumbar puncture with CSF Negative for infection (10/28)   - s/p Ceftriaxone (10/26- 30)   - ID consulting, recs appreciated  - febrile intermittently  -  sputum with few yeast like cells 10/31, blood cx, UA negative (10/27)  -repeat bld cx sent overnight 10/31 for temp 100.6 NGTD  -11/1 sputum cx no respiratory delbert  -11/3 febrile now monitoring off abx. Repeat cultures pending     Endocrine  IDDM- HbA1c 14.8  - Hyperglycemic BGL>400 requiring Insulin gtt, transitioned off 10/29   - episode of hypoglycemia FS 70, given 1 AMP d50, held 6pm NPH.  - FS q 6 avoid hypoglycemia   - NPH 20 Q6hr and mod ISS q6h  -11/2 ON held midnight dose and reduced AM dose to 10U      Heme  - Monitor CBC   - Lovenox DVT PPX (held for vomiting last night)  - LE Duplex (10/29) neg for DVT   - Carotid duplex no clinically significant stenosis  -pending reads US upper extremity and lower extremity DVT studies    Lines  - Arrows x2 10/30  - Right Radial A line 10/27   - Morales Catheter 10/27-11/2. Condom Cath placed 11/2    GOC:   -  Palliative care consulted  -  discussed vEEG read with Daughter Kalli   - remains Full Code   - Primary contact: Kalli Hughes (daughter) [phone]  -pending further GOC discussion with palliative Dr. Garg, cj marlow, ex-wife and son may also be involved in GOC discussion and palliative extubation/withdrawal of care versus trach. Ongoing code status discussion.   -Plans for family to come in 11/7 Sunday. Discussed w Dr. Garg.     DISPO: continue ICU level of care, above plan discussed with attending     65 y/o male with PMHx HTN, poorly controlled IDDM, Alzheimers COVID infxn originally transferred from [facility] generalized tonic clonic seizures 22 hypoglycemia w insulin admistration now with likely anoxic brain injury.     Neuro:  New onset seizures-status epilepticus of unknown etiology and hypoxic encephalopathy.  - vEEG monitoring (10/27- 30) Diffuse cortical hyperexcitability with GPDs at times coming in prolonged runs without clear evolution into a distinct ictal pattern-poor prognosis  - Vimpat 100mg IV q12h and Keppra 1g q8h  - 10/31 loaded with 1g of depakote IV and will continue 500mg q8hr per recommendations  - Noncon CTH (10/27) no anoxic ischemic encephalopathy, 11/3 CT non contrast anoxic   - q4 neuro checks  - Neuro following, Dr Fernandez, Recs appreciated   -11/2 AM ativan 2mg, 2mg, 4mg with seizurelike activity bilateral downward gaze R leg jerking  - FU drug levels Vimpat, Keppra & Depakote --56  - MR with anoxic brain injury  -11/4 AM requiring prop gtt for seizure activity     Respiratory  - Intubated on 10/25 for airway protection  - onVol AC 10/0.35/5/21%        CV  previous hypotension w labile BPs and wide pulse pressure  -likely required because of propofol sedation   -monitoring BPs while on propofol gtt      PMHx HTN, Cath 2/21 no stents  - intermittent HTN to 180s s/p hydralazine 5mg x 2  - c/w home meds: Lipitor 40mg qd, ASA 81mg qd, Norvasc 10mg qd, Coreg 12.5mg q12h  - TTE (10/29) -> normal function  - Trend Trop 127 --> 130 --> 164-->173. No EKG changes. likely secondary to seizures in setting of normal echo, continue to treat underlying, trops nonspecific do not continue to trend unless clinically indicated. OP ischemic w/u. Demand ischemia.   - Cards consulted, recs appreciated    GI -  - NPO with TF (Glucerna 1.5cal) per Dietician recs via NGT  - Protonix daily   - Bowel regimen with Miralax BID & senna, no gastric residual noted  -11/4 per disucssion w nurse multiple BMs        Hx BPH  - c/w home Cardura 4mg qd  - Omrales changed on Admission (10/27)   -Morales removed condom cath   - Monitor Lytes replete as necessary to maintain Mg>2 Phos>3 K>4     HyperNa/HyperCl, resolved  - Free Water 200ml Q6h,   - monitor NA    Fluid overload resolved.      Infectious Disease  Covid 19—incidental finding in OSH ER (Negative PCR x2 at [facility])   - OFF Remdesivir x 3doses stopped 2/2 negative Covid diagnosis  - OFF Dexmethasone x1 dose stopped 2/2 neg Covid diagnosis   - BCx + for GPC with UCx + for Group B Strep at [facility]   - F/u repeat Blood cultures x2 NGTD , UCx neg, Sputum Cx neg (10/27)  - Lumbar puncture with CSF Negative for infection (10/28)   - s/p Ceftriaxone (10/26- 30)   - ID consulting, recs appreciated  - febrile intermittently  -  sputum with few yeast like cells 10/31, blood cx, UA negative (10/27)  -repeat bld cx sent overnight 10/31 for temp 100.6 NGTD  -11/1 sputum cx no respiratory delbert  -11/3 febrile now monitoring off abx. Repeat cultures pending     Endocrine  IDDM- HbA1c 14.8  - Hyperglycemic BGL>400 requiring Insulin gtt, transitioned off 10/29   - episode of hypoglycemia FS 70, given 1 AMP d50, held 6pm NPH.  - FS q 6 avoid hypoglycemia   - NPH 20 Q6hr and mod ISS q6h  -11/2 ON held midnight dose and reduced AM dose to 10U      Heme  - Monitor CBC   - Lovenox DVT PPX (held for vomiting last night)  - LE Duplex (10/29) neg for DVT   - Carotid duplex no clinically significant stenosis  - bleeding around mouth noted, f/u ENT recommendations    Lines  - Arrows x2 10/30  - Right Radial A line 10/27   - Morales Catheter 10/27-11/2. Condom Cath placed 11/2    GOC:   -  Palliative care consulted  -  discussed vEEG read with Daughter Kalli   - remains Full Code   - Primary contact: Kalli Hughes (daughter) [phone]  -pending further GOC discussion with palliative Dr. Garg, cj marlow, ex-wife and son may also be involved in GOC discussion and palliative extubation/withdrawal of care versus trach. Ongoing code status discussion.   -Plans for family to come in 11/7 Sunday. Discussed w Dr. Garg.     DISPO: continue ICU level of care, above plan discussed with attending

## 2021-11-06 NOTE — PROGRESS NOTE ADULT - ATTENDING COMMENTS
67 y/o F w/DM admitted to OSH with hypoglycemic seizures after accidental insulin overdose. Patient had persistent seizures at OSH and was transferred to [facility] for management of status epilepticus. Patient intubated for acute respiratory failure in setting of status epilepticus. Seizures have been suppressed, however concern for breakthrough seizure 11/2. No further clinical seizures, EEG read pending. RIJ DVT as well as numerous superficial clots seen on upper extremities b/l while attempting to place IVs. MRI consistent with anoxic brain injury.    - Continue AEDs as per neuro, Ativan PRN  - Insulin for DM  - Poor prognosis for neurologic recovery  - Monitor off abx  - ENT consult for oral bleeding. Stop therapeutic AC  - Continue GOC discussions, appreciate palliative assistance

## 2021-11-06 NOTE — CONSULT NOTE ADULT - PROBLEM SELECTOR RECOMMENDATION 9
- continue with oral packing x2 days   - antibiotics while pt is packed  - will continue to follow   - call ent prn - continue with oral packing x2 days   - antibiotics while pt is packed  - consider dental consult   - will continue to follow   - call ent prn

## 2021-11-06 NOTE — CONSULT NOTE ADULT - REASON FOR ADMISSION
Transfer from [place] for EEG Monitoring

## 2021-11-07 NOTE — PROGRESS NOTE ADULT - SUBJECTIVE AND OBJECTIVE BOX
Esvin Novoa MD  Internal Medicine  Pager #[phone]    CHIEF COMPLAINT:Patient is a 66y old  Male who presents with a chief complaint of Transfer from [place] for EEG Monitoring (06 Nov 2021 13:12)        Interval Events:        REVIEW OF SYSTEMS:    Unable to obtain ROS intubated and sedated    OBJECTIVE:  ICU Vital Signs Last 24 Hrs  T(C): 37.8 (07 Nov 2021 04:00), Max: 38.4 (06 Nov 2021 12:00)  T(F): 100 (07 Nov 2021 04:00), Max: 101.1 (06 Nov 2021 12:00)  HR: 74 (07 Nov 2021 07:00) (70 - 87)  BP: 122/57 (07 Nov 2021 07:00) (94/51 - 157/58)  BP(mean): 82 (07 Nov 2021 07:00) (61 - 93)  ABP: --  ABP(mean): --  RR: 20 (07 Nov 2021 07:00) (11 - 27)  SpO2: 100% (07 Nov 2021 07:00) (93% - 100%)    Mode: AC/ CMV (Assist Control/ Continuous Mandatory Ventilation), RR (machine): 10, TV (machine): 350, FiO2: 30, PEEP: 5, ITime: 1, MAP: 9, PIP: 18    11-06 @ 08:01  -  11-07 @ 07:00  --------------------------------------------------------  IN: 2437.5 mL / OUT: 1630 mL / NET: 807.5 mL      CAPILLARY BLOOD GLUCOSE      POCT Blood Glucose.: 159 mg/dL (07 Nov 2021 06:20)      PHYSICAL EXAM:  GENERAL: intubate  HEAD:  Atraumatic, Normocephalic  EYES: EOMI, PERRLA, conjunctiva and sclera clear  ENT: Moist mucous membranes  NECK: Supple, No JVD  CHEST/LUNG: Clear to auscultation bilaterally; No rales, rhonchi, wheezing, or rubs. Unlabored respirations  HEART: Regular rate and rhythm; No murmurs, rubs, or gallops  ABDOMEN: Bowel sounds present; Soft, Nontender, Nondistended   EXTREMITIES:  2+ Peripheral Pulses, brisk capillary refill. No clubbing, cyanosis, or edema  NERVOUS SYSTEM:  unable to assess  MSK: FROM all 4 extremities, full and equal strength  SKIN: No rashes or lesions    LINES:    HOSPITAL MEDICATIONS:  Standing Meds:  amLODIPine   Tablet 10 milliGRAM(s) Oral daily  aspirin  chewable 81 milliGRAM(s) Enteral Tube daily  atorvastatin 40 milliGRAM(s) Oral at bedtime  cephalexin 500 milliGRAM(s) Oral every 12 hours  chlorhexidine 0.12% Liquid 15 milliLiter(s) Oral Mucosa every 12 hours  chlorhexidine 4% Liquid 1 Application(s) Topical <User Schedule>  insulin lispro (ADMELOG) corrective regimen sliding scale   SubCutaneous every 6 hours  insulin NPH human recombinant 15 Unit(s) SubCutaneous every 6 hours  lacosamide IVPB 100 milliGRAM(s) IV Intermittent every 12 hours  levETIRAcetam  IVPB 1000 milliGRAM(s) IV Intermittent <User Schedule>  multivitamin/minerals/iron Oral Solution (CENTRUM) 15 milliLiter(s) Enteral Tube daily  pantoprazole  Injectable 40 milliGRAM(s) IV Push daily  polyethylene glycol 3350 17 Gram(s) Oral every 12 hours  propofol Infusion. 50 MICROgram(s)/kG/Min IV Continuous <Continuous>  senna Syrup 10 milliLiter(s) Oral at bedtime  valproate sodium IVPB 500 milliGRAM(s) IV Intermittent every 8 hours      PRN Meds:  acetaminophen    Suspension .. 650 milliGRAM(s) Oral every 6 hours PRN  bisacodyl Suppository 10 milliGRAM(s) Rectal daily PRN      LABS:                        9.2    10.96 )-----------( 190      ( 07 Nov 2021 00:14 )             30.5     Hgb Trend: 9.2<--, 10.3<--, 9.8<--, 9.9<--, 10.3<--  11-07    141  |  108  |  24<H>  ----------------------------<  194<H>  4.8   |  26  |  0.97    Ca    7.8<L>      07 Nov 2021 00:14  Phos  2.9     11-07  Mg     2.3     11-07    TPro  5.2<L>  /  Alb  2.2<L>  /  TBili  <0.1<L>  /  DBili  x   /  AST  56<H>  /  ALT  19  /  AlkPhos  116  11-07    Creatinine Trend: 0.97<--, 0.98<--, 0.96<--, 1.04<--, 1.11<--, 1.05<--  PT/INR - ( 07 Nov 2021 00:14 )   PT: 12.2 sec;   INR: 1.02 ratio         PTT - ( 07 Nov 2021 00:14 )  PTT:39.5 sec          MICROBIOLOGY:     RADIOLOGY:  [ ] Reviewed and interpreted by me    EKG:     Esvin Novoa MD  Internal Medicine  Pager #[phone]    CHIEF COMPLAINT:Patient is a 66y old  Male who presents with a chief complaint of Transfer from [place] for EEG Monitoring (06 Nov 2021 13:12)        Interval Events:    No acute events     REVIEW OF SYSTEMS:    Unable to obtain ROS intubated and sedated    OBJECTIVE:  ICU Vital Signs Last 24 Hrs  T(C): 37.8 (07 Nov 2021 04:00), Max: 38.4 (06 Nov 2021 12:00)  T(F): 100 (07 Nov 2021 04:00), Max: 101.1 (06 Nov 2021 12:00)  HR: 74 (07 Nov 2021 07:00) (70 - 87)  BP: 122/57 (07 Nov 2021 07:00) (94/51 - 157/58)  BP(mean): 82 (07 Nov 2021 07:00) (61 - 93)  ABP: --  ABP(mean): --  RR: 20 (07 Nov 2021 07:00) (11 - 27)  SpO2: 100% (07 Nov 2021 07:00) (93% - 100%)    Mode: AC/ CMV (Assist Control/ Continuous Mandatory Ventilation), RR (machine): 10, TV (machine): 350, FiO2: 30, PEEP: 5, ITime: 1, MAP: 9, PIP: 18    11-06 @ 08:01  -  11-07 @ 07:00  --------------------------------------------------------  IN: 2437.5 mL / OUT: 1630 mL / NET: 807.5 mL      CAPILLARY BLOOD GLUCOSE      POCT Blood Glucose.: 159 mg/dL (07 Nov 2021 06:20)      PHYSICAL EXAM:  GENERAL: intubate  HEAD:  Atraumatic, Normocephalic  EYES: EOMI, PERRLA, conjunctiva and sclera clear  ENT: Moist mucous membranes  NECK: Supple, No JVD  CHEST/LUNG: Clear to auscultation bilaterally; No rales, rhonchi, wheezing, or rubs. Unlabored respirations  HEART: Regular rate and rhythm; No murmurs, rubs, or gallops  ABDOMEN: Bowel sounds present; Soft, Nontender, Nondistended   EXTREMITIES:  2+ Peripheral Pulses, brisk capillary refill. No clubbing, cyanosis, or edema  NERVOUS SYSTEM:  unable to assess  MSK: FROM all 4 extremities, full and equal strength  SKIN: No rashes or lesions    LINES:    HOSPITAL MEDICATIONS:  Standing Meds:  amLODIPine   Tablet 10 milliGRAM(s) Oral daily  aspirin  chewable 81 milliGRAM(s) Enteral Tube daily  atorvastatin 40 milliGRAM(s) Oral at bedtime  cephalexin 500 milliGRAM(s) Oral every 12 hours  chlorhexidine 0.12% Liquid 15 milliLiter(s) Oral Mucosa every 12 hours  chlorhexidine 4% Liquid 1 Application(s) Topical <User Schedule>  insulin lispro (ADMELOG) corrective regimen sliding scale   SubCutaneous every 6 hours  insulin NPH human recombinant 15 Unit(s) SubCutaneous every 6 hours  lacosamide IVPB 100 milliGRAM(s) IV Intermittent every 12 hours  levETIRAcetam  IVPB 1000 milliGRAM(s) IV Intermittent <User Schedule>  multivitamin/minerals/iron Oral Solution (CENTRUM) 15 milliLiter(s) Enteral Tube daily  pantoprazole  Injectable 40 milliGRAM(s) IV Push daily  polyethylene glycol 3350 17 Gram(s) Oral every 12 hours  propofol Infusion. 50 MICROgram(s)/kG/Min IV Continuous <Continuous>  senna Syrup 10 milliLiter(s) Oral at bedtime  valproate sodium IVPB 500 milliGRAM(s) IV Intermittent every 8 hours      PRN Meds:  acetaminophen    Suspension .. 650 milliGRAM(s) Oral every 6 hours PRN  bisacodyl Suppository 10 milliGRAM(s) Rectal daily PRN      LABS:                        9.2    10.96 )-----------( 190      ( 07 Nov 2021 00:14 )             30.5     Hgb Trend: 9.2<--, 10.3<--, 9.8<--, 9.9<--, 10.3<--  11-07    141  |  108  |  24<H>  ----------------------------<  194<H>  4.8   |  26  |  0.97    Ca    7.8<L>      07 Nov 2021 00:14  Phos  2.9     11-07  Mg     2.3     11-07    TPro  5.2<L>  /  Alb  2.2<L>  /  TBili  <0.1<L>  /  DBili  x   /  AST  56<H>  /  ALT  19  /  AlkPhos  116  11-07    Creatinine Trend: 0.97<--, 0.98<--, 0.96<--, 1.04<--, 1.11<--, 1.05<--  PT/INR - ( 07 Nov 2021 00:14 )   PT: 12.2 sec;   INR: 1.02 ratio         PTT - ( 07 Nov 2021 00:14 )  PTT:39.5 sec          MICROBIOLOGY:     RADIOLOGY:  [ ] Reviewed and interpreted by me    EKG:

## 2021-11-07 NOTE — PROGRESS NOTE ADULT - ASSESSMENT
67 y/o male with PMHx HTN, poorly controlled IDDM, Alzheimers COVID infxn originally transferred from [facility] generalized tonic clonic seizures 22 hypoglycemia w insulin admistration now with likely anoxic brain injury.     Neuro:  New onset seizures-status epilepticus of unknown etiology and hypoxic encephalopathy.  - vEEG monitoring (10/27- 30) Diffuse cortical hyperexcitability with GPDs at times coming in prolonged runs without clear evolution into a distinct ictal pattern-poor prognosis  - Vimpat 100mg IV q12h and Keppra 1g q8h  - 10/31 loaded with 1g of depakote IV and will continue 500mg q8hr per recommendations  - Noncon CTH (10/27) no anoxic ischemic encephalopathy, 11/3 CT non contrast anoxic   - q4 neuro checks  - Neuro following, Dr Fernandez, Recs appreciated   -11/2 AM ativan 2mg, 2mg, 4mg with seizurelike activity bilateral downward gaze R leg jerking  - FU drug levels Vimpat, Keppra & Depakote --56  - MR with anoxic brain injury  -11/4 AM requiring prop gtt for seizure activity     Respiratory  - Intubated on 10/25 for airway protection  - onVol AC 10/0.35/5/21%        CV  previous hypotension w labile BPs and wide pulse pressure  -likely required because of propofol sedation   -monitoring BPs while on propofol gtt      PMHx HTN, Cath 2/21 no stents  - intermittent HTN to 180s s/p hydralazine 5mg x 2  - c/w home meds: Lipitor 40mg qd, ASA 81mg qd, Norvasc 10mg qd, Coreg 12.5mg q12h  - TTE (10/29) -> normal function  - Trend Trop 127 --> 130 --> 164-->173. No EKG changes. likely secondary to seizures in setting of normal echo, continue to treat underlying, trops nonspecific do not continue to trend unless clinically indicated. OP ischemic w/u. Demand ischemia.   - Cards consulted, recs appreciated    GI -  - NPO with TF (Glucerna 1.5cal) per Dietician recs via NGT  - Protonix daily   - Bowel regimen with Miralax BID & senna, no gastric residual noted  -11/4 per disucssion w nurse multiple BMs        Hx BPH  - c/w home Cardura 4mg qd  - Morales changed on Admission (10/27)   -Morales removed condom cath   - Monitor Lytes replete as necessary to maintain Mg>2 Phos>3 K>4     HyperNa/HyperCl, resolved  - Free Water 200ml Q6h,   - monitor NA    Fluid overload resolved.      Infectious Disease  Covid 19—incidental finding in OSH ER (Negative PCR x2 at [facility])   - OFF Remdesivir x 3doses stopped 2/2 negative Covid diagnosis  - OFF Dexmethasone x1 dose stopped 2/2 neg Covid diagnosis   - BCx + for GPC with UCx + for Group B Strep at [facility]   - F/u repeat Blood cultures x2 NGTD , UCx neg, Sputum Cx neg (10/27)  - Lumbar puncture with CSF Negative for infection (10/28)   - s/p Ceftriaxone (10/26- 30)   - ID consulting, recs appreciated  - febrile intermittently  -  sputum with few yeast like cells 10/31, blood cx, UA negative (10/27)  -repeat bld cx sent overnight 10/31 for temp 100.6 NGTD  -11/1 sputum cx no respiratory delbert  -11/3 febrile now monitoring off abx. Repeat cultures pending   -Cephalexin 11/6- while packing in per ENT    Endocrine  IDDM- HbA1c 14.8  - Hyperglycemic BGL>400 requiring Insulin gtt, transitioned off 10/29   - episode of hypoglycemia FS 70, given 1 AMP d50, held 6pm NPH.  - FS q 6 avoid hypoglycemia   - NPH 20 Q6hr and mod ISS q6h  -11/2 ON held midnight dose and reduced AM dose to 10U      Heme  - Monitor CBC   - Lovenox DVT PPX (held for vomiting last night)  - LE Duplex (10/29) neg for DVT   - Carotid duplex no clinically significant stenosis  - bleeding around mouth noted, f/u ENT recommendations. Trend hgb     Lines  - Arrows x2 10/30  - Right Radial A line 10/27   - Morales Catheter 10/27-11/2. Condom Cath placed 11/2    GOC:   -  Palliative care consulted  -  discussed vEEG read with Daughter Kalli   - remains Full Code   - Primary contact: Kalli Hughes (daughter) [phone]  -pending further GOC discussion with palliative Dr. Garg, cj kalli, ex-wife and son may also be involved in GOC discussion and palliative extubation/withdrawal of care versus trach. Ongoing code status discussion.   -Plans for family to come in 11/7 Sunday. Discussed w Dr. Garg.     DISPO: continue ICU level of care, above plan discussed with attending

## 2021-11-07 NOTE — PROGRESS NOTE ADULT - SUBJECTIVE AND OBJECTIVE BOX
CC: oral bleed     HPI:  65 y/o male with PMHx HTN, poorly controlled IDDM, Alzheimers COVID infxn originally transferred from [facility] generalized tonic clonic seizures 22 hypoglycemia w insulin administered now with likely anoxic brain injury. ENT called for oral bleed. Pt intubated unable to answer further modifying factors. bleeding controlled with oral packing    PAST MEDICAL & SURGICAL HISTORY:  Hypertension    Diabetes    History of BPH    No significant past surgical history      Allergies    No Known Allergies    Intolerances      MEDICATIONS  (STANDING):  amLODIPine   Tablet 10 milliGRAM(s) Oral daily  aspirin  chewable 81 milliGRAM(s) Enteral Tube daily  atorvastatin 40 milliGRAM(s) Oral at bedtime  cephalexin 500 milliGRAM(s) Oral every 12 hours  chlorhexidine 0.12% Liquid 15 milliLiter(s) Oral Mucosa every 12 hours  chlorhexidine 4% Liquid 1 Application(s) Topical <User Schedule>  insulin lispro (ADMELOG) corrective regimen sliding scale   SubCutaneous every 6 hours  insulin NPH human recombinant 15 Unit(s) SubCutaneous every 6 hours  lacosamide IVPB 100 milliGRAM(s) IV Intermittent every 12 hours  levETIRAcetam  IVPB 1000 milliGRAM(s) IV Intermittent <User Schedule>  multivitamin/minerals/iron Oral Solution (CENTRUM) 15 milliLiter(s) Enteral Tube daily  pantoprazole  Injectable 40 milliGRAM(s) IV Push daily  polyethylene glycol 3350 17 Gram(s) Oral every 12 hours  propofol Infusion. 50 MICROgram(s)/kG/Min (23.5 mL/Hr) IV Continuous <Continuous>  senna Syrup 10 milliLiter(s) Oral at bedtime  valproate sodium IVPB 500 milliGRAM(s) IV Intermittent every 8 hours    MEDICATIONS  (PRN):  acetaminophen    Suspension .. 650 milliGRAM(s) Oral every 6 hours PRN Temp greater or equal to 38C (100.4F), Mild Pain (1 - 3)  bisacodyl Suppository 10 milliGRAM(s) Rectal daily PRN Constipation    social history: see consult     family history: see consult   ROS:   ENT: all negative except as noted in HPI   Pulm: denies SOB, cough, hemoptysis  Neuro: denies numbness/tingling, loss of sensation  Endo: denies heat/cold intolerance, excessive sweating      Vital Signs Last 24 Hrs  T(C): 37.4 (07 Nov 2021 08:00), Max: 38.4 (06 Nov 2021 12:00)  T(F): 99.3 (07 Nov 2021 08:00), Max: 101.1 (06 Nov 2021 12:00)  HR: 74 (07 Nov 2021 09:08) (71 - 87)  BP: 143/58 (07 Nov 2021 09:00) (94/51 - 157/58)  BP(mean): 83 (07 Nov 2021 09:00) (61 - 93)  RR: 20 (07 Nov 2021 09:00) (11 - 27)  SpO2: 100% (07 Nov 2021 09:08) (93% - 100%)                          9.2    10.96 )-----------( 190      ( 07 Nov 2021 00:14 )             30.5    11-07    141  |  108  |  24<H>  ----------------------------<  194<H>  4.8   |  26  |  0.97    Ca    7.8<L>      07 Nov 2021 00:14  Phos  2.9     11-07  Mg     2.3     11-07    TPro  5.2<L>  /  Alb  2.2<L>  /  TBili  <0.1<L>  /  DBili  x   /  AST  56<H>  /  ALT  19  /  AlkPhos  116  11-07   PT/INR - ( 07 Nov 2021 00:14 )   PT: 12.2 sec;   INR: 1.02 ratio         PTT - ( 07 Nov 2021 00:14 )  PTT:39.5 sec    PHYSICAL EXAM:  Gen: NAD  Skin: No rashes, bruises, or lesions  Head: Normocephalic, Atraumatic  Face: no edema, erythema, or fluctuance. Parotid glands soft without mass  Eyes: no scleral injection  Nose: + right ng tube in place,  blood from b/l nares suctioned, blood stopped after oral packing in place   Mouth: No Stridor / Drooling / Trismus.  Mucosa moist, tongue/uvula midline, oropharynx with ETT in place, two small kerlix packing placed. + broken teeth on bottom  Neck: Flat, supple, no lymphadenopathy, trachea midline, no masses  Lymphatic: No lymphadenopathy  Resp: on vent   CV: no peripheral edema/cyanosis  GI: nondistended   Peripheral vascular: no JVD or edema  Neuro: facial nerve intact, no facial droop

## 2021-11-07 NOTE — PROGRESS NOTE ADULT - PROBLEM SELECTOR PLAN 1
- continue with oral packing x2 days   - antibiotics while pt is packed  - consider dental consult   - will continue to follow   - call ent prn.

## 2021-11-07 NOTE — PROGRESS NOTE ADULT - ATTENDING COMMENTS
65 y/o F w/DM admitted to OSH with hypoglycemic seizures after accidental insulin overdose. Patient had persistent seizures at OSH and was transferred to [facility] for management of status epilepticus. Patient intubated for acute respiratory failure in setting of status epilepticus. Seizures have been suppressed, however concern for breakthrough seizure 11/2. No further clinical seizures, EEG read pending. RIJ DVT as well as numerous superficial clots seen on upper extremities b/l while attempting to place IVs. MRI consistent with anoxic brain injury.    - Continue AEDs as per neuro, Ativan PRN  - Insulin for DM  - Poor prognosis for neurologic recovery  - Monitor off abx  - Appreciate ENT assistance  - Continue GOC discussions, appreciate palliative assistance

## 2021-11-08 NOTE — PROCEDURE NOTE - NSPROCDETAILS_GEN_ALL_CORE
guidewire recovered/lumen(s) aspirated and flushed/sterile dressing applied/sterile technique, catheter placed/ultrasound guidance with use of sterile gel and probe cove
positive blood return obtained via catheter/connected to a pressurized flush line/sutured in place/hemostasis with direct pressure, dressing applied/Seldinger technique/all materials/supplies accounted for at end of procedure
dressing applied/flushes easily/secured in place

## 2021-11-08 NOTE — PROGRESS NOTE ADULT - SUBJECTIVE AND OBJECTIVE BOX
CC: oral bleed     HPI: 67 y/o male with PMHx HTN, poorly controlled IDDM, Alzheimers COVID infxn originally transferred from [facility] generalized tonic clonic seizures 22 hypoglycemia w insulin administered now with likely anoxic brain injury. ENT called for oral bleed. Pt intubated unable to answer further modifying factors. bleeding controlled with oral packing        PAST MEDICAL & SURGICAL HISTORY:  Hypertension    Diabetes    History of BPH    No significant past surgical history      Allergies    No Known Allergies    Intolerances      MEDICATIONS  (STANDING):  amLODIPine   Tablet 10 milliGRAM(s) Oral daily  aspirin  chewable 81 milliGRAM(s) Enteral Tube daily  atorvastatin 40 milliGRAM(s) Oral at bedtime  cephalexin 500 milliGRAM(s) Oral every 12 hours  chlorhexidine 0.12% Liquid 15 milliLiter(s) Oral Mucosa every 12 hours  chlorhexidine 4% Liquid 1 Application(s) Topical <User Schedule>  insulin lispro (ADMELOG) corrective regimen sliding scale   SubCutaneous every 6 hours  insulin NPH human recombinant 15 Unit(s) SubCutaneous every 6 hours  lacosamide IVPB 100 milliGRAM(s) IV Intermittent every 12 hours  levETIRAcetam  IVPB 1000 milliGRAM(s) IV Intermittent <User Schedule>  multivitamin/minerals/iron Oral Solution (CENTRUM) 15 milliLiter(s) Enteral Tube daily  pantoprazole  Injectable 40 milliGRAM(s) IV Push daily  polyethylene glycol 3350 17 Gram(s) Oral every 12 hours  propofol Infusion. 50 MICROgram(s)/kG/Min (23.5 mL/Hr) IV Continuous <Continuous>  senna Syrup 10 milliLiter(s) Oral at bedtime  valproate sodium IVPB 500 milliGRAM(s) IV Intermittent every 8 hours    MEDICATIONS  (PRN):  acetaminophen    Suspension .. 650 milliGRAM(s) Oral every 6 hours PRN Temp greater or equal to 38C (100.4F), Mild Pain (1 - 3)  bisacodyl Suppository 10 milliGRAM(s) Rectal daily PRN Constipation      Social History: see consult    Family history: see consult    ROS:   ENT: all negative except as noted in HPI   Pulm: denies SOB, cough, hemoptysis  Neuro: denies numbness/tingling, loss of sensation  Endo: denies heat/cold intolerance, excessive sweating      Vital Signs Last 24 Hrs  T(C): 37.6 (08 Nov 2021 06:00), Max: 38.1 (07 Nov 2021 20:00)  T(F): 99.7 (08 Nov 2021 06:00), Max: 100.6 (07 Nov 2021 20:00)  HR: 80 (08 Nov 2021 07:00) (74 - 89)  BP: 143/57 (08 Nov 2021 07:00) (127/58 - 165/67)  BP(mean): 82 (08 Nov 2021 07:00) (75 - 96)  RR: 20 (08 Nov 2021 07:00) (17 - 23)  SpO2: 98% (08 Nov 2021 07:00) (97% - 100%)                          9.0    10.20 )-----------( 227      ( 08 Nov 2021 00:54 )             29.5    11-08    144  |  108  |  21  ----------------------------<  136<H>  4.5   |  27  |  0.89    Ca    8.0<L>      08 Nov 2021 00:54  Phos  3.1     11-08  Mg     2.1     11-08    TPro  5.4<L>  /  Alb  2.3<L>  /  TBili  <0.1<L>  /  DBili  x   /  AST  59<H>  /  ALT  19  /  AlkPhos  128<H>  11-08   PT/INR - ( 08 Nov 2021 00:54 )   PT: 12.7 sec;   INR: 1.06 ratio         PTT - ( 08 Nov 2021 00:54 )  PTT:38.4 sec      PHYSICAL EXAM:  Gen: NAD  Skin: No rashes, bruises, or lesions  Head: Normocephalic, Atraumatic  Face: no edema, erythema, or fluctuance. Parotid glands soft without mass  Eyes: no scleral injection  Nose: + right ng tube in place, no active bleeding   Mouth: No Stridor / Drooling / Trismus.  Mucosa moist, tongue/uvula midline, oropharynx with ETT in place, two small kerlix packings removed, no active bleeding noted. Poor dentition.   Neck: Flat, supple, no lymphadenopathy, trachea midline, no masses  Lymphatic: No lymphadenopathy  Resp: on vent   CV: no peripheral edema/cyanosis  GI: nondistended   Peripheral vascular: no JVD or edema  Neuro: facial nerve intact, no facial droop

## 2021-11-08 NOTE — PROGRESS NOTE ADULT - ASSESSMENT
66y with oral bleed, unable to visualize source. Pt packed with two small kerlix packings which were removed this AM, no further bleeding noted.

## 2021-11-08 NOTE — CHART NOTE - NSCHARTNOTEFT_GEN_A_CORE
Nutrition Follow Up Note  Patient seen for: Nutrition follow up     Chart reviewed, events noted. Pt is a 67 yo M with PMH: HTN, poorly controlled IDDM, Alzheimer's, COVID infection. Originially transferred from OSH with generalized tonic clonic seizures 2/2 hypoglycemia with insulin administration. Now with likely anoxic brain injury.  Remains intubated at this time. Goals of care discussions pending.     Source: [] Patient       [x] EMR        [x] RN        [] Family at bedside       [x] Other: interdisciplinary medical team    -If unable to interview patient: [x] Trach/Vent/BiPAP  [x] Disoriented/confused/inappropriate to interview    Diet Order:   Diet, NPO with Tube Feed:   Tube Feeding Modality: Nasogastric  Glucerna 1.5 Ja (GLUCERNA1.5RTH)  Total Volume for 24 Hours (mL): 1200  Continuous  Starting Tube Feed Rate {mL per Hour}: 20  Until Goal Tube Feed Rate (mL per Hour): 50  Tube Feed Duration (in Hours): 24  Tube Feed Start Time: 18:00 (10-27-21)    EN Order Provides:  1200ml, 1800kcal and 99g protein     EN Provision (per nursing flow sheet):   (): EN feeds held at time of RD visit. Received 300ml of formula thus far today.   (): 100% of goal  (): 96% of goal  (): 100% of goal  (): 100% of goal    Nutrition-related concerns:  -Currently prescribed Propofol; infusing at 23.5ml/hr. If to continue x 24 hrs, will provide +620kcal.  -Last BM (): x 1; (): x 3. On bowel regimen (senna, Miralax, Dulcolax).  -Antihyperglycemic regimen: NPH 15u q 6 hrs, Insulin Lispro sliding scale.  -Continues on multivitamin as ordered.     Weights:   Daily Weight in k.5 ()    MEDICATIONS  (STANDING):  amLODIPine   Tablet  atorvastatin  cephalexin  insulin lispro (ADMELOG) corrective regimen sliding scale  insulin NPH human recombinant  multivitamin/minerals/iron Oral Solution (CENTRUM)  pantoprazole  Injectable  polyethylene glycol 3350  senna Syrup    Pertinent Labs:  @ 00:54: Na 144, BUN 21, Cr 0.89, <H>, K+ 4.5, Phos 3.1, Mg 2.1, Alk Phos 128<H>, ALT/SGPT 19, AST/SGOT 59<H>, HbA1c --    A1C with Estimated Average Glucose Result: 14.8 % (10-28-21 @ 02:51)    Finger Sticks:  POCT Blood Glucose.: 136 mg/dL ( @ 05:50)  POCT Blood Glucose.: 133 mg/dL ( @ 00:53)  POCT Blood Glucose.: 129 mg/dL ( @ 17:01)  POCT Blood Glucose.: 168 mg/dL ( @ 11:30)    Skin per nursing documentation: no pressure injuries noted   Edema: 2+ left arm; right arm; left wrist; right wrist; left hand; right hand    (based on dosing wt 78.2kg):   Estimated Energy Needs: (20-25kcal/kg): 1564-1955kcal  Estimated Protein Needs: (1.2-2.0g protein/kg): 94-156g protein    Previous Nutrition Diagnosis: Altered Nutrition Related Lab Values   Nutrition Diagnosis is: [x] ongoing  [] resolved [] not applicable     Nutrition Care Plan:  [x] In Progress  [] Achieved  [] Not applicable       Recommendations:      1. Consider adjusting EN formulary in context of high propofol requirements (+620kcal). Recommend Vital AF at 45ml/hr x 24 hrs + No Carb Prosource 1x daily. To provide with provision of propofol (based on dosing wt 78.2kg): 1080ml, 1956kcal (25kcal/kg) and 92g protein (1.17g protein/kg).   2. Monitor provision of propofol and GI tolerance. RD to remain available to adjust EN formulary, volume/rate PRN.   3. Continue multivitamin as ordered. Antihyperglycemic regimen per team.     Monitoring and Evaluation:   Continue to monitor nutritional intake, tolerance to diet prescription, weights, labs, skin integrity    RD remains available upon request and will follow up per protocol

## 2021-11-08 NOTE — PROCEDURE NOTE - NSINDICATIONS_GEN_A_CORE
arterial puncture to obtain ABG's/critical patient/monitoring purposes
critical patient/CSF sampling/mental status change/suspected CNS infection
hemodynamic monitoring/venous access
venous access/other

## 2021-11-08 NOTE — PROCEDURE NOTE - NSSITEPREP_SKIN_A_CORE
chlorhexidine
chlorhexidine/Adherence to aseptic technique: hand hygiene prior to donning barriers (gown, gloves), don cap and mask, sterile drape over patient
alcohol
chlorhexidine

## 2021-11-08 NOTE — GOALS OF CARE CONVERSATION - ADVANCED CARE PLANNING - CONVERSATION/DISCUSSION
Diagnosis/Prognosis/Treatment Options
Diagnosis/Prognosis
Diagnosis/Prognosis/Treatment Options
Diagnosis
Diagnosis/Treatment Options

## 2021-11-08 NOTE — GOALS OF CARE CONVERSATION - ADVANCED CARE PLANNING - CONVERSATION DETAILS
We re-stated to the patient's daughter that based on the patient's clinical conditions and diagnostic studies that his chances for meaningful recovery (being able to be independent, interact with his loved ones, and being able to eat on his own) were poor. She was able to recognized the patient was a person that valued independency and did not like to go to the doctor, and that he wanted to f/u his own rules (still drinking soda tough he was diabetic), She agreed on that he may not have wanted to be dependent on others or to be ok with being alive while on artificial life support. She expressed that after d/w her family she had decided for a compassionate extubation, which will be probably happening on Wednesday or Thursday this week. She indicated the patient's ex-wife also believed a compassionate extubation was appropriate. She indicate the patient's son was neutral and not opposed to the other family members decision.     We discussed about transferrin the patient to PCU and his daughter agreed with it.     Will continue current meds and will add on medication for other distressful symptoms. We re-stated to the patient's daughter that based on the patient's clinical conditions and diagnostic studies that his chances for meaningful recovery (being able to be independent, interact with his loved ones, and being able to eat on his own) were poor. She was able to recognized the patient was a person that valued independency and did not like to go to the doctor, and that he wanted to f/u his own rules (still drinking soda tough he was diabetic), She agreed on that he may not have wanted to be dependent on others or to be ok with being alive while on artificial life support. She expressed that after d/w her family she had decided for a compassionate extubation, which will be probably happening on Wednesday or Thursday this week. She indicated the patient's ex-wife also believed a compassionate extubation was appropriate. She indicate the patient's son was neutral and not opposed to the other family members decision.     We discussed about transferrin the patient to PCU and his daughter agreed with it.     Will continue current meds and will add on medication for other distressful symptoms.    After this meeting, I also called the patient's daughter and d/w her about code status and she agreed with DNR. A MOLST form will be completed by Dr. Novoa

## 2021-11-08 NOTE — PROGRESS NOTE ADULT - SUBJECTIVE AND OBJECTIVE BOX
Esvin Novoa MD  Internal Medicine  Pager #[phone]    CHIEF COMPLAINT:Patient is a 66y old  Male who presents with a chief complaint of Transfer from [place] for EEG Monitoring (07 Nov 2021 10:32)        Interval Events:    No acute changes overnight. packing remains in place per ENT     REVIEW OF SYSTEMS:    unable to obtain due to AMS     OBJECTIVE:  ICU Vital Signs Last 24 Hrs  T(C): 37.6 (08 Nov 2021 06:00), Max: 38.1 (07 Nov 2021 20:00)  T(F): 99.7 (08 Nov 2021 06:00), Max: 100.6 (07 Nov 2021 20:00)  HR: 80 (08 Nov 2021 07:00) (71 - 89)  BP: 143/57 (08 Nov 2021 07:00) (119/56 - 165/67)  BP(mean): 82 (08 Nov 2021 07:00) (75 - 96)  ABP: --  ABP(mean): --  RR: 20 (08 Nov 2021 07:00) (17 - 23)  SpO2: 98% (08 Nov 2021 07:00) (97% - 100%)    Mode: AC/ CMV (Assist Control/ Continuous Mandatory Ventilation), RR (machine): 10, TV (machine): 350, FiO2: 30, PEEP: 5, ITime: 1, MAP: 10, PIP: 17    11-07 @ 07:01  -  11-08 @ 07:00  --------------------------------------------------------  IN: 2214 mL / OUT: 1745 mL / NET: 469 mL      CAPILLARY BLOOD GLUCOSE      POCT Blood Glucose.: 136 mg/dL (08 Nov 2021 05:50)      PHYSICAL EXAM:  GENERAL: intubate  HEAD:  Atraumatic, Normocephalic  EYES: 5mm and 3mm unequal, anisocoria, reactive, unable to follow commands for extraocular motions   ENT: Moist mucous membranes  NECK: Supple, No JVD  CHEST/LUNG: ventilated breath sounds   HEART: Regular rate and rhythm; No murmurs, rubs, or gallops  ABDOMEN: Bowel sounds present; Soft, Nontender, Nondistended   EXTREMITIES:  2+ Peripheral Pulses, brisk capillary refill. No clubbing, cyanosis, or edema  NERVOUS SYSTEM:  opens eyes, attempts to squeeze hands.   MSK: FROM all 4 extremities, full and equal strength  SKIN: No rashes or lesions    LINES:    HOSPITAL MEDICATIONS:  Standing Meds:  amLODIPine   Tablet 10 milliGRAM(s) Oral daily  aspirin  chewable 81 milliGRAM(s) Enteral Tube daily  atorvastatin 40 milliGRAM(s) Oral at bedtime  cephalexin 500 milliGRAM(s) Oral every 12 hours  chlorhexidine 0.12% Liquid 15 milliLiter(s) Oral Mucosa every 12 hours  chlorhexidine 4% Liquid 1 Application(s) Topical <User Schedule>  insulin lispro (ADMELOG) corrective regimen sliding scale   SubCutaneous every 6 hours  insulin NPH human recombinant 15 Unit(s) SubCutaneous every 6 hours  lacosamide IVPB 100 milliGRAM(s) IV Intermittent every 12 hours  levETIRAcetam  IVPB 1000 milliGRAM(s) IV Intermittent <User Schedule>  multivitamin/minerals/iron Oral Solution (CENTRUM) 15 milliLiter(s) Enteral Tube daily  pantoprazole  Injectable 40 milliGRAM(s) IV Push daily  polyethylene glycol 3350 17 Gram(s) Oral every 12 hours  propofol Infusion. 50 MICROgram(s)/kG/Min IV Continuous <Continuous>  senna Syrup 10 milliLiter(s) Oral at bedtime  valproate sodium IVPB 500 milliGRAM(s) IV Intermittent every 8 hours      PRN Meds:  acetaminophen    Suspension .. 650 milliGRAM(s) Oral every 6 hours PRN  bisacodyl Suppository 10 milliGRAM(s) Rectal daily PRN      LABS:                        9.0    10.20 )-----------( 227      ( 08 Nov 2021 00:54 )             29.5     Hgb Trend: 9.0<--, 9.2<--, 10.3<--, 9.8<--, 9.9<--  11-08    144  |  108  |  21  ----------------------------<  136<H>  4.5   |  27  |  0.89    Ca    8.0<L>      08 Nov 2021 00:54  Phos  3.1     11-08  Mg     2.1     11-08    TPro  5.4<L>  /  Alb  2.3<L>  /  TBili  <0.1<L>  /  DBili  x   /  AST  59<H>  /  ALT  19  /  AlkPhos  128<H>  11-08    Creatinine Trend: 0.89<--, 0.97<--, 0.98<--, 0.96<--, 1.04<--, 1.11<--  PT/INR - ( 08 Nov 2021 00:54 )   PT: 12.7 sec;   INR: 1.06 ratio         PTT - ( 08 Nov 2021 00:54 )  PTT:38.4 sec          MICROBIOLOGY:     RADIOLOGY:  [ ] Reviewed and interpreted by me    EKG:

## 2021-11-08 NOTE — CHART NOTE - NSCHARTNOTEFT_GEN_A_CORE
Documentation to reflect Neurology Service attention to case:    67 y/o M with PMHx DM, HTN, BPH, transferred from [facility] after witnessed tonic clonic seizure fo unknown etiology and chronicity found also with hypoglycemia, hypotension in setting of bacteremia. Currently intubated for airway protection and on sedation versed/ fentanyl. Concern of COVID+. VS: 90/41 (map 57), RR22, 99% vent. CBC: 20.13wbc 89%N, Cr 1.34, alb2.4, ast 49, proBNP 882, Ua w/ glucosuria and proteinuria. Patient had myoclonic of LUE and given versed. Patient EEG noted to show diffuse cortical hyperexcitability with GPDs, prognosis is poor as noted in EEG report. Patient remains at this time intubated and sedated        CT Head No Cont (11.02.21 @ 13:56)     FINDINGS:  Diffuse diminished cortical attenuation throughout the bilateral cerebral hemispheres with preservation of the cerebellum. There is hypoattenuation and diminished gray-white differentiation involving the left insula and anterior left temporal lobe. Loss of gray-white differentiation involving the bilateral basal ganglia. No significant sulcal effacement.    The ventricular and sulcal size and configuration is age appropriate.  There are moderate patchy areas of hypodensity in the periventricular and subcortical white matter which are likely related to chronic microangiopathic changes.   Chronic left corona radiata lacunar infarct.    There is no evidence of midline shift, acute intracranial hemorrhage, or extra-axial collections.     The calvarium is intact. Small fluid level in theleft sphenoid sinus.The mastoid air cells are predominantly clear. The orbits are unremarkable.  Nasoenteric tube and secretions within the nasopharynx are noted.    IMPRESSION:  Findings consistent with diffuse hypoxic ischemic encephalopathy as detailed above. No acute intracranial hemorrhage.        MR Head w/wo IV Cont (11.02.21 @ 22:30)     IMPRESSION:    Restricted diffusion diffusely involves the bilateral cerebral cortices and striate nuclei, consistent with hypoxic ischemic encephalopathy. No hemorrhagic transformation.      _____________________________________________________________  EEG SUMMARY/CLASSIFICATION    Abnormal EEG in a comatose patient.    - Sharp wave discharges upto 1Hz maximal right > left frontal region (LPDs vs. GPDs with left sided attenuation)   - Burst suppression pattern with left sided attenuation.    _____________________________________________________________  EEG IMPRESSION/CLINICAL CORRELATE    Abnormal EEG study.    Asymmetric burst suppression with more prominent grey matter dysfunction in left hemisphere. Right frontal maximal periodic discharges may indicate a risk for seizures from that region versus asymmetric GPDs which are indicative of diffuse cortical injury.   No seizure seen.    _____________________________________________        Impression: Anoxic Brain Injury arising from Status Epilepticus prompting Intubation, possibly provoked in the setting of underlying infection. Prognosis for meaningful neurological recovery is poor based on MR and CT imaging and difficulty extubating        Recommendations:    No further inpatient Neurological work up at this time, will defer to primary team to continue EEG  Would recommend continuing GOC conversation  Can continue with Vimpat 100mg q12h IV,   Can continue with VPA 500mg q8h IV, therapeutic to supratherapeutic in light of albumin  Can continue with Keppra 1000mg q12h IV, levels notably unreliable for titrating doses, no need to draw further levels  Rest of care as per MICU Documentation to reflect Neurology Service attention to case:    67 y/o M with PMHx DM, HTN, BPH, transferred from [facility] after witnessed tonic clonic seizure fo unknown etiology and chronicity found also with hypoglycemia, hypotension in setting of bacteremia. Currently intubated for airway protection and on sedation. VS on arrival: 90/41 (map 57), RR22, 99% vent. CBC: 20.13wbc 89%N, Cr 1.34, alb2.4, ast 49, proBNP 882, Ua w/ glucosuria and proteinuria. Patient had myoclonic of LUE and given versed. Patient EEG noted to show diffuse cortical hyperexcitability with GPDs, prognosis is poor as noted in EEG report. Patient remains at this time intubated and sedated      CT Head No Cont (11.02.21 @ 13:56)     FINDINGS:  Diffuse diminished cortical attenuation throughout the bilateral cerebral hemispheres with preservation of the cerebellum. There is hypoattenuation and diminished gray-white differentiation involving the left insula and anterior left temporal lobe. Loss of gray-white differentiation involving the bilateral basal ganglia. No significant sulcal effacement.    The ventricular and sulcal size and configuration is age appropriate.  There are moderate patchy areas of hypodensity in the periventricular and subcortical white matter which are likely related to chronic microangiopathic changes.   Chronic left corona radiata lacunar infarct.    There is no evidence of midline shift, acute intracranial hemorrhage, or extra-axial collections.     The calvarium is intact. Small fluid level in theleft sphenoid sinus.The mastoid air cells are predominantly clear. The orbits are unremarkable.  Nasoenteric tube and secretions within the nasopharynx are noted.    IMPRESSION:  Findings consistent with diffuse hypoxic ischemic encephalopathy as detailed above. No acute intracranial hemorrhage.        MR Head w/wo IV Cont (11.02.21 @ 22:30)     IMPRESSION:    Restricted diffusion diffusely involves the bilateral cerebral cortices and striate nuclei, consistent with hypoxic ischemic encephalopathy. No hemorrhagic transformation.      _____________________________________________________________  EEG SUMMARY/CLASSIFICATION    Abnormal EEG in a comatose patient.    - Sharp wave discharges upto 1Hz maximal right > left frontal region (LPDs vs. GPDs with left sided attenuation)   - Burst suppression pattern with left sided attenuation.    _____________________________________________________________  EEG IMPRESSION/CLINICAL CORRELATE    Abnormal EEG study.    Asymmetric burst suppression with more prominent grey matter dysfunction in left hemisphere. Right frontal maximal periodic discharges may indicate a risk for seizures from that region versus asymmetric GPDs which are indicative of diffuse cortical injury.   No seizure seen.    _____________________________________________        Impression: Anoxic Brain Injury arising from Status Epilepticus prompting Intubation, possibly provoked in the setting of underlying infection. Prognosis for meaningful neurological recovery is poor based on MR and CT imaging and difficulty extubating        Recommendations:    No further inpatient Neurological work up at this time, will defer to primary team to continue EEG  Would recommend continuing GOC conversation  Decrease VPA to 500mg q12h IV, supratherapeutic in light of albumin = 130.24  Can continue with Vimpat 100mg q12h IV  Can continue with Keppra 1000mg q12h IV, levels notably unreliable for titrating doses, no need to draw further levels  Rest of care as per MICU

## 2021-11-08 NOTE — PROCEDURE NOTE - NSINFORMCONSENT_GEN_A_CORE
Benefits, risks, and possible complications of procedure explained to patient/caregiver who verbalized understanding and gave verbal consent.
Benefits, risks, and possible complications of procedure explained to patient/caregiver who verbalized understanding and gave written consent.
This was an emergent procedure.
This was an emergent procedure.

## 2021-11-08 NOTE — GOALS OF CARE CONVERSATION - ADVANCED CARE PLANNING - SURROGATE NAME
pAs per his daughter, Kalli, the patient is  and neither his ex-wife, nor his son talk to him. Hence, she is the surrogate decision maker. As per his daughter, Kalli, the patient is  and neither his ex-wife, nor his son talk to him. Hence, she is the surrogate decision maker.

## 2021-11-08 NOTE — PROGRESS NOTE ADULT - ASSESSMENT
67 y/o male with PMHx HTN, poorly controlled IDDM, Alzheimers COVID infxn originally transferred from [facility] generalized tonic clonic seizures 22 hypoglycemia w insulin admistration now with likely anoxic brain injury.     Neuro:  New onset seizures-status epilepticus of unknown etiology and hypoxic encephalopathy.  - vEEG monitoring (10/27- 30) Diffuse cortical hyperexcitability with GPDs at times coming in prolonged runs without clear evolution into a distinct ictal pattern-poor prognosis  - Vimpat 100mg IV q12h and Keppra 1g q8h  - 10/31 loaded with 1g of depakote IV and will continue 500mg q8hr per recommendations  - Noncon CTH (10/27) no anoxic ischemic encephalopathy, 11/3 CT non contrast anoxic   - q4 neuro checks  - Neuro following, Dr Fernandez, Recs appreciated   -11/2 AM ativan 2mg, 2mg, 4mg with seizurelike activity bilateral downward gaze R leg jerking  - FU drug levels Vimpat, Keppra & Depakote --56  - MR with anoxic brain injury  -11/4 AM requiring prop gtt for seizure activity     Respiratory  - Intubated on 10/25 for airway protection  - onVol AC 10/0.35/5/21%        CV  previous hypotension w labile BPs and wide pulse pressure  -likely required because of propofol sedation   -monitoring BPs while on propofol gtt      PMHx HTN, Cath 2/21 no stents  - intermittent HTN to 180s s/p hydralazine 5mg x 2  - c/w home meds: Lipitor 40mg qd, ASA 81mg qd, Norvasc 10mg qd, Coreg 12.5mg q12h  - TTE (10/29) -> normal function  - Trend Trop 127 --> 130 --> 164-->173. No EKG changes. likely secondary to seizures in setting of normal echo, continue to treat underlying, trops nonspecific do not continue to trend unless clinically indicated. OP ischemic w/u. Demand ischemia.   - Cards consulted, recs appreciated    GI -  - NPO with TF (Glucerna 1.5cal) per Dietician recs via NGT  - Protonix daily   - Bowel regimen with Miralax BID & senna, no gastric residual noted  -11/4 per disucssion w nurse multiple BMs        Hx BPH  - c/w home Cardura 4mg qd  - Morales changed on Admission (10/27)   -Morales removed condom cath   - Monitor Lytes replete as necessary to maintain Mg>2 Phos>3 K>4     HyperNa/HyperCl, resolved  - Free Water 200ml Q6h,   - monitor NA    Fluid overload resolved.      Infectious Disease  Covid 19—incidental finding in OSH ER (Negative PCR x2 at [facility])   - OFF Remdesivir x 3doses stopped 2/2 negative Covid diagnosis  - OFF Dexmethasone x1 dose stopped 2/2 neg Covid diagnosis   - BCx + for GPC with UCx + for Group B Strep at [facility]   - F/u repeat Blood cultures x2 NGTD , UCx neg, Sputum Cx neg (10/27)  - Lumbar puncture with CSF Negative for infection (10/28)   - s/p Ceftriaxone (10/26- 30)   - ID consulting, recs appreciated  - febrile intermittently  -  sputum with few yeast like cells 10/31, blood cx, UA negative (10/27)  -repeat bld cx sent overnight 10/31 for temp 100.6 NGTD  -11/1 sputum cx no respiratory delbert  -11/3 febrile now monitoring off abx. Repeat cultures pending   -Cephalexin 11/6- while packing in per ENT    Endocrine  IDDM- HbA1c 14.8  - Hyperglycemic BGL>400 requiring Insulin gtt, transitioned off 10/29   - episode of hypoglycemia FS 70, given 1 AMP d50, held 6pm NPH.  - FS q 6 avoid hypoglycemia   - NPH 20 Q6hr and mod ISS q6h  -11/2 ON held midnight dose and reduced AM dose to 10U      Heme  - Monitor CBC   - Lovenox DVT PPX (held for vomiting last night)  - LE Duplex (10/29) neg for DVT   - Carotid duplex no clinically significant stenosis  - bleeding around mouth noted, f/u ENT recommendations. Trend hgb     Lines  - Arrows x2 10/30  - Right Radial A line 10/27   - Morales Catheter 10/27-11/2. Condom Cath placed 11/2    GOC:   -  Palliative care consulted  -  discussed vEEG read with Daughter Kalli   - remains Full Code   - Primary contact: Kalli Hughes (daughter) [phone]  -pending further GOC discussion with palliative Dr. Garg, cj kalli, ex-wife and son may also be involved in GOC discussion and palliative extubation/withdrawal of care versus trach. Ongoing code status discussion.   -Plans for family to come in 11/7 Sunday. Discussed w Dr. Garg.     DISPO: continue ICU level of care, above plan discussed with attending     67 y/o male with PMHx HTN, poorly controlled IDDM, Alzheimers COVID infxn originally transferred from [facility] generalized tonic clonic seizures 22 hypoglycemia w insulin admistration now with likely anoxic brain injury.     Neuro:  New onset seizures-status epilepticus of unknown etiology and hypoxic encephalopathy.  - vEEG monitoring (10/27- 30) Diffuse cortical hyperexcitability with GPDs at times coming in prolonged runs without clear evolution into a distinct ictal pattern-poor prognosis  - Vimpat 100mg IV q12h and Keppra 1g q8h  - 10/31 loaded with 1g of depakote IV and will continue 500mg q8hr per recommendations  - Noncon CTH (10/27) no anoxic ischemic encephalopathy, 11/3 CT non contrast anoxic   - q4 neuro checks  - Neuro following, Dr Fernandez, Recs appreciated   -11/2 AM ativan 2mg, 2mg, 4mg with seizurelike activity bilateral downward gaze R leg jerking  - FU drug levels Vimpat, Keppra & Depakote --56  - MR with anoxic brain injury  -11/4 AM requiring prop gtt for seizure activity     Respiratory  - Intubated on 10/25 for airway protection  - onVol AC 10/0.35/5/21%        CV  previous hypotension w labile BPs and wide pulse pressure  -likely required because of propofol sedation   -monitoring BPs while on propofol gtt      PMHx HTN, Cath 2/21 no stents  - intermittent HTN to 180s s/p hydralazine 5mg x 2  - c/w home meds: Lipitor 40mg qd, ASA 81mg qd, Norvasc 10mg qd, Coreg 12.5mg q12h  - TTE (10/29) -> normal function  - Trend Trop 127 --> 130 --> 164-->173. No EKG changes. likely secondary to seizures in setting of normal echo, continue to treat underlying, trops nonspecific do not continue to trend unless clinically indicated. OP ischemic w/u. Demand ischemia.   - Cards consulted, recs appreciated    GI -  - NPO with TF (Glucerna 1.5cal) per Dietician recs via NGT  - Protonix daily   - Bowel regimen with Miralax BID & senna, no gastric residual noted  -11/4 per disucssion w nurse multiple BMs        Hx BPH  - c/w home Cardura 4mg qd  - Morales changed on Admission (10/27)   -Morales removed condom cath   - Monitor Lytes replete as necessary to maintain Mg>2 Phos>3 K>4     HyperNa/HyperCl, resolved  - Free Water 200ml Q6h,   - monitor NA    Fluid overload resolved.      Infectious Disease  Covid 19—incidental finding in OSH ER (Negative PCR x2 at [facility])   - OFF Remdesivir x 3doses stopped 2/2 negative Covid diagnosis  - OFF Dexmethasone x1 dose stopped 2/2 neg Covid diagnosis   - BCx + for GPC with UCx + for Group B Strep at [facility]   - F/u repeat Blood cultures x2 NGTD , UCx neg, Sputum Cx neg (10/27)  - Lumbar puncture with CSF Negative for infection (10/28)   - s/p Ceftriaxone (10/26- 30)   - ID consulting, recs appreciated  - febrile intermittently  -  sputum with few yeast like cells 10/31, blood cx, UA negative (10/27)  -repeat bld cx sent overnight 10/31 for temp 100.6 NGTD  -11/1 sputum cx no respiratory delbert  -11/3 febrile now monitoring off abx. Repeat cultures pending   -Cephalexin 11/6-11/8   -11/8 Blood cultures and UA sent     Endocrine  IDDM- HbA1c 14.8  - Hyperglycemic BGL>400 requiring Insulin gtt, transitioned off 10/29   - episode of hypoglycemia FS 70, given 1 AMP d50, held 6pm NPH.  - FS q 6 avoid hypoglycemia   -11/2 ON held midnight dose and reduced AM dose to 10U  -NPH 10U q6h       Heme  - Monitor CBC   - Lovenox DVT PPX (held for vomiting last night)  - LE Duplex (10/29) neg for DVT   - Carotid duplex no clinically significant stenosis  - bleeding around mouth noted, f/u ENT recommendations. Trend hgb     Lines  - Arrows x2 10/30  - Right Radial A line 10/27   - Morales Catheter 10/27-11/2. Condom Cath placed 11/2    GOC:   -  Palliative care consulted  -  discussed vEEG read with Daughter Kalli   - remains Full Code   - Primary contact: Kalli Hughes (daughter) [phone]  -pending further GOC discussion with palliative Dr. Garg, cj kalli, ex-wife and son may also be involved in GOC discussion and palliative extubation/withdrawal of care versus trach. Ongoing code status discussion.   -Plans for family to come in 11/7 Sunday. Discussed w Dr. Garg.     DISPO: continue ICU level of care, above plan discussed with attending

## 2021-11-08 NOTE — PROGRESS NOTE ADULT - SUBJECTIVE AND OBJECTIVE BOX
Follow Up:  Fever    Interval History: continued low grade fevers. Per RN no diarrhea. significant oral secretions but minimal inline.      REVIEW OF SYSTEMS  [ x ] ROS unobtainable because:  intubated/sedated  [  ] All other systems negative except as noted below    Constitutional:  [ ] fever [ ] chills  [ ] weight loss  [ ] weakness  Skin:  [ ] rash [ ] phlebitis	  Eyes: [ ] icterus [ ] pain  [ ] discharge	  ENMT: [ ] sore throat  [ ] thrush [ ] ulcers [ ] exudates  Respiratory: [ ] dyspnea [ ] hemoptysis [ ] cough [ ] sputum	  Cardiovascular:  [ ] chest pain [ ] palpitations [ ] edema	  Gastrointestinal:  [ ] nausea [ ] vomiting [ ] diarrhea [ ] constipation [ ] pain	  Genitourinary:  [ ] dysuria [ ] frequency [ ] hematuria [ ] discharge [ ] flank pain  [ ] incontinence  Musculoskeletal:  [ ] myalgias [ ] arthralgias [ ] arthritis  [ ] back pain  Neurological:  [ ] headache [ ] seizures  [ ] confusion/altered mental status    Allergies  No Known Allergies        ANTIMICROBIALS:      OTHER MEDS:  MEDICATIONS  (STANDING):  acetaminophen    Suspension .. 650 every 6 hours PRN  amLODIPine   Tablet 10 daily  aspirin  chewable 81 daily  atorvastatin 40 at bedtime  bisacodyl Suppository 10 daily PRN  insulin lispro (ADMELOG) corrective regimen sliding scale  every 6 hours  insulin NPH human recombinant 10 every 6 hours  lacosamide IVPB 100 every 12 hours  levETIRAcetam  IVPB 1000 <User Schedule>  pantoprazole  Injectable 40 daily  polyethylene glycol 3350 17 every 12 hours  propofol Infusion. 50 <Continuous>  senna Syrup 10 at bedtime  valproate sodium IVPB 500 every 8 hours      Vital Signs Last 24 Hrs  T(C): 38.1 (2021 16:00), Max: 38.1 (2021 20:00)  T(F): 100.6 (2021 16:00), Max: 100.6 (2021 20:00)  HR: 77 (2021 18:00) (73 - 89)  BP: 123/50 (2021 18:00) (111/51 - 165/67)  BP(mean): 72 (2021 18:00) (72 - 99)  RR: 21 (2021 18:00) (17 - 24)  SpO2: 97% (2021 18:00) (94% - 99%)    PHYSICAL EXAMINATION:  General: Intubated and Sedated  HEENT: +ETT  Neck: Supple  Cardiac: RRR, No M/R/G  Resp: CTAB, No Wh/Rh/Ra  Abdomen: NBS, NT/ND, No HSM, No rigidity or guarding  MSK: No LE edema. No Calf tenderness  : marin  Skin: No rashes or lesions. Skin is warm and dry to the touch.    Neuro: Intubated and Sedated  Psych: Unable to assess - intubated and sedated                        9.0    10.20 )-----------( 227      ( 2021 00:54 )             29.5           144  |  108  |  21  ----------------------------<  136<H>  4.5   |  27  |  0.89    Ca    8.0<L>      2021 00:54  Phos  3.1       Mg     2.1         TPro  5.4<L>  /  Alb  2.3<L>  /  TBili  <0.1<L>  /  DBili  x   /  AST  59<H>  /  ALT  19  /  AlkPhos  128<H>        Urinalysis Basic - ( 2021 12:45 )    Color: Yellow / Appearance: Clear / S.034 / pH: x  Gluc: x / Ketone: Negative  / Bili: Negative / Urobili: Negative   Blood: x / Protein: 300 mg/dL / Nitrite: Negative   Leuk Esterase: Small / RBC: 27 /hpf / WBC 58 /HPF   Sq Epi: x / Non Sq Epi: 21 /hpf / Bacteria: Negative        MICROBIOLOGY:  v  .Blood Blood-Venous  21   No growth to date.  --  --      .Sputum Sputum  21   Normal Respiratory Jenifer present  --    Few polymorphonuclear leukocytes per low power field  No Squamous epithelial cells per low power field  Few Yeast like cells per oil power field      .Blood Blood-Peripheral  10-31-21   No Growth Final  --  --      .CSF CSF  10-28-21   No growth at 3 days.  --    No polymorphonuclear cells seen  No organisms seen  by cytocentrifuge      Catheterized Catheterized  10-27-21   No growth  --  --      .Sputum ett  10-27-21   Normal Respiratory Jenifer present  --    Numerous polymorphonuclear leukocytes per low power field  Rare Squamous epithelial cells per low power field  No organisms seen per oil power field      .Blood Blood  10-27-21   No Growth Final  --  --    RADIOLOGY:    <The imaging below has been reviewed and visualized by me independently. Findings as detailed in report below>    < from: Xray Chest 1 View- PORTABLE-Urgent (Xray Chest 1 View- PORTABLE-Urgent .) (21 @ 13:58) >  IMPRESSION:  Enteric tube, coursing below the diaphragm, with tip in the stomach.  Clear lungs.    < end of copied text >

## 2021-11-08 NOTE — PROGRESS NOTE ADULT - ASSESSMENT
66 years old male with PMHx of uncontrolled DM (A1C ~14), HTN and BPH, transferred from [facility] for Epilepsy monitoring    CTH 10/27 negative for intracranial pathologies; No septic emboli  CXR 10/28 no focal consolidation; on minimal vent setting  COVID PCR 10/27, 10/28 negative;   Had COVID infection in 2/2021 hospitalized at [facility]; received Pfizer 1st dose only on 8/8/21  Positive COVID PCR in [place] was likely false positive since patient likely has immunity from natural infection and vaccination    UA (10/25) ([place]) shows pyuria  UCx (10/25) ([place]) grew group B strep in [facility]    BCx (10/25) ([place]) viridans strep 1/4 bottles in [facility]  BCx (10/27) ([facility]) NGTD  Received Rocephin 2gm q24hrs 10/27 - current  TTE no vegetation, mild MR and AR  LP with one nucleated cell. CSF PCR Negative    Called [facility] Today and confirmed that no further growth on 10/25 BCx  Suspect AHS in 1/4 bottles was procurement contaminant  Completed 3 days of Ceftriaxone (had GBS on UCx at [place])    Febrile 10/31 -->  U/A 10/31 without pyuria  Possible from CNS injury in context of anoxia or ?Thrombophlebitis of bilateral cephalic veins  Would monitor off of antibiotics and follow cultures / repeat U/A     RECOMMENDATION    #Fever (?central fevers, ?Thrombophlebitis of bilateral cephalic veins), Leukocytosis  --Monitor off of antibiotics  --Repeat U/A and blood cultures sent - followup on result  --Follow up on BCx  --Follow fever curve  --Follow WBC    #Positive Rubella IgM  --Doubt active infection at this time, does not fit clinical context. IgG is also positive which makes acute infection less likely at this point.     #Positive BCx, Positive UCx, Seizures  --Continue to follow CBC with diff  --Continue to follow temperature curve  --Follow up on preliminary blood cultures    I will continue to follow. Please feel free to contact me with any further questions.    Ad Villa M.D.  [facility] Division of Infectious Disease  8AM-5PM: Pager Number [phone]  After Hours (or if no response): Please contact the Infectious Diseases Office at [phone]     The above assessment and plan were discussed with MICU Team

## 2021-11-08 NOTE — PROGRESS NOTE ADULT - ATTENDING COMMENTS
67 y/o F w/DM admitted to OSH with hypoglycemic seizures after accidental insulin overdose (?). Patient had persistent seizures at OSH and was transferred to [facility] for management of status epilepticus. Patient intubated for acute respiratory failure in setting of status epilepticus. Seizures have been suppressed, however needing keppra, vimpat and depakote and propofol - RIJ DVT as well as numerous superficial clots seen on upper extremities b/l cephalic thrombophlebitis - stopped lovenox bc oral bleeding. MRI consistent with anoxic brain injury.    - Continue AEDs as per neuro, Ativan PRN - will need neuro evaluation to assist with family conversation  - Insulin for DM  - Continue GOC discussions, appreciate palliative assistance    cc time 35 mns 65 y/o F w/DM admitted to OSH with hypoglycemic seizures after accidental insulin overdose (?). Patient had persistent seizures at OSH and was transferred to [facility] for management of status epilepticus. Patient intubated for acute respiratory failure in setting of status epilepticus. Seizures have been suppressed, however needing keppra, vimpat and depakote and propofol - RIJ DVT as well as numerous superficial clots seen on upper extremities b/l cephalic thrombophlebitis - stopped lovenox bc oral bleeding. MRI consistent with anoxic brain injury.    - Continue AEDs as per neuro, Ativan PRN - will need neuro evaluation to assist with family conversation  - Insulin for DM  - Continue GOC discussions, appreciate palliative assistance  - spot EEGFto evalue gianfranco status  - now that packing removed no need for keflex    cc time 35 mns

## 2021-11-09 NOTE — PROGRESS NOTE ADULT - PROBLEM SELECTOR PLAN 3
Continue with Valproate Sodium IVPB 500mg q8h  Continue with Vimpat 100mg IVPB q12hrs  Continue with Keppra 1000mg IVPB  q8h

## 2021-11-09 NOTE — PROVIDER CONTACT NOTE (CRITICAL VALUE NOTIFICATION) - TEST AND RESULT REPORTED:
blood cx results: 2 aerobic bottles with gram negative rods blood cx preliminary results of 11/8/21: 2 aerobic bottles with gram negative rods

## 2021-11-09 NOTE — PROGRESS NOTE ADULT - PROBLEM SELECTOR PLAN 2
PPSV: 10%. The patient requires nursing assistance with all ADLs  Supportive care  Continue with good skin care

## 2021-11-09 NOTE — PROGRESS NOTE ADULT - PROBLEM SELECTOR PLAN 6
Spoke with the patient's daughter Kalli. see note above   Plan is for compassionate extubation next week Wednesday or Thursday.  Will continue with management in the PCU

## 2021-11-09 NOTE — PROGRESS NOTE ADULT - SUBJECTIVE AND OBJECTIVE BOX
GAP TEAM PALLIATIVE CARE UNIT PROGRESS NOTE:      [  ] Patient on hospice program.    INDICATION FOR PALLIATIVE CARE UNIT SERVICES: symptom management and plan for compassionate extubation in the setting of a 67 yo with anoxic brain injury c/b seizures.     INTERVAL HPI/OVERNIGHT EVENTS: Chart reviewed. The patient is seen and examined at the bedside. Patient is intubated for airway protection. Required PRN Tylenol yesterday afternoon for fever.    DNR on chart:   Allergies    No Known Allergies    Intolerances    MEDICATIONS  (STANDING):  amLODIPine   Tablet 10 milliGRAM(s) Oral daily  aspirin  chewable 81 milliGRAM(s) Enteral Tube daily  atorvastatin 40 milliGRAM(s) Oral at bedtime  chlorhexidine 0.12% Liquid 15 milliLiter(s) Oral Mucosa every 12 hours  chlorhexidine 4% Liquid 1 Application(s) Topical <User Schedule>  insulin lispro (ADMELOG) corrective regimen sliding scale   SubCutaneous every 6 hours  insulin NPH human recombinant 10 Unit(s) SubCutaneous every 6 hours  lacosamide IVPB 100 milliGRAM(s) IV Intermittent every 12 hours  levETIRAcetam  IVPB 1000 milliGRAM(s) IV Intermittent <User Schedule>  multivitamin/minerals/iron Oral Solution (CENTRUM) 15 milliLiter(s) Enteral Tube daily  pantoprazole  Injectable 40 milliGRAM(s) IV Push daily  polyethylene glycol 3350 17 Gram(s) Oral every 12 hours  propofol Infusion. 50 MICROgram(s)/kG/Min (23.5 mL/Hr) IV Continuous <Continuous>  senna Syrup 10 milliLiter(s) Oral at bedtime  valproate sodium IVPB 500 milliGRAM(s) IV Intermittent every 8 hours    MEDICATIONS  (PRN):  acetaminophen    Suspension .. 650 milliGRAM(s) Oral every 6 hours PRN Temp greater or equal to 38C (100.4F), Mild Pain (1 - 3)  bisacodyl Suppository 10 milliGRAM(s) Rectal daily PRN Constipation    ITEMS UNCHECKED ARE NOT PRESENT    PRESENT SYMPTOMS: [X ]Unable to obtain due to poor mentation   Source if other than patient:  [ ]Family   [ X]Team     Pain: [ ] yes [ X] no Critical care observation scale: 0   QOL impact -   Location -                    Aggravating factors -  Quality -  Radiation -  Timing-  Severity (0-10 scale):  Minimal acceptable level (0-10 scale):     Dyspnea:                           [ ]Mild [ ]Moderate [ ]Severe  Anxiety:                             [ ]Mild [ ]Moderate [ ]Severe  Fatigue:                             [ ]Mild [ ]Moderate [ ]Severe  Nausea:                             [ ]Mild [ ]Moderate [ ]Severe  Loss of appetite:              [ ]Mild [ ]Moderate [ ]Severe  Constipation:                    [ ]Mild [ ]Moderate [ ]Severe    PAINAD Score:    [email or web address] (Ctrl +  left click to view)  		  Other Symptoms:  [ X]All other review of systems negative     Palliative Performance Status Version 2:  10 %         http://npcrc.org/files/news/palliative_performance_scale_[email or web address].pdf  PHYSICAL EXAM:  Vital Signs Last 24 Hrs  T(C): 37.3 (2021 09:11), Max: 38.1 (2021 16:00)  T(F): 99.1 (2021 09:11), Max: 100.6 (2021 16:00)  HR: 74 (2021 10:12) (73 - 85)  BP: 136/60 (2021 09:11) (111/51 - 149/66)  BP(mean): 75 (2021 19:00) (72 - 99)  RR: 16 (2021 19:20) (16 - 24)  SpO2: 95% (2021 10:12) (94% - 99%) I&O's Summary    2021 07:01  -  2021 07:00  --------------------------------------------------------  IN: 1112 mL / OUT: 525 mL / NET: 587 mL    GENERAL:  [ ]Alert  [ ]Oriented x   [ ]Lethargic  [ ]Cachexia  [ ]Unarousable  [ ]Verbal  [X ]Non-Verbal  Behavioral:   [ ] Anxiety  [ ] Delirium [ ] Agitation [ ] Other  HEENT:  [ ]Normal   [ ]Dry mouth   [ X]ET Tube/Trach  [ ]Oral lesions  PULMONARY:   [ ]Clear [ ]Tachypnea  [ ]Audible excessive secretions   [ ]Rhonchi        [ ]Right [ ]Left [ ]Bilateral  [ ]Crackles        [ ]Right [ ]Left [ ]Bilateral  [ ]Wheezing     [ ]Right [ ]Left [ ]Bilateral  [ X]Diminshed BS [ ]Right [ ]Left [X ]Bilateral    CARDIOVASCULAR:    [X ]Regular [ ]Irregular [ ]Tachy  [ ]Sal [ ]Murmur [ ]Other  GASTROINTESTINAL:  [ X]Soft  [ ]Distended   [X ]+BS  [ ]Non tender [ ]Tender  [ ]PEG [X ]OGT/ NGT   Last BM:  21  GENITOURINARY:  [ ]Normal [ X] Incontinent   [ ]Oliguria/Anuria   [X ]Morales  MUSCULOSKELETAL:   [ ]Normal   [X ]Weakness  [ X]Bed/Wheelchair bound [ X]Edema- generalize   NEUROLOGIC:   [ ]No focal deficits  [X ] Cognitive impairment  [ ] Dysphagia [ ]Dysarthria [ ] Paresis [ ]Other   SKIN:   [ ]Normal  [ ]Rash     [X ]Pressure ulcer(s)  [ ]y [ ]n  Present on admission  Right ear suspected DTI. Please see nursing assessment for further details    CRITICAL CARE:  [ ] Shock Present  [ ]Septic [ ]Cardiogenic [ ]Neurologic [ ]Hypovolemic  [ ]  Vasopressors [ ]  Inotropes   [ ] Respiratory failure present [ X] Mechanical Ventilation [ ] Non-invasive ventilatory support [ ] High-Flow  [ ] Acute  [ ] Chronic [ ] Hypoxic  [ ] Hypercarbic [ ] Other  [X ] Other organ failure- Brain    LABS:                        9.0    10.20 )-----------( 227      ( 2021 00:54 )             29.5   11    144  |  108  |  21  ----------------------------<  136<H>  4.5   |  27  |  0.89    Ca    8.0<L>      2021 00:54  Phos  3.1       Mg     2.1         TPro  5.4<L>  /  Alb  2.3<L>  /  TBili  <0.1<L>  /  DBili  x   /  AST  59<H>  /  ALT  19  /  AlkPhos  128<H>  1108  PT/INR - ( 2021 00:54 )   PT: 12.7 sec;   INR: 1.06 ratio         PTT - ( 2021 00:54 )  PTT:38.4 sec    Urinalysis Basic - ( 2021 12:45 )    Color: Yellow / Appearance: Clear / S.034 / pH: x  Gluc: x / Ketone: Negative  / Bili: Negative / Urobili: Negative   Blood: x / Protein: 300 mg/dL / Nitrite: Negative   Leuk Esterase: Small / RBC: 27 /hpf / WBC 58 /HPF   Sq Epi: x / Non Sq Epi: 21 /hpf / Bacteria: Negative      RADIOLOGY & ADDITIONAL STUDIES:    PROTEIN CALORIE MALNUTRITION: [ ] mild [ ] moderate [ ] severe  [ ] underweight [ ] morbid obesity    https://www.andeal.org/vault/2440/web/files/ONC/Table_Clinical%20Characteristics%20to%20Document%20Malnutrition-White%20JV%20et%20al%824115.pdf    Height (cm): 177.8 (10-28-21 @ 15:58)  Weight (kg): 78.2 (10-27-21 @ 17:07)  BMI (kg/m2): 24.7 (10-28-21 @ 15:58)    [ X] PPSV2 < or = 30% [ ] significant weight loss [ ] poor nutritional intake [ ] anasarca   Artificial Nutrition [X ] Glucerna 1.5cal    REFERRALS:   [ ]Chaplaincy  [ ] Hospice  [ ]Child Life  [X ]Social Work  [ ]Case management [ ]Holistic Therapy [ ] Physical Therapy [ ] Dietary   Goals of Care Document:

## 2021-11-09 NOTE — PROVIDER CONTACT NOTE (CRITICAL VALUE NOTIFICATION) - SITUATION
blood cx results: 2 aerobic bottles with gram negative rods per [facility] lab blood cx preliminary results of 11/8/21: 2 aerobic bottles with gram negative rods per [facility] lab

## 2021-11-09 NOTE — CHART NOTE - NSCHARTNOTEFT_GEN_A_CORE
Blood cultures resulted with GNR (negative on BCID)  ?Urinary source  Family proceeding with palliative extubation and comfort measures only given the anoxic brain injury  PCU Team clarified with family and at this point they do not want initiation of antibiotics  If goals of care change I would initiate empiric Cefepime pending identification/susceptibilities of the organism    I will otherwise sign off at this point but please feel free to reconsult if goals of care change.    Ad Villa M.D.  [facility] Division of Infectious Disease  8AM-5PM: Pager Number [phone]  After Hours (or if no response): Please contact the Infectious Diseases Office at [phone]     The above assessment and plan were discussed with PCU Team

## 2021-11-09 NOTE — PROGRESS NOTE ADULT - ASSESSMENT
66 year old M 67 y/o M with PMHx DM, HTN, BPH, transferred from [facility] after witnessed tonic clonic seizure for unknown etiology and chronicity found also with hypoglycemia, hypotension in setting of bacteremia. Patient intubated for acute respiratory failure in setting of status epilepticus. Palliative was consulted for GOC. The patient was transferred to the PCU for compassionate extubation. Date TBD.

## 2021-11-09 NOTE — PROGRESS NOTE ADULT - PROBLEM SELECTOR PLAN 1
Patient intubated for acute respiratory failure in setting of status epilepticus.  Vent mode AC/CMV Tidal Volume- 350 RR:10 Fi02:30 PEEP/CPAP- 5

## 2021-11-09 NOTE — PROGRESS NOTE ADULT - ATTENDING COMMENTS
#respiratory failure-intubated  #dysphagia-TF held due to not being able to tolerate  #palliative encounter-delineating care plan with family, extubation dates to be determined

## 2021-11-09 NOTE — PROGRESS NOTE ADULT - PROBLEM SELECTOR PLAN 4
Plan for compassionate extubation TBD  Will continue with care in the PCU Plan is for compassionate extubation. Date TBD  Will continue with management in the PCU A1C 14.8 on 10/28  FS <200 X5 days  Spoke to the patient's daughter about tight glycemic control at this point in the patient's illness trajectory. Explained to her that effects of tight glycemic control only benefits patients with prognoses of years.   Daughter wants to focus on comfort and symptom management. She agrees to discontinuing FS and insulin.

## 2021-11-10 NOTE — PROGRESS NOTE ADULT - PROBLEM SELECTOR PLAN 3
Continue with Valproate Sodium IVPB 500mg q8h  Continue with Vimpat 100mg IVPB q12hrs  Continue with Keppra 1000mg IVPB  q8h Tube feeds held yesterday due to diarrhea. Patient also with increase secretions. Spoke with the patient's daughter Kalli about tube feeds at the end of life and symptoms burdens. Explained to her that at this point in the patients illness trajectory that tube feeds are more of a risk than a benefit. Patient is at increased risk for skin breakdown and diarrhea. Also at risk for aspiration. Daughter verbalize understanding and is ok with stopping tube feeds.

## 2021-11-10 NOTE — PROGRESS NOTE ADULT - PROBLEM SELECTOR PLAN 4
Called and spoke to the patient's son Sameer [phone]. He defers to Kalli for decision making.   Spoke with the patient's daughter Kalli.  Questions answered.  Emotional support provided. She will be visiting this afternoon.    Tube feeds held yesterday due to diarrhea. Tube feeds restarted today. Will continue to closely monitor.   Plan is for compassionate extubation next week Wednesday or Thursday.  Will continue with management in the PCU Continue with Valproate Sodium IVPB 500mg q8h  Continue with Vimpat 100mg IVPB q12hrs  Continue with Keppra 1000mg IVPB  q8h

## 2021-11-10 NOTE — PROGRESS NOTE ADULT - ASSESSMENT
66 year old M 65 y/o M with PMHx DM, HTN, BPH, transferred from [facility] after witnessed tonic clonic seizure for unknown etiology and chronicity found also with hypoglycemia, hypotension in setting of bacteremia. Patient intubated for acute respiratory failure in setting of status epilepticus. Palliative was consulted for GOC. The patient was transferred to the PCU for compassionate extubation. Date TBD.

## 2021-11-10 NOTE — PROGRESS NOTE ADULT - SUBJECTIVE AND OBJECTIVE BOX
GAP TEAM PALLIATIVE CARE UNIT PROGRESS NOTE:      [  ] Patient on hospice program.    INDICATION FOR PALLIATIVE CARE UNIT SERVICES: symptom management and plan for compassionate extubation in the setting of a 65 yo with anoxic brain injury c/b seizures.     INTERVAL HPI/OVERNIGHT EVENTS: Chart reviewed. The patient is seen and examined at the bedside.  He did not require any PRN medications within the last 24hrs 8am-8am    DNR on chart:   Allergies    No Known Allergies    Intolerances    MEDICATIONS  (STANDING):  amLODIPine   Tablet 10 milliGRAM(s) Oral daily  chlorhexidine 0.12% Liquid 15 milliLiter(s) Oral Mucosa every 12 hours  chlorhexidine 4% Liquid 1 Application(s) Topical <User Schedule>  lacosamide IVPB 100 milliGRAM(s) IV Intermittent every 12 hours  levETIRAcetam  IVPB 1000 milliGRAM(s) IV Intermittent <User Schedule>  pantoprazole  Injectable 40 milliGRAM(s) IV Push daily  propofol Infusion. 50 MICROgram(s)/kG/Min (23.5 mL/Hr) IV Continuous <Continuous>  valproate sodium IVPB 500 milliGRAM(s) IV Intermittent every 8 hours    MEDICATIONS  (PRN):  acetaminophen    Suspension .. 650 milliGRAM(s) Oral every 6 hours PRN Temp greater or equal to 38C (100.4F), Mild Pain (1 - 3)  bisacodyl Suppository 10 milliGRAM(s) Rectal daily PRN Constipation    ITEMS UNCHECKED ARE NOT PRESENT    PRESENT SYMPTOMS: [X ]Unable to obtain due to poor mentation   Source if other than patient:  [ ]Family   [ X]Team     Pain: [ ] yes [ ] no Critical care observation scale: 0   QOL impact -   Location -                    Aggravating factors -  Quality -  Radiation -  Timing-  Severity (0-10 scale):  Minimal acceptable level (0-10 scale):     Dyspnea:                           [ ]Mild [ ]Moderate [ ]Severe  Anxiety:                             [ ]Mild [ ]Moderate [ ]Severe  Fatigue:                             [ ]Mild [ ]Moderate [ ]Severe  Nausea:                             [ ]Mild [ ]Moderate [ ]Severe  Loss of appetite:              [ ]Mild [ ]Moderate [ ]Severe  Constipation:                    [ ]Mild [ ]Moderate [ ]Severe    PAINAD Score:    http://geriatrictoolkit.[email or web address]/painad.pdf (Ctrl +  left click to view)  		  Other Symptoms:  [ X]All other review of systems negative     Palliative Performance Status Version 2: 10%         http://[facility].org/files/news/palliative_performance_scale_ppsv2.pdf  PHYSICAL EXAM:  Vital Signs Last 24 Hrs  T(C): 36.4 (10 Nov 2021 08:40), Max: 36.4 (10 Nov 2021 08:40)  T(F): 97.6 (10 Nov 2021 08:40), Max: 97.6 (10 Nov 2021 08:40)  HR: 70 (10 Nov 2021 08:58) (70 - 78)  BP: 124/80 (10 Nov 2021 08:40) (124/80 - 124/80)  BP(mean): --  RR: 18 (10 Nov 2021 08:40) (18 - 18)  SpO2: 98% (10 Nov 2021 08:58) (94% - 98%) I&O's Summary    09 Nov 2021 07:01  -  10 Nov 2021 07:00  --------------------------------------------------------  IN: 0 mL / OUT: 650 mL / NET: -650 mL  GENERAL:  [ ]Alert  [ ]Oriented x   [ ]Lethargic  [ ]Cachexia  [ ]Unarousable  [ ]Verbal  [X ]Non-Verbal  Behavioral:   [ ] Anxiety  [ ] Delirium [ ] Agitation [ ] Other  HEENT:  [ ]Normal   [ ]Dry mouth   [ X]ET Tube/Trach  [ ]Oral lesions  PULMONARY:   [ ]Clear [ ]Tachypnea  [ ]Audible excessive secretions   [ ]Rhonchi        [ ]Right [ ]Left [ ]Bilateral  [ ]Crackles        [ ]Right [ ]Left [ ]Bilateral  [ ]Wheezing     [ ]Right [ ]Left [ ]Bilateral  [ X]Diminshed BS [ ]Right [ ]Left [X ]Bilateral    CARDIOVASCULAR:    [X ]Regular [ ]Irregular [ ]Tachy  [ ]Sal [ ]Murmur [ ]Other  GASTROINTESTINAL:  [ X]Soft  [ ]Distended   [X ]+BS  [ ]Non tender [ ]Tender  [ ]PEG [X ]OGT/ NGT   Last BM:  11/9/21  GENITOURINARY:  [ ]Normal [ X] Incontinent   [ ]Oliguria/Anuria   [X ]Morales  MUSCULOSKELETAL:   [ ]Normal   [X ]Weakness  [ X]Bed/Wheelchair bound [ X]Edema- generalize   NEUROLOGIC:   [ ]No focal deficits  [X ] Cognitive impairment  [ ] Dysphagia [ ]Dysarthria [ ] Paresis [ ]Other   SKIN:   [ ]Normal  [ ]Rash     [X ]Pressure ulcer(s)  [ ]y [ ]n  Present on admission  Right ear suspected DTI. Please see nursing assessment for further details    CRITICAL CARE:  [ ] Shock Present  [ ]Septic [ ]Cardiogenic [ ]Neurologic [ ]Hypovolemic  [ ]  Vasopressors [ ]  Inotropes   [ ] Respiratory failure present [ X] Mechanical Ventilation [ ] Non-invasive ventilatory support [ ] High-Flow  [ ] Acute  [ ] Chronic [ ] Hypoxic  [ ] Hypercarbic [ ] Other  [X ] Other organ failure- Brain    LABS: None new    RADIOLOGY & ADDITIONAL STUDIES: None new    PROTEIN CALORIE MALNUTRITION: [ ] mild [ ] moderate [ ] severe  [ ] underweight [ ] morbid obesity    https://www.andeal.org/vault/2440/web/files/ONC/Table_Clinical%20Characteristics%20to%20Document%20Malnutrition-White%20JV%20et%20al%888938.pdf    Height (cm): 177.8 (10-28-21 @ 15:58)  Weight (kg): 78.2 (10-27-21 @ 17:07)  BMI (kg/m2): 24.7 (10-28-21 @ 15:58)    [ X] PPSV2 < or = 30% [ ] significant weight loss [ ] poor nutritional intake [ ] anasarca   Artificial Nutrition [ ]     REFERRALS:   [ X]Chaplaincy  [ ] Hospice  [ ]Child Life  [X ]Social Work  [ ]Case management [ ]Holistic Therapy [ ] Physical Therapy [ ] Dietary   Goals of Care Document:

## 2021-11-11 NOTE — PROGRESS NOTE ADULT - PROBLEM SELECTOR PLAN 4
Called the patient's daughter Kalli. She did not answer.  Left a voicemail.   Spoke with the patient's daughter Kalli.  Questions answered.  Emotional support provided.   Plan is for compassionate extubation next week Wednesday or Thursday.  Will continue with management in the PCU

## 2021-11-11 NOTE — PROGRESS NOTE ADULT - ASSESSMENT
66 year old M 67 y/o M with PMHx DM, HTN, BPH, transferred from [facility] after witnessed tonic clonic seizure for unknown etiology and chronicity found also with hypoglycemia, hypotension in setting of bacteremia. Patient intubated for acute respiratory failure in setting of status epilepticus. Palliative was consulted for GOC. The patient was transferred to the PCU for compassionate extubation. Date TBD.

## 2021-11-11 NOTE — PROGRESS NOTE ADULT - ATTENDING COMMENTS
Intubated  Mode: AC/ CMV (Assist Control/ Continuous Mandatory Ventilation)  RR (machine): 10  TV (machine): 350  FiO2: 30  PEEP: 5  ITime: 1  MAP: 11  PIP: 16  Potential extubation next week

## 2021-11-11 NOTE — PROGRESS NOTE ADULT - PROBLEM SELECTOR PLAN 1
Patient intubated for acute respiratory failure in setting of status epilepticus.  Vent mode AC/CMV Tidal Volume- 350 RR:10 Fi02:30 PEEP/CPAP- 5  Morphine 1mg IV q1hr PRN ordered  Bowel regimen while on opioids

## 2021-11-11 NOTE — PROGRESS NOTE ADULT - SUBJECTIVE AND OBJECTIVE BOX
GAP TEAM PALLIATIVE CARE UNIT PROGRESS NOTE:      [  ] Patient on hospice program.    INDICATION FOR PALLIATIVE CARE UNIT SERVICES: symptom management and plan for compassionate extubation in the setting of a 65 yo with anoxic brain injury c/b seizures.     INTERVAL HPI/OVERNIGHT EVENTS: Chart reviewed. The patient is seen and examined at the bedside. He did not require any PRN medications within a 24hr period 8am-8am    DNR on chart:   Allergies    No Known Allergies    Intolerances    MEDICATIONS  (STANDING):  amLODIPine   Tablet 10 milliGRAM(s) Oral daily  chlorhexidine 0.12% Liquid 15 milliLiter(s) Oral Mucosa every 12 hours  chlorhexidine 4% Liquid 1 Application(s) Topical <User Schedule>  lacosamide IVPB 100 milliGRAM(s) IV Intermittent every 12 hours  levETIRAcetam  IVPB 1000 milliGRAM(s) IV Intermittent <User Schedule>  pantoprazole  Injectable 40 milliGRAM(s) IV Push daily  propofol Infusion. 50 MICROgram(s)/kG/Min (23.5 mL/Hr) IV Continuous <Continuous>  valproate sodium IVPB 500 milliGRAM(s) IV Intermittent every 8 hours    MEDICATIONS  (PRN):  acetaminophen    Suspension .. 650 milliGRAM(s) Oral every 6 hours PRN Temp greater or equal to 38C (100.4F), Mild Pain (1 - 3)  bisacodyl Suppository 10 milliGRAM(s) Rectal daily PRN Constipation  morphine  - Injectable 1 milliGRAM(s) IV Push every 1 hour PRN dyspnea  morphine  - Injectable 1 milliGRAM(s) IV Push every 1 hour PRN Severe Pain (7 - 10)    ITEMS UNCHECKED ARE NOT PRESENT    PRESENT SYMPTOMS: [ X]Unable to obtain due to poor mentation   Source if other than patient:  [ ]Family   [ X]Team     Pain: [ ] yes [X ] no  Critical care observation scale: 0   QOL impact -   Location -                    Aggravating factors -  Quality -  Radiation -  Timing-  Severity (0-10 scale):  Minimal acceptable level (0-10 scale):     Dyspnea:                           [ ]Mild [ ]Moderate [ ]Severe  Anxiety:                             [ ]Mild [ ]Moderate [ ]Severe  Fatigue:                             [ ]Mild [ ]Moderate [ ]Severe  Nausea:                             [ ]Mild [ ]Moderate [ ]Severe  Loss of appetite:              [ ]Mild [ ]Moderate [ ]Severe  Constipation:                    [ ]Mild [ ]Moderate [ ]Severe    PAINAD Score:    http://geriatrictoolkit.[email or web address]/cog/painad.pdf (Ctrl +  left click to view)  		  Other Symptoms:  [X ]All other review of systems negative     Palliative Performance Status Version 2:  10 %         http://[facility].org/files/news/palliative_performance_scale_[email or web address].pdf  PHYSICAL EXAM:  Vital Signs Last 24 Hrs  T(C): 37.3 (11 Nov 2021 08:44), Max: 37.3 (11 Nov 2021 08:44)  T(F): 99.2 (11 Nov 2021 08:44), Max: 99.2 (11 Nov 2021 08:44)  HR: 98 (11 Nov 2021 08:44) (85 - 98)  BP: 188/70 (11 Nov 2021 08:44) (188/70 - 188/70)  BP(mean): --  RR: 18 (11 Nov 2021 08:44) (18 - 18)  SpO2: 95% (11 Nov 2021 08:44) (95% - 98%) I&O's Summary    10 Nov 2021 07:01  -  11 Nov 2021 07:00  --------------------------------------------------------  IN: 0 mL / OUT: 2000 mL / NET: -2000 mL    GENERAL:  [ ]Alert  [ ]Oriented x   [ ]Lethargic  [ ]Cachexia  [ ]Unarousable  [ ]Verbal  [X ]Non-Verbal  Behavioral:   [ ] Anxiety  [ ] Delirium [ ] Agitation [ ] Other  HEENT:  [ ]Normal   [ ]Dry mouth   [ X]ET Tube/Trach  [ ]Oral lesions  PULMONARY:   [ ]Clear [ ]Tachypnea  [ ]Audible excessive secretions   [ ]Rhonchi        [ ]Right [ ]Left [ ]Bilateral  [ ]Crackles        [ ]Right [ ]Left [ ]Bilateral  [ ]Wheezing     [ ]Right [ ]Left [ ]Bilateral  [ X]Diminshed BS [ ]Right [ ]Left [X ]Bilateral    CARDIOVASCULAR:    [X ]Regular [ ]Irregular [ ]Tachy  [ ]Sal [ ]Murmur [ ]Other  GASTROINTESTINAL:  [ X]Soft  [ ]Distended   [X ]+BS  [ ]Non tender [ ]Tender  [ ]PEG [X ]OGT/ NGT   Last BM:  11/9/21  GENITOURINARY:  [ ]Normal [ X] Incontinent   [ ]Oliguria/Anuria   [X ]Morales  MUSCULOSKELETAL:   [ ]Normal   [X ]Weakness  [ X]Bed/Wheelchair bound [ X]Edema- generalize   NEUROLOGIC:   [ ]No focal deficits  [X ] Cognitive impairment  [ ] Dysphagia [ ]Dysarthria [ ] Paresis [ ]Other   SKIN:   [ ]Normal  [ ]Rash     [X ]Pressure ulcer(s)  [ ]y [ ]n  Present on admission  Right ear suspected DTI. Please see nursing assessment for further details    CRITICAL CARE:  [ ] Shock Present  [ ]Septic [ ]Cardiogenic [ ]Neurologic [ ]Hypovolemic  [ ]  Vasopressors [ ]  Inotropes   [ ] Respiratory failure present [ X] Mechanical Ventilation [ ] Non-invasive ventilatory support [ ] High-Flow  [ ] Acute  [ ] Chronic [ ] Hypoxic  [ ] Hypercarbic [ ] Other  [X ] Other organ failure- Brain    LABS: None new    RADIOLOGY & ADDITIONAL STUDIES: None new    PROTEIN CALORIE MALNUTRITION: [ ] mild [ ] moderate [ ] severe  [ ] underweight [ ] morbid obesity    https://www.andeal.org/vault/2440/web/files/ONC/Table_Clinical%20Characteristics%20to%20Document%20Malnutrition-White%20JV%20et%20al%256608.pdf    Height (cm): 177.8 (10-28-21 @ 15:58)  Weight (kg): 78.2 (10-27-21 @ 17:07)  BMI (kg/m2): 24.7 (10-28-21 @ 15:58)    [ ] PPSV2 < or = 30% [ ] significant weight loss [ ] poor nutritional intake [ ] anasarca   Artificial Nutrition [ ]     REFERRALS:   [ ]Chaplaincy  [ ] Hospice  [ ]Child Life  [ ]Social Work  [ ]Case management [ ]Holistic Therapy [ ] Physical Therapy [ ] Dietary   Goals of Care Document:    GAP TEAM PALLIATIVE CARE UNIT PROGRESS NOTE:      [  ] Patient on hospice program.    INDICATION FOR PALLIATIVE CARE UNIT SERVICES: symptom management and plan for compassionate extubation in the setting of a 65 yo with anoxic brain injury c/b seizures.     INTERVAL HPI/OVERNIGHT EVENTS: Chart reviewed. The patient is seen and examined at the bedside. He did not require any PRN medications within a 24hr period 8am-8am    DNR on chart:   Allergies    No Known Allergies    Intolerances    MEDICATIONS  (STANDING):  amLODIPine   Tablet 10 milliGRAM(s) Oral daily  chlorhexidine 0.12% Liquid 15 milliLiter(s) Oral Mucosa every 12 hours  chlorhexidine 4% Liquid 1 Application(s) Topical <User Schedule>  lacosamide IVPB 100 milliGRAM(s) IV Intermittent every 12 hours  levETIRAcetam  IVPB 1000 milliGRAM(s) IV Intermittent <User Schedule>  pantoprazole  Injectable 40 milliGRAM(s) IV Push daily  propofol Infusion. 50 MICROgram(s)/kG/Min (23.5 mL/Hr) IV Continuous <Continuous>  valproate sodium IVPB 500 milliGRAM(s) IV Intermittent every 8 hours    MEDICATIONS  (PRN):  acetaminophen    Suspension .. 650 milliGRAM(s) Oral every 6 hours PRN Temp greater or equal to 38C (100.4F), Mild Pain (1 - 3)  bisacodyl Suppository 10 milliGRAM(s) Rectal daily PRN Constipation  morphine  - Injectable 1 milliGRAM(s) IV Push every 1 hour PRN dyspnea  morphine  - Injectable 1 milliGRAM(s) IV Push every 1 hour PRN Severe Pain (7 - 10)    ITEMS UNCHECKED ARE NOT PRESENT    PRESENT SYMPTOMS: [ X]Unable to obtain due to poor mentation   Source if other than patient:  [ ]Family   [ X]Team     Pain: [ ] yes [X ] no  Critical care observation scale: 0   QOL impact -   Location -                    Aggravating factors -  Quality -  Radiation -  Timing-  Severity (0-10 scale):  Minimal acceptable level (0-10 scale):     Dyspnea:                           [ ]Mild [ ]Moderate [ ]Severe  Anxiety:                             [ ]Mild [ ]Moderate [ ]Severe  Fatigue:                             [ ]Mild [ ]Moderate [ ]Severe  Nausea:                             [ ]Mild [ ]Moderate [ ]Severe  Loss of appetite:              [ ]Mild [ ]Moderate [ ]Severe  Constipation:                    [ ]Mild [ ]Moderate [ ]Severe    PAINAD Score:    http://geriatrictoolkit.[facility]/cog/painad.pdf (Ctrl +  left click to view)  		  Other Symptoms:  [X ]All other review of systems negative     Palliative Performance Status Version 2:  10 %         http://[email or web address]/files/news/palliative_performance_scale_[email or web address].pdf  PHYSICAL EXAM:  Vital Signs Last 24 Hrs  T(C): 37.3 (11 Nov 2021 08:44), Max: 37.3 (11 Nov 2021 08:44)  T(F): 99.2 (11 Nov 2021 08:44), Max: 99.2 (11 Nov 2021 08:44)  HR: 98 (11 Nov 2021 08:44) (85 - 98)  BP: 188/70 (11 Nov 2021 08:44) (188/70 - 188/70)  BP(mean): --  RR: 18 (11 Nov 2021 08:44) (18 - 18)  SpO2: 95% (11 Nov 2021 08:44) (95% - 98%) I&O's Summary    10 Nov 2021 07:01  -  11 Nov 2021 07:00  --------------------------------------------------------  IN: 0 mL / OUT: 2000 mL / NET: -2000 mL    GENERAL:  [ ]Alert  [ ]Oriented x   [ ]Lethargic  [ ]Cachexia  [ ]Unarousable  [ ]Verbal  [X ]Non-Verbal  Behavioral:   [ ] Anxiety  [ ] Delirium [ ] Agitation [ ] Other  HEENT:  [ ]Normal   [ ]Dry mouth   [ X]ET Tube/Trach  [ ]Oral lesions  PULMONARY:   [ ]Clear [ ]Tachypnea  [ ]Audible excessive secretions   [ ]Rhonchi        [ ]Right [ ]Left [ ]Bilateral  [ ]Crackles        [ ]Right [ ]Left [ ]Bilateral  [ ]Wheezing     [ ]Right [ ]Left [ ]Bilateral  [ X]Diminshed BS [ ]Right [ ]Left [X ]Bilateral    CARDIOVASCULAR:    [X ]Regular [ ]Irregular [ ]Tachy  [ ]Sal [ ]Murmur [ ]Other  GASTROINTESTINAL:  [ X]Soft  [ ]Distended   [X ]+BS  [ ]Non tender [ ]Tender  [ ]PEG [X ]OGT/ NGT   Last BM:  11/9/21  GENITOURINARY:  [ ]Normal [ X] Incontinent   [ ]Oliguria/Anuria   [X ]Morales  MUSCULOSKELETAL:   [ ]Normal   [X ]Weakness  [ X]Bed/Wheelchair bound [ X]Edema- generalize   NEUROLOGIC:   [ ]No focal deficits  [X ] Cognitive impairment  [ ] Dysphagia [ ]Dysarthria [ ] Paresis [ ]Other   SKIN:   [ ]Normal  [ ]Rash     [X ]Pressure ulcer(s)  [ ]y [ ]n  Present on admission  Right ear suspected DTI. Please see nursing assessment for further details    CRITICAL CARE:  [ ] Shock Present  [ ]Septic [ ]Cardiogenic [ ]Neurologic [ ]Hypovolemic  [ ]  Vasopressors [ ]  Inotropes   [ ] Respiratory failure present [ X] Mechanical Ventilation [ ] Non-invasive ventilatory support [ ] High-Flow  [ ] Acute  [ ] Chronic [ ] Hypoxic  [ ] Hypercarbic [ ] Other  [X ] Other organ failure- Brain    LABS: None new    RADIOLOGY & ADDITIONAL STUDIES: None new    PROTEIN CALORIE MALNUTRITION: [ ] mild [ ] moderate [ ] severe  [ ] underweight [ ] morbid obesity    https://www.andeal.org/vault/2440/web/files/ONC/Table_Clinical%20Characteristics%20to%20Document%20Malnutrition-White%20JV%20et%20al%747994.pdf    Height (cm): 177.8 (10-28-21 @ 15:58)  Weight (kg): 78.2 (10-27-21 @ 17:07)  BMI (kg/m2): 24.7 (10-28-21 @ 15:58)    [X ] PPSV2 < or = 30% [ ] significant weight loss [ ] poor nutritional intake [ ] anasarca   Artificial Nutrition [ ]     REFERRALS:   [X ]Chaplaincy  [ ] Hospice  [ ]Child Life  [ X]Social Work  [ ]Case management [ ]Holistic Therapy [ ] Physical Therapy [ ] Dietary   Goals of Care Document:

## 2021-11-12 NOTE — PROGRESS NOTE ADULT - ASSESSMENT
66 year old M 67 y/o M with PMHx DM, HTN, BPH, transferred from [facility] after witnessed tonic clonic seizure for unknown etiology and chronicity found also with hypoglycemia, hypotension in setting of bacteremia. Patient intubated for acute respiratory failure in setting of status epilepticus. Palliative was consulted for GOC. The patient was transferred to the PCU for compassionate extubation. Date TBD.

## 2021-11-12 NOTE — PROGRESS NOTE ADULT - SUBJECTIVE AND OBJECTIVE BOX
GAP TEAM PALLIATIVE CARE UNIT PROGRESS NOTE:      [  ] Patient on hospice program.    INDICATION FOR PALLIATIVE CARE UNIT SERVICES: symptom management    INTERVAL HPI/OVERNIGHT EVENTS: Patient seen at bedside, no distress noted, required 1 PRN dose of morphine for dyspnea, LBM unknown. No adverse effects from opiates noted.    DNR on chart: yes  Allergies    No Known Allergies    Intolerances    MEDICATIONS  (STANDING):  chlorhexidine 0.12% Liquid 15 milliLiter(s) Oral Mucosa every 12 hours  chlorhexidine 4% Liquid 1 Application(s) Topical <User Schedule>  lacosamide IVPB 100 milliGRAM(s) IV Intermittent every 12 hours  levETIRAcetam  IVPB 1000 milliGRAM(s) IV Intermittent <User Schedule>  pantoprazole  Injectable 40 milliGRAM(s) IV Push daily  polyethylene glycol 3350 17 Gram(s) Oral daily  propofol Infusion. 50 MICROgram(s)/kG/Min (23.5 mL/Hr) IV Continuous <Continuous>  senna 2 Tablet(s) Oral at bedtime  valproate sodium IVPB 500 milliGRAM(s) IV Intermittent every 8 hours    MEDICATIONS  (PRN):  acetaminophen    Suspension .. 650 milliGRAM(s) Oral every 6 hours PRN Temp greater or equal to 38C (100.4F), Mild Pain (1 - 3)  bisacodyl Suppository 10 milliGRAM(s) Rectal daily PRN Constipation  morphine  - Injectable 1 milliGRAM(s) IV Push every 1 hour PRN Severe Pain (7 - 10)  morphine  - Injectable 1 milliGRAM(s) IV Push every 1 hour PRN dyspnea    ITEMS UNCHECKED ARE NOT PRESENT    PRESENT SYMPTOMS: [x ]Unable to obtain due to poor mentation   Source if other than patient:  [ ]Family   [ ]Team     Pain: [ ] yes [ ] no  QOL impact -   Location -                    Aggravating factors -  Quality -  Radiation -  Timing-  Severity (0-10 scale):  Minimal acceptable level (0-10 scale):     Dyspnea:                           [ ]Mild [ ]Moderate [ ]Severe  Anxiety:                             [ ]Mild [ ]Moderate [ ]Severe  Fatigue:                             [ ]Mild [ ]Moderate [ ]Severe  Nausea:                             [ ]Mild [ ]Moderate [ ]Severe  Loss of appetite:              [ ]Mild [ ]Moderate [ ]Severe  Constipation:                    [ ]Mild [ ]Moderate [ ]Severe    PAINAD Score: 0    http://geriatrictoolkit.[place].[facility]/cog/painad.pdf (Ctrl +  left click to view)  		  Other Symptoms:  [ ]All other review of systems negative     Palliative Performance Status Version 2:   10      %         http://[email or web address]/files/news/palliative_performance_scale_[email or web address].pdf  PHYSICAL EXAM:  Vital Signs Last 24 Hrs  T(C): 37.5 (12 Nov 2021 08:52), Max: 37.5 (12 Nov 2021 08:52)  T(F): 99.5 (12 Nov 2021 08:52), Max: 99.5 (12 Nov 2021 08:52)  HR: 91 (12 Nov 2021 09:35) (84 - 98)  BP: 179/72 (12 Nov 2021 08:52) (179/72 - 179/72)  BP(mean): --  RR: 21 (12 Nov 2021 09:34) (19 - 21)  SpO2: 100% (12 Nov 2021 09:35) (93% - 100%) I&O's Summary    11 Nov 2021 07:01  -  12 Nov 2021 07:00  --------------------------------------------------------  IN: 0 mL / OUT: 1600 mL / NET: -1600 mL    12 Nov 2021 07:01  -  12 Nov 2021 15:57  --------------------------------------------------------  IN: 0 mL / OUT: 1800 mL / NET: -1800 mL    GENERAL:  [ ]Alert  [ ]Oriented x   [ ]Lethargic  [ ]Cachexia  [x ]Unarousable  [ ]Verbal  [ ]Non-Verbal  Behavioral:   [ ] Anxiety  [ ] Delirium [ ] Agitation [ ] Other  HEENT:  [ ]Normal   [ ]Dry mouth   [ ]ET Tube/Trach  [ ]Oral lesions  PULMONARY:   [ ]Clear [ ]Tachypnea  [ ]Audible excessive secretions   [ ]Rhonchi        [ ]Right [ ]Left [ ]Bilateral  [ ]Crackles        [ ]Right [ ]Left [ ]Bilateral  [ ]Wheezing     [ ]Right [ ]Left [ ]Bilateral  [ x]Diminished BS [ ]Right [ ]Left [x ]Bilateral    CARDIOVASCULAR:    [x ]Regular [ ]Irregular [ ]Tachy  [ ]Sal [ ]Murmur [ ]Other  GASTROINTESTINAL:  [x ]Soft  [ ]Distended   [ ]+BS  [ ]Non tender [ ]Tender  [ ]PEG [ ]OGT/ NGT   Last BM:     GENITOURINARY:  [ ]Normal [ ] Incontinent   [ ]Oliguria/Anuria   [ ]Morales  MUSCULOSKELETAL:   [ ]Normal   [x ]Weakness  [ ]Bed/Wheelchair bound [ ]Edema  NEUROLOGIC:   [ ]No focal deficits  [ ] Cognitive impairment  [ ] Dysphagia [ ]Dysarthria [ ] Paresis [ ]Other   SKIN:   [ ]Normal  [ ]Rash     [ ]Pressure ulcer(s)  [ ]y [ ]n  Present on admission      CRITICAL CARE:  [ ] Shock Present  [ ]Septic [ ]Cardiogenic [ ]Neurologic [ ]Hypovolemic  [ ]  Vasopressors [ ]  Inotropes   [ ] Respiratory failure present [x ] Mechanical Ventilation [ ] Non-invasive ventilatory support [ ] High-Flow  [ ] Acute  [ ] Chronic [ ] Hypoxic  [ ] Hypercarbic [ ] Other  [ ] Other organ failure     LABS: none new            RADIOLOGY & ADDITIONAL STUDIES:  none new  PROTEIN CALORIE MALNUTRITION: [ ] mild [ ] moderate [ ] severe  [ ] underweight [ ] morbid obesity    https://www.andeal.org/vault/2440/web/files/ONC/Table_Clinical%20Characteristics%20to%20Document%20Malnutrition-White%20JV%20et%20al%499356.pdf    Height (cm): 177.8 (10-28-21 @ 15:58)  Weight (kg): 78.2 (10-27-21 @ 17:07)  BMI (kg/m2): 24.7 (10-28-21 @ 15:58)    [ ] PPSV2 < or = 30% [ ] significant weight loss [ ] poor nutritional intake [ ] anasarca   Artificial Nutrition [ ]     REFERRALS:   [ ]Chaplaincy  [ ] Hospice  [ ]Child Life  [ ]Social Work  [ ]Case management [ ]Holistic Therapy [ ] Physical Therapy [ ] Dietary   Goals of Care Document: Goals of Care Conversation - Advanced Care Planning [JOSAFAT Garg] (11-08-21 @ 16:20)  Goals of Care Conversation - Advanced Care Planning [JOSAFAT Garg] (11-04-21 @ 14:13)  Goals of Care Conversation - Advanced Care Planning [JOSAFAT Garg] (11-03-21 @ 17:34)  Goals of Care Conversation - Advanced Care Planning [ALBAN Clarke] (11-01-21 @ 15:33)  Goals of Care Conversation - Advanced Care Planning [CONRAD Escobedo] (10-30-21 @ 17:27)

## 2021-11-12 NOTE — PROGRESS NOTE ADULT - PROBLEM SELECTOR PLAN 4
Emotional support provided to daughter, she agrees with stopping amlodipine.   Plan for extubation either Monday or Tuesday, she will confirm the same.   continue symptomatic management in the PCU.

## 2021-11-12 NOTE — PROGRESS NOTE ADULT - ATTENDING COMMENTS
#encephalopathy-likely multifactorial  #hypoxic respiratory failure-intubated, plan is for extubation next week  #dysphagia-monitor for aspiration risk  #psychiatric history-abilify held, on depalote

## 2021-11-13 NOTE — PROGRESS NOTE ADULT - SUBJECTIVE AND OBJECTIVE BOX
GAP TEAM PALLIATIVE CARE UNIT PROGRESS NOTE:      [  ] Patient on hospice program.    INDICATION FOR PALLIATIVE CARE UNIT SERVICES: Symptom Management, Planned Compassionate Extubation    INTERVAL HPI/OVERNIGHT EVENTS: Remains intubated and sedated on Propofol.  Did not use PRN IV Morphine.  NG tube dislodged by patient, no plan to replace. Febrile to 101.9F, /63, HR 87. Given Tylenol Supp 650mg x1 and started Hydralazine IV 5mg PRN for SBP > 170, given x1. Planned for compassionate extubation with family on Tuesday.    DNR on chart: Yes  Allergies: No Known Allergies/Intolerances    MEDICATIONS  (STANDING):  chlorhexidine 0.12% Liquid 15 milliLiter(s) Oral Mucosa every 12 hours  chlorhexidine 4% Liquid 1 Application(s) Topical <User Schedule>  lacosamide IVPB 100 milliGRAM(s) IV Intermittent every 12 hours  levETIRAcetam  IVPB 1000 milliGRAM(s) IV Intermittent <User Schedule>  pantoprazole  Injectable 40 milliGRAM(s) IV Push daily  propofol Infusion. 50 MICROgram(s)/kG/Min (23.5 mL/Hr) IV Continuous <Continuous>  valproate sodium IVPB 500 milliGRAM(s) IV Intermittent every 8 hours    MEDICATIONS  (PRN):  acetaminophen  Suppository .. 650 milliGRAM(s) Rectal every 4 hours PRN Temp greater or equal to 38C (100.4F)  bisacodyl Suppository 10 milliGRAM(s) Rectal daily PRN Constipation  hydrALAZINE Injectable 5 milliGRAM(s) IV Push every 2 hours PRN SBP > 170  morphine  - Injectable 1 milliGRAM(s) IV Push every 1 hour PRN dyspnea  morphine  - Injectable 1 milliGRAM(s) IV Push every 1 hour PRN Severe Pain (7 - 10)    ITEMS UNCHECKED ARE NOT PRESENT    PRESENT SYMPTOMS: [X]Unable to obtain due to poor mentation   Source if other than patient:  [ ]Family   [X]Team     Pain: [ ] yes [ ] no  QOL impact -   Location -                    Aggravating factors -  Quality -  Radiation -  Timing-  Severity (0-10 scale):  Minimal acceptable level (0-10 scale):     Dyspnea:                           [ ]Mild [ ]Moderate [ ]Severe  Anxiety:                             [ ]Mild [ ]Moderate [ ]Severe  Fatigue:                             [ ]Mild [ ]Moderate [ ]Severe  Nausea:                             [ ]Mild [ ]Moderate [ ]Severe  Loss of appetite:              [ ]Mild [ ]Moderate [ ]Severe  Constipation:                    [ ]Mild [ ]Moderate [ ]Severe    PAINAD Score:    http://geriatrictoolkit.[place].[facility]/cog/painad.pdf (Ctrl +  left click to view)  		  Other Symptoms:  [X]All other review of systems negative     Palliative Performance Status Version 2:   10 %         http://[email or web address]/files/news/palliative_performance_scale_[email or web address].pdf  PHYSICAL EXAM:  Vital Signs Last 24 Hrs  T(C): 37.9 (13 Nov 2021 09:58), Max: 38.8 (13 Nov 2021 08:17)  T(F): 100.2 (13 Nov 2021 09:58), Max: 101.9 (13 Nov 2021 08:17)  HR: 79 (13 Nov 2021 09:25) (74 - 92)  BP: 195/63 (13 Nov 2021 08:17) (195/63 - 195/63)  BP(mean): --  RR: 22 (12 Nov 2021 16:41) (22 - 22)  SpO2: 98% (13 Nov 2021 09:25) (94% - 100%) I&O's Summary    12 Nov 2021 07:01  -  13 Nov 2021 07:00  --------------------------------------------------------  IN: 0 mL / OUT: 2550 mL / NET: -2550 mL    GENERAL:  [ ]Alert  [ ]Oriented x   [ ]Lethargic  [ ]Cachexia  [x ]Unarousable  [ ]Verbal  [ ]Non-Verbal [x] intubated and sedated  Behavioral:   [ ] Anxiety  [ ] Delirium [ ] Agitation [ ] Other  HEENT:  [ ]Normal   [ ]Dry mouth   [ ]ET Tube/Trach  [ ]Oral lesions  PULMONARY:   [X] symmetrical expansions  [ ]Clear [ ]Tachypnea  [ ]Audible excessive secretions   [ ]Rhonchi        [ ]Right [ ]Left [ ]Bilateral  [ ]Crackles        [ ]Right [ ]Left [ ]Bilateral  [ ]Wheezing     [ ]Right [ ]Left [ ]Bilateral  [ x]Diminished BS [ ]Right [ ]Left [x ]Bilateral    CARDIOVASCULAR:    [x ]Regular [ ]Irregular [ ]Tachy  [ ]Sal [ ]Murmur [ ]Other  GASTROINTESTINAL:  [x ]Soft  [ ]Distended   [ ]+BS  [ ]Non tender [ ]Tender  [ ]PEG [ ]OGT/ NGT   Last BM:     GENITOURINARY:  [ ]Normal [ ] Incontinent   [ ]Oliguria/Anuria   [ ]Morales  MUSCULOSKELETAL:   [ ]Normal   [x ]Weakness  [ ]Bed/Wheelchair bound [ ]Edema  NEUROLOGIC:   [ ]No focal deficits  [ ] Cognitive impairment  [ ] Dysphagia [ ]Dysarthria [ ] Paresis [ ]Other   SKIN:   [ ]Normal  [ ]Rash     [ ]Pressure ulcer(s)  [ ]y [ ]n  Present on admission    CRITICAL CARE:  [ ] Shock Present  [ ]Septic [ ]Cardiogenic [ ]Neurologic [ ]Hypovolemic  [ ]  Vasopressors [ ]  Inotropes   [ ] Respiratory failure present [ ] Mechanical Ventilation [ ] Non-invasive ventilatory support [ ] High-Flow  [ ] Acute  [ ] Chronic [ ] Hypoxic  [ ] Hypercarbic [ ] Other  [ ] Other organ failure     LABS: None New    RADIOLOGY & ADDITIONAL STUDIES: None New    PROTEIN CALORIE MALNUTRITION: [ ] mild [ ] moderate [ ] severe  [ ] underweight [ ] morbid obesity    https://www.andeal.org/vault/2440/web/files/ONC/Table_Clinical%20Characteristics%20to%20Document%20Malnutrition-White%20JV%20et%20al%817889.pdf    Height (cm): 177.8 (10-28-21 @ 15:58)  Weight (kg): 78.2 (10-27-21 @ 17:07)  BMI (kg/m2): 24.7 (10-28-21 @ 15:58)    [X] PPSV2 < or = 30% [ ] significant weight loss [ ] poor nutritional intake [ ] anasarca   Artificial Nutrition [ ]     REFERRALS:   [ ]Chaplaincy  [ ] Hospice  [ ]Child Life  [ ]Social Work  [ ]Case management [ ]Holistic Therapy [ ] Physical Therapy [ ] Dietary   Goals of Care Document: Goals of Care Conversation - Advanced Care Planning [JOSAFAT Garg] (11-08-21 @ 16:20)  Goals of Care Conversation - Advanced Care Planning [JOSAFAT Garg] (11-04-21 @ 14:13)  Goals of Care Conversation - Advanced Care Planning [JOSAFAT Garg] (11-03-21 @ 17:34)  Goals of Care Conversation - Advanced Care Planning [ALBAN Clrake] (11-01-21 @ 15:33)  Goals of Care Conversation - Advanced Care Planning [CONRAD Escobedo] (10-30-21 @ 17:27)

## 2021-11-13 NOTE — PROGRESS NOTE ADULT - ATTENDING COMMENTS
Chart reviewed. Events noted. Pt seen and examined. Agree with excellent note above.   Plan for palliative extubation on Tuesday

## 2021-11-13 NOTE — PROGRESS NOTE ADULT - PROBLEM SELECTOR PLAN 1
Patient intubated for acute respiratory failure in setting of status epilepticus.  Vent mode AC/CMV Tidal Volume- 350 RR:10 Fi02:30 PEEP/CPAP- 5  On Propofol for sedation  Morphine 1mg IV q1hr PRN -  did not require in 24hr  Bowel regimen while on opioids

## 2021-11-13 NOTE — PROGRESS NOTE ADULT - NSPROGADDITIONALINFOA_GEN_ALL_CORE
Raegan Nagy, PGY-5  Hospice and Palliative Care Medicine Fellow
Discussed with Palliative Attending Dr. Jhaveri. Palliative Service will continue to follow in PCU, planned for compassionate extubation.    Gisselle Acosta MD - PGY4  Palliative Medicine, Fellow

## 2021-11-14 NOTE — PROGRESS NOTE ADULT - PROBLEM SELECTOR PROBLEM 5
Advance care planning
Palliative care encounter

## 2021-11-14 NOTE — PROGRESS NOTE ADULT - SUBJECTIVE AND OBJECTIVE BOX
GAP TEAM PALLIATIVE CARE UNIT PROGRESS NOTE:      [  ] Patient on hospice program.    INDICATION FOR PALLIATIVE CARE UNIT SERVICES: Symptom Management, Planned Compassionate Extubation    INTERVAL HPI/OVERNIGHT EVENTS:   11/14/21  - Patient remains on vent support. Desaturating to 80's. Rest of VS stable  - Tried to call Kalli (daughter). No answer. Planning for palliative extubation on Tuesday    DNR on chart: Yes  Allergies: No Known Allergies/Intolerances    MEDICATIONS  (STANDING):  chlorhexidine 0.12% Liquid 15 milliLiter(s) Oral Mucosa every 12 hours  chlorhexidine 4% Liquid 1 Application(s) Topical <User Schedule>  lacosamide IVPB 100 milliGRAM(s) IV Intermittent every 12 hours  levETIRAcetam  IVPB 1000 milliGRAM(s) IV Intermittent <User Schedule>  pantoprazole  Injectable 40 milliGRAM(s) IV Push daily  propofol Infusion. 50 MICROgram(s)/kG/Min (23.5 mL/Hr) IV Continuous <Continuous>  valproate sodium IVPB 500 milliGRAM(s) IV Intermittent every 8 hours    MEDICATIONS  (PRN):  acetaminophen  Suppository .. 650 milliGRAM(s) Rectal every 4 hours PRN Temp greater or equal to 38C (100.4F)  bisacodyl Suppository 10 milliGRAM(s) Rectal daily PRN Constipation  hydrALAZINE Injectable 5 milliGRAM(s) IV Push every 2 hours PRN SBP > 170  morphine  - Injectable 1 milliGRAM(s) IV Push every 1 hour PRN dyspnea  morphine  - Injectable 1 milliGRAM(s) IV Push every 1 hour PRN Severe Pain (7 - 10)    ITEMS UNCHECKED ARE NOT PRESENT    PRESENT SYMPTOMS: [X]Unable to obtain due to poor mentation   Source if other than patient:  [ ]Family   [X]Team     Pain: [ ] yes [ ] no  QOL impact -   Location -                    Aggravating factors -  Quality -  Radiation -  Timing-  Severity (0-10 scale):  Minimal acceptable level (0-10 scale):     Dyspnea:                           [ ]Mild [ ]Moderate [ ]Severe  Anxiety:                             [ ]Mild [ ]Moderate [ ]Severe  Fatigue:                             [ ]Mild [ ]Moderate [ ]Severe  Nausea:                             [ ]Mild [ ]Moderate [ ]Severe  Loss of appetite:              [ ]Mild [ ]Moderate [ ]Severe  Constipation:                    [ ]Mild [ ]Moderate [ ]Severe    PAINAD Score:    [email or web address] (Ctrl +  left click to view)  		  Other Symptoms:  [X]All other review of systems negative     Palliative Performance Status Version 2:   10 %           [email or web address]/[email or web address]_[email or web address]  PHYSICAL EXAM:  Vital Signs Last 24 Hrs  T(C): 37 (14 Nov 2021 09:18), Max: 37 (13 Nov 2021 15:30)  T(F): 98.6 (14 Nov 2021 09:18), Max: 98.6 (13 Nov 2021 15:30)  HR: 78 (14 Nov 2021 09:18) (72 - 78)  BP: 137/68 (14 Nov 2021 09:18) (137/68 - 191/79)  BP(mean): --  RR: 19 (14 Nov 2021 09:18) (19 - 19)  SpO2: 84% (14 Nov 2021 09:18) (84% - 99%)    GENERAL:  [ ]Alert  [ ]Oriented x   [ ]Lethargic  [ ]Cachexia  [x ]Unarousable  [ ]Verbal  [ ]Non-Verbal [x] intubated and sedated  Behavioral:   [ ] Anxiety  [ ] Delirium [ ] Agitation [ ] Other  HEENT:  [ ]Normal   [ ]Dry mouth   [ ]ET Tube/Trach  [ ]Oral lesions  PULMONARY:   [X] symmetrical expansions  [ ]Clear [ ]Tachypnea  [ ]Audible excessive secretions   [ ]Rhonchi        [ ]Right [ ]Left [ ]Bilateral  [ ]Crackles        [ ]Right [ ]Left [ ]Bilateral  [ ]Wheezing     [ ]Right [ ]Left [ ]Bilateral  [ x]Diminished BS [ ]Right [ ]Left [x ]Bilateral    CARDIOVASCULAR:    [x ]Regular [ ]Irregular [ ]Tachy  [ ]Sal [ ]Murmur [ ]Other  GASTROINTESTINAL:  [x ]Soft  [ ]Distended   [ ]+BS  [ ]Non tender [ ]Tender  [ ]PEG [ ]OGT/ NGT   Last BM:     GENITOURINARY:  [ ]Normal [ ] Incontinent   [ ]Oliguria/Anuria   [ ]Morales  MUSCULOSKELETAL:   [ ]Normal   [x ]Weakness  [ ]Bed/Wheelchair bound [ ]Edema  NEUROLOGIC:   [ ]No focal deficits  [ ] Cognitive impairment  [ ] Dysphagia [ ]Dysarthria [ ] Paresis [ ]Other   SKIN:   [ ]Normal  [ ]Rash     [ ]Pressure ulcer(s)  [ ]y [ ]n  Present on admission    CRITICAL CARE:  [ ] Shock Present  [ ]Septic [ ]Cardiogenic [ ]Neurologic [ ]Hypovolemic  [ ]  Vasopressors [ ]  Inotropes   [ ] Respiratory failure present [ ] Mechanical Ventilation [ ] Non-invasive ventilatory support [ ] High-Flow  [ ] Acute  [ ] Chronic [ ] Hypoxic  [ ] Hypercarbic [ ] Other  [ ] Other organ failure     LABS: None New    RADIOLOGY & ADDITIONAL STUDIES: None New    PROTEIN CALORIE MALNUTRITION: [ ] mild [ ] moderate [ ] severe  [ ] underweight [ ] morbid obesity    https://www.andeal.org/vault/2440/web/files/ONC/Table_Clinical%20Characteristics%20to%20Document%20Malnutrition-White%20JV%20et%20al%904283.pdf    Height (cm): 177.8 (10-28-21 @ 15:58)  Weight (kg): 78.2 (10-27-21 @ 17:07)  BMI (kg/m2): 24.7 (10-28-21 @ 15:58)    [X] PPSV2 < or = 30% [ ] significant weight loss [ ] poor nutritional intake [ ] anasarca   Artificial Nutrition [ ]     REFERRALS:   [ ]Chaplaincy  [ ] Hospice  [ ]Child Life  [ ]Social Work  [ ]Case management [ ]Holistic Therapy [ ] Physical Therapy [ ] Dietary   Goals of Care Document: Goals of Care Conversation - Advanced Care Planning [JOSAFAT Garg] (11-08-21 @ 16:20)  Goals of Care Conversation - Advanced Care Planning [JOSAFAT Garg] (11-04-21 @ 14:13)  Goals of Care Conversation - Advanced Care Planning [JOSAFAT Garg] (11-03-21 @ 17:34)  Goals of Care Conversation - Advanced Care Planning [ALBAN Clarke] (11-01-21 @ 15:33)  Goals of Care Conversation - Advanced Care Planning [CONRAD Escobedo] (10-30-21 @ 17:27)

## 2021-11-14 NOTE — PROGRESS NOTE ADULT - PROBLEM SELECTOR PLAN 1
Patient intubated for acute respiratory failure in setting of status epilepticus.  Vent mode AC/CMV Tidal Volume- 350 RR:10 Fi02:30 PEEP/CPAP- 5  On Propofol for sedation    11/14: Desaturating. Tried to call family- no response. Reasonable to increase FiO2 if continues to desat if family's goal is to "buy time" for the rest of the family to get here. Continue PRN Morphine and rest of comfort meds

## 2021-11-14 NOTE — PROGRESS NOTE ADULT - PROBLEM SELECTOR PLAN 5
Surrogate - dtr Kalli  DNR/I  Compassionate extubation planned next week  Prognosis - days
Called and spoke to the patient's son Sameer [phone]. He defers to Kalli for decision making.   Spoke with the patient's daughter Kalli.  Questions answered.  Emotional support provided.   Plan is for compassionate extubation next week Wednesday or Thursday.  Will continue with management in the PCU
Spoke with the patient's daughter Kalli.  Educated as to what to expect.  Questions answered.    Patient with positive blood cultures. BC showed growth in aerobic bottle gram negative rods. Explained that at this time in the patient's illness trajectory that antibiotics may be more of a risk than a benefit( diarrhea, skin breakdown)  Daughter wants to focus on comfort and symptom directed care. No plans to start antibiotics. Daughter is ok with discontinuing non essential meds, FS and insulin.   The plan is for compassionate extubation next week Wednesday or Thursday. She wants time to look at  homes and make arrangements. Educated the daughter that there is a possibility that the patient can die on the vent. She verbalize understanding.   Chaplaincy services offered. Daughter mentioned that she was seeing someone from the team already and plans to follow up with them tomorrow.   She provided me with her brothers contact information. Called the brother but he did not answer. Will try again later

## 2021-11-14 NOTE — PROGRESS NOTE ADULT - ASSESSMENT
66 year old M 65 y/o M with PMHx DM, HTN, BPH, transferred from [facility] after witnessed tonic clonic seizure for unknown etiology and chronicity found also with hypoglycemia, hypotension in setting of bacteremia. Patient intubated for acute respiratory failure in setting of status epilepticus. Palliative was consulted for GOC. The patient was transferred to the PCU for compassionate extubation.

## 2021-11-15 NOTE — PROGRESS NOTE ADULT - PROBLEM SELECTOR PLAN 1
Patient intubated for acute respiratory failure in setting of status epilepticus.  Vent mode AC/CMV Tidal Volume- 350 RR:10 Fi02:30 PEEP/CPAP- 5  On Propofol for sedation  Continue with [id number] IV q1hr PRN for dyspnea  Bowel regimen while on opioids

## 2021-11-15 NOTE — PROGRESS NOTE ADULT - PROBLEM SELECTOR PLAN 4
Soke with the patient's daughter Kalli. Questions answered.  Emotional support provided.   Plan is for compassionate extubation on Tuesday 11/14/21 at 11:30am  Will continue with care in the PCU Soke with the patient's daughter Kalli. Questions answered.  Emotional support provided.   Plan is for compassionate extubation on Tuesday 11/16/21 at 11:30am  Will continue with care in the PCU

## 2021-11-15 NOTE — PROGRESS NOTE ADULT - ATTENDING COMMENTS
66 year old M 67 y/o M with PMHx DM, HTN, BPH, transferred from [facility] after witnessed tonic clonic seizure for unknown etiology and chronicity found also with hypoglycemia, hypotension in setting of bacteremia. Patient intubated for acute respiratory failure in setting of status epilepticus. Palliative was consulted for [facility]. The patient was transferred to the PCU for compassionate extubation.  Patient triggers the vent otherwise unresponsive.  For compassionate extubation on wednesday.  Supportive care.  Monitor for seizures/symptoms.  Patient assessment and plan discussed on interdisciplinary team rounds today.

## 2021-11-15 NOTE — PROGRESS NOTE ADULT - ASSESSMENT
66 year old M 67 y/o M with PMHx DM, HTN, BPH, transferred from [facility] after witnessed tonic clonic seizure for unknown etiology and chronicity found also with hypoglycemia, hypotension in setting of bacteremia. Patient intubated for acute respiratory failure in setting of status epilepticus. Palliative was consulted for GOC. The patient was transferred to the PCU for compassionate extubation.

## 2021-11-15 NOTE — PROGRESS NOTE ADULT - SUBJECTIVE AND OBJECTIVE BOX
GAP TEAM PALLIATIVE CARE UNIT PROGRESS NOTE:      [  ] Patient on hospice program.    INDICATION FOR PALLIATIVE CARE UNIT SERVICES:  symptom management and plan for compassionate extubation in the setting of a 67 yo with anoxic brain injury c/b seizures.     INTERVAL HPI/OVERNIGHT EVENTS: Chart reviewed. The patient is seen and examined at the bedside. The patient did not require any PRN medication within a 24hr period 8am-8am.     DNR on chart:   Allergies    No Known Allergies    Intolerances    MEDICATIONS  (STANDING):  chlorhexidine 0.12% Liquid 15 milliLiter(s) Oral Mucosa every 12 hours  chlorhexidine 4% Liquid 1 Application(s) Topical <User Schedule>  lacosamide IVPB 100 milliGRAM(s) IV Intermittent every 12 hours  levETIRAcetam  IVPB 1000 milliGRAM(s) IV Intermittent <User Schedule>  pantoprazole  Injectable 40 milliGRAM(s) IV Push daily  propofol Infusion. 50 MICROgram(s)/kG/Min (23.5 mL/Hr) IV Continuous <Continuous>  valproate sodium IVPB 500 milliGRAM(s) IV Intermittent every 8 hours    MEDICATIONS  (PRN):  acetaminophen  Suppository .. 650 milliGRAM(s) Rectal every 4 hours PRN Temp greater or equal to 38C (100.4F)  bisacodyl Suppository 10 milliGRAM(s) Rectal daily PRN Constipation  hydrALAZINE Injectable 5 milliGRAM(s) IV Push every 2 hours PRN SBP > 170  morphine  - Injectable 1 milliGRAM(s) IV Push every 1 hour PRN Severe Pain (7 - 10)  morphine  - Injectable 1 milliGRAM(s) IV Push every 1 hour PRN dyspnea    ITEMS UNCHECKED ARE NOT PRESENT    PRESENT SYMPTOMS: [X ]Unable to obtain due to poor mentation   Source if other than patient:  [ ]Family   [X ]Team     Pain: [ ] yes [ X] no see pain ad score  QOL impact -   Location -                    Aggravating factors -  Quality -  Radiation -  Timing-  Severity (0-10 scale):  Minimal acceptable level (0-10 scale):     Dyspnea:                           [ ]Mild [ ]Moderate [ ]Severe  Anxiety:                             [ ]Mild [ ]Moderate [ ]Severe  Fatigue:                             [ ]Mild [ ]Moderate [ ]Severe  Nausea:                             [ ]Mild [ ]Moderate [ ]Severe  Loss of appetite:              [ ]Mild [ ]Moderate [ ]Severe  Constipation:                    [ ]Mild [ ]Moderate [ ]Severe    PAINAD Score: 0 within a 24hr period 8am-8am    [email or web address] (Ctrl +  left click to view)  		  Other Symptoms:  [X ]All other review of systems negative     Palliative Performance Status Version 2:  10%         [email or web address]  PHYSICAL EXAM:  Vital Signs Last 24 Hrs  T(C): 36.9 (15 Nov 2021 08:00), Max: 36.9 (15 Nov 2021 08:00)  T(F): 98.5 (15 Nov 2021 08:00), Max: 98.5 (15 Nov 2021 08:00)  HR: 71 (15 Nov 2021 09:47) (71 - 84)  BP: 155/69 (15 Nov 2021 08:00) (155/69 - 155/69)  BP(mean): --  RR: 20 (15 Nov 2021 08:00) (20 - 20)  SpO2: 100% (15 Nov 2021 09:47) (99% - 100%) I&O's Summary    14 Nov 2021 07:01  -  15 Nov 2021 07:00  --------------------------------------------------------  IN: 0 mL / OUT: 1325 mL / NET: -1325 mL    15 Nov 2021 07:01  -  15 Nov 2021 11:53  --------------------------------------------------------  IN: 0 mL / OUT: 150 mL / NET: -150 mL    GENERAL:  [ ]Alert  [ ]Oriented x   [ ]Lethargic  [ ]Cachexia  [X ]Unarousable  [ ]Verbal  [X ]Non-Verbal  Behavioral:   [ ] Anxiety  [ ] Delirium [ ] Agitation [ ] Other  HEENT:  [ ]Normal   [ ]Dry mouth   [ X]ET Tube/Trach  [ ]Oral lesions  PULMONARY:   [ ]Clear [ ]Tachypnea  [ ]Audible excessive secretions   [ ]Rhonchi        [ ]Right [ ]Left [ ]Bilateral  [ ]Crackles        [ ]Right [ ]Left [ ]Bilateral  [ ]Wheezing     [ ]Right [ ]Left [ ]Bilateral  [ X]Diminshed BS [ ]Right [ ]Left [X ]Bilateral    CARDIOVASCULAR:    [X ]Regular [ ]Irregular [ ]Tachy  [ ]Sal [ ]Murmur [ ]Other  GASTROINTESTINAL:  [ X]Soft  [ ]Distended   [X ]+BS  [ ]Non tender [ ]Tender  [ ]PEG [ ]OGT/ NGT   Last BM:  11/14/21  GENITOURINARY:  [ ]Normal [ X] Incontinent   [ ]Oliguria/Anuria   [X ]Morales  MUSCULOSKELETAL:   [ ]Normal   [X ]Weakness  [ X]Bed/Wheelchair bound [ X]Edema- generalize   NEUROLOGIC:   [ ]No focal deficits  [X ] Cognitive impairment  [ ] Dysphagia [ ]Dysarthria [ ] Paresis [ ]Other   SKIN:   [ ]Normal  [ ]Rash     [X ]Pressure ulcer(s)  [ ]y [ ]n  Present on admission  Right ear suspected DTI. Please see nursing assessment for further details    CRITICAL CARE:  [ ] Shock Present  [ ]Septic [ ]Cardiogenic [ ]Neurologic [ ]Hypovolemic  [ ]  Vasopressors [ ]  Inotropes   [ ] Respiratory failure present [ X] Mechanical Ventilation [ ] Non-invasive ventilatory support [ ] High-Flow  [ ] Acute  [ ] Chronic [ ] Hypoxic  [ ] Hypercarbic [ ] Other  [X ] Other organ failure- Brain    LABS: None new    RADIOLOGY & ADDITIONAL STUDIES: None new    PROTEIN CALORIE MALNUTRITION: [ ] mild [ ] moderate [ ] severe  [ ] underweight [ ] morbid obesity    https://www.andeal.org/vault/2600/web/files/ONC/Table_Clinical%20Characteristics%20to%20Document%20Malnutrition-White%20JV%20et%20al%202012.pdf    Height (cm): 177.8 (10-28-21 @ 15:58)  Weight (kg): 78.2 (10-27-21 @ 17:07)  BMI (kg/m2): 24.7 (10-28-21 @ 15:58)    [X ] PPSV2 < or = 30% [ ] significant weight loss [ ] poor nutritional intake [ ] anasarca   Artificial Nutrition [ ]     REFERRALS:   [ X]Chaplaincy  [ ] Hospice  [ ]Child Life  [X ]Social Work  [ ]Case management [ ]Holistic Therapy [ ] Physical Therapy [ ] Dietary   Goals of Care Document:    GAP TEAM PALLIATIVE CARE UNIT PROGRESS NOTE:      [  ] Patient on hospice program.    INDICATION FOR PALLIATIVE CARE UNIT SERVICES:  symptom management and plan for compassionate extubation in the setting of a 67 yo with anoxic brain injury c/b seizures.     INTERVAL HPI/OVERNIGHT EVENTS: Chart reviewed. The patient is seen and examined at the bedside. The patient did not require any PRN medication within a 24hr period 8am-8am.     DNR on chart:   Allergies    No Known Allergies    Intolerances    MEDICATIONS  (STANDING):  chlorhexidine 0.12% Liquid 15 milliLiter(s) Oral Mucosa every 12 hours  chlorhexidine 4% Liquid 1 Application(s) Topical <User Schedule>  lacosamide IVPB 100 milliGRAM(s) IV Intermittent every 12 hours  levETIRAcetam  IVPB 1000 milliGRAM(s) IV Intermittent <User Schedule>  pantoprazole  Injectable 40 milliGRAM(s) IV Push daily  propofol Infusion. 50 MICROgram(s)/kG/Min (23.5 mL/Hr) IV Continuous <Continuous>  valproate sodium IVPB 500 milliGRAM(s) IV Intermittent every 8 hours    MEDICATIONS  (PRN):  acetaminophen  Suppository .. 650 milliGRAM(s) Rectal every 4 hours PRN Temp greater or equal to 38C (100.4F)  bisacodyl Suppository 10 milliGRAM(s) Rectal daily PRN Constipation  hydrALAZINE Injectable 5 milliGRAM(s) IV Push every 2 hours PRN SBP > 170  morphine  - Injectable 1 milliGRAM(s) IV Push every 1 hour PRN Severe Pain (7 - 10)  morphine  - Injectable 1 milliGRAM(s) IV Push every 1 hour PRN dyspnea    ITEMS UNCHECKED ARE NOT PRESENT    PRESENT SYMPTOMS: [X ]Unable to obtain due to poor mentation   Source if other than patient:  [ ]Family   [X ]Team     Pain: [ ] yes [ X] no see pain ad score  QOL impact -   Location -                    Aggravating factors -  Quality -  Radiation -  Timing-  Severity (0-10 scale):  Minimal acceptable level (0-10 scale):     Dyspnea:                           [ ]Mild [ ]Moderate [ ]Severe  Anxiety:                             [ ]Mild [ ]Moderate [ ]Severe  Fatigue:                             [ ]Mild [ ]Moderate [ ]Severe  Nausea:                             [ ]Mild [ ]Moderate [ ]Severe  Loss of appetite:              [ ]Mild [ ]Moderate [ ]Severe  Constipation:                    [ ]Mild [ ]Moderate [ ]Severe    PAINAD Score: 0 within a 24hr period 8am-8am    [email or web address] (Ctrl +  left click to view)  		  Other Symptoms:  [X ]All other review of systems negative     Palliative Performance Status Version 2:  10%         http://[email or web address]/files/[email or web address]_performance_scale_[email or web address]  PHYSICAL EXAM:  Vital Signs Last 24 Hrs  T(C): 36.9 (15 Nov 2021 08:00), Max: 36.9 (15 Nov 2021 08:00)  T(F): 98.5 (15 Nov 2021 08:00), Max: 98.5 (15 Nov 2021 08:00)  HR: 71 (15 Nov 2021 09:47) (71 - 84)  BP: 155/69 (15 Nov 2021 08:00) (155/69 - 155/69)  BP(mean): --  RR: 20 (15 Nov 2021 08:00) (20 - 20)  SpO2: 100% (15 Nov 2021 09:47) (99% - 100%) I&O's Summary    14 Nov 2021 07:01  -  15 Nov 2021 07:00  --------------------------------------------------------  IN: 0 mL / OUT: 1325 mL / NET: -1325 mL    15 Nov 2021 07:01  -  15 Nov 2021 11:53  --------------------------------------------------------  IN: 0 mL / OUT: 150 mL / NET: -150 mL    GENERAL:  [ ]Alert  [ ]Oriented x   [ ]Lethargic  [ ]Cachexia  [X ]Unarousable  [ ]Verbal  [X ]Non-Verbal  Behavioral:   [ ] Anxiety  [ ] Delirium [ ] Agitation [ ] Other  HEENT:  [ ]Normal   [ ]Dry mouth   [ X]ET Tube/Trach  [ ]Oral lesions  PULMONARY:   [ ]Clear [ ]Tachypnea  [ ]Audible excessive secretions   [ ]Rhonchi        [ ]Right [ ]Left [ ]Bilateral  [ ]Crackles        [ ]Right [ ]Left [ ]Bilateral  [ ]Wheezing     [ ]Right [ ]Left [ ]Bilateral  [ X]Diminished BS [ ]Right [ ]Left [X ]Bilateral    CARDIOVASCULAR:    [X ]Regular [ ]Irregular [ ]Tachy  [ ]Sal [ ]Murmur [ ]Other  GASTROINTESTINAL:  [ X]Soft  [ ]Distended   [X ]+BS  [ ]Non tender [ ]Tender  [ ]PEG [ ]OGT/ NGT   Last BM:  11/14/21  GENITOURINARY:  [ ]Normal [ X] Incontinent   [ ]Oliguria/Anuria   [X ]Morales  MUSCULOSKELETAL:   [ ]Normal   [X ]Weakness  [ X]Bed/Wheelchair bound [ X]Edema- generalize   NEUROLOGIC:   [ ]No focal deficits  [X ] Cognitive impairment  [ ] Dysphagia [ ]Dysarthria [ ] Paresis [ ]Other   SKIN:   [ ]Normal  [ ]Rash     [X ]Pressure ulcer(s)  [ ]y [ ]n  Present on admission  Right ear suspected DTI. Please see nursing assessment for further details    CRITICAL CARE:  [ ] Shock Present  [ ]Septic [ ]Cardiogenic [ ]Neurologic [ ]Hypovolemic  [ ]  Vasopressors [ ]  Inotropes   [x ] Respiratory failure present [ X] Mechanical Ventilation [ ] Non-invasive ventilatory support [ ] High-Flow    [ ] Acute  [ ] Chronic [ ] Hypoxic  [ ] Hypercarbic [ ] Other  [X ] Other organ failure- Brain    LABS: None new    RADIOLOGY & ADDITIONAL STUDIES: None new    PROTEIN CALORIE MALNUTRITION: [ ] mild [ ] moderate [ ] severe  [ ] underweight [ ] morbid obesity    https://www.andeal.org/vault/5080/web/files/ONC/Table_Clinical%20Characteristics%20to%20Document%20Malnutrition-White%20JV%20et%20al%202012.pdf    Height (cm): 177.8 (10-28-21 @ 15:58)  Weight (kg): 78.2 (10-27-21 @ 17:07)  BMI (kg/m2): 24.7 (10-28-21 @ 15:58)    [X ] PPSV2 < or = 30% [ ] significant weight loss [ ] poor nutritional intake [ ] anasarca   Artificial Nutrition [ ]     REFERRALS:   [ X]Chaplaincy  [ ] Hospice  [ ]Child Life  [X ]Social Work  [ ]Case management [ ]Holistic Therapy [ ] Physical Therapy [ ] Dietary   Goals of Care Document:

## 2021-11-16 NOTE — PROGRESS NOTE ADULT - PROBLEM SELECTOR PLAN 1
Patient intubated for acute respiratory failure in setting of status epilepticus.  Vent mode AC/CMV Tidal Volume- 350 RR:10 Fi02:30 PEEP/CPAP- 5  Plan is for compassionate extubation today 11/16  Discontinue Propofol gtt  Discontinue  [id number] IV q1hr PRN for dyspnea  Dilaudid gtt @ 0.2mg/hr ordered  Dilaudid 0.5mg IV q1hr PRN  Bowel regimen while on opioids Patient intubated for acute respiratory failure in setting of status epilepticus.  Vent mode AC/CMV Tidal Volume- 350 RR:10 Fi02:30 PEEP/CPAP-5  Plan is for compassionate extubation today 11/16  Discontinue Propofol gtt  Discontinue  [id number] IV q1hr PRN for dyspnea  Dilaudid gtt @ 0.2mg/hr ordered  Dilaudid 0.5mg IV q1hr PRN  Bowel regimen while on opioids

## 2021-11-16 NOTE — PROGRESS NOTE ADULT - PROBLEM SELECTOR PROBLEM 2
Functional quadriplegia
Hypertension, essential
Hypertension, essential
Functional quadriplegia
Functional quadriplegia
Hypertension, essential
Functional quadriplegia
Hypertension, essential
Functional quadriplegia
Hypertension, essential

## 2021-11-16 NOTE — CHART NOTE - NSCHARTNOTESELECT_GEN_ALL_CORE
Event Note
GOC discussion/Event Note
Neurology/Event Note
Nutrition Services
Bedside POCUS/Event Note
EEG Prelim
EEG/Epilepsy
GOC Discussion with Daughter/Event Note
Infectious Diseases/Event Note
Nutrition Services
Seizure/Event Note
seizure/Event Note

## 2021-11-16 NOTE — DISCHARGE NOTE FOR THE EXPIRED PATIENT - HOSPITAL COURSE
66 year old M 65 y/o M with PMHx DM, HTN, BPH, transferred from [facility] after witnessed tonic clonic seizure for unknown etiology and chronicity found also with hypoglycemia, hypotension in setting of bacteremia. Patient intubated for acute respiratory failure in setting of status epilepticus. Palliative was consulted for [facility]. The patient was transferred to the PCU for compassionate extubation. Patient compassionately extubated on . The patient  today at 14:14.

## 2021-11-16 NOTE — PROGRESS NOTE ADULT - SUBJECTIVE AND OBJECTIVE BOX
GAP TEAM PALLIATIVE CARE UNIT PROGRESS NOTE:      [  ] Patient on hospice program.    INDICATION FOR PALLIATIVE CARE UNIT SERVICES: symptom management and plan for compassionate extubation in the setting of a 65 yo with anoxic brain injury c/b seizures    INTERVAL HPI/OVERNIGHT EVENTS: Chart reviewed. The patient is seen and examined at the bedside. He did not require any PRN medications within a 24hr period 8am-8am    DNR on chart:   Allergies    No Known Allergies    Intolerances    MEDICATIONS  (STANDING):  chlorhexidine 0.12% Liquid 15 milliLiter(s) Oral Mucosa every 12 hours  chlorhexidine 4% Liquid 1 Application(s) Topical <User Schedule>  lacosamide IVPB 100 milliGRAM(s) IV Intermittent every 12 hours  levETIRAcetam  IVPB 1000 milliGRAM(s) IV Intermittent <User Schedule>  pantoprazole  Injectable 40 milliGRAM(s) IV Push daily  propofol Infusion. 50 MICROgram(s)/kG/Min (23.5 mL/Hr) IV Continuous <Continuous>  valproate sodium IVPB 500 milliGRAM(s) IV Intermittent every 8 hours    MEDICATIONS  (PRN):  acetaminophen  Suppository .. 650 milliGRAM(s) Rectal every 4 hours PRN Temp greater or equal to 38C (100.4F)  bisacodyl Suppository 10 milliGRAM(s) Rectal daily PRN Constipation  hydrALAZINE Injectable 5 milliGRAM(s) IV Push every 2 hours PRN SBP > 170  morphine  - Injectable 1 milliGRAM(s) IV Push every 1 hour PRN Severe Pain (7 - 10)  morphine  - Injectable 1 milliGRAM(s) IV Push every 1 hour PRN dyspnea    ITEMS UNCHECKED ARE NOT PRESENT    PRESENT SYMPTOMS: [ X]Unable to obtain due to poor mentation   Source if other than patient:  [ ]Family   [X ]Team     Pain: [ ] yes [X ] no see pain ad score  QOL impact -   Location -                    Aggravating factors -  Quality -  Radiation -  Timing-  Severity (0-10 scale):  Minimal acceptable level (0-10 scale):     Dyspnea:                           [ ]Mild [ ]Moderate [ ]Severe  Anxiety:                             [ ]Mild [ ]Moderate [ ]Severe  Fatigue:                             [ ]Mild [ ]Moderate [ ]Severe  Nausea:                             [ ]Mild [ ]Moderate [ ]Severe  Loss of appetite:              [ ]Mild [ ]Moderate [ ]Severe  Constipation:                    [ ]Mild [ ]Moderate [ ]Severe    PAINAD Score: 0    [email or web address]/[email or web address] (Ctrl +  left click to view)  		  Other Symptoms:  [X ]All other review of systems negative     Palliative Performance Status Version 2: 10%         [email or web address]  PHYSICAL EXAM:  Vital Signs Last 24 Hrs  T(C): 36.6 (16 Nov 2021 08:44), Max: 36.6 (16 Nov 2021 08:44)  T(F): 97.8 (16 Nov 2021 08:44), Max: 97.8 (16 Nov 2021 08:44)  HR: 73 (16 Nov 2021 09:00) (64 - 78)  BP: 181/67 (16 Nov 2021 08:44) (181/67 - 181/67)  BP(mean): --  RR: --  SpO2: 99% (16 Nov 2021 09:00) (99% - 100%) I&O's Summary    15 Nov 2021 07:01  -  16 Nov 2021 07:00  --------------------------------------------------------  IN: 0 mL / OUT: 1300 mL / NET: -1300 mL    GENERAL:  [ ]Alert  [ ]Oriented x   [ ]Lethargic  [ ]Cachexia  [X ]Unarousable  [ ]Verbal  [ X]Non-Verbal  Behavioral:   [ ] Anxiety  [ ] Delirium [ ] Agitation [ ] Other  HEENT:  [ ]Normal   [ ]Dry mouth   [X ]ET Tube/Trach  [ ]Oral lesions  PULMONARY:   [ ]Clear [ ]Tachypnea  [ ]Audible excessive secretions   [ ]Rhonchi        [ ]Right [ ]Left [ ]Bilateral  [ ]Crackles        [ ]Right [ ]Left [ ]Bilateral  [ ]Wheezing     [ ]Right [ ]Left [ ]Bilateral  [ X]Diminshed BS [ ]Right [ ]Left [ X]Bilateral    CARDIOVASCULAR:    [ X]Regular [ ]Irregular [ ]Tachy  [ ]Sal [ ]Murmur [ ]Other  GASTROINTESTINAL:  [X ]Soft  [ ]Distended   [ X]+BS  [ ]Non tender [ ]Tender  [ ]PEG [ ]OGT/ NGT   Last BM:   11/14/21  GENITOURINARY:  [ ]Normal [ X] Incontinent   [ ]Oliguria/Anuria   [X ]Morales  MUSCULOSKELETAL:   [ ]Normal   [X ]Weakness  [ X]Bed/Wheelchair bound [X ]Edema- generalize  NEUROLOGIC:   [ ]No focal deficits  [ ] Cognitive impairment  [ ] Dysphagia [ ]Dysarthria [ ] Paresis [ ]Other   SKIN:   [ ]Normal  [ ]Rash     [ ]Pressure ulcer(s)  [ ]y [ ]n  Present on admission      CRITICAL CARE:  [ ] Shock Present  [ ]Septic [ ]Cardiogenic [ ]Neurologic [ ]Hypovolemic  [ ]  Vasopressors [ ]  Inotropes   [ ] Respiratory failure present [ ] Mechanical Ventilation [ ] Non-invasive ventilatory support [ ] High-Flow  [ ] Acute  [ ] Chronic [ ] Hypoxic  [ ] Hypercarbic [ ] Other  [ ] Other organ failure     LABS:            RADIOLOGY & ADDITIONAL STUDIES:    PROTEIN CALORIE MALNUTRITION: [ ] mild [ ] moderate [ ] severe  [ ] underweight [ ] morbid obesity    https://www.andeal.org/vault/2440/web/files/ONC/Table_Clinical%20Characteristics%20to%20Document%20Malnutrition-White%20JV%20et%20al%028652.pdf    Height (cm): 177.8 (10-28-21 @ 15:58)  Weight (kg): 78.2 (10-27-21 @ 17:07)  BMI (kg/m2): 24.7 (10-28-21 @ 15:58)    [ ] PPSV2 < or = 30% [ ] significant weight loss [ ] poor nutritional intake [ ] anasarca   Artificial Nutrition [ ]     REFERRALS:   [ ]Chaplaincy  [ ] Hospice  [ ]Child Life  [ ]Social Work  [ ]Case management [ ]Holistic Therapy [ ] Physical Therapy [ ] Dietary   Goals of Care Document:    GAP TEAM PALLIATIVE CARE UNIT PROGRESS NOTE:      [  ] Patient on hospice program.    INDICATION FOR PALLIATIVE CARE UNIT SERVICES: symptom management and plan for compassionate extubation in the setting of a 67 yo with anoxic brain injury c/b seizures    INTERVAL HPI/OVERNIGHT EVENTS: Chart reviewed. The patient is seen and examined at the bedside. He did not require any PRN medications within a 24hr period 8am-8am    DNR on chart:   Allergies    No Known Allergies    Intolerances    MEDICATIONS  (STANDING):  chlorhexidine 0.12% Liquid 15 milliLiter(s) Oral Mucosa every 12 hours  chlorhexidine 4% Liquid 1 Application(s) Topical <User Schedule>  lacosamide IVPB 100 milliGRAM(s) IV Intermittent every 12 hours  levETIRAcetam  IVPB 1000 milliGRAM(s) IV Intermittent <User Schedule>  pantoprazole  Injectable 40 milliGRAM(s) IV Push daily  propofol Infusion. 50 MICROgram(s)/kG/Min (23.5 mL/Hr) IV Continuous <Continuous>  valproate sodium IVPB 500 milliGRAM(s) IV Intermittent every 8 hours    MEDICATIONS  (PRN):  acetaminophen  Suppository .. 650 milliGRAM(s) Rectal every 4 hours PRN Temp greater or equal to 38C (100.4F)  bisacodyl Suppository 10 milliGRAM(s) Rectal daily PRN Constipation  hydrALAZINE Injectable 5 milliGRAM(s) IV Push every 2 hours PRN SBP > 170  morphine  - Injectable 1 milliGRAM(s) IV Push every 1 hour PRN Severe Pain (7 - 10)  morphine  - Injectable 1 milliGRAM(s) IV Push every 1 hour PRN dyspnea    ITEMS UNCHECKED ARE NOT PRESENT    PRESENT SYMPTOMS: [ X]Unable to obtain due to poor mentation   Source if other than patient:  [ ]Family   [X ]Team     Pain: [ ] yes [X ] no see pain ad score  QOL impact -   Location -                    Aggravating factors -  Quality -  Radiation -  Timing-  Severity (0-10 scale):  Minimal acceptable level (0-10 scale):     Dyspnea:                           [ ]Mild [ ]Moderate [ ]Severe  Anxiety:                             [ ]Mild [ ]Moderate [ ]Severe  Fatigue:                             [ ]Mild [ ]Moderate [ ]Severe  Nausea:                             [ ]Mild [ ]Moderate [ ]Severe  Loss of appetite:              [ ]Mild [ ]Moderate [ ]Severe  Constipation:                    [ ]Mild [ ]Moderate [ ]Severe    PAINAD Score: 0    [email or web address]/painad.pdf (Ctrl +  left click to view)  		  Other Symptoms:  [X ]All other review of systems negative     Palliative Performance Status Version 2: 10%         [email or web address]/files/news/[email or web address]_performance_scale_[email or web address].pdf  PHYSICAL EXAM:  Vital Signs Last 24 Hrs  T(C): 36.6 (16 Nov 2021 08:44), Max: 36.6 (16 Nov 2021 08:44)  T(F): 97.8 (16 Nov 2021 08:44), Max: 97.8 (16 Nov 2021 08:44)  HR: 73 (16 Nov 2021 09:00) (64 - 78)  BP: 181/67 (16 Nov 2021 08:44) (181/67 - 181/67)  BP(mean): --  RR: --  SpO2: 99% (16 Nov 2021 09:00) (99% - 100%) I&O's Summary    15 Nov 2021 07:01  -  16 Nov 2021 07:00  --------------------------------------------------------  IN: 0 mL / OUT: 1300 mL / NET: -1300 mL    GENERAL:  [ ]Alert  [ ]Oriented x   [ ]Lethargic  [ ]Cachexia  [X ]Unarousable  [ ]Verbal  [ X]Non-Verbal  Behavioral:   [ ] Anxiety  [ ] Delirium [ ] Agitation [ ] Other  HEENT:  [ ]Normal   [ ]Dry mouth   [X ]ET Tube/Trach  [ ]Oral lesions  PULMONARY:   [ ]Clear [ ]Tachypnea  [ ]Audible excessive secretions   [ ]Rhonchi        [ ]Right [ ]Left [ ]Bilateral  [ ]Crackles        [ ]Right [ ]Left [ ]Bilateral  [ ]Wheezing     [ ]Right [ ]Left [ ]Bilateral  [ X]Diminshed BS [ ]Right [ ]Left [ X]Bilateral    CARDIOVASCULAR:    [ X]Regular [ ]Irregular [ ]Tachy  [ ]Sal [ ]Murmur [ ]Other  GASTROINTESTINAL:  [X ]Soft  [ ]Distended   [ X]+BS  [ ]Non tender [ ]Tender  [ ]PEG [ ]OGT/ NGT   Last BM:   11/14/21  GENITOURINARY:  [ ]Normal [ X] Incontinent   [ ]Oliguria/Anuria   [X ]Morales  MUSCULOSKELETAL:   [ ]Normal   [X ]Weakness  [ X]Bed/Wheelchair bound [X ]Edema- generalize  NEUROLOGIC:   [ ]No focal deficits  [ X] Cognitive impairment  [ ] Dysphagia [ ]Dysarthria [ ] Paresis [ ]Other   SKIN:   [ ]Normal  [ ]Rash     [X ]Pressure ulcer(s)  [ ]y [ ]n  Present on admission  Right ear suspected DTI. Please see nursing assessment for further details    CRITICAL CARE:  [ ] Shock Present  [ ]Septic [ ]Cardiogenic [ ]Neurologic [ ]Hypovolemic  [ ]  Vasopressors [ ]  Inotropes   [X ] Respiratory failure present [ X] Mechanical Ventilation [ ] Non-invasive ventilatory support [ ] High-Flow    [ ] Acute  [ ] Chronic [ ] Hypoxic  [ ] Hypercarbic [ ] Other  [X ] Other organ failure- Brain    LABS: None new    RADIOLOGY & ADDITIONAL STUDIES: None new    PROTEIN CALORIE MALNUTRITION: [ ] mild [ ] moderate [ ] severe  [ ] underweight [ ] morbid obesity    https://www.[facility]al.org/vault/2440/web/files/ONC/Table_Clinical%20Characteristics%20to%20Document%20Malnutrition-White%20JV%20et%20al%253409.pdf    Height (cm): 177.8 (10-28-21 @ 15:58)  Weight (kg): 78.2 (10-27-21 @ 17:07)  BMI (kg/m2): 24.7 (10-28-21 @ 15:58)    [X ] PPSV2 < or = 30% [ ] significant weight loss [ ] poor nutritional intake [ ] anasarca   Artificial Nutrition [ ]     REFERRALS:   [ X]Chaplaincy  [ ] Hospice  [ ]Child Life  [ X]Social Work  [ ]Case management [ ]Holistic Therapy [ ] Physical Therapy [ ] Dietary   Goals of Care Document:

## 2021-11-16 NOTE — PROGRESS NOTE ADULT - TIME BILLING
development of plan of care/coordination of care/glycemic control through review of labs, blood glucose values and vital signs.
development of plan of care/coordination of care/glycemic control through review of labs, blood glucose values and vital signs.   critically ill on insulin gtt
case discussed with residents.
development of plan of care/coordination of care/glycemic control through review of labs, blood glucose values and vital signs.
development of plan of care/coordination of care/glycemic control through review of labs, blood glucose values and vital signs.
see attestation above.

## 2021-11-16 NOTE — PROGRESS NOTE ADULT - REASON FOR ADMISSION
Transfer from [place] for EEG Monitoring

## 2021-11-16 NOTE — PROGRESS NOTE ADULT - PROVIDER SPECIALTY LIST ADULT
Cardiology
MICU
MICU
Cardiology
Critical Care
Critical Care
Endocrinology
Infectious Disease
MICU
Neurology
Palliative Care
Palliative Care
Cardiology
Cardiology
ENT
Infectious Disease
MICU
Neurology
Palliative Care
Critical Care
MICU
Neurology
Palliative Care
ENT
Endocrinology
Palliative Care
Palliative Care
Endocrinology
Palliative Care
Palliative Care

## 2021-11-16 NOTE — PROGRESS NOTE ADULT - ATTENDING COMMENTS
66 year old M 65 y/o M with PMHx DM, HTN, BPH, transferred from [facility] after witnessed tonic clonic seizure for unknown etiology and chronicity found also with hypoglycemia, hypotension in setting of bacteremia. Patient intubated for acute respiratory failure in setting of status epilepticus. Palliative was consulted for [facility]. The patient was transferred to the PCU for compassionate extubation.  Patient triggers the vent otherwise unresponsive.  For compassionate extubation on wednesday.  Supportive care.  Monitor for seizures/symptoms.  Patient assessment and plan discussed on interdisciplinary team rounds today. Time spent in face to face discussion with multiple family members in preparation for compassionate extubation 20 minutes.

## 2021-11-16 NOTE — CHART NOTE - NSCHARTNOTEFT_GEN_A_CORE
The patient's daughter Kalli, Kalli's boyfriend, son Biajn and ex wife present at the bedside.   Compassionate extubation to RA order placed as per family wishes  Educated as to what to expect.  Questions answered.  Emotional support provided.  Pre medicated with Dilaudid 0.5mg IV X1, Ativan 0.5mg IV X1 and Robinul 0.4mg IV X1.  Patient compassionately extubated by the respiratory therapist to room air.  Primary RN, Fellow and myself present. Patient compassionately extubated at 1320  Emotional support provided to the family  Will continue to monitor. The patient's daughter Kalli, Kalli's boyfriend, son Bijan and ex wife present at the bedside.   Compassionate extubation to RA order placed as per family wishes  Educated as to what to expect.  Questions answered.  Emotional support provided.  Pre medicated with Dilaudid 0.5mg IV X1, Ativan 0.5mg IV X1 and Robinul 0.4mg IV X1.  Patient compassionately extubated by the respiratory therapist to room air.  Primary RN, Fellow and myself present. Patient compassionately extubated at 1320  Emotional support provided to the family  Will continue to monitor.    I was immediately available to oversee this procedure and reviewed the plan with the team.      MARJORIE Valle MD

## 2021-11-16 NOTE — PROGRESS NOTE ADULT - ASSESSMENT
66 year old M 65 y/o M with PMHx DM, HTN, BPH, transferred from [facility] after witnessed tonic clonic seizure for unknown etiology and chronicity found also with hypoglycemia, hypotension in setting of bacteremia. Patient intubated for acute respiratory failure in setting of status epilepticus. Palliative was consulted for GOC. The patient was transferred to the PCU for compassionate extubation.

## 2021-11-16 NOTE — PROGRESS NOTE ADULT - ATTENDING SUPERVISION STATEMENT
Resident
Fellow
Resident
ACP
Resident
ACP
Fellow
ACP
Fellow
Fellow
ACP
ACP

## 2021-11-16 NOTE — PROGRESS NOTE ADULT - PROBLEM SELECTOR PROBLEM 1
Oral bleeding
Acute respiratory failure
Oral bleeding
Acute respiratory failure
Acute respiratory failure
Uncontrolled type 2 diabetes mellitus with hyperglycemia
Uncontrolled type 2 diabetes mellitus with hyperglycemia
Acute respiratory failure
Uncontrolled type 2 diabetes mellitus with hyperglycemia
Uncontrolled type 2 diabetes mellitus with hyperglycemia
Acute respiratory failure
Acute respiratory failure
Uncontrolled type 2 diabetes mellitus with hyperglycemia

## 2021-11-27 LAB
CULTURE RESULTS: SIGNIFICANT CHANGE UP
SPECIMEN SOURCE: SIGNIFICANT CHANGE UP

## 2021-12-18 LAB
CULTURE RESULTS: SIGNIFICANT CHANGE UP
SPECIMEN SOURCE: SIGNIFICANT CHANGE UP

## 2022-11-17 NOTE — CONSULT NOTE ADULT - SUBJECTIVE AND OBJECTIVE BOX
Neurology Consultation  Angelo Conley, PGY-2      HPI: INCOMPLETE  67 y/o M with PMHX DM, HTn and BPH, found lethargic and was brought to [facility] by ambulance on 10/25 after family witnesed tonic clonic seizure. Patient was found hypoglycemic upon arrival to [facility] with a blood sugar of 34. IN the ER pt had another episode of seizure activity  and did not return to baseline and was subsequently intubated for airway protection. EEG was done on 10/26 after sedation was turned off and showed epileptic activity while patient was on Keppra 500bid. After EEG findings Keppra was increased to 100bid, however repeat eeg on 10/27 showed epileptic activity originating from left mid temporal area. On admission the patient was tested positive for covid 19 but no signs of respiratory disease and no findings of PNA on cxr. Pt was seen by the neurology team in [place] who added vimpat in addition to the keppra. Due to refractory seizures, pt was started on propofol for sedation. In addition blood cultures collected on 10/26 showed GPC bacteremia and group B strep isolated from urine culture. Pt has not required pressors during his stay at [facility] and was subsequently transferred to [address] for EEG monitoring and seizure management.      Unable to obtain ROS    (Stroke only)  NIHSS: limited 2/2 sedation  MRS:       PAST MEDICAL & SURGICAL HISTORY:  Hypertension    Diabetes    History of BPH    No significant past surgical history      FAMILY HISTORY:  No pertinent family history in first degree relatives      SOCIAL HISTORY: no smoking, alcohol, drug use hx    MEDICATIONS (HOME):  Home Medications:  aspirin 81 mg oral tablet, chewable: 1 tab(s) orally once a day (27 Oct 2021 17:54)  Coreg 12.5 mg oral tablet: 1 tab(s) orally 2 times a day (27 Oct 2021 17:54)  Lipitor 40 mg oral tablet: 1 tab(s) orally once a day (27 Oct 2021 17:54)  Norvasc 10 mg oral tablet: 1 tab(s) orally once a day (27 Oct 2021 17:54)    MEDICATIONS  (STANDING):  cefTRIAXone   IVPB 2000 milliGRAM(s) IV Intermittent every 24 hours  chlorhexidine 0.12% Liquid 15 milliLiter(s) Oral Mucosa every 12 hours  chlorhexidine 4% Liquid 1 Application(s) Topical <User Schedule>  fentaNYL   Infusion... 0.5 MICROgram(s)/kG/Hr (1.96 mL/Hr) IV Continuous <Continuous>  insulin lispro (ADMELOG) corrective regimen sliding scale   SubCutaneous every 6 hours  lacosamide 100 milliGRAM(s) Oral two times a day  levETIRAcetam  IVPB 1000 milliGRAM(s) IV Intermittent every 12 hours  midazolam Infusion 0.02 mG/kG/Hr (1.56 mL/Hr) IV Continuous <Continuous>  norepinephrine Infusion 0.05 MICROgram(s)/kG/Min (7.33 mL/Hr) IV Continuous <Continuous>  pantoprazole  Injectable 40 milliGRAM(s) IV Push daily  phenylephrine    Infusion 0.1 MICROgram(s)/kG/Min (2.93 mL/Hr) IV Continuous <Continuous>  polyethylene glycol 3350 17 Gram(s) Oral daily  remdesivir  IVPB 100 milliGRAM(s) IV Intermittent every 24 hours  senna Syrup 10 milliLiter(s) Oral at bedtime  sodium chloride 0.9% Bolus 500 milliLiter(s) IV Bolus once  vasopressin Infusion 0.04 Unit(s)/Min (2.4 mL/Hr) IV Continuous <Continuous>    MEDICATIONS  (PRN):    ALLERGIES/INTOLERANCES:  Allergies  No Known Allergies    Intolerances    VITALS & EXAMINATION:  Vital Signs Last 24 Hrs  T(C): 35.8 (27 Oct 2021 17:00), Max: 35.8 (27 Oct 2021 17:00)  T(F): 96.4 (27 Oct 2021 17:00), Max: 96.4 (27 Oct 2021 17:00)  HR: 63 (27 Oct 2021 19:00) (62 - 98)  BP: --  BP(mean): --  RR: 22 (27 Oct 2021 19:00) (16 - 22)  SpO2: 99% (27 Oct 2021 19:00) (98% - 100%)    General:  Constitutional: Male, appears stated age, in no apparent distress including pain  Head: Normocephalic & atraumatic; Eyes: clear sclera; Neck: supple  Extremities: No cyanosis, clubbing, or edema; Skin: No rashes, bruising, or discoloration.  Resp: breathing comfortably, normal rate    Neurological (>12):  MS: Awake, alert, oriented to person, place, situation, time. Normal affect. Follows all commands. Cooperative.   Language: Speech is clear, fluent, intact with normal tone/rate/ volume and good repetition,  comprehension, registration of words. No perseverance.     CNs: PERRL (R = 3mm, L = 3mm). VFF. EOMI no nystagmus. V1-3 intact to LT, well developed masseter muscles b/l. No facial asymmetry b/l, full eye closure strength b/l. Hearing grossly normal (rubbing fingers) b/l.  Gag reflex deferred.     Motor: Normal muscle bulk & tone. No noticeable tremor or seizure. No pronator drift.              Deltoid	Biceps	Triceps	   R	5	5	5	5		 	  L	5	5	5	5			  	H-Flex	K-Ext	D-Flex	P-Flex  R	5	5	5	5			 	   L	5	5	5	5		     Sensation: Intact to LT/PP/Temp/Vibration/Position b/l., Cortical: Extinction on DSS (neglect): none  Reflexes:              Biceps(C5)       BR(C6)     Triceps(C7)               Patellar(L4)        Plantar Resp  R	2	          2	             2		        2		    	Down   L	2	          2	             2		        2		 	Down     Coordination: No dysmetria to FTN  Gait: Normal Romberg. No postural instability. Normal stance and tandem gait.     LABORATORY:  CBC                       13.7   20.13 )-----------( 311      ( 27 Oct 2021 17:50 )             42.2     Chem 10-27    140  |  102  |  28<H>  ----------------------------<  274<H>  3.6   |  24  |  1.34<H>    Ca    8.3<L>      27 Oct 2021 17:50  Phos  3.8     10-27  Mg     2.4     10-27    TPro  5.6<L>  /  Alb  2.4<L>  /  TBili  0.2  /  DBili  x   /  AST  49<H>  /  ALT  14  /  AlkPhos  100  10-27    LFTs LIVER FUNCTIONS - ( 27 Oct 2021 17:50 )  Alb: 2.4 g/dL / Pro: 5.6 g/dL / ALK PHOS: 100 U/L / ALT: 14 U/L / AST: 49 U/L / GGT: x           Coagulopathy PT/INR - ( 27 Oct 2021 17:50 )   PT: 13.4 sec;   INR: 1.12 ratio         PTT - ( 27 Oct 2021 17:50 )  PTT:34.6 sec  Lipid Panel   A1c   Cardiac enzymes     U/A   CSF  Other    STUDIES & IMAGING: (EEG, CT, MR, U/S, TTE/KATIE):     No Neurology Consultation  Angelo Conley, PGY-2        HPI: Patient is a 65 y/o M with PMHx DM, HTN, BPH, transferred from [facility] after family ?tonic clonic seizure. Patient was found hypoglycemic at [facility] with a blood sugar of 34. IN the ER pt had another episode of seizure activity and did not return to baseline and was subsequently intubated for airway protection. EEG was done on 10/26 after sedation was turned off and showed epileptic activity while patient was on Keppra 500bid. After EEG findings Keppra was increased to 100bid, however repeat EEG on 10/27 showed epileptic activity originating from left mid temporal area. On admission the patient was tested positive for covid 19. Neurology team in [place] added vimpat in addition to the keppra. Due to refractory seizures, pt was started on propofol for sedation. In addition blood cultures collected on 10/26 showed GPC bacteremia and group B strep isolated from urine culture. Pt has not required pressors during his stay at [facility] and was subsequently transferred to [address] for EEG monitoring and seizure management. Here, sedated on fentanyl and versed. On pressors for maps in 50. Unable to obtain ROS.     (Stroke only)  NIHSS: limited 2/2 sedation    PAST MEDICAL & SURGICAL HISTORY:  Hypertension    Diabetes    History of BPH    No significant past surgical history    FAMILY HISTORY:  No pertinent family history in first degree relatives    SOCIAL HISTORY: unable to obtain     MEDICATIONS (HOME):  Home Medications:  aspirin 81 mg oral tablet, chewable: 1 tab(s) orally once a day (27 Oct 2021 17:54)  Coreg 12.5 mg oral tablet: 1 tab(s) orally 2 times a day (27 Oct 2021 17:54)  Lipitor 40 mg oral tablet: 1 tab(s) orally once a day (27 Oct 2021 17:54)  Norvasc 10 mg oral tablet: 1 tab(s) orally once a day (27 Oct 2021 17:54)    MEDICATIONS  (STANDING):  cefTRIAXone   IVPB 2000 milliGRAM(s) IV Intermittent every 24 hours  chlorhexidine 0.12% Liquid 15 milliLiter(s) Oral Mucosa every 12 hours  chlorhexidine 4% Liquid 1 Application(s) Topical <User Schedule>  fentaNYL   Infusion... 0.5 MICROgram(s)/kG/Hr (1.96 mL/Hr) IV Continuous <Continuous>  insulin lispro (ADMELOG) corrective regimen sliding scale   SubCutaneous every 6 hours  lacosamide 100 milliGRAM(s) Oral two times a day  levETIRAcetam  IVPB 1000 milliGRAM(s) IV Intermittent every 12 hours  midazolam Infusion 0.02 mG/kG/Hr (1.56 mL/Hr) IV Continuous <Continuous>  norepinephrine Infusion 0.05 MICROgram(s)/kG/Min (7.33 mL/Hr) IV Continuous <Continuous>  pantoprazole  Injectable 40 milliGRAM(s) IV Push daily  phenylephrine    Infusion 0.1 MICROgram(s)/kG/Min (2.93 mL/Hr) IV Continuous <Continuous>  polyethylene glycol 3350 17 Gram(s) Oral daily  remdesivir  IVPB 100 milliGRAM(s) IV Intermittent every 24 hours  senna Syrup 10 milliLiter(s) Oral at bedtime  sodium chloride 0.9% Bolus 500 milliLiter(s) IV Bolus once  vasopressin Infusion 0.04 Unit(s)/Min (2.4 mL/Hr) IV Continuous <Continuous>    MEDICATIONS  (PRN):    ALLERGIES/INTOLERANCES:  Allergies  No Known Allergies    Intolerances    VITALS & EXAMINATION:  Vital Signs Last 24 Hrs  T(C): 35.8 (27 Oct 2021 17:00), Max: 35.8 (27 Oct 2021 17:00)  T(F): 96.4 (27 Oct 2021 17:00), Max: 96.4 (27 Oct 2021 17:00)  HR: 63 (27 Oct 2021 19:00) (62 - 98)  BP: --  BP(mean): --  RR: 22 (27 Oct 2021 19:00) (16 - 22)  SpO2: 99% (27 Oct 2021 19:00) (98% - 100%)    General: on sedation versed/ fentanyl  General:  lying in bed intubated on sedation   Eyes: Conjunctiva and sclera clear   Neurologic:  - Mental Status: intubated on sedation   - Cranial Nerves II-XII:  Pupils are 4 mm, equal, round, and nonreactive to light.  Eyes b/l fixated on head turn.    - Motor: Flaccid throughout, no spontaneous movement. No decorticate or decerebrate posturing on exam.     - Reflexes: 2+ and symmetric at the biceps, triceps, brachioradialis, +1 knees.  - Sensory: sedated  - Coordination & Gait: Unable to obtain.  - upgoing toes b/l feet    LABORATORY:  CBC                       13.7   20.13 )-----------( 311      ( 27 Oct 2021 17:50 )             42.2     Chem 10-27    140  |  102  |  28<H>  ----------------------------<  274<H>  3.6   |  24  |  1.34<H>    Ca    8.3<L>      27 Oct 2021 17:50  Phos  3.8     10-27  Mg     2.4     10-27    TPro  5.6<L>  /  Alb  2.4<L>  /  TBili  0.2  /  DBili  x   /  AST  49<H>  /  ALT  14  /  AlkPhos  100  10-27    LFTs LIVER FUNCTIONS - ( 27 Oct 2021 17:50 )  Alb: 2.4 g/dL / Pro: 5.6 g/dL / ALK PHOS: 100 U/L / ALT: 14 U/L / AST: 49 U/L / GGT: x           Coagulopathy PT/INR - ( 27 Oct 2021 17:50 )   PT: 13.4 sec;   INR: 1.12 ratio       PTT - ( 27 Oct 2021 17:50 )  PTT:34.6 sec  Lipid Panel   A1c   Cardiac enzymes     U/A   CSF  Other    STUDIES & IMAGING: (EEG, CT, MR, U/S, TTE/KATIE):     Neurology Consultation  Angelo Conley, PGY-2        HPI: Patient is a 65 y/o M with PMHx DM, HTN, BPH, transferred from [facility] after family ?tonic clonic seizure. Patient was found hypoglycemic at [facility] with a blood sugar of 34. IN the ER pt had another episode of seizure activity and did not return to baseline and was subsequently intubated for airway protection. EEG was done on 10/26 after sedation was turned off and showed epileptic activity while patient was on Keppra 500bid. After EEG findings Keppra was increased to 100bid, however repeat EEG on 10/27 showed epileptic activity originating from left mid temporal area. On admission the patient was tested positive for covid 19. Neurology team in [place] added vimpat in addition to the keppra. Due to refractory seizures, pt was started on propofol for sedation. In addition blood cultures collected on 10/26 showed GPC bacteremia and group B strep isolated from urine culture. Pt has not required pressors during his stay at [facility] and was subsequently transferred to [address] for EEG monitoring and seizure management. Here, sedated on fentanyl and versed. On pressors for maps in 50. Unable to obtain ROS.     (Stroke only)  NIHSS: limited 2/2 sedation    PAST MEDICAL & SURGICAL HISTORY:  Hypertension    Diabetes    History of BPH    No significant past surgical history    FAMILY HISTORY:  No pertinent neurologic  family history in first degree relatives    SOCIAL HISTORY: unable to obtain     MEDICATIONS (HOME):  Home Medications:  aspirin 81 mg oral tablet, chewable: 1 tab(s) orally once a day (27 Oct 2021 17:54)  Coreg 12.5 mg oral tablet: 1 tab(s) orally 2 times a day (27 Oct 2021 17:54)  Lipitor 40 mg oral tablet: 1 tab(s) orally once a day (27 Oct 2021 17:54)  Norvasc 10 mg oral tablet: 1 tab(s) orally once a day (27 Oct 2021 17:54)    MEDICATIONS  (STANDING):  cefTRIAXone   IVPB 2000 milliGRAM(s) IV Intermittent every 24 hours  chlorhexidine 0.12% Liquid 15 milliLiter(s) Oral Mucosa every 12 hours  chlorhexidine 4% Liquid 1 Application(s) Topical <User Schedule>  fentaNYL   Infusion... 0.5 MICROgram(s)/kG/Hr (1.96 mL/Hr) IV Continuous <Continuous>  insulin lispro (ADMELOG) corrective regimen sliding scale   SubCutaneous every 6 hours  lacosamide 100 milliGRAM(s) Oral two times a day  levETIRAcetam  IVPB 1000 milliGRAM(s) IV Intermittent every 12 hours  midazolam Infusion 0.02 mG/kG/Hr (1.56 mL/Hr) IV Continuous <Continuous>  norepinephrine Infusion 0.05 MICROgram(s)/kG/Min (7.33 mL/Hr) IV Continuous <Continuous>  pantoprazole  Injectable 40 milliGRAM(s) IV Push daily  phenylephrine    Infusion 0.1 MICROgram(s)/kG/Min (2.93 mL/Hr) IV Continuous <Continuous>  polyethylene glycol 3350 17 Gram(s) Oral daily  remdesivir  IVPB 100 milliGRAM(s) IV Intermittent every 24 hours  senna Syrup 10 milliLiter(s) Oral at bedtime  sodium chloride 0.9% Bolus 500 milliLiter(s) IV Bolus once  vasopressin Infusion 0.04 Unit(s)/Min (2.4 mL/Hr) IV Continuous <Continuous>    MEDICATIONS  (PRN):    ALLERGIES/INTOLERANCES:  Allergies  No Known Allergies    Intolerances    VITALS & EXAMINATION:  Vital Signs Last 24 Hrs  T(C): 35.8 (27 Oct 2021 17:00), Max: 35.8 (27 Oct 2021 17:00)  T(F): 96.4 (27 Oct 2021 17:00), Max: 96.4 (27 Oct 2021 17:00)  HR: 63 (27 Oct 2021 19:00) (62 - 98)  BP: --  BP(mean): --  RR: 22 (27 Oct 2021 19:00) (16 - 22)  SpO2: 99% (27 Oct 2021 19:00) (98% - 100%)    General: on sedation versed/ fentanyl, healthy appearing  Resp: on vent, not overbreathing  Head: atraumatic  Eyes: nonicteric  Skin: no rashes or discoloration  Ext: no joint deformity  vasc: no edema    General:  lying in bed intubated on sedation   Eyes: Conjunctiva and sclera clear   Neurologic:  - Mental Status: intubated on sedation   - Cranial Nerves II-XII:  Pupils are 4 mm, equal, round, and nonreactive to light.  Eyes b/l fixated on head turn.    - Motor: Flaccid tone throughout, no spontaneous movement. No decorticate or decerebrate posturing on exam.     - Reflexes: 2+ and symmetric at the biceps, triceps, brachioradialis, +1 knees.  - Sensory: sedated  - Coordination & Gait: Unable to obtain.  - upgoing toes b/l feet    LABORATORY:  CBC                       13.7   20.13 )-----------( 311      ( 27 Oct 2021 17:50 )             42.2     Chem 10-27    140  |  102  |  28<H>  ----------------------------<  274<H>  3.6   |  24  |  1.34<H>    Ca    8.3<L>      27 Oct 2021 17:50  Phos  3.8     10-27  Mg     2.4     10-27    TPro  5.6<L>  /  Alb  2.4<L>  /  TBili  0.2  /  DBili  x   /  AST  49<H>  /  ALT  14  /  AlkPhos  100  10-27    LFTs LIVER FUNCTIONS - ( 27 Oct 2021 17:50 )  Alb: 2.4 g/dL / Pro: 5.6 g/dL / ALK PHOS: 100 U/L / ALT: 14 U/L / AST: 49 U/L / GGT: x           Coagulopathy PT/INR - ( 27 Oct 2021 17:50 )   PT: 13.4 sec;   INR: 1.12 ratio       PTT - ( 27 Oct 2021 17:50 )  PTT:34.6 sec  Lipid Panel   A1c   Cardiac enzymes     U/A   CSF  Other    STUDIES & IMAGING: (EEG, CT, MR, U/S, TTE/KATIE):
